# Patient Record
Sex: FEMALE | Race: WHITE | NOT HISPANIC OR LATINO | Employment: OTHER | URBAN - NONMETROPOLITAN AREA
[De-identification: names, ages, dates, MRNs, and addresses within clinical notes are randomized per-mention and may not be internally consistent; named-entity substitution may affect disease eponyms.]

---

## 2017-01-04 DIAGNOSIS — I65.23 BILATERAL CAROTID ARTERY STENOSIS: Primary | ICD-10-CM

## 2017-01-07 ENCOUNTER — TRANSCRIBE ORDERS (OUTPATIENT)
Dept: CARDIAC SURGERY | Facility: CLINIC | Age: 82
End: 2017-01-07

## 2017-01-07 DIAGNOSIS — I65.23 BILATERAL CAROTID ARTERY OCCLUSION: Primary | ICD-10-CM

## 2017-01-31 ENCOUNTER — OFFICE VISIT (OUTPATIENT)
Dept: CARDIAC SURGERY | Facility: CLINIC | Age: 82
End: 2017-01-31

## 2017-01-31 VITALS
SYSTOLIC BLOOD PRESSURE: 149 MMHG | DIASTOLIC BLOOD PRESSURE: 74 MMHG | HEART RATE: 70 BPM | HEIGHT: 62 IN | OXYGEN SATURATION: 97 % | WEIGHT: 106.3 LBS | BODY MASS INDEX: 19.56 KG/M2

## 2017-01-31 DIAGNOSIS — I65.23 BILATERAL CAROTID ARTERY STENOSIS: Primary | ICD-10-CM

## 2017-01-31 PROBLEM — I65.29 CAROTID ARTERY STENOSIS: Status: ACTIVE | Noted: 2017-01-31

## 2017-01-31 PROCEDURE — 99212 OFFICE O/P EST SF 10 MIN: CPT | Performed by: NURSE PRACTITIONER

## 2017-02-01 NOTE — PROGRESS NOTES
Subjective   Patient ID: Naomi IRVING Son is a 82 y.o. female is here today for follow-up CAROTID STENOSIS.    History of Present Illness  The following portions of the patient's history were reviewed and updated as appropriate: allergies, current medications, past family history, past medical history, past social history, past surgical history and problem list.  Carotid Artery Disease:  No amaurosis fugax, No TIA, No stroke, No dizziness, No syncope  12/19/2013 Carotid Duplex:  CASPER 16-49% antegrade.  LICA 50-79% antegrade.  12/18/14  Carotid Duplex:  CASPER 50-79% (ratio 2.4)   LICA 50-79% (ratio 2.4).  6/23/15: Carotid duplex: CASPER 50-79%(ratio 2.9) LICA 50-79%  12/29/15: Carotid duplex: CASPER 50-79% (ratio 3.6). LICA 50-79% ratio 1.8)   7/28/16: Carotid duplex: CASPER 50-79% (mPSV 239cm/s ratio 2.9) LICA 50-79% (mPSV 236cm/s ratio 1.7)  1/31/17: Carotid duplex: CASPER 50-69% (mPSV 185cm/s, ratio 1.7) LICA 50-69% (mPSV 202cm/s, ratio 2.0)    Ms Son is a 82yr woman with CAD, NSTEMI, ischemic cardiomyopathy (EF 10-15%), CHF, HTN, CKD3, dyslipidemia,  CHF, and carotid stenosis.  She returns today in scheduled follow up for carotid stenosis evaluation.  She denies any neurosensory or motor symptoms.   Denies visual or motor changes.  No CVA/TIA.     11/16/2013 Echocardiogram:  LA 38, LV 56, RV 49, IVS 9.  EF 20%.  GIULIA 2.4.  11/22/2013 Cardiac Catheterization:  LAD 70-80%, D1 70-80%, CX 95%, RCA 30% mild irreg.  moderate MR, EF 10-15%.  /24.  12/18/2013 MISSY:  EF 20%, global severe hypokinesis, no clot, LVH.  central moderate MR.  mild to moderate AI.  12/19/2013 Myocardial Viability Study:  anterior wall normal.  inferior no appreciable viable tissue in infarcted area.  lateral small amount vialble tissue on delayed images, most nonviable. (Dr Cisneros  has reviewed, noting infarcted nonviable lateral wall, anterior and inferior walls are viable.)  12/19/2013 Lower extremity vein mapping:  RIGHT 2.3-5.0mm.  LEFT  1.8-3.8mm.  3/10/2014 Echocardiogram:  LA 37, LV 58, RV 49, IVS 9.  EF 35%.  GIULIA 2.4. tr MR  4/8/14: OFF PUMP CABG X 1  4/22/14: CXR : Bilateral pleural effusions, left greater than right, and  minimal bibasilar atelectasis.  5/28/14: Echo: EF 45-50%. LV borderline reduced. LA mildly dilated. Borderline global hypokineses of LV.       Current Outpatient Prescriptions:   •  aspirin 81 MG EC tablet, Take 81 mg by mouth Daily., Disp: , Rfl:   •  atorvastatin (LIPITOR) 10 MG tablet, Take 10 mg by mouth Daily., Disp: , Rfl:   •  B Complex-C-Zn-Folic Acid (DIALYVITE/ZINC) tablet, , Disp: , Rfl:   •  carvedilol (COREG) 12.5 MG tablet, Take 6.25 mg by mouth 2 (Two) Times a Day With Meals., Disp: , Rfl:   •  clopidogrel (PLAVIX) 75 MG tablet, Take 75 mg by mouth Daily., Disp: , Rfl:   •  furosemide (LASIX) 40 MG tablet, Take 40 mg by mouth As Needed (edema)., Disp: , Rfl:   •  losartan (COZAAR) 50 MG tablet, Take 50 mg by mouth Daily., Disp: , Rfl:   •  magnesium oxide (MAGOX) 400 (241.3 MG) MG tablet tablet, Take 400 mg by mouth 2 (Two) Times a Day., Disp: , Rfl:   •  paricalcitol (ZEMPLAR) 1 MCG capsule, Take 1 mcg by mouth Take As Directed. Monday and Thursday, Disp: , Rfl:     Review of Systems   Eyes: Negative for visual disturbance.   Cardiovascular: Negative for claudication.   Respiratory: Negative for shortness of breath.    Hematologic/Lymphatic: Negative for bleeding problem.   Skin: Negative for color change.   Musculoskeletal: Negative for muscle weakness.   Gastrointestinal: Negative for dysphagia.   Genitourinary: Negative for dysuria.   Neurological: Negative for focal weakness, light-headedness, loss of balance, numbness and paresthesias.   All other systems reviewed and are negative.       Objective   Physical Exam   Constitutional: She is oriented to person, place, and time. She appears well-developed.   HENT:   Head: Normocephalic.   Eyes: Pupils are equal, round, and reactive to light.   Neck: Carotid  bruit is present.   Cardiovascular: Normal rate.    Pulmonary/Chest: Effort normal and breath sounds normal.   Abdominal: Soft. Bowel sounds are normal.   Musculoskeletal: Normal range of motion. She exhibits no edema.   Neurological: She is alert and oriented to person, place, and time. No cranial nerve deficit.   Skin: Skin is warm and dry. No erythema.   Psychiatric: She has a normal mood and affect.   Vitals reviewed.      Office Visit on 01/31/2017   Component Date Value Ref Range Status   • Prox CCA PSV 01/31/2017 141.6  cm/sec In process   • Prox CCA EDV 01/31/2017 17.8  cm/sec In process   • Dist CCA PSV 01/31/2017 69.4  cm/sec In process   • Dist CCA EDV 01/31/2017 13.7  cm/sec In process   • Prox ECA PSV 01/31/2017 222.1  cm/sec In process   • Prox ECA EDV 01/31/2017 0  cm/sec In process   • Prox ICA PSV 01/31/2017 168.9  cm/sec In process   • Prox ICA EDV 01/31/2017 32.3  cm/sec In process   • Mid ICA PSV 01/31/2017 185.7  cm/sec In process   • Mid ICA EDV 01/31/2017 29.5  cm/sec In process   • Dist ICA PSV 01/31/2017 130.3  cm/sec In process   • Dist ICA EDV 01/31/2017 22  cm/sec In process   • Vertebral A PSV 01/31/2017 121.5  cm/sec In process   • Vertebral A EDV 01/31/2017 20.9  cm/sec In process   • ICA/CCA ratio 01/31/2017 1.3   In process   • Prox CCA PSV 01/31/2017 83.3  cm/sec In process   • Prox CCA EDV 01/31/2017 0  cm/sec In process   • Dist CCA PSV 01/31/2017 110.9  cm/sec In process   • Dist CCA EDV 01/31/2017 15  cm/sec In process   • Prox ECA PSV 01/31/2017 510.9  cm/sec In process   • Prox ECA EDV 01/31/2017 0  cm/sec In process   • Prox ICA PSV 01/31/2017 202.4  cm/sec In process   • Prox ICA EDV 01/31/2017 29.5  cm/sec In process   • Mid ICA PSV 01/31/2017 149.1  cm/sec In process   • Mid ICA EDV 01/31/2017 13  cm/sec In process   • Dist ICA PSV 01/31/2017 74.5  cm/sec In process   • Dist ICA EDV 01/31/2017 13.9  cm/sec In process   • Vertebral A PSV 01/31/2017 61.7  cm/sec In process    • Vertebral A EDV 01/31/2017 15  cm/sec In process   • ICA/CCA ratio 01/31/2017 1.8   In process       Assessment/Plan   Independent Review of Radiographic Studies:    Detailed discussion regarding risks, benefits, and treatment plan.  Patient understands, agrees, and wishes to proceed with plan.  Progressing well.     1. Bilateral carotid artery stenosis  Grade 2 Moderate Carotid Stenosis BICA, Asymptomatic. Velocities stable.   - Duplex Carotid Ultrasound CAR; Future (1yr)  Medical Management: ASA,PLAVIX,STATIN   If you should experience any neurological symptoms including but not limited to visual or speech disturbances confusion, seizures, or weakness of limbs of one side of your body notify [facility] center immediately for evaluation or if after hours present to the nearest Emergency Department.              This document has been electronically signed by CHANA Redd on February 1, 2017 3:24 PM

## 2017-02-26 RX ORDER — ATORVASTATIN CALCIUM 10 MG/1
TABLET, FILM COATED ORAL
Qty: 30 TABLET | Refills: 5 | Status: CANCELLED | OUTPATIENT
Start: 2017-02-26

## 2017-02-27 RX ORDER — ATORVASTATIN CALCIUM 10 MG/1
10 TABLET, FILM COATED ORAL DAILY
Qty: 30 TABLET | Refills: 4 | Status: SHIPPED | OUTPATIENT
Start: 2017-02-27 | End: 2017-07-24 | Stop reason: SDUPTHER

## 2017-04-18 ENCOUNTER — TRANSCRIBE ORDERS (OUTPATIENT)
Dept: LAB | Facility: HOSPITAL | Age: 82
End: 2017-04-18

## 2017-04-18 ENCOUNTER — APPOINTMENT (OUTPATIENT)
Dept: LAB | Facility: HOSPITAL | Age: 82
End: 2017-04-18

## 2017-04-18 DIAGNOSIS — I10 ESSENTIAL HYPERTENSION, MALIGNANT: ICD-10-CM

## 2017-04-18 DIAGNOSIS — N18.30 CHRONIC KIDNEY DISEASE, STAGE III (MODERATE) (HCC): ICD-10-CM

## 2017-04-18 DIAGNOSIS — I10 ESSENTIAL HYPERTENSION, BENIGN: ICD-10-CM

## 2017-04-18 DIAGNOSIS — E83.30 DISORDERS OF PHOSPHORUS METABOLISM: ICD-10-CM

## 2017-04-18 DIAGNOSIS — I42.8 OTHER PRIMARY CARDIOMYOPATHIES: Primary | ICD-10-CM

## 2017-04-18 LAB
25(OH)D3 SERPL-MCNC: 40.2 NG/ML (ref 30–100)
ALBUMIN SERPL-MCNC: 4.4 G/DL (ref 3.4–4.8)
ALBUMIN/GLOB SERPL: 1.3 G/DL (ref 1.1–1.8)
ALP SERPL-CCNC: 142 U/L (ref 38–126)
ALT SERPL W P-5'-P-CCNC: 24 U/L (ref 9–52)
ANION GAP SERPL CALCULATED.3IONS-SCNC: 13 MMOL/L (ref 5–15)
AST SERPL-CCNC: 48 U/L (ref 14–36)
BASOPHILS # BLD AUTO: 0.06 10*3/MM3 (ref 0–0.2)
BASOPHILS NFR BLD AUTO: 1.2 % (ref 0–2)
BILIRUB SERPL-MCNC: 0.6 MG/DL (ref 0.2–1.3)
BUN BLD-MCNC: 20 MG/DL (ref 7–21)
BUN/CREAT SERPL: 13 (ref 7–25)
CALCIUM SPEC-SCNC: 9.5 MG/DL (ref 8.4–10.2)
CHLORIDE SERPL-SCNC: 99 MMOL/L (ref 95–110)
CO2 SERPL-SCNC: 26 MMOL/L (ref 22–31)
CREAT BLD-MCNC: 1.54 MG/DL (ref 0.5–1)
DEPRECATED RDW RBC AUTO: 43.5 FL (ref 36.4–46.3)
EOSINOPHIL # BLD AUTO: 0.27 10*3/MM3 (ref 0–0.7)
EOSINOPHIL NFR BLD AUTO: 5.2 % (ref 0–7)
ERYTHROCYTE [DISTWIDTH] IN BLOOD BY AUTOMATED COUNT: 13.7 % (ref 11.5–14.5)
GFR SERPL CREATININE-BSD FRML MDRD: 32 ML/MIN/1.73 (ref 39–90)
GLOBULIN UR ELPH-MCNC: 3.5 GM/DL (ref 2.3–3.5)
GLUCOSE BLD-MCNC: 109 MG/DL (ref 60–100)
HCT VFR BLD AUTO: 32.8 % (ref 35–45)
HGB BLD-MCNC: 10.9 G/DL (ref 12–15.5)
IMM GRANULOCYTES # BLD: 0.01 10*3/MM3 (ref 0–0.02)
IMM GRANULOCYTES NFR BLD: 0.2 % (ref 0–0.5)
LYMPHOCYTES # BLD AUTO: 1.16 10*3/MM3 (ref 0.6–4.2)
LYMPHOCYTES NFR BLD AUTO: 22.4 % (ref 10–50)
MCH RBC QN AUTO: 28.8 PG (ref 26.5–34)
MCHC RBC AUTO-ENTMCNC: 33.2 G/DL (ref 31.4–36)
MCV RBC AUTO: 86.5 FL (ref 80–98)
MONOCYTES # BLD AUTO: 0.53 10*3/MM3 (ref 0–0.9)
MONOCYTES NFR BLD AUTO: 10.2 % (ref 0–12)
NEUTROPHILS # BLD AUTO: 3.16 10*3/MM3 (ref 2–8.6)
NEUTROPHILS NFR BLD AUTO: 60.8 % (ref 37–80)
PLATELET # BLD AUTO: 281 10*3/MM3 (ref 150–450)
PMV BLD AUTO: 10.2 FL (ref 8–12)
POTASSIUM BLD-SCNC: 4.9 MMOL/L (ref 3.5–5.1)
PROT SERPL-MCNC: 7.9 G/DL (ref 6.3–8.6)
PTH-INTACT SERPL-MCNC: 55.9 PG/ML (ref 10–65)
RBC # BLD AUTO: 3.79 10*6/MM3 (ref 3.77–5.16)
SODIUM BLD-SCNC: 138 MMOL/L (ref 137–145)
WBC NRBC COR # BLD: 5.19 10*3/MM3 (ref 3.2–9.8)

## 2017-04-18 PROCEDURE — 83970 ASSAY OF PARATHORMONE: CPT | Performed by: INTERNAL MEDICINE

## 2017-04-18 PROCEDURE — 36415 COLL VENOUS BLD VENIPUNCTURE: CPT | Performed by: INTERNAL MEDICINE

## 2017-04-18 PROCEDURE — 85025 COMPLETE CBC W/AUTO DIFF WBC: CPT | Performed by: INTERNAL MEDICINE

## 2017-04-18 PROCEDURE — 82306 VITAMIN D 25 HYDROXY: CPT | Performed by: INTERNAL MEDICINE

## 2017-04-18 PROCEDURE — 80053 COMPREHEN METABOLIC PANEL: CPT | Performed by: INTERNAL MEDICINE

## 2017-06-07 ENCOUNTER — OFFICE VISIT (OUTPATIENT)
Dept: CARDIOLOGY | Facility: CLINIC | Age: 82
End: 2017-06-07

## 2017-06-07 VITALS
BODY MASS INDEX: 18.77 KG/M2 | DIASTOLIC BLOOD PRESSURE: 70 MMHG | SYSTOLIC BLOOD PRESSURE: 120 MMHG | WEIGHT: 102 LBS | HEIGHT: 62 IN | HEART RATE: 54 BPM

## 2017-06-07 DIAGNOSIS — I10 ESSENTIAL HYPERTENSION: ICD-10-CM

## 2017-06-07 DIAGNOSIS — I25.5 ISCHEMIC CARDIOMYOPATHY: Primary | ICD-10-CM

## 2017-06-07 DIAGNOSIS — E78.2 MIXED HYPERLIPIDEMIA: ICD-10-CM

## 2017-06-07 DIAGNOSIS — Z95.1 S/P CABG (CORONARY ARTERY BYPASS GRAFT): ICD-10-CM

## 2017-06-07 PROCEDURE — 99213 OFFICE O/P EST LOW 20 MIN: CPT | Performed by: INTERNAL MEDICINE

## 2017-06-07 RX ORDER — CARVEDILOL 25 MG/1
6.25 TABLET ORAL 2 TIMES DAILY WITH MEALS
COMMUNITY
End: 2020-02-21

## 2017-06-07 RX ORDER — MULTIVITAMIN WITH IRON
250 TABLET ORAL NIGHTLY
COMMUNITY
End: 2017-12-06

## 2017-06-07 RX ORDER — LOSARTAN POTASSIUM 25 MG/1
25 TABLET ORAL DAILY
COMMUNITY
End: 2019-02-14 | Stop reason: ALTCHOICE

## 2017-06-07 NOTE — PROGRESS NOTES
Naomi IRVING Son  82 y.o. female    06/07/2017  1. Ischemic cardiomyopathy    2. Essential hypertension    3. Mixed hyperlipidemia    4. S/P CABG (coronary artery bypass graft)        History of Present Illness    . Rm is here for a follow-up of her multiple cardiac issues.  She denied any chest pain, shortness of breath, palpitation, dizziness or syncope.  She takes Lasix only on a when necessary basis.  Blood pressure was in the normal range.  No signs of congestive heart failure was noted.  Her last echocardiogram showed normal LV systolic function.  She follows up with nephrology on a regular basis and her creatinine is 1.5.  I have advised her to get a lipid profiles checked with her next blood draw.        SUBJECTIVE    No Known Allergies      Past Medical History:   Diagnosis Date   • Acute bronchitis    • Arrhythmia    • Cardiomyopathy    • Carotid artery stenosis    • Chronic kidney disease     stage 3   • Congestive heart failure    • Contusion of elbow    • Cough    • Dyslipidemia    • Essential hypertension    • Fatigue    • Generalized ischemic myocardial dysfunction    • Hyperlipidemia    • Hypertension    • Iron deficiency anemia    • Mitral valve disease    • Multiple vessel coronary artery disease    • Surgical follow-up care    • UTI (urinary tract infection)          Past Surgical History:   Procedure Laterality Date   • CARDIAC CATHETERIZATION  11/22/2013    Multi-vessel CAD with critical lesions noted in the LAD, diagonal CA and lesion complex. Critical lesion noted in the obtuse marginal CA and moderate lesion noted in the proximal RCA. LV dysfunction with LV dilatation with global hypokinesis of LV.   • CORONARY ARTERY BYPASS GRAFT  04/08/2014    CABG ( off pump), placement of LIMA to LAD coronary artery (1 distal anastomosis), placement of left femoral arterial line, vascular ultrasound guidance.   • CORONARY ARTERY BYPASS GRAFT  04/08/2014    Off Pump LIMA-LAD   • TRANSESOPHAGEAL ECHOCARDIOGRAM  "(MISSY)  03/10/2014    with color flow-Depressed left ventricular systolic function with EF of 35%. Grade I diastolic dysfunction of the left ventricular myocardium. Mild AV regurgitation. Mild enlargement of the left ventricular cavity         Family History   Problem Relation Age of Onset   • Heart disease Other    • Hypertension Other          Social History     Social History   • Marital status:      Spouse name: N/A   • Number of children: N/A   • Years of education: N/A     Occupational History   • Not on file.     Social History Main Topics   • Smoking status: Former Smoker   • Smokeless tobacco: Never Used   • Alcohol use No   • Drug use: No   • Sexual activity: Defer     Other Topics Concern   • Not on file     Social History Narrative         Current Outpatient Prescriptions   Medication Sig Dispense Refill   • aspirin 81 MG EC tablet Take 81 mg by mouth Daily.     • atorvastatin (LIPITOR) 10 MG tablet Take 1 tablet by mouth Daily. 30 tablet 4   • B Complex-C-Zn-Folic Acid (DIALYVITE/ZINC) tablet      • carvedilol (COREG) 25 MG tablet Take 12.5 mg by mouth 2 (Two) Times a Day With Meals.     • clopidogrel (PLAVIX) 75 MG tablet Take 75 mg by mouth Daily.     • furosemide (LASIX) 40 MG tablet Take 40 mg by mouth As Needed (edema).     • losartan (COZAAR) 25 MG tablet Take 25 mg by mouth Daily.     • Magnesium 250 MG tablet Take 250 mg by mouth Every Night.       No current facility-administered medications for this visit.          OBJECTIVE    /70  Pulse 54  Ht 62\" (157.5 cm)  Wt 102 lb (46.3 kg)  BMI 18.66 kg/m2        Review of Systems     Constitutional:  Denies recent weight loss, weight gain, fever or chills     HENT:  Denies any hearing loss, epistaxis, hoarseness, or difficulty speaking.     Eyes: Wears eyeglasses or contact lenses     Respiratory:  Denies dyspnea with exertion,no cough, wheezing, or hemoptysis.     Cardiovascular: Negative for palpations, chest pain, orthopnea, PND, " peripheral edema, syncope, or claudication.     Gastrointestinal:  Denies change in bowel habits, dyspepsia, ulcer disease, hematochezia, or melena.     Endocrine: Negative for cold intolerance, heat intolerance, polydipsia, polyphagia and polyuria. Denies any history of weight change, heat/cold intolerance, polydipsia, polyuria     Genitourinary: Negative.        Physical Exam     Constitutional: Cooperative, alert and oriented, well-developed,  in no acute distress.     HENT:   Head: Normocephalic, normal hair patterns, no masses or tenderness.  Ears, Nose, and Throat: No gross abnormalities. No pallor or cyanosis. Dentition good.   Eyes: EOMS intact, PERRL, conjunctivae and lids unremarkable. Fundoscopic exam and visual fields not performed.   Neck: No palpable masses or adenopathy, no thyromegaly, no JVD, carotid pulses are full and equal bilaterally and without  Bruits.     Cardiovascular: Regular rhythm, S1 and S2 normal, no S3 or S4. Apical impulse not displaced. No murmurs, gallops, or rubs detected.     Pulmonary/Chest: Chest: normal symmetry, no tenderness to palpation, normal respiratory excursion, no intercostal retraction, no use of accessory muscles.            Pulmonary: Normal breath sounds. No rales or ronchi.    Abdominal: Abdomen soft, bowel sounds normoactive, no masses, no hepatosplenomegaly, non-tender, no bruits.     Musculoskeletal: No deformities, clubbing, cyanosis, erythema, or edema observed.    Procedures      Lab Results   Component Value Date    WBC 5.19 04/18/2017    HGB 10.9 (L) 04/18/2017    HCT 32.8 (L) 04/18/2017    MCV 86.5 04/18/2017     04/18/2017     Lab Results   Component Value Date    GLUCOSE 109 (H) 04/18/2017    BUN 20 04/18/2017    CREATININE 1.54 (H) 04/18/2017    EGFRIFNONA 32 (L) 04/18/2017    BCR 13.0 04/18/2017    CO2 26.0 04/18/2017    CALCIUM 9.5 04/18/2017    ALBUMIN 4.40 04/18/2017    LABIL2 1.3 04/18/2017    AST 48 (H) 04/18/2017    ALT 24 04/18/2017      No results found for: CHOL  Lab Results   Component Value Date    TRIG 89 01/19/2015     No results found for: HDL  Lab Results   Component Value Date    LDLCALC 92 01/19/2015     No results found for: LDL  No results found for: HDLLDLRATIO  No components found for: CHOLHDL  Lab Results   Component Value Date    HGBA1C 5.7 (H) 04/02/2014     Lab Results   Component Value Date    TSH 3.65 01/19/2016           ASSESSMENT AND PLAN   is stable with no evidence of progression of coronary artery disease.  No signs of congestive heart failure was noted.  Antiplatelet therapy with aspirin and Plavix, antihypertensive therapy with Coreg, losartan and when necessary Lasix has been continued.  Statin therapy with Lipitor has been continued.    Ada was seen today for follow-up.    Diagnoses and all orders for this visit:    Ischemic cardiomyopathy    Essential hypertension    Mixed hyperlipidemia    S/P CABG (coronary artery bypass graft)        Kalee Cisneros MD  6/7/2017  10:08 AM

## 2017-06-11 RX ORDER — CLOPIDOGREL BISULFATE 75 MG/1
TABLET ORAL
Qty: 90 TABLET | Refills: 2 | Status: CANCELLED | OUTPATIENT
Start: 2017-06-11

## 2017-06-12 RX ORDER — CLOPIDOGREL BISULFATE 75 MG/1
75 TABLET ORAL DAILY
Qty: 30 TABLET | Refills: 5 | Status: SHIPPED | OUTPATIENT
Start: 2017-06-12 | End: 2017-12-26 | Stop reason: SDUPTHER

## 2017-07-24 RX ORDER — ATORVASTATIN CALCIUM 10 MG/1
TABLET, FILM COATED ORAL
Qty: 30 TABLET | Refills: 6 | Status: SHIPPED | OUTPATIENT
Start: 2017-07-24 | End: 2018-02-23 | Stop reason: SDUPTHER

## 2017-12-06 ENCOUNTER — OFFICE VISIT (OUTPATIENT)
Dept: CARDIOLOGY | Facility: CLINIC | Age: 82
End: 2017-12-06

## 2017-12-06 VITALS
HEART RATE: 60 BPM | WEIGHT: 107 LBS | HEIGHT: 62 IN | BODY MASS INDEX: 19.69 KG/M2 | DIASTOLIC BLOOD PRESSURE: 70 MMHG | SYSTOLIC BLOOD PRESSURE: 152 MMHG

## 2017-12-06 DIAGNOSIS — Z95.1 S/P CABG (CORONARY ARTERY BYPASS GRAFT): ICD-10-CM

## 2017-12-06 DIAGNOSIS — I10 ESSENTIAL HYPERTENSION: Primary | ICD-10-CM

## 2017-12-06 DIAGNOSIS — Z86.79 H/O CHF: ICD-10-CM

## 2017-12-06 DIAGNOSIS — I25.5 ISCHEMIC CARDIOMYOPATHY: ICD-10-CM

## 2017-12-06 PROCEDURE — 99213 OFFICE O/P EST LOW 20 MIN: CPT | Performed by: INTERNAL MEDICINE

## 2017-12-06 RX ORDER — AMLODIPINE BESYLATE 5 MG/1
5 TABLET ORAL DAILY
Qty: 30 TABLET | Refills: 6 | Status: SHIPPED | OUTPATIENT
Start: 2017-12-06 | End: 2018-02-02 | Stop reason: SDUPTHER

## 2017-12-06 NOTE — PROGRESS NOTES
Naomi IRVING Son  82 y.o. female    12/06/2017  1. Essential hypertension    2. Ischemic cardiomyopathy    3. S/P CABG (coronary artery bypass graft)    4. H/O CHF        History of Present Illness    . Son Is here for follow-up of her above stated problems.  She denied any chest pain or shortness of breath but her blood pressure was noted to be elevated and when I checked it was 160/80 mmHg.  No signs of congestive heart failure was noted.  She has been compliant with her medications.  She follows up with Dr. Robertson on a regular basis and is due to have lab work done in January 2018.  I have added lipid profile to the labs ordered.        SUBJECTIVE    No Known Allergies      Past Medical History:   Diagnosis Date   • Acute bronchitis    • Arrhythmia    • Cardiomyopathy    • Carotid artery stenosis    • Chronic kidney disease     stage 3   • Congestive heart failure    • Contusion of elbow    • Cough    • Dyslipidemia    • Essential hypertension    • Fatigue    • Generalized ischemic myocardial dysfunction    • Hyperlipidemia    • Hypertension    • Iron deficiency anemia    • Mitral valve disease    • Multiple vessel coronary artery disease    • Surgical follow-up care    • UTI (urinary tract infection)          Past Surgical History:   Procedure Laterality Date   • CARDIAC CATHETERIZATION  11/22/2013    Multi-vessel CAD with critical lesions noted in the LAD, diagonal CA and lesion complex. Critical lesion noted in the obtuse marginal CA and moderate lesion noted in the proximal RCA. LV dysfunction with LV dilatation with global hypokinesis of LV.   • CORONARY ARTERY BYPASS GRAFT  04/08/2014    CABG ( off pump), placement of LIMA to LAD coronary artery (1 distal anastomosis), placement of left femoral arterial line, vascular ultrasound guidance.   • CORONARY ARTERY BYPASS GRAFT  04/08/2014    Off Pump LIMA-LAD   • TRANSESOPHAGEAL ECHOCARDIOGRAM (MISSY)  03/10/2014    with color flow-Depressed left ventricular systolic  "function with EF of 35%. Grade I diastolic dysfunction of the left ventricular myocardium. Mild AV regurgitation. Mild enlargement of the left ventricular cavity         Family History   Problem Relation Age of Onset   • Heart disease Other    • Hypertension Other          Social History     Social History   • Marital status:      Spouse name: N/A   • Number of children: N/A   • Years of education: N/A     Occupational History   • Not on file.     Social History Main Topics   • Smoking status: Former Smoker   • Smokeless tobacco: Never Used   • Alcohol use No   • Drug use: No   • Sexual activity: Defer     Other Topics Concern   • Not on file     Social History Narrative         Current Outpatient Prescriptions   Medication Sig Dispense Refill   • aspirin 81 MG EC tablet Take 81 mg by mouth Daily.     • atorvastatin (LIPITOR) 10 MG tablet TAKE ONE TABLET BY MOUTH DAILY 30 tablet 6   • carvedilol (COREG) 25 MG tablet Take 12.5 mg by mouth 2 (Two) Times a Day With Meals.     • clopidogrel (PLAVIX) 75 MG tablet Take 1 tablet by mouth Daily. 30 tablet 5   • furosemide (LASIX) 40 MG tablet Take 40 mg by mouth As Needed (edema).     • losartan (COZAAR) 25 MG tablet Take 25 mg by mouth Daily.     • amLODIPine (NORVASC) 5 MG tablet Take 1 tablet by mouth Daily. 30 tablet 6     No current facility-administered medications for this visit.          OBJECTIVE    /70  Pulse 60  Ht 157.5 cm (62.01\")  Wt 48.5 kg (107 lb)  BMI 19.57 kg/m2        Review of Systems     Constitutional:  Denies recent weight loss, weight gain, fever or chills, no change in exercise tolerance     HENT:  Denies any hearing loss, epistaxis, hoarseness, or difficulty speaking.     Eyes: Wears eyeglasses or contact lenses     Respiratory:  Denies dyspnea with exertion,no cough, wheezing, or hemoptysis.     Cardiovascular: Negative for palpations, chest pain, orthopnea, PND, peripheral edema, syncope, or claudication.     Gastrointestinal:  " Denies change in bowel habits, dyspepsia, ulcer disease, hematochezia, or melena.     Endocrine: Negative for cold intolerance, heat intolerance, polydipsia, polyphagia and polyuria.     Genitourinary: Negative.      Musculoskeletal: Denies any history of arthritic symptoms or back problems     Skin:  Denies any change in hair or nails, rashes, or skin lesions.     Allergic/Immunologic: Negative.  Negative for environmental allergies, food allergies and immunocompromised state.     Neurological:  Denies any history of recurrent headaches, strokes, TIA, or seizure disorder.     Hematological: Denies any food allergies, seasonal allergies, bleeding disorders, or lymphadenopathy.     Psychiatric/Behavioral: Denies any history of depression, substance abuse, or change in cognitive function.         Physical Exam     Constitutional: Cooperative, alert and oriented, in no acute distress.     HENT:   Head: Normocephalic, normal hair patterns, no masses or tenderness.  Ears, Nose, and Throat: No gross abnormalities. No pallor or cyanosis.   Eyes: EOMS intact, PERRL, conjunctivae and lids unremarkable. Fundoscopic exam and visual fields not performed.   Neck: No palpable masses or adenopathy, no thyromegaly, no JVD, carotid pulses are full and equal bilaterally and without  Bruits.     Cardiovascular: Regular rhythm, S1 and S2 normal, no S3 or S4. No murmurs, gallops, or rubs detected.     Pulmonary/Chest: Chest: normal symmetry, no tenderness to palpation, normal respiratory excursion, no use of accessory muscles.            Pulmonary: Normal breath sounds. No rales or ronchi.    Abdominal: Abdomen soft, bowel sounds normoactive, no masses, no hepatosplenomegaly, non-tender, no bruits.     Musculoskeletal: No deformities, clubbing, cyanosis, erythema, or edema observed.     Neurological: No gross motor or sensory deficits noted, affect appropriate, oriented to time, person, place.     Skin: Warm and dry to the touch, no  apparent skin lesions or masses noted.     Psychiatric: She has a normal mood and affect. Her behavior is normal. Judgment and thought content normal.         Procedures      Lab Results   Component Value Date    WBC 5.19 04/18/2017    HGB 10.9 (L) 04/18/2017    HCT 32.8 (L) 04/18/2017    MCV 86.5 04/18/2017     04/18/2017     Lab Results   Component Value Date    GLUCOSE 109 (H) 04/18/2017    BUN 20 04/18/2017    CREATININE 1.54 (H) 04/18/2017    EGFRIFNONA 32 (L) 04/18/2017    BCR 13.0 04/18/2017    CO2 26.0 04/18/2017    CALCIUM 9.5 04/18/2017    ALBUMIN 4.40 04/18/2017    LABIL2 1.3 04/18/2017    AST 48 (H) 04/18/2017    ALT 24 04/18/2017     No results found for: CHOL  Lab Results   Component Value Date    TRIG 89 01/19/2015     No results found for: HDL  Lab Results   Component Value Date    LDLCALC 92 01/19/2015     No results found for: LDL  No results found for: HDLLDLRATIO  No components found for: CHOLHDL  Lab Results   Component Value Date    HGBA1C 5.7 (H) 04/02/2014     Lab Results   Component Value Date    TSH 3.65 01/19/2016           ASSESSMENT AND PLAN  Mrs. Abdalla is stable with regards to her heart with no evidence of angina or congestive heart failure.  No arrhythmias noted.  Optimization of blood pressure I have started her on amlodipine 5 mg daily in addition to losartan 25 mg in a.m. and Coreg 6.25 mg twice a day.  Lipid-lowering therapy with atorvastatin has been continued and antiplatelet therapy with aspirin has been continued.    Ada was seen today for follow-up.    Diagnoses and all orders for this visit:    Essential hypertension    Ischemic cardiomyopathy    S/P CABG (coronary artery bypass graft)    H/O CHF    Other orders  -     amLODIPine (NORVASC) 5 MG tablet; Take 1 tablet by mouth Daily.        Kalee Cisneros MD  12/6/2017  11:16 AM

## 2017-12-26 RX ORDER — CLOPIDOGREL BISULFATE 75 MG/1
TABLET ORAL
Qty: 30 TABLET | Refills: 6 | Status: SHIPPED | OUTPATIENT
Start: 2017-12-26 | End: 2018-07-25 | Stop reason: SDUPTHER

## 2018-01-22 ENCOUNTER — TRANSCRIBE ORDERS (OUTPATIENT)
Dept: LAB | Facility: HOSPITAL | Age: 83
End: 2018-01-22

## 2018-01-22 ENCOUNTER — LAB (OUTPATIENT)
Dept: LAB | Facility: HOSPITAL | Age: 83
End: 2018-01-22

## 2018-01-22 DIAGNOSIS — N18.30 CHRONIC KIDNEY DISEASE, STAGE III (MODERATE) (HCC): ICD-10-CM

## 2018-01-22 DIAGNOSIS — I10 ESSENTIAL (PRIMARY) HYPERTENSION: ICD-10-CM

## 2018-01-22 DIAGNOSIS — I42.8 OBSCURE CARDIOMYOPATHY OF AFRICA (HCC): Primary | ICD-10-CM

## 2018-01-22 DIAGNOSIS — I10 ESSENTIAL HYPERTENSION: Primary | ICD-10-CM

## 2018-01-22 DIAGNOSIS — E83.30 DISORDER OF PHOSPHORUS METABOLISM: ICD-10-CM

## 2018-01-22 LAB
25(OH)D3 SERPL-MCNC: 33.3 NG/ML (ref 30–100)
ALBUMIN SERPL-MCNC: 4.3 G/DL (ref 3.4–4.8)
ANION GAP SERPL CALCULATED.3IONS-SCNC: 12 MMOL/L (ref 5–15)
ARTICHOKE IGE QN: 72 MG/DL (ref 1–129)
BUN BLD-MCNC: 21 MG/DL (ref 7–21)
BUN/CREAT SERPL: 12.3 (ref 7–25)
CALCIUM SPEC-SCNC: 9.7 MG/DL (ref 8.4–10.2)
CHLORIDE SERPL-SCNC: 100 MMOL/L (ref 95–110)
CHOLEST SERPL-MCNC: 163 MG/DL (ref 0–199)
CO2 SERPL-SCNC: 26 MMOL/L (ref 22–31)
CREAT BLD-MCNC: 1.71 MG/DL (ref 0.5–1)
GFR SERPL CREATININE-BSD FRML MDRD: 29 ML/MIN/1.73 (ref 39–90)
GLUCOSE BLD-MCNC: 96 MG/DL (ref 60–100)
HCT VFR BLD AUTO: 34.7 % (ref 35–45)
HDLC SERPL-MCNC: 61 MG/DL (ref 60–200)
HGB BLD-MCNC: 11.1 G/DL (ref 12–15.5)
LDLC/HDLC SERPL: 1.44 {RATIO} (ref 0–3.22)
PHOSPHATE SERPL-MCNC: 4.3 MG/DL (ref 2.4–4.4)
POTASSIUM BLD-SCNC: 4.7 MMOL/L (ref 3.5–5.1)
SODIUM BLD-SCNC: 138 MMOL/L (ref 137–145)
TRIGL SERPL-MCNC: 71 MG/DL (ref 20–199)

## 2018-01-22 PROCEDURE — 82306 VITAMIN D 25 HYDROXY: CPT | Performed by: INTERNAL MEDICINE

## 2018-01-22 PROCEDURE — 80061 LIPID PANEL: CPT

## 2018-01-22 PROCEDURE — 85018 HEMOGLOBIN: CPT | Performed by: INTERNAL MEDICINE

## 2018-01-22 PROCEDURE — 36415 COLL VENOUS BLD VENIPUNCTURE: CPT

## 2018-01-22 PROCEDURE — 83970 ASSAY OF PARATHORMONE: CPT | Performed by: INTERNAL MEDICINE

## 2018-01-22 PROCEDURE — 85014 HEMATOCRIT: CPT | Performed by: INTERNAL MEDICINE

## 2018-01-22 PROCEDURE — 80069 RENAL FUNCTION PANEL: CPT | Performed by: INTERNAL MEDICINE

## 2018-01-24 LAB — PTH-INTACT SERPL-MCNC: 78.9 PG/ML (ref 10–65)

## 2018-02-02 RX ORDER — AMLODIPINE BESYLATE 5 MG/1
5 TABLET ORAL DAILY
Qty: 30 TABLET | Refills: 3 | Status: SHIPPED | OUTPATIENT
Start: 2018-02-02 | End: 2020-11-12

## 2018-02-13 ENCOUNTER — OFFICE VISIT (OUTPATIENT)
Dept: CARDIAC SURGERY | Facility: CLINIC | Age: 83
End: 2018-02-13

## 2018-02-13 VITALS
OXYGEN SATURATION: 97 % | WEIGHT: 109 LBS | HEIGHT: 62 IN | HEART RATE: 65 BPM | BODY MASS INDEX: 20.06 KG/M2 | DIASTOLIC BLOOD PRESSURE: 70 MMHG | SYSTOLIC BLOOD PRESSURE: 138 MMHG

## 2018-02-13 DIAGNOSIS — I65.23 BILATERAL CAROTID ARTERY STENOSIS: Primary | ICD-10-CM

## 2018-02-13 PROCEDURE — 99213 OFFICE O/P EST LOW 20 MIN: CPT | Performed by: NURSE PRACTITIONER

## 2018-02-13 NOTE — PATIENT INSTRUCTIONS
Moderate Carotid Stenosis-Stable  Medical Management: ASA,PLAVIX,STATIN   If you should experience any neurological symptoms including but not limited to visual or speech disturbances confusion, seizures, or weakness of limbs of one side of your body notify [facility] V[facility] center immediately for evaluation or if after hours present to the nearest Emergency Department.    Return 1 year- Carotid Duplex

## 2018-02-13 NOTE — PROGRESS NOTES
Subjective   Patient ID: Naomi IRVING Son is a 83 y.o. female is here today for follow-up CAROTID STENOSIS.    History of Present Illness  The following portions of the patient's history were reviewed and updated as appropriate: allergies, current medications, past family history, past medical history, past social history, past surgical history and problem list.  PCP: None  Card: Neptali  Renal: Ailyn Suh Son is a 83yr woman with CAD, NSTEMI, ischemic cardiomyopathy (EF 10-15%), CHF, HTN, CKD3, dyslipidemia,  CHF, and carotid stenosis.  She returns today in scheduled follow up for carotid stenosis evaluation.  She denies any neurosensory or motor symptoms.   Denies visual or motor changes.  No CVA/TIA.     Carotid Artery Disease:  No amaurosis fugax, No TIA, No stroke, No dizziness, No syncope  12/19/2013 Carotid Duplex:  CASPER 16-49% antegrade.  LICA 50-79% antegrade.  12/18/14  Carotid Duplex:  CASPER 50-79% (ratio 2.4)   LICA 50-79% (ratio 2.4).  6/23/15: Carotid duplex: CASPER 50-79%(ratio 2.9) LICA 50-79%  12/29/15: Carotid duplex: CASPER 50-79% (ratio 3.6). LICA 50-79% ratio 1.8)   7/28/16: Carotid duplex: CASPER 50-79% (mPSV 239cm/s ratio 2.9) LICA 50-79% (mPSV 236cm/s ratio 1.7)  1/31/17: Carotid duplex: CASPER 50-69% (mPSV 185cm/s, ratio 1.7) LICA 50-69% (mPSV 202cm/s, ratio 2.0)  2/3/18: Carotid duplex: CASPER 50-69% (nIAP407lc/s, ratio 3.1) LICA 50-69% (mPSV 407cm/s, ratio 0.9)    Cardiac  11/16/2013 Echocardiogram:  LA 38, LV 56, RV 49, IVS 9.  EF 20%.  GIULIA 2.4.  11/22/2013 Cardiac Catheterization:  LAD 70-80%, D1 70-80%, CX 95%, RCA 30% mild irreg.  moderate MR, EF 10-15%.  /24.  12/18/2013 MISSY:  EF 20%, global severe hypokinesis, no clot, LVH.  central moderate MR.  mild to moderate AI.  12/19/2013 Myocardial Viability Study:  anterior wall normal.  inferior no appreciable viable tissue in infarcted area.  lateral small amount vialble tissue on delayed images, most nonviable. (Dr Cisneros  has reviewed, noting  infarcted nonviable lateral wall, anterior and inferior walls are viable.)  12/19/2013 Lower extremity vein mapping:  RIGHT 2.3-5.0mm.  LEFT 1.8-3.8mm.  3/10/2014 Echocardiogram:  LA 37, LV 58, RV 49, IVS 9.  EF 35%.  GIULIA 2.4. tr MR  4/8/14: OFF PUMP CABG X 1  4/22/14: CXR : Bilateral pleural effusions, left greater than right, and  minimal bibasilar atelectasis.  5/28/14: Echo: EF 45-50%. LV borderline reduced. LA mildly dilated. Borderline global hypokineses of LV.       Current Outpatient Prescriptions:   •  amLODIPine (NORVASC) 5 MG tablet, Take 1 tablet by mouth Daily., Disp: 30 tablet, Rfl: 3  •  aspirin 81 MG EC tablet, Take 81 mg by mouth Daily., Disp: , Rfl:   •  atorvastatin (LIPITOR) 10 MG tablet, TAKE ONE TABLET BY MOUTH DAILY, Disp: 30 tablet, Rfl: 6  •  carvedilol (COREG) 25 MG tablet, Take 6.25 mg by mouth 2 (Two) Times a Day With Meals., Disp: , Rfl:   •  clopidogrel (PLAVIX) 75 MG tablet, TAKE ONE TABLET BY MOUTH DAILY, Disp: 30 tablet, Rfl: 6  •  furosemide (LASIX) 40 MG tablet, Take 40 mg by mouth As Needed (edema)., Disp: , Rfl:   •  losartan (COZAAR) 25 MG tablet, Take 25 mg by mouth Daily., Disp: , Rfl:     Review of Systems   Eyes: Negative for visual disturbance.   Cardiovascular: Negative for claudication.   Respiratory: Negative for shortness of breath.    Hematologic/Lymphatic: Negative for bleeding problem.   Skin: Negative for color change.   Musculoskeletal: Negative for muscle weakness.   Gastrointestinal: Negative for dysphagia.   Genitourinary: Negative for dysuria.   Neurological: Negative for focal weakness, light-headedness, loss of balance, numbness and paresthesias.   All other systems reviewed and are negative.       Objective    Vitals:    02/13/18 1034   BP: 138/70   Pulse: 65   SpO2: 97%    Body mass index is 19.93 kg/(m^2).    Physical Exam   Constitutional: She is oriented to person, place, and time. She appears well-developed.   HENT:   Head: Normocephalic.   Eyes: Pupils  are equal, round, and reactive to light.   Neck: Carotid bruit is present.   Cardiovascular: Normal rate.    Pulmonary/Chest: Effort normal and breath sounds normal.   Abdominal: Soft. Bowel sounds are normal.   Musculoskeletal: Normal range of motion. She exhibits no edema.   Neurological: She is alert and oriented to person, place, and time. No cranial nerve deficit.   Skin: Skin is warm and dry. No erythema.   Psychiatric: She has a normal mood and affect.   Vitals reviewed.      Hospital Outpatient Visit on 02/13/2018   Component Date Value Ref Range Status   • Prox CCA PSV 02/13/2018 71  cm/sec In process   • Prox CCA EDV 02/13/2018 11  cm/sec In process   • Dist CCA PSV 02/13/2018 64  cm/sec In process   • Dist CCA EDV 02/13/2018 13  cm/sec In process   • Prox ECA PSV 02/13/2018 449  cm/sec In process   • Prox ECA EDV 02/13/2018 0  cm/sec In process   • Prox ICA PSV 02/13/2018 222  cm/sec In process   • Prox ICA EDV 02/13/2018 38  cm/sec In process   • Mid ICA PSV 02/13/2018 188  cm/sec In process   • Mid ICA EDV 02/13/2018 35  cm/sec In process   • Dist ICA PSV 02/13/2018 96  cm/sec In process   • Dist ICA EDV 02/13/2018 15  cm/sec In process   • Vertebral A PSV 02/13/2018 99  cm/sec In process   • Vertebral A EDV 02/13/2018 15  cm/sec In process   • ICA/CCA ratio 02/13/2018 3.1   In process   • Prox CCA PSV 02/13/2018 179  cm/sec In process   • Prox CCA EDV 02/13/2018 27  cm/sec In process   • Dist CCA PSV 02/13/2018 121  cm/sec In process   • Dist CCA EDV 02/13/2018 25  cm/sec In process   • Prox ECA PSV 02/13/2018 90  cm/sec In process   • Prox ECA EDV 02/13/2018 6  cm/sec In process   • Prox ICA PSV 02/13/2018 100  cm/sec In process   • Prox ICA EDV 02/13/2018 23  cm/sec In process   • Mid ICA PSV 02/13/2018 98  cm/sec In process   • Mid ICA EDV 02/13/2018 21  cm/sec In process   • Dist ICA PSV 02/13/2018 107  cm/sec In process   • Dist ICA EDV 02/13/2018 22  cm/sec In process   • Vertebral A PSV  02/13/2018 41  cm/sec In process   • Vertebral A EDV 02/13/2018 13  cm/sec In process   • ICA/CCA ratio 02/13/2018 0.9   In process   • Diastolic ICA/CCA Ratio 02/13/2018 3.5   In process   • ICA/CCA diastolic ratio 02/13/2018 0.9   In process       Assessment/Plan   Independent Review of Radiographic Studies:    Detailed discussion regarding risks, benefits, and treatment plan.  Patient understands, agrees, and wishes to proceed with plan.  Progressing well.     1. Bilateral carotid artery stenosis  Moderate Carotid Stenosis-Stable  Medical Management: ASA,PLAVIX,STATIN   If you should experience any neurological symptoms including but not limited to visual or speech disturbances confusion, seizures, or weakness of limbs of one side of your body notify [facility] center immediately for evaluation or if after hours present to the nearest Emergency Department.    Return 1 year- Carotid Duplex  - Duplex Carotid Ultrasound CAR; Future              This document has been electronically signed by CHANA Redd on February 13, 2018 2:26 PM

## 2018-02-26 RX ORDER — ATORVASTATIN CALCIUM 10 MG/1
TABLET, FILM COATED ORAL
Qty: 30 TABLET | Refills: 5 | Status: SHIPPED | OUTPATIENT
Start: 2018-02-26 | End: 2018-08-25 | Stop reason: SDUPTHER

## 2018-06-13 ENCOUNTER — OFFICE VISIT (OUTPATIENT)
Dept: CARDIOLOGY | Facility: CLINIC | Age: 83
End: 2018-06-13

## 2018-06-13 VITALS
HEIGHT: 62 IN | SYSTOLIC BLOOD PRESSURE: 118 MMHG | BODY MASS INDEX: 19.88 KG/M2 | HEART RATE: 62 BPM | DIASTOLIC BLOOD PRESSURE: 62 MMHG | WEIGHT: 108 LBS

## 2018-06-13 DIAGNOSIS — I10 ESSENTIAL HYPERTENSION: ICD-10-CM

## 2018-06-13 DIAGNOSIS — Z95.1 S/P CABG (CORONARY ARTERY BYPASS GRAFT): ICD-10-CM

## 2018-06-13 DIAGNOSIS — E78.2 MIXED HYPERLIPIDEMIA: ICD-10-CM

## 2018-06-13 DIAGNOSIS — I25.5 ISCHEMIC CARDIOMYOPATHY: Primary | ICD-10-CM

## 2018-06-13 PROCEDURE — 99214 OFFICE O/P EST MOD 30 MIN: CPT | Performed by: INTERNAL MEDICINE

## 2018-06-13 RX ORDER — PARICALCITOL 1 UG/1
1 CAPSULE, LIQUID FILLED ORAL DAILY
COMMUNITY
End: 2019-02-14 | Stop reason: ALTCHOICE

## 2018-06-13 NOTE — PROGRESS NOTES
Naomi IRVING Son  83 y.o. female    06/13/2018  1. Ischemic cardiomyopathy    2. Mixed hyperlipidemia    3. S/P CABG (coronary artery bypass graft)    4. Essential hypertension        History of Present Illness    . Son is here for follow-up of her above stated problems.  She denied any chest pain or shortness of breath at this time and is able to perform her activities of daily living without any restrictions.  Her LV systolic function had improved post CABG.  Blood pressure was in the normal range.  No signs of congestive heart failure was noted.  She hasn't compliant with her medications.        SUBJECTIVE    No Known Allergies      Past Medical History:   Diagnosis Date   • Acute bronchitis    • Arrhythmia    • Cardiomyopathy    • Carotid artery stenosis    • Chronic kidney disease     stage 3   • Congestive heart failure    • Contusion of elbow    • Cough    • Dyslipidemia    • Essential hypertension    • Fatigue    • Generalized ischemic myocardial dysfunction    • Hyperlipidemia    • Hypertension    • Iron deficiency anemia    • Mitral valve disease    • Multiple vessel coronary artery disease    • Surgical follow-up care    • UTI (urinary tract infection)          Past Surgical History:   Procedure Laterality Date   • CARDIAC CATHETERIZATION  11/22/2013    Multi-vessel CAD with critical lesions noted in the LAD, diagonal CA and lesion complex. Critical lesion noted in the obtuse marginal CA and moderate lesion noted in the proximal RCA. LV dysfunction with LV dilatation with global hypokinesis of LV.   • CORONARY ARTERY BYPASS GRAFT  04/08/2014    CABG ( off pump), placement of LIMA to LAD coronary artery (1 distal anastomosis), placement of left femoral arterial line, vascular ultrasound guidance.   • CORONARY ARTERY BYPASS GRAFT  04/08/2014    Off Pump LIMA-LAD   • TRANSESOPHAGEAL ECHOCARDIOGRAM (MISSY)  03/10/2014    with color flow-Depressed left ventricular systolic function with EF of 35%. Grade I diastolic  "dysfunction of the left ventricular myocardium. Mild AV regurgitation. Mild enlargement of the left ventricular cavity         Family History   Problem Relation Age of Onset   • Heart disease Other    • Hypertension Other    • No Known Problems Mother    • No Known Problems Father          Social History     Social History   • Marital status:      Spouse name: N/A   • Number of children: N/A   • Years of education: N/A     Occupational History   • Not on file.     Social History Main Topics   • Smoking status: Former Smoker   • Smokeless tobacco: Never Used   • Alcohol use No   • Drug use: No   • Sexual activity: Defer     Other Topics Concern   • Not on file     Social History Narrative   • No narrative on file         Current Outpatient Prescriptions   Medication Sig Dispense Refill   • amLODIPine (NORVASC) 5 MG tablet Take 1 tablet by mouth Daily. 30 tablet 3   • aspirin 81 MG EC tablet Take 81 mg by mouth Daily.     • atorvastatin (LIPITOR) 10 MG tablet TAKE ONE TABLET BY MOUTH DAILY 30 tablet 5   • carvedilol (COREG) 25 MG tablet Take 6.25 mg by mouth 2 (Two) Times a Day With Meals.     • clopidogrel (PLAVIX) 75 MG tablet TAKE ONE TABLET BY MOUTH DAILY 30 tablet 6   • furosemide (LASIX) 40 MG tablet Take 40 mg by mouth As Needed (edema).     • losartan (COZAAR) 25 MG tablet Take 25 mg by mouth Daily.     • paricalcitol (ZEMPLAR) 1 MCG capsule Take 1 mcg by mouth Daily.       No current facility-administered medications for this visit.          OBJECTIVE    /62 (BP Location: Left arm, Patient Position: Sitting)   Pulse 62   Ht 157.5 cm (62.01\")   Wt 49 kg (108 lb)   BMI 19.75 kg/m²         Review of Systems     Constitutional:  Denies recent weight loss, weight gain, fever or chills, no change in exercise tolerance     HENT:  Denies any hearing loss, epistaxis, hoarseness, or difficulty speaking.     Eyes: Wears eyeglasses or contact lenses     Respiratory:  Dyspnea with moderate to severe " exertion,no cough, wheezing, or hemoptysis.     Cardiovascular: Negative for palpations, chest pain, orthopnea, PND, peripheral edema, syncope, or claudication.     Gastrointestinal:  Denies change in bowel habits, dyspepsia, ulcer disease, hematochezia, or melena.     Endocrine: Negative for cold intolerance, heat intolerance, polydipsia, polyphagia and polyuria.    Genitourinary: Negative.      Musculoskeletal: Denies any history of arthritic symptoms or back problems     Skin:  Denies any change in hair or nails, rashes, or skin lesions.     Allergic/Immunologic: Negative.  Negative for environmental allergies, food allergies and immunocompromised state.     Neurological:  Denies any history of recurrent headaches, strokes, TIA, or seizure disorder.     Hematological: Denies any food allergies, seasonal allergies, bleeding disorders, or lymphadenopathy.     Psychiatric/Behavioral: Denies any history of depression, substance abuse, or change in cognitive function.         Physical Exam     Constitutional: Cooperative, alert and oriented,  in no acute distress.     HENT:   Head: Normocephalic, normal hair patterns, no masses or tenderness.  Ears, Nose, and Throat: No gross abnormalities. No pallor or cyanosis.   Eyes: EOMS intact, PERRL, conjunctivae and lids unremarkable. Fundoscopic exam and visual fields not performed.   Neck: No palpable masses or adenopathy, no thyromegaly, no JVD, carotid pulses are full and equal bilaterally and without  Bruits.     Cardiovascular: Regular rhythm, S1 and S2 normal, no S3 or S4.  No murmurs, gallops, or rubs detected.     Pulmonary/Chest: Chest: normal symmetry,  normal respiratory excursion, no intercostal retraction, no use of accessory muscles.            Pulmonary: Normal breath sounds. No rales or ronchi.    Abdominal: Abdomen soft, bowel sounds normoactive, no masses, no hepatosplenomegaly, non-tender, no bruits.     Musculoskeletal: No deformities, clubbing, cyanosis,  erythema, or edema observed.     Neurological: No gross motor or sensory deficits noted, affect appropriate, oriented to time, person, place.     Skin: Warm and dry to the touch, no apparent skin lesions or masses noted.     Psychiatric: She has a normal mood and affect. Her behavior is normal. Judgment and thought content normal.         Procedures      Lab Results   Component Value Date    WBC 5.19 04/18/2017    HGB 11.1 (L) 01/22/2018    HCT 34.7 (L) 01/22/2018    MCV 86.5 04/18/2017     04/18/2017     Lab Results   Component Value Date    GLUCOSE 96 01/22/2018    BUN 21 01/22/2018    CREATININE 1.71 (H) 01/22/2018    EGFRIFNONA 29 (L) 01/22/2018    BCR 12.3 01/22/2018    CO2 26.0 01/22/2018    CALCIUM 9.7 01/22/2018    ALBUMIN 4.30 01/22/2018    LABIL2 1.3 04/18/2017    AST 48 (H) 04/18/2017    ALT 24 04/18/2017     Lab Results   Component Value Date    CHOL 163 01/22/2018     Lab Results   Component Value Date    TRIG 71 01/22/2018    TRIG 89 01/19/2015     Lab Results   Component Value Date    HDL 61 01/22/2018     No components found for: LDLCALC  Lab Results   Component Value Date    LDL 72 01/22/2018    LDL 92 01/19/2015     No results found for: HDLLDLRATIO  No components found for: CHOLHDL  Lab Results   Component Value Date    HGBA1C 5.7 (H) 04/02/2014     Lab Results   Component Value Date    TSH 3.65 01/19/2016           ASSESSMENT AND PLAN  Mrs. Abdalla is compensated with no evidence of angina or congestive heart failure at this time.  Antiplatelet therapy with aspirin and Plavix, antihypertensive therapy with amlodipine, Coreg, losartan, lipid-lowering therapy with atorvastatin has been continued.  She follows up with nephrology on a regular basis.  She uses Lasix only on a when necessary basis.  Her GFR was 29 and creatinine 1.7 in April 2018.  I have encouraged her to drink plenty of fluids.    Ada was seen today for follow-up.    Diagnoses and all orders for this visit:    Ischemic  cardiomyopathy    Mixed hyperlipidemia    S/P CABG (coronary artery bypass graft)    Essential hypertension        Kalee Cisneros MD  6/13/2018  3:29 PM

## 2018-07-25 RX ORDER — CLOPIDOGREL BISULFATE 75 MG/1
TABLET ORAL
Qty: 30 TABLET | Refills: 5 | Status: SHIPPED | OUTPATIENT
Start: 2018-07-25 | End: 2019-01-24 | Stop reason: SDUPTHER

## 2018-08-27 RX ORDER — ATORVASTATIN CALCIUM 10 MG/1
TABLET, FILM COATED ORAL
Qty: 30 TABLET | Refills: 4 | Status: SHIPPED | OUTPATIENT
Start: 2018-08-27 | End: 2019-01-25 | Stop reason: SDUPTHER

## 2018-10-30 ENCOUNTER — TRANSCRIBE ORDERS (OUTPATIENT)
Dept: LAB | Facility: HOSPITAL | Age: 83
End: 2018-10-30

## 2018-10-30 ENCOUNTER — LAB (OUTPATIENT)
Dept: LAB | Facility: HOSPITAL | Age: 83
End: 2018-10-30

## 2018-10-30 DIAGNOSIS — I10 HYPERTENSION, UNSPECIFIED TYPE: ICD-10-CM

## 2018-10-30 DIAGNOSIS — E83.30 DISORDER OF PHOSPHORUS METABOLISM: ICD-10-CM

## 2018-10-30 DIAGNOSIS — N18.30 CHRONIC RENAL DISEASE, STAGE III (HCC): ICD-10-CM

## 2018-10-30 DIAGNOSIS — I10 HYPERTENSION, UNSPECIFIED TYPE: Primary | ICD-10-CM

## 2018-10-30 LAB
ALBUMIN SERPL-MCNC: 3.9 G/DL (ref 3.4–4.8)
ALBUMIN/GLOB SERPL: 1 G/DL (ref 1.1–1.8)
ALP SERPL-CCNC: 105 U/L (ref 38–126)
ALT SERPL W P-5'-P-CCNC: 16 U/L (ref 9–52)
ANION GAP SERPL CALCULATED.3IONS-SCNC: 8 MMOL/L (ref 5–15)
AST SERPL-CCNC: 99 U/L (ref 14–36)
BILIRUB SERPL-MCNC: 0.6 MG/DL (ref 0.2–1.3)
BUN BLD-MCNC: 17 MG/DL (ref 7–21)
BUN/CREAT SERPL: 11.4 (ref 7–25)
CALCIUM SPEC-SCNC: 9 MG/DL (ref 8.4–10.2)
CHLORIDE SERPL-SCNC: 102 MMOL/L (ref 95–110)
CO2 SERPL-SCNC: 25 MMOL/L (ref 22–31)
CREAT BLD-MCNC: 1.49 MG/DL (ref 0.5–1)
GFR SERPL CREATININE-BSD FRML MDRD: 34 ML/MIN/1.73 (ref 39–90)
GLOBULIN UR ELPH-MCNC: 3.8 GM/DL (ref 2.3–3.5)
GLUCOSE BLD-MCNC: 95 MG/DL (ref 60–100)
HCT VFR BLD AUTO: 33.7 % (ref 35–45)
HGB BLD-MCNC: 11.1 G/DL (ref 12–15.5)
POTASSIUM BLD-SCNC: 4.3 MMOL/L (ref 3.5–5.1)
PROT SERPL-MCNC: 7.7 G/DL (ref 6.3–8.6)
SODIUM BLD-SCNC: 135 MMOL/L (ref 137–145)

## 2018-10-30 PROCEDURE — 85018 HEMOGLOBIN: CPT

## 2018-10-30 PROCEDURE — 36415 COLL VENOUS BLD VENIPUNCTURE: CPT

## 2018-10-30 PROCEDURE — 85014 HEMATOCRIT: CPT

## 2018-10-30 PROCEDURE — 80053 COMPREHEN METABOLIC PANEL: CPT

## 2019-01-16 ENCOUNTER — OFFICE VISIT (OUTPATIENT)
Dept: CARDIOLOGY | Facility: CLINIC | Age: 84
End: 2019-01-16

## 2019-01-16 VITALS
OXYGEN SATURATION: 96 % | WEIGHT: 112 LBS | HEIGHT: 62 IN | BODY MASS INDEX: 20.61 KG/M2 | DIASTOLIC BLOOD PRESSURE: 62 MMHG | HEART RATE: 64 BPM | SYSTOLIC BLOOD PRESSURE: 138 MMHG

## 2019-01-16 DIAGNOSIS — E78.2 MIXED HYPERLIPIDEMIA: ICD-10-CM

## 2019-01-16 DIAGNOSIS — I25.5 ISCHEMIC CARDIOMYOPATHY: Primary | ICD-10-CM

## 2019-01-16 DIAGNOSIS — Z95.1 S/P CABG (CORONARY ARTERY BYPASS GRAFT): ICD-10-CM

## 2019-01-16 DIAGNOSIS — I10 ESSENTIAL HYPERTENSION: ICD-10-CM

## 2019-01-16 PROCEDURE — 99214 OFFICE O/P EST MOD 30 MIN: CPT | Performed by: INTERNAL MEDICINE

## 2019-01-16 NOTE — PROGRESS NOTES
Naomi IRVING Son  84 y.o. female    01/16/2019  1. Ischemic cardiomyopathy    2. Mixed hyperlipidemia    3. Essential hypertension    4. S/P CABG (coronary artery bypass graft)        History of Present Illness    . Rm is here for follow-up of her above stated problems.  She denied any chest pain or shortness of breath and has been compliant with her medications.  Her blood pressure was in the normal range.  No signs of congestive heart failure was noted.  She is known to have CK D and her renal function is being monitored closely by Dr. Robertson.        SUBJECTIVE    No Known Allergies      Past Medical History:   Diagnosis Date   • Acute bronchitis    • Arrhythmia    • Cardiomyopathy (CMS/HCC)    • Carotid artery stenosis    • Chronic kidney disease     stage 3   • Congestive heart failure (CMS/HCC)    • Contusion of elbow    • Cough    • Dyslipidemia    • Essential hypertension    • Fatigue    • Generalized ischemic myocardial dysfunction    • Hyperlipidemia    • Hypertension    • Iron deficiency anemia    • Mitral valve disease    • Multiple vessel coronary artery disease    • Surgical follow-up care    • UTI (urinary tract infection)          Past Surgical History:   Procedure Laterality Date   • CARDIAC CATHETERIZATION  11/22/2013    Multi-vessel CAD with critical lesions noted in the LAD, diagonal CA and lesion complex. Critical lesion noted in the obtuse marginal CA and moderate lesion noted in the proximal RCA. LV dysfunction with LV dilatation with global hypokinesis of LV.   • CORONARY ARTERY BYPASS GRAFT  04/08/2014    CABG ( off pump), placement of LIMA to LAD coronary artery (1 distal anastomosis), placement of left femoral arterial line, vascular ultrasound guidance.   • CORONARY ARTERY BYPASS GRAFT  04/08/2014    Off Pump LIMA-LAD   • TRANSESOPHAGEAL ECHOCARDIOGRAM (MISSY)  03/10/2014    with color flow-Depressed left ventricular systolic function with EF of 35%. Grade I diastolic dysfunction of the left  "ventricular myocardium. Mild AV regurgitation. Mild enlargement of the left ventricular cavity         Family History   Problem Relation Age of Onset   • Heart disease Other    • Hypertension Other    • No Known Problems Mother    • No Known Problems Father          Social History     Socioeconomic History   • Marital status:      Spouse name: Not on file   • Number of children: Not on file   • Years of education: Not on file   • Highest education level: Not on file   Social Needs   • Financial resource strain: Not on file   • Food insecurity - worry: Not on file   • Food insecurity - inability: Not on file   • Transportation needs - medical: Not on file   • Transportation needs - non-medical: Not on file   Occupational History   • Not on file   Tobacco Use   • Smoking status: Former Smoker   • Smokeless tobacco: Never Used   Substance and Sexual Activity   • Alcohol use: No   • Drug use: No   • Sexual activity: Defer   Other Topics Concern   • Not on file   Social History Narrative   • Not on file         Current Outpatient Medications   Medication Sig Dispense Refill   • amLODIPine (NORVASC) 5 MG tablet Take 1 tablet by mouth Daily. 30 tablet 3   • aspirin 81 MG EC tablet Take 81 mg by mouth Daily.     • atorvastatin (LIPITOR) 10 MG tablet TAKE ONE TABLET BY MOUTH DAILY 30 tablet 4   • carvedilol (COREG) 25 MG tablet Take 6.25 mg by mouth 2 (Two) Times a Day With Meals.     • clopidogrel (PLAVIX) 75 MG tablet TAKE ONE TABLET BY MOUTH DAILY 30 tablet 5   • furosemide (LASIX) 40 MG tablet Take 40 mg by mouth As Needed (edema).     • losartan (COZAAR) 25 MG tablet Take 25 mg by mouth Daily.     • paricalcitol (ZEMPLAR) 1 MCG capsule Take 1 mcg by mouth Daily.       No current facility-administered medications for this visit.          OBJECTIVE    /62   Pulse 64   Ht 157.5 cm (62.01\")   Wt 50.8 kg (112 lb)   SpO2 96%   BMI 20.48 kg/m²         Review of Systems     Constitutional:  Denies recent " weight loss, weight gain, fever or chills, no change in exercise tolerance     HENT:  Denies any hearing loss, epistaxis, hoarseness, or difficulty speaking.     Eyes: Wears eyeglasses or contact lenses     Respiratory:  Denies dyspnea with exertion,no cough, wheezing, or hemoptysis.     Cardiovascular: Negative for palpations, chest pain, orthopnea, PND    Gastrointestinal:  Denies change in bowel habits, dyspepsia, ulcer disease, hematochezia, or melena.     Endocrine: Negative for cold intolerance, heat intolerance, polydipsia, polyphagia and polyuria.    Genitourinary: Negative.      Musculoskeletal: Denies any history of arthritic symptoms or back problems     Skin:  Denies any change in hair or nails, rashes, or skin lesions.     Allergic/Immunologic: Negative.  Negative for environmental allergies, food allergies and immunocompromised state.     Neurological:  Denies any history of recurrent headaches, strokes, TIA, or seizure disorder.     Hematological: Denies any food allergies, seasonal allergies, bleeding disorders, or lymphadenopathy.     Psychiatric/Behavioral: Denies any history of depression, substance abuse, or change in cognitive function.         Physical Exam     Constitutional: Cooperative, alert and oriented,  in no acute distress.     HENT:   Head: Normocephalic, normal hair patterns, no masses or tenderness.  Ears, Nose, and Throat: No gross abnormalities. No pallor or cyanosis.   Eyes: EOMS intact, PERRL, conjunctivae and lids unremarkable. Fundoscopic exam and visual fields not performed.   Neck: No palpable masses or adenopathy, no thyromegaly, no JVD, carotid pulses are full and equal bilaterally and without  Bruits.     Cardiovascular: Regular rhythm, S1 and S2 normal, no S3 or S4. No murmurs, gallops, or rubs detected.     Pulmonary/Chest: Chest: normal symmetry, normal respiratory excursion, no intercostal retraction, no use of accessory muscles.            Pulmonary: Normal breath  sounds. No rales or ronchi.    Abdominal: Abdomen soft, bowel sounds normoactive, no masses, no hepatosplenomegaly, non-tender, no bruits.     Musculoskeletal: No deformities, clubbing, cyanosis, erythema, or edema observed.     Neurological: No gross motor or sensory deficits noted, affect appropriate, oriented to time, person, place.     Skin: Warm and dry to the touch, no apparent skin lesions or masses noted.     Psychiatric: She has a normal mood and affect. Her behavior is normal. Judgment and thought content normal.         Procedures      Lab Results   Component Value Date    WBC 5.19 04/18/2017    HGB 11.1 (L) 10/30/2018    HCT 33.7 (L) 10/30/2018    MCV 86.5 04/18/2017     04/18/2017     Lab Results   Component Value Date    GLUCOSE 95 10/30/2018    BUN 17 10/30/2018    CREATININE 1.49 (H) 10/30/2018    EGFRIFNONA 34 (L) 10/30/2018    BCR 11.4 10/30/2018    CO2 25.0 10/30/2018    CALCIUM 9.0 10/30/2018    ALBUMIN 3.90 10/30/2018    AST 99 (H) 10/30/2018    ALT 16 10/30/2018     Lab Results   Component Value Date    CHOL 163 01/22/2018     Lab Results   Component Value Date    TRIG 71 01/22/2018    TRIG 89 01/19/2015     Lab Results   Component Value Date    HDL 61 01/22/2018     No components found for: LDLCALC  Lab Results   Component Value Date    LDL 72 01/22/2018    LDL 92 01/19/2015     No results found for: HDLLDLRATIO  No components found for: CHOLHDL  Lab Results   Component Value Date    HGBA1C 5.7 (H) 04/02/2014     Lab Results   Component Value Date    TSH 3.65 01/19/2016           ASSESSMENT AND PLAN  Mrs. Abdalla is able to perform her activities of daily living without any restrictions.  To reassess her left ventricular and valvular function and echocardiogram is being arranged.  A fasting lipid profiles will be checked and she has a lab work done in February for Dr. Robertson.  I have continued antiplatelet therapy with aspirin and Plavix, antihypertensive therapy with amlodipine, Coreg,  losartan.  She takes Lasix only on an as-needed basis.    Ada was seen today for follow-up.    Diagnoses and all orders for this visit:    Ischemic cardiomyopathy  -     Lipid Panel; Future  -     Adult Transthoracic Echo Complete W/ Cont if Necessary Per Protocol; Future    Mixed hyperlipidemia  -     Lipid Panel; Future    Essential hypertension  -     Adult Transthoracic Echo Complete W/ Cont if Necessary Per Protocol; Future    S/P CABG (coronary artery bypass graft)  -     Lipid Panel; Future  -     Adult Transthoracic Echo Complete W/ Cont if Necessary Per Protocol; Future        Patient's Body mass index is 20.48 kg/m².. BMI is within normal parameters. No follow-up required..       Kalee Cisneros MD  1/16/2019  11:14 AM

## 2019-01-24 RX ORDER — CLOPIDOGREL BISULFATE 75 MG/1
TABLET ORAL
Qty: 30 TABLET | Refills: 6 | Status: SHIPPED | OUTPATIENT
Start: 2019-01-24 | End: 2019-08-25 | Stop reason: SDUPTHER

## 2019-01-25 RX ORDER — ATORVASTATIN CALCIUM 10 MG/1
10 TABLET, FILM COATED ORAL DAILY
Qty: 30 TABLET | Refills: 4 | Status: SHIPPED | OUTPATIENT
Start: 2019-01-25 | End: 2019-06-23 | Stop reason: SDUPTHER

## 2019-02-14 ENCOUNTER — OFFICE VISIT (OUTPATIENT)
Dept: CARDIAC SURGERY | Facility: CLINIC | Age: 84
End: 2019-02-14

## 2019-02-14 VITALS
SYSTOLIC BLOOD PRESSURE: 136 MMHG | TEMPERATURE: 98.1 F | HEIGHT: 63 IN | OXYGEN SATURATION: 94 % | HEART RATE: 60 BPM | WEIGHT: 113.2 LBS | BODY MASS INDEX: 20.06 KG/M2 | DIASTOLIC BLOOD PRESSURE: 62 MMHG

## 2019-02-14 DIAGNOSIS — I65.23 BILATERAL CAROTID ARTERY STENOSIS: Primary | ICD-10-CM

## 2019-02-14 DIAGNOSIS — E78.2 MIXED HYPERLIPIDEMIA: ICD-10-CM

## 2019-02-14 DIAGNOSIS — I10 ESSENTIAL HYPERTENSION: ICD-10-CM

## 2019-02-14 PROCEDURE — 99214 OFFICE O/P EST MOD 30 MIN: CPT | Performed by: NURSE PRACTITIONER

## 2019-02-14 RX ORDER — IRBESARTAN 75 MG/1
75 TABLET ORAL DAILY
COMMUNITY
End: 2021-01-01 | Stop reason: SDUPTHER

## 2019-02-14 NOTE — PATIENT INSTRUCTIONS
moderate Carotid Artery Stenosis bilateral remained stable Asymptomatic  Medical Management: ASA,PLAVIX,STATIN   If you should experience any neurological symptoms including but not limited to visual or speech disturbances confusion, seizures, or weakness of limbs of one side of your body notify Heart and Vascular center immediately for evaluation or if after hours present to the nearest Emergency Department.    Return 6 mos- Carotid duplex

## 2019-02-21 NOTE — PROGRESS NOTES
Subjective   Patient ID: Naomi IRVING Son is a 84 y.o. female is here today for follow-up     Chief Complaint   Patient presents with   • Carotid Artery Disease     1 yr f/u         The following portions of the patient's history were reviewed and updated as appropriate: allergies, current medications, past family history, past medical history, past social history, past surgical history and problem list.  PCP: None  Card: Neptali  Renal: Bradclaire    Ms Son is a 84yr woman with CAD, NSTEMI, ischemic cardiomyopathy (EF 10-15%), CHF, HTN, CKD3, dyslipidemia,  CHF, and carotid stenosis.  She returns today in scheduled follow up for carotid stenosis evaluation.  She denies any neurosensory or motor symptoms.   Denies visual or motor changes.  No CVA/TIA.     12/19/2013 Carotid Duplex:  CASPER 16-49% antegrade.  LICA 50-79% antegrade.  12/18/14  Carotid Duplex:  CASPER 50-79% (ratio 2.4)   LICA 50-79% (ratio 2.4).  6/23/15: Carotid duplex: CASPER 50-79%(ratio 2.9) LICA 50-79%  12/29/15: Carotid duplex: CASPER 50-79% (ratio 3.6). LICA 50-79% ratio 1.8)   7/28/16: Carotid duplex: CASPER 50-79% (mPSV 239cm/s ratio 2.9) LICA 50-79% (mPSV 236cm/s ratio 1.7)  1/31/17: Carotid duplex: CASPER 50-69% (mPSV 185cm/s, ratio 1.7) LICA 50-69% (mPSV 202cm/s, ratio 2.0)  2/3/18: Carotid duplex: CASPER 50-69% ([id number], ratio 3.1) LICA 50-69% (mPSV 407cm/s, ratio 0.9)  2/14/19: Carotid duplex: CASPER >70% (204/3.0) LICA 50-69% (143/1.1) Antegrade    Cardiac  11/16/2013 Echocardiogram:  LA 38, LV 56, RV 49, IVS 9.  EF 20%.  GIULIA 2.4.  11/22/2013 Cardiac Catheterization:  LAD 70-80%, D1 70-80%, CX 95%, RCA 30% mild irreg.  moderate MR, EF 10-15%.  /24.  12/18/2013 MISSY:  EF 20%, global severe hypokinesis, no clot, LVH.  central moderate MR.  mild to moderate AI.  12/19/2013 Myocardial Viability Study:  anterior wall normal.  inferior no appreciable viable tissue in infarcted area.  lateral small amount vialble tissue on delayed images, most nonviable. (  Blayne  has reviewed, noting infarcted nonviable lateral wall, anterior and inferior walls are viable.)  12/19/2013 Lower extremity vein mapping:  RIGHT 2.3-5.0mm.  LEFT 1.8-3.8mm.  3/10/2014 Echocardiogram:  LA 37, LV 58, RV 49, IVS 9.  EF 35%.  GIULIA 2.4. tr MR  4/8/14: OFF PUMP CABG X 1  4/22/14: CXR : Bilateral pleural effusions, left greater than right, and  minimal bibasilar atelectasis.  5/28/14: Echo: EF 45-50%. LV borderline reduced. LA mildly dilated. Borderline global hypokineses of LV.   Past Medical History:   Diagnosis Date   • Acute bronchitis    • Arrhythmia    • Cardiomyopathy (CMS/HCC)    • Carotid artery stenosis    • Chronic kidney disease     stage 3   • Congestive heart failure (CMS/HCC)    • Contusion of elbow    • Cough    • Dyslipidemia    • Essential hypertension    • Fatigue    • Generalized ischemic myocardial dysfunction    • Hyperlipidemia    • Hypertension    • Iron deficiency anemia    • Mitral valve disease    • Multiple vessel coronary artery disease    • Surgical follow-up care    • UTI (urinary tract infection)       Past Surgical History:   Procedure Laterality Date   • CARDIAC CATHETERIZATION  11/22/2013    Multi-vessel CAD with critical lesions noted in the LAD, diagonal CA and lesion complex. Critical lesion noted in the obtuse marginal CA and moderate lesion noted in the proximal RCA. LV dysfunction with LV dilatation with global hypokinesis of LV.   • CORONARY ARTERY BYPASS GRAFT  04/08/2014    CABG ( off pump), placement of LIMA to LAD coronary artery (1 distal anastomosis), placement of left femoral arterial line, vascular ultrasound guidance.   • CORONARY ARTERY BYPASS GRAFT  04/08/2014    Off Pump LIMA-LAD   • TRANSESOPHAGEAL ECHOCARDIOGRAM (MISSY)  03/10/2014    with color flow-Depressed left ventricular systolic function with EF of 35%. Grade I diastolic dysfunction of the left ventricular myocardium. Mild AV regurgitation. Mild enlargement of the left ventricular  cavity      Social History     Tobacco Use   • Smoking status: Former Smoker   • Smokeless tobacco: Never Used   Substance Use Topics   • Alcohol use: No   • Drug use: No      Family History   Problem Relation Age of Onset   • Heart disease Other    • Hypertension Other    • No Known Problems Mother    • No Known Problems Father         Current Outpatient Medications:   •  amLODIPine (NORVASC) 5 MG tablet, Take 1 tablet by mouth Daily., Disp: 30 tablet, Rfl: 3  •  aspirin 81 MG EC tablet, Take 81 mg by mouth Daily., Disp: , Rfl:   •  atorvastatin (LIPITOR) 10 MG tablet, Take 1 tablet by mouth Daily., Disp: 30 tablet, Rfl: 4  •  carvedilol (COREG) 25 MG tablet, Take 6.25 mg by mouth 2 (Two) Times a Day With Meals., Disp: , Rfl:   •  clopidogrel (PLAVIX) 75 MG tablet, TAKE ONE TABLET BY MOUTH DAILY, Disp: 30 tablet, Rfl: 6  •  furosemide (LASIX) 40 MG tablet, Take 40 mg by mouth As Needed (edema)., Disp: , Rfl:   •  irbesartan (AVAPRO) 75 MG tablet, Take 75 mg by mouth Every Night., Disp: , Rfl:     Review of Systems   Eyes: Negative for visual disturbance.   Cardiovascular: Negative for claudication.   Respiratory: Negative for shortness of breath.    Hematologic/Lymphatic: Negative for bleeding problem.   Skin: Negative for color change.   Musculoskeletal: Negative for muscle weakness.   Gastrointestinal: Negative for dysphagia.   Genitourinary: Negative for dysuria.   Neurological: Negative for focal weakness, light-headedness, loss of balance, numbness and paresthesias.   All other systems reviewed and are negative.       Objective    Vitals:    02/14/19 1402   BP: 136/62   Pulse: 60   Temp: 98.1 °F (36.7 °C)   SpO2: 94%    Body mass index is 20.37 kg/m².    Physical Exam   Constitutional: She is oriented to person, place, and time. She appears well-developed.   HENT:   Head: Normocephalic.   Eyes: Pupils are equal, round, and reactive to light.   Neck: Carotid bruit is present.   Cardiovascular: Normal rate.    Pulmonary/Chest: Effort normal and breath sounds normal.   Abdominal: Soft. Bowel sounds are normal.   Musculoskeletal: Normal range of motion. She exhibits no edema.   Neurological: She is alert and oriented to person, place, and time. No cranial nerve deficit.   Skin: Skin is warm and dry. No erythema.   Psychiatric: She has a normal mood and affect.   Vitals reviewed.      Hospital Outpatient Visit on 02/14/2019   Component Date Value Ref Range Status   • Prox CCA PSV 02/14/2019 200  cm/sec Final   • Prox CCA EDV 02/14/2019 13.1  cm/sec Final   • Dist CCA PSV 02/14/2019 61.2  cm/sec Final   • Dist CCA EDV 02/14/2019 9.5  cm/sec Final   • Prox ECA PSV 02/14/2019 555.6  cm/sec Final   • Prox ECA EDV 02/14/2019 0  cm/sec Final   • Prox ICA PSV 02/14/2019 204.1  cm/sec Final   • Prox ICA EDV 02/14/2019 34.6  cm/sec Final   • Mid ICA PSV 02/14/2019 135.3  cm/sec Final   • Mid ICA EDV 02/14/2019 17  cm/sec Final   • Dist ICA PSV 02/14/2019 91.5  cm/sec Final   • Dist ICA EDV 02/14/2019 15  cm/sec Final   • Vertebral A PSV 02/14/2019 121.5  cm/sec Final   • Vertebral A EDV 02/14/2019 17  cm/sec Final   • ICA/CCA ratio 02/14/2019 3.3   Final   • Prox CCA PSV 02/14/2019 141.5  cm/sec Final   • Prox CCA EDV 02/14/2019 19.9  cm/sec Final   • Dist CCA PSV 02/14/2019 143.1  cm/sec Final   • Dist CCA EDV 02/14/2019 17.5  cm/sec Final   • Prox ECA PSV 02/14/2019 193  cm/sec Final   • Prox ECA EDV 02/14/2019 0  cm/sec Final   • Prox ICA PSV 02/14/2019 143.9  cm/sec Final   • Prox ICA EDV 02/14/2019 20.1  cm/sec Final   • Mid ICA PSV 02/14/2019 92.8  cm/sec Final   • Mid ICA EDV 02/14/2019 15  cm/sec Final   • Dist ICA PSV 02/14/2019 75.9  cm/sec Final   • Dist ICA EDV 02/14/2019 16.3  cm/sec Final   • Vertebral A PSV 02/14/2019 57.3  cm/sec Final   • Vertebral A EDV 02/14/2019 11.9  cm/sec Final   • ICA/CCA ratio 02/14/2019 1   Final   • Diastolic ICA/CCA Ratio 02/14/2019 3.6   Final   • ICA/CCA diastolic ratio 02/14/2019 1.1    Final       Assessment/Plan   Independent Review of Radiographic Studies:    Detailed discussion regarding risks, benefits, and treatment plan.  Patient understands, agrees, and wishes to proceed with plan.  Progressing well.     1. Bilateral carotid artery stenosis  moderate Carotid Artery Stenosis bilateral remained stable Asymptomatic  Medical Management: ASA,PLAVIX,STATIN   If you should experience any neurological symptoms including but not limited to visual or speech disturbances confusion, seizures, or weakness of limbs of one side of your body notify [facility] center immediately for evaluation or if after hours present to the nearest Emergency Department.    Return 6 mos- Carotid duplex  - Duplex Carotid Ultrasound CAR; Future    2. Mixed hyperlipidemia  Lipid-lowering therapy has been proven beneficial in patients with vascular disease. Current guidelines recommend statin treatment for all patients with PAD and carotid stenosis. Statins are beneficial in preventing cardiovascular events, increasing functional capacity and lower the risk of adverse limb loss in PAD. Statins decrease the progression of plaque formation and may improve peripheral vessel lining, and aid in reversing atherosclerosis.    3. Essential hypertension  Controlled             This document has been electronically signed by CHANA Redd on February 21, 2019 3:50 PM

## 2019-06-24 RX ORDER — ATORVASTATIN CALCIUM 10 MG/1
TABLET, FILM COATED ORAL
Qty: 30 TABLET | Refills: 3 | Status: SHIPPED | OUTPATIENT
Start: 2019-06-24 | End: 2019-10-24 | Stop reason: SDUPTHER

## 2019-07-24 ENCOUNTER — APPOINTMENT (OUTPATIENT)
Dept: LAB | Facility: HOSPITAL | Age: 84
End: 2019-07-24

## 2019-07-24 ENCOUNTER — TRANSCRIBE ORDERS (OUTPATIENT)
Dept: LAB | Facility: HOSPITAL | Age: 84
End: 2019-07-24

## 2019-07-24 DIAGNOSIS — I10 BENIGN ESSENTIAL HYPERTENSION: ICD-10-CM

## 2019-07-24 DIAGNOSIS — E83.30 DISORDER OF PHOSPHORUS METABOLISM: ICD-10-CM

## 2019-07-24 DIAGNOSIS — I42.8 OTHER CARDIOMYOPATHIES (HCC): Primary | ICD-10-CM

## 2019-07-24 DIAGNOSIS — N18.30 CHRONIC KIDNEY DISEASE, STAGE III (MODERATE) (HCC): ICD-10-CM

## 2019-07-24 LAB
25(OH)D3 SERPL-MCNC: 62.9 NG/ML (ref 30–100)
ALBUMIN SERPL-MCNC: 4.2 G/DL (ref 3.5–5.2)
ANION GAP SERPL CALCULATED.3IONS-SCNC: 11.6 MMOL/L (ref 5–15)
BUN BLD-MCNC: 15 MG/DL (ref 8–23)
BUN/CREAT SERPL: 10.4 (ref 7–25)
CALCIUM SPEC-SCNC: 9.5 MG/DL (ref 8.6–10.5)
CHLORIDE SERPL-SCNC: 99 MMOL/L (ref 98–107)
CO2 SERPL-SCNC: 26.4 MMOL/L (ref 22–29)
CREAT BLD-MCNC: 1.44 MG/DL (ref 0.57–1)
CREAT UR-MCNC: 154.3 MG/DL
GFR SERPL CREATININE-BSD FRML MDRD: 35 ML/MIN/1.73
GLUCOSE BLD-MCNC: 101 MG/DL (ref 65–99)
PHOSPHATE SERPL-MCNC: 3.4 MG/DL (ref 2.5–4.5)
POTASSIUM BLD-SCNC: 4.2 MMOL/L (ref 3.5–5.2)
PROT UR-MCNC: 49 MG/DL
PROT/CREAT UR: 317.6 MG/G CREA (ref 0–200)
PTH-INTACT SERPL-MCNC: 41.8 PG/ML (ref 15–65)
SODIUM BLD-SCNC: 137 MMOL/L (ref 136–145)

## 2019-07-24 PROCEDURE — 80061 LIPID PANEL: CPT | Performed by: INTERNAL MEDICINE

## 2019-07-24 PROCEDURE — 83700 LIPOPRO BLD ELECTROPHORETIC: CPT | Performed by: INTERNAL MEDICINE

## 2019-07-24 PROCEDURE — 83970 ASSAY OF PARATHORMONE: CPT | Performed by: INTERNAL MEDICINE

## 2019-07-24 PROCEDURE — 82306 VITAMIN D 25 HYDROXY: CPT | Performed by: INTERNAL MEDICINE

## 2019-07-24 PROCEDURE — 82570 ASSAY OF URINE CREATININE: CPT | Performed by: INTERNAL MEDICINE

## 2019-07-24 PROCEDURE — 36415 COLL VENOUS BLD VENIPUNCTURE: CPT | Performed by: INTERNAL MEDICINE

## 2019-07-24 PROCEDURE — 80069 RENAL FUNCTION PANEL: CPT | Performed by: INTERNAL MEDICINE

## 2019-07-24 PROCEDURE — 84156 ASSAY OF PROTEIN URINE: CPT | Performed by: INTERNAL MEDICINE

## 2019-07-31 ENCOUNTER — OFFICE VISIT (OUTPATIENT)
Dept: CARDIOLOGY | Facility: CLINIC | Age: 84
End: 2019-07-31

## 2019-07-31 VITALS
DIASTOLIC BLOOD PRESSURE: 78 MMHG | OXYGEN SATURATION: 98 % | HEIGHT: 63 IN | HEART RATE: 56 BPM | SYSTOLIC BLOOD PRESSURE: 120 MMHG | BODY MASS INDEX: 20.38 KG/M2 | WEIGHT: 115 LBS

## 2019-07-31 DIAGNOSIS — Z95.1 S/P CABG (CORONARY ARTERY BYPASS GRAFT): ICD-10-CM

## 2019-07-31 DIAGNOSIS — I10 ESSENTIAL HYPERTENSION: ICD-10-CM

## 2019-07-31 DIAGNOSIS — E78.2 MIXED HYPERLIPIDEMIA: ICD-10-CM

## 2019-07-31 DIAGNOSIS — I25.5 ISCHEMIC CARDIOMYOPATHY: Primary | ICD-10-CM

## 2019-07-31 LAB
CHOLEST SERPL-MCNC: 171 MG/DL (ref 100–199)
HDLC SERPL-MCNC: 58 MG/DL
LDLC SERPL CALC-MCNC: 95 MG/DL (ref 0–99)
LP SERPL ELPH-IMP: NORMAL
TRIGL SERPL-MCNC: 89 MG/DL (ref 0–149)
VLDLC SERPL-MCNC: 18 MG/DL (ref 5–40)

## 2019-07-31 PROCEDURE — 99214 OFFICE O/P EST MOD 30 MIN: CPT | Performed by: INTERNAL MEDICINE

## 2019-07-31 NOTE — PROGRESS NOTES
Naomi IRVING Son  84 y.o. female    07/31/2019  1. Ischemic cardiomyopathy    2. Essential hypertension    3. Mixed hyperlipidemia    4. S/P CABG (coronary artery bypass graft)        History of Present Illness  Naomi Son is here for follow-up of her above-stated problems.  She has done well from a symptom standpoint and denied any chest pain, shortness of breath or palpitation.  Echocardiogram performed in January of this year showed the following findings:    · The left ventricular cavity is mildly dilated.  · Estimated EF = 48%.  · Left ventricular systolic function is low normal.  · Left ventricular diastolic dysfunction (grade I) consistent with impaired relaxation.  · Left atrial cavity size is borderline dilated.  · Mild aortic valve regurgitation is present.  · Mild mitral valve regurgitation is present  · Mild tricuspid valve regurgitation is present.     Blood pressure was in the normal range.  She does have bilateral carotid stenosis which is being followed closely by vascular surgery. Lipid profiles were in the normal range when checked last week.      SUBJECTIVE    No Known Allergies      Past Medical History:   Diagnosis Date   • Acute bronchitis    • Arrhythmia    • Cardiomyopathy (CMS/HCC)    • Carotid artery stenosis    • Chronic kidney disease     stage 3   • Congestive heart failure (CMS/HCC)    • Contusion of elbow    • Cough    • Dyslipidemia    • Essential hypertension    • Fatigue    • Generalized ischemic myocardial dysfunction    • Hyperlipidemia    • Hypertension    • Iron deficiency anemia    • Mitral valve disease    • Multiple vessel coronary artery disease    • Surgical follow-up care    • UTI (urinary tract infection)          Past Surgical History:   Procedure Laterality Date   • CARDIAC CATHETERIZATION  11/22/2013    Multi-vessel CAD with critical lesions noted in the LAD, diagonal CA and lesion complex. Critical lesion noted in the obtuse marginal CA and moderate lesion noted in the proximal  "RCA. LV dysfunction with LV dilatation with global hypokinesis of LV.   • CORONARY ARTERY BYPASS GRAFT  04/08/2014    CABG ( off pump), placement of LIMA to LAD coronary artery (1 distal anastomosis), placement of left femoral arterial line, vascular ultrasound guidance.   • CORONARY ARTERY BYPASS GRAFT  04/08/2014    Off Pump LIMA-LAD   • TRANSESOPHAGEAL ECHOCARDIOGRAM (MISSY)  03/10/2014    with color flow-Depressed left ventricular systolic function with EF of 35%. Grade I diastolic dysfunction of the left ventricular myocardium. Mild AV regurgitation. Mild enlargement of the left ventricular cavity         Family History   Problem Relation Age of Onset   • Heart disease Other    • Hypertension Other    • No Known Problems Mother    • No Known Problems Father          Social History     Socioeconomic History   • Marital status:      Spouse name: Not on file   • Number of children: Not on file   • Years of education: Not on file   • Highest education level: Not on file   Tobacco Use   • Smoking status: Former Smoker   • Smokeless tobacco: Never Used   Substance and Sexual Activity   • Alcohol use: No   • Drug use: No   • Sexual activity: Defer         Current Outpatient Medications   Medication Sig Dispense Refill   • amLODIPine (NORVASC) 5 MG tablet Take 1 tablet by mouth Daily. 30 tablet 3   • aspirin 81 MG EC tablet Take 81 mg by mouth Daily.     • atorvastatin (LIPITOR) 10 MG tablet TAKE ONE TABLET BY MOUTH DAILY 30 tablet 3   • carvedilol (COREG) 25 MG tablet Take 6.25 mg by mouth 2 (Two) Times a Day With Meals.     • clopidogrel (PLAVIX) 75 MG tablet TAKE ONE TABLET BY MOUTH DAILY 30 tablet 6   • furosemide (LASIX) 40 MG tablet Take 40 mg by mouth As Needed (edema).     • irbesartan (AVAPRO) 75 MG tablet Take 75 mg by mouth Every Night.       No current facility-administered medications for this visit.          OBJECTIVE    /78   Pulse 56   Ht 158.8 cm (62.52\")   Wt 52.2 kg (115 lb)   SpO2 98% "   BMI 20.69 kg/m²         Review of Systems     Constitutional:  Denies recent weight loss, weight gain, fever or chills     HENT:  Denies any hearing loss, epistaxis, hoarseness, or difficulty speaking.     Eyes: Wears eyeglasses or contact lenses     Respiratory:  Denies dyspnea with exertion,no cough, wheezing, or hemoptysis.     Cardiovascular: Negative for palpations, chest pain, orthopnea, PND    Gastrointestinal:  Denies change in bowel habits, dyspepsia, ulcer disease, hematochezia, or melena.     Endocrine: Negative for cold intolerance, heat intolerance, polydipsia, polyphagia and polyuria.     Genitourinary: CKD      Musculoskeletal: Denies any history of arthritic symptoms or back problems     Skin:  Denies any change in hair or nails, rashes, or skin lesions.     Allergic/Immunologic: Negative.  Negative for environmental allergies, food allergies and immunocompromised state.     Neurological:  Denies any history of recurrent headaches, strokes, TIA, or seizure disorder.     Hematological: Denies any food allergies, seasonal allergies, bleeding disorders, or lymphadenopathy.     Psychiatric/Behavioral: Denies any history of depression, substance abuse, or change in cognitive function.         Physical Exam     Constitutional: Cooperative, alert and oriented,  in no acute distress.     HENT:   Head: Normocephalic, normal hair patterns, no masses or tenderness.  Ears, Nose, and Throat: No gross abnormalities. No pallor or cyanosis.   Eyes: EOMS intact, PERRL, conjunctivae and lids unremarkable. Fundoscopic exam and visual fields not performed.   Neck: No palpable masses or adenopathy, no thyromegaly, no JVD, carotid pulses are full and equal bilaterally and without  Bruits.     Cardiovascular: Regular rhythm, S1 and S2 normal, no S3 or S4.  No murmurs, gallops, or rubs detected.     Pulmonary/Chest: Chest: normal symmetry,  normal respiratory excursion, no intercostal retraction, no use of accessory  muscles.            Pulmonary: Normal breath sounds. No rales or ronchi.    Abdominal: Abdomen soft, bowel sounds normoactive, no masses, no hepatosplenomegaly, non-tender, no bruits.     Musculoskeletal: No deformities, clubbing, cyanosis, erythema, or edema observed.     Neurological: No gross motor or sensory deficits noted, affect appropriate, oriented to time, person, place.     Skin: Warm and dry to the touch, no apparent skin lesions or masses noted.     Psychiatric: She has a normal mood and affect. Her behavior is normal. Judgment and thought content normal.         Procedures      Lab Results   Component Value Date    WBC 5.19 04/18/2017    HGB 11.1 (L) 10/30/2018    HCT 33.7 (L) 10/30/2018    MCV 86.5 04/18/2017     04/18/2017     Lab Results   Component Value Date    GLUCOSE 101 (H) 07/24/2019    BUN 15 07/24/2019    CREATININE 1.44 (H) 07/24/2019    EGFRIFNONA 35 (L) 07/24/2019    BCR 10.4 07/24/2019    CO2 26.4 07/24/2019    CALCIUM 9.5 07/24/2019    ALBUMIN 4.20 07/24/2019    AST 99 (H) 10/30/2018    ALT 16 10/30/2018     Lab Results   Component Value Date    CHOL 163 01/22/2018     Lab Results   Component Value Date    TRIG 89 07/24/2019    TRIG 71 01/22/2018    TRIG 89 01/19/2015     Lab Results   Component Value Date    HDL 58 07/24/2019    HDL 61 01/22/2018     No components found for: LDLCALC  Lab Results   Component Value Date    LDL 95 07/24/2019    LDL 72 01/22/2018    LDL 92 01/19/2015     No results found for: HDLLDLRATIO  No components found for: CHOLHDL  Lab Results   Component Value Date    HGBA1C 5.7 (H) 04/02/2014     Lab Results   Component Value Date    TSH 3.65 01/19/2016           ASSESSMENT AND PLAN  Ada Son is stable at this time with no clinical evidence of angina, arrhythmia or congestive heart failure.  She has CKD which is stable.  I have encouraged her to maintain a high fluid intake.  I have continued antiplatelet therapy with aspirin and Plavix, antihypertensive  therapy with amlodipine, irbesartan, carvedilol, lipid-lowering therapy with Coreg and furosemide on a as needed basis has been continued.    Ada was seen today for follow-up.    Diagnoses and all orders for this visit:    Ischemic cardiomyopathy    Essential hypertension    Mixed hyperlipidemia    S/P CABG (coronary artery bypass graft)        Patient's Body mass index is 20.69 kg/m². BMI is within normal parameters. No follow-up required..  Patient is a non-smoker          Kalee Cisneros MD  7/31/2019  11:45 AM

## 2019-08-26 RX ORDER — CLOPIDOGREL BISULFATE 75 MG/1
TABLET ORAL
Qty: 30 TABLET | Refills: 5 | Status: SHIPPED | OUTPATIENT
Start: 2019-08-26 | End: 2020-02-21

## 2019-09-05 NOTE — PROGRESS NOTES
CVTS Office Progress Note       Subjective   Patient ID: Naomi IRVING Son is a 84 y.o. female is here today for follow-up.    Chief Complaint:    Chief Complaint   Patient presents with   • Carotid Artery Disease     6 month follow up        The following portions of the patient's history were reviewed and updated as appropriate: allergies, current medications, past family history, past medical history, past social history, past surgical history and problem list.  Recent images independently reviewed.  Available laboratory values reviewed.    PCP:  Provider, No Known  Cardiology:  Neptali    84 y.o. female with CAD, NSTEMI, ischemic cardiomyopathy (EF 10-15%), CHF, HTN, CKD3, dyslipidemia,  CHF, and carotid stenosis.  She returns today in scheduled follow up for carotid stenosis evaluation.  She denies any neurosensory or motor symptoms.   Denies visual or motor changes.  No CVA/TIA.     12/19/2013 Carotid Duplex:  CASPER 16-49% antegrade.  LICA 50-79% antegrade.  12/18/14  Carotid Duplex:  CASPER 50-79% (ratio 2.4)   LICA 50-79% (ratio 2.4).  6/23/15: Carotid duplex: CASPER 50-79%(ratio 2.9) LICA 50-79%  12/29/15: Carotid duplex: CASPER 50-79% (ratio 3.6). LICA 50-79% ratio 1.8)   7/28/16: Carotid duplex: CASPER 50-79% (mPSV 239cm/s ratio 2.9) LICA 50-79% (mPSV 236cm/s ratio 1.7)  1/31/17: Carotid duplex: CASPER 50-69% (mPSV 185cm/s, ratio 1.7) LICA 50-69% (mPSV 202cm/s, ratio 2.0)  2/3/18: Carotid duplex: CASPER 50-69% (yCMN098ab/s, ratio 3.1) LICA 50-69% (mPSV 407cm/s, ratio 0.9)  2/14/19: Carotid duplex: CASPER >70% (204/3.0) LICA 50-69% (143/1.1) Antegrade  9/10/19: Carotid duplex: CASPER 50-69% (218/2.7) LICA 50-69% (268/4.5) Antegrade flow    11/16/2013 Echocardiogram:  LA 38, LV 56, RV 49, IVS 9.  EF 20%.  GIULIA 2.4.  11/22/2013 Cardiac Catheterization:  LAD 70-80%, D1 70-80%, CX 95%, RCA 30% mild irreg.  moderate MR, EF 10-15%.  /24.  12/18/2013 MISSY:  EF 20%, global severe hypokinesis, no clot, LVH.  central moderate MR.  mild to  moderate AI.  12/19/2013 Myocardial Viability Study:  anterior wall normal.  inferior no appreciable viable tissue in infarcted area.  lateral small amount vialble tissue on delayed images, most nonviable.  12/19/2013 Lower extremity vein mapping:  RIGHT 2.3-5.0mm.  LEFT 1.8-3.8mm.  3/10/2014 Echocardiogram:  LA 37, LV 58, RV 49, IVS 9.  EF 35%.  GIULIA 2.4. tr MR  4/8/14: OFF PUMP CABG X 1  4/22/14: CXR : Bilateral pleural effusions, left greater than right, and  minimal bibasilar atelectasis.  5/28/14: Echo: EF 45-50%. LV borderline reduced. LA mildly dilated. Borderline global hypokineses of LV.       Past Medical History:   Diagnosis Date   • Acute bronchitis    • Arrhythmia    • Cardiomyopathy (CMS/HCC)    • Carotid artery stenosis    • Chronic kidney disease     stage 3   • Congestive heart failure (CMS/HCC)    • Contusion of elbow    • Cough    • Dyslipidemia    • Essential hypertension    • Fatigue    • Generalized ischemic myocardial dysfunction    • Hyperlipidemia    • Hypertension    • Iron deficiency anemia    • Mitral valve disease    • Multiple vessel coronary artery disease    • Surgical follow-up care    • UTI (urinary tract infection)      Past Surgical History:   Procedure Laterality Date   • CARDIAC CATHETERIZATION  11/22/2013    Multi-vessel CAD with critical lesions noted in the LAD, diagonal CA and lesion complex. Critical lesion noted in the obtuse marginal CA and moderate lesion noted in the proximal RCA. LV dysfunction with LV dilatation with global hypokinesis of LV.   • CORONARY ARTERY BYPASS GRAFT  04/08/2014    CABG ( off pump), placement of LIMA to LAD coronary artery (1 distal anastomosis), placement of left femoral arterial line, vascular ultrasound guidance.   • CORONARY ARTERY BYPASS GRAFT  04/08/2014    Off Pump LIMA-LAD   • TRANSESOPHAGEAL ECHOCARDIOGRAM (MISSY)  03/10/2014    with color flow-Depressed left ventricular systolic function with EF of 35%. Grade I diastolic dysfunction of  the left ventricular myocardium. Mild AV regurgitation. Mild enlargement of the left ventricular cavity     Family History   Problem Relation Age of Onset   • Heart disease Other    • Hypertension Other    • No Known Problems Mother    • No Known Problems Father      Social History     Tobacco Use   • Smoking status: Former Smoker   • Smokeless tobacco: Never Used   Substance Use Topics   • Alcohol use: No   • Drug use: No       ALLERGIES:   Patient has no known allergies.    MEDICATIONS:      Current Outpatient Medications:   •  amLODIPine (NORVASC) 5 MG tablet, Take 1 tablet by mouth Daily., Disp: 30 tablet, Rfl: 3  •  aspirin 81 MG EC tablet, Take 81 mg by mouth Daily., Disp: , Rfl:   •  atorvastatin (LIPITOR) 10 MG tablet, TAKE ONE TABLET BY MOUTH DAILY, Disp: 30 tablet, Rfl: 3  •  carvedilol (COREG) 25 MG tablet, Take 6.25 mg by mouth 2 (Two) Times a Day With Meals., Disp: , Rfl:   •  clopidogrel (PLAVIX) 75 MG tablet, TAKE ONE TABLET BY MOUTH DAILY, Disp: 30 tablet, Rfl: 5  •  furosemide (LASIX) 40 MG tablet, Take 40 mg by mouth As Needed (edema)., Disp: , Rfl:   •  irbesartan (AVAPRO) 75 MG tablet, Take 75 mg by mouth Every Night., Disp: , Rfl:   •  Multiple Vitamins-Minerals (MULTIVITAMIN ADULT PO), Take  by mouth., Disp: , Rfl:     Review of Systems   Constitution: Negative for weakness.   HENT: Positive for hearing loss.    Eyes: Negative for visual disturbance.   Cardiovascular: Negative for claudication and cyanosis.   Respiratory: Negative for shortness of breath.    Skin: Negative for color change and nail changes.   Musculoskeletal: Negative for muscle weakness.   Gastrointestinal: Negative for dysphagia.   Neurological: Negative for focal weakness, light-headedness, loss of balance, numbness and paresthesias.   Psychiatric/Behavioral: Positive for memory loss. Negative for altered mental status. The patient is nervous/anxious.    All other systems reviewed and are negative.       Objective        Vitals:    09/10/19 1140   BP: 125/70   Pulse: 56   Temp: 97.9 °F (36.6 °C)   SpO2: 98%      Body mass index is 19.94 kg/m².  Physical Exam   Constitutional: She is oriented to person, place, and time. She appears well-nourished.   HENT:   Head: Atraumatic.   Eyes: EOM are normal.   Neck: Neck supple. Carotid bruit is present.   Cardiovascular: Normal heart sounds and intact distal pulses. Bradycardia present.   Pulmonary/Chest: Effort normal and breath sounds normal.   Abdominal: Soft. Bowel sounds are normal.   Musculoskeletal: Normal range of motion. She exhibits no edema.   Gait normal   Neurological: She is alert and oriented to person, place, and time.   Skin: Skin is warm and dry. Capillary refill takes less than 2 seconds.   Psychiatric: She has a normal mood and affect. Thought content normal.   Vitals reviewed.          Assessment & Plan     Independent Review of Radiographic Studies:  Detailed discussion regarding risks, benefits, and treatment plan. Images independently reviewed. Patient understands, agrees, and wishes to proceed with plan.     1. Bilateral carotid artery stenosis  Moderate-severe Carotid Artery Stenosis bilateral slightly worsened Asymptomatic  Medical Management: ASA,PLAVIX,STATIN   Wishes to wait for CTA Carotids. Re-eval with Duplex  If you should experience any neurological symptoms including but not limited to visual or speech disturbances confusion, seizures, or weakness of limbs of one side of your body notify [facility] and Vascular center immediately for evaluation or if after hours present to the nearest Emergency Department.    Return 6 mos- Duplex  - Duplex Carotid Ultrasound CAR; Future    2. Mixed hyperlipidemia  Lipid-lowering therapy has been proven beneficial in patients with vascular disease. Current guidelines recommend statin treatment for all patients with PAD and carotid stenosis. Statins are beneficial in preventing cardiovascular events, increasing functional  capacity and lower the risk of adverse limb loss in PAD. Statins decrease the progression of plaque formation and may improve peripheral vessel lining, and aid in reversing atherosclerosis.    3. Essential hypertension  Controlled. Beta, ARB, Norvasc    4. Ischemic cardiomyopathy  Results for orders placed during the hospital encounter of 01/25/19   Adult Transthoracic Echo Complete W/ Cont if Necessary Per Protocol    Narrative · The left ventricular cavity is mildly dilated.  · Estimated EF = 48%.  · Left ventricular systolic function is low normal.  · Left ventricular diastolic dysfunction (grade I) consistent with   impaired relaxation.  · Left atrial cavity size is borderline dilated.  · Mild aortic valve regurgitation is present.  · Mild mitral valve regurgitation is present  · Mild tricuspid valve regurgitation is present.      Stable currently. Symptoms controlled            This document has been electronically signed by CHANA Redd on September 11, 2019 3:25 PM

## 2019-09-10 ENCOUNTER — OFFICE VISIT (OUTPATIENT)
Dept: CARDIAC SURGERY | Facility: CLINIC | Age: 84
End: 2019-09-10

## 2019-09-10 VITALS
HEART RATE: 56 BPM | WEIGHT: 109 LBS | DIASTOLIC BLOOD PRESSURE: 70 MMHG | TEMPERATURE: 97.9 F | OXYGEN SATURATION: 98 % | HEIGHT: 62 IN | BODY MASS INDEX: 20.06 KG/M2 | SYSTOLIC BLOOD PRESSURE: 125 MMHG

## 2019-09-10 DIAGNOSIS — I65.23 BILATERAL CAROTID ARTERY STENOSIS: Primary | ICD-10-CM

## 2019-09-10 DIAGNOSIS — I25.5 ISCHEMIC CARDIOMYOPATHY: ICD-10-CM

## 2019-09-10 DIAGNOSIS — I10 ESSENTIAL HYPERTENSION: ICD-10-CM

## 2019-09-10 DIAGNOSIS — E78.2 MIXED HYPERLIPIDEMIA: ICD-10-CM

## 2019-09-10 PROCEDURE — 99214 OFFICE O/P EST MOD 30 MIN: CPT | Performed by: NURSE PRACTITIONER

## 2019-09-10 NOTE — PATIENT INSTRUCTIONS
Moderate-severe Carotid Artery Stenosis bilateral slightly worsened Asymptomatic  Medical Management: ASA,PLAVIX,STATIN   Wishes to wait for CTA Carotids. Re-eval with Duplex  If you should experience any neurological symptoms including but not limited to visual or speech disturbances confusion, seizures, or weakness of limbs of one side of your body notify Heart and Vascular center immediately for evaluation or if after hours present to the nearest Emergency Department.    Return 6 mos- Duplex

## 2019-10-24 RX ORDER — ATORVASTATIN CALCIUM 10 MG/1
TABLET, FILM COATED ORAL
Qty: 30 TABLET | Refills: 2 | Status: SHIPPED | OUTPATIENT
Start: 2019-10-24 | End: 2020-01-23

## 2020-01-23 RX ORDER — ATORVASTATIN CALCIUM 10 MG/1
TABLET, FILM COATED ORAL
Qty: 30 TABLET | Refills: 1 | Status: SHIPPED | OUTPATIENT
Start: 2020-01-23 | End: 2020-03-23

## 2020-02-20 DIAGNOSIS — Z95.1 S/P CABG (CORONARY ARTERY BYPASS GRAFT): Primary | ICD-10-CM

## 2020-02-21 ENCOUNTER — OFFICE VISIT (OUTPATIENT)
Dept: CARDIOLOGY | Facility: CLINIC | Age: 85
End: 2020-02-21

## 2020-02-21 VITALS
WEIGHT: 115 LBS | DIASTOLIC BLOOD PRESSURE: 70 MMHG | SYSTOLIC BLOOD PRESSURE: 132 MMHG | HEART RATE: 68 BPM | BODY MASS INDEX: 20.38 KG/M2 | OXYGEN SATURATION: 96 % | HEIGHT: 63 IN

## 2020-02-21 DIAGNOSIS — I10 ESSENTIAL HYPERTENSION: ICD-10-CM

## 2020-02-21 DIAGNOSIS — E78.2 MIXED HYPERLIPIDEMIA: ICD-10-CM

## 2020-02-21 DIAGNOSIS — Z95.1 S/P CABG (CORONARY ARTERY BYPASS GRAFT): ICD-10-CM

## 2020-02-21 DIAGNOSIS — I25.5 ISCHEMIC CARDIOMYOPATHY: Primary | ICD-10-CM

## 2020-02-21 PROCEDURE — 93000 ELECTROCARDIOGRAM COMPLETE: CPT | Performed by: INTERNAL MEDICINE

## 2020-02-21 PROCEDURE — 99214 OFFICE O/P EST MOD 30 MIN: CPT | Performed by: INTERNAL MEDICINE

## 2020-02-21 RX ORDER — CLOPIDOGREL BISULFATE 75 MG/1
TABLET ORAL
Qty: 30 TABLET | Refills: 6 | Status: SHIPPED | OUTPATIENT
Start: 2020-02-21 | End: 2020-09-16

## 2020-02-21 RX ORDER — CARVEDILOL 6.25 MG/1
6.25 TABLET ORAL 2 TIMES DAILY
Qty: 180 TABLET | Refills: 3
Start: 2020-02-21 | End: 2020-11-12

## 2020-02-21 NOTE — TELEPHONE ENCOUNTER
----- Message from Maria R Adamson sent at 2/21/2020  2:06 PM CST -----  Contact: [phone]  In today.....needs to talk to you about a rx that was sent in today. She thinks she made a mistake on the mg.    She left a msg on v/m today@ 1:34

## 2020-02-21 NOTE — TELEPHONE ENCOUNTER
----- Message from Maria R Adamson sent at 2/21/2020  2:06 PM CST -----  Contact: [phone]  In today.....needs to talk to you about a rx that was sent in today. She thinks she made a mistake on the mg.    She left a msg on v/m today@ 1:34

## 2020-02-21 NOTE — PROGRESS NOTES
Naomi IRVING Son  85 y.o. female    1. Ischemic cardiomyopathy    2. S/P CABG (coronary artery bypass graft)    3. Essential hypertension    4. Mixed hyperlipidemia        History of Present Illness  Ada Son is here for follow-up of her above-stated problems. She has done well from a symptom standpoint and denied any chest pain, shortness of breath or palpitation.    Echocardiogram performed in January 2019 showed the following findings:  · The left ventricular cavity is mildly dilated.  · Estimated EF = 48%.  · Left ventricular systolic function is low normal.  · Left ventricular diastolic dysfunction (grade I) consistent with impaired relaxation.  · Left atrial cavity size is borderline dilated.  · Mild aortic valve regurgitation is present.  · Mild mitral valve regurgitation is present  · Mild tricuspid valve regurgitation is present.    Carotid Doppler studies in September 2019 showed:   · Right internal carotid artery stenosis of 50-69%.  · Left internal carotid artery stenosis of 50-69%.  · Increased LT ICA/CCA ratio    Blood pressure was in the normal range.  She does have bilateral carotid stenosis which is being followed closely by vascular surgery. Lipid profiles have been in the normal range.    EKG today showed sinus rhythm with heart rate of 56 bpm.  Nonspecific ST-T changes.  Poor R wave progression anteroseptal   leads.    SUBJECTIVE    No Known Allergies      Past Medical History:   Diagnosis Date   • Acute bronchitis    • Arrhythmia    • Cardiomyopathy (CMS/HCC)    • Carotid artery stenosis    • Chronic kidney disease     stage 3   • Congestive heart failure (CMS/HCC)    • Contusion of elbow    • Cough    • Dyslipidemia    • Essential hypertension    • Fatigue    • Generalized ischemic myocardial dysfunction    • Hyperlipidemia    • Hypertension    • Iron deficiency anemia    • Mitral valve disease    • Multiple vessel coronary artery disease    • Surgical follow-up care    • UTI (urinary tract infection)           Past Surgical History:   Procedure Laterality Date   • CARDIAC CATHETERIZATION  11/22/2013    Multi-vessel CAD with critical lesions noted in the LAD, diagonal CA and lesion complex. Critical lesion noted in the obtuse marginal CA and moderate lesion noted in the proximal RCA. LV dysfunction with LV dilatation with global hypokinesis of LV.   • CORONARY ARTERY BYPASS GRAFT  04/08/2014    CABG ( off pump), placement of LIMA to LAD coronary artery (1 distal anastomosis), placement of left femoral arterial line, vascular ultrasound guidance.   • CORONARY ARTERY BYPASS GRAFT  04/08/2014    Off Pump LIMA-LAD   • TRANSESOPHAGEAL ECHOCARDIOGRAM (MISSY)  03/10/2014    with color flow-Depressed left ventricular systolic function with EF of 35%. Grade I diastolic dysfunction of the left ventricular myocardium. Mild AV regurgitation. Mild enlargement of the left ventricular cavity         Family History   Problem Relation Age of Onset   • Heart disease Other    • Hypertension Other    • No Known Problems Mother    • No Known Problems Father          Social History     Socioeconomic History   • Marital status:      Spouse name: Not on file   • Number of children: Not on file   • Years of education: Not on file   • Highest education level: Not on file   Tobacco Use   • Smoking status: Former Smoker   • Smokeless tobacco: Never Used   Substance and Sexual Activity   • Alcohol use: No   • Drug use: No   • Sexual activity: Defer         Current Outpatient Medications   Medication Sig Dispense Refill   • amLODIPine (NORVASC) 5 MG tablet Take 1 tablet by mouth Daily. 30 tablet 3   • aspirin 81 MG EC tablet Take 81 mg by mouth Daily.     • atorvastatin (LIPITOR) 10 MG tablet TAKE ONE TABLET BY MOUTH DAILY 30 tablet 1   • carvedilol (COREG) 25 MG tablet Take 6.25 mg by mouth 2 (Two) Times a Day With Meals.     • clopidogrel (PLAVIX) 75 MG tablet TAKE ONE TABLET BY MOUTH DAILY 30 tablet 5   • furosemide (LASIX) 40 MG tablet  "Take 40 mg by mouth As Needed (edema).     • irbesartan (AVAPRO) 75 MG tablet Take 75 mg by mouth Every Night.     • Multiple Vitamins-Minerals (MULTIVITAMIN ADULT PO) Take  by mouth.       No current facility-administered medications for this visit.          OBJECTIVE    /70 (BP Location: Left arm, Patient Position: Sitting, Cuff Size: Adult)   Pulse 68   Ht 158.8 cm (62.5\")   Wt 52.2 kg (115 lb)   SpO2 96%   BMI 20.70 kg/m²         Review of Systems     Constitutional:  Denies recent weight loss, weight gain, fever or chills     HENT:  Denies any hearing loss, epistaxis, hoarseness, or difficulty speaking.     Eyes: Wears eyeglasses or contact lenses     Respiratory:  Denies dyspnea with exertion,no cough, wheezing, or hemoptysis.     Cardiovascular: Negative for palpations, chest pain, orthopnea, PND    Gastrointestinal:  Denies change in bowel habits, dyspepsia, ulcer disease, hematochezia, or melena.     Endocrine: Negative for cold intolerance, heat intolerance, polydipsia, polyphagia and polyuria.     Genitourinary: CKD      Musculoskeletal: Denies any history of arthritic symptoms or back problems     Skin:  Denies any change in hair or nails, rashes, or skin lesions.     Allergic/Immunologic: Negative.  Negative for environmental allergies, food allergies and immunocompromised state.     Neurological:  Denies any history of recurrent headaches, strokes, TIA, or seizure disorder.     Hematological: Denies any food allergies, seasonal allergies, bleeding disorders, or lymphadenopathy.     Psychiatric/Behavioral: Denies any history of depression, substance abuse, or change in cognitive function.         Physical Exam     Constitutional: Cooperative, alert and oriented,  in no acute distress.     HENT:   Head: Normocephalic, normal hair patterns, no masses or tenderness.  Ears, Nose, and Throat: No gross abnormalities. No pallor or cyanosis.   Eyes: EOMS intact, PERRL, conjunctivae and lids " unremarkable. Fundoscopic exam and visual fields not performed.   Neck: No palpable masses or adenopathy, no thyromegaly, no JVD, carotid pulses are full and equal bilaterally and without  Bruits.     Cardiovascular: Regular rhythm, S1 and S2 normal, no S3 or S4.  No murmurs, gallops, or rubs detected.     Pulmonary/Chest: Chest: normal symmetry,  normal respiratory excursion, no intercostal retraction, no use of accessory muscles.            Pulmonary: Normal breath sounds. No rales or ronchi.    Abdominal: Abdomen soft, bowel sounds normoactive, no masses, no hepatosplenomegaly, non-tender, no bruits.     Musculoskeletal: No deformities, clubbing, cyanosis, erythema, or edema observed.     Neurological: No gross motor or sensory deficits noted, affect appropriate, oriented to time, person, place.     Skin: Warm and dry to the touch, no apparent skin lesions or masses noted.     Psychiatric: She has a normal mood and affect. Her behavior is normal. Judgment and thought content normal.         Procedures      Lab Results   Component Value Date    WBC 5.19 04/18/2017    HGB 11.1 (L) 10/30/2018    HCT 33.7 (L) 10/30/2018    MCV 86.5 04/18/2017     04/18/2017     Lab Results   Component Value Date    GLUCOSE 101 (H) 07/24/2019    BUN 15 07/24/2019    CREATININE 1.44 (H) 07/24/2019    EGFRIFNONA 35 (L) 07/24/2019    BCR 10.4 07/24/2019    CO2 26.4 07/24/2019    CALCIUM 9.5 07/24/2019    ALBUMIN 4.20 07/24/2019    AST 99 (H) 10/30/2018    ALT 16 10/30/2018     Lab Results   Component Value Date    CHOL 163 01/22/2018     Lab Results   Component Value Date    TRIG 89 07/24/2019    TRIG 71 01/22/2018    TRIG 89 01/19/2015     Lab Results   Component Value Date    HDL 58 07/24/2019    HDL 61 01/22/2018     No components found for: LDLCALC  Lab Results   Component Value Date    LDL 95 07/24/2019    LDL 72 01/22/2018    LDL 92 01/19/2015     No results found for: HDLLDLRATIO  No components found for: CHOLHDL  Lab  Results   Component Value Date    HGBA1C 5.7 (H) 04/02/2014     Lab Results   Component Value Date    TSH 3.65 01/19/2016           ASSESSMENT AND PLAN  Ada Son is stable at this time with no clinical evidence of angina, arrhythmia or congestive heart failure.  She has CKD which is stable.  I have encouraged her to maintain a high fluid intake.  I have continued antiplatelet therapy with aspirin and Plavix, antihypertensive therapy with amlodipine, irbesartan, carvedilol, lipid-lowering therapy with Coreg and furosemide on a as needed basis has been continued.    Ada was seen today for follow-up.    Diagnoses and all orders for this visit:    Ischemic cardiomyopathy    S/P CABG (coronary artery bypass graft)    Essential hypertension    Mixed hyperlipidemia        Patient's Body mass index is 20.7 kg/m². BMI is within normal parameters. No follow-up required..  Patient is a non-smoker          Kalee Cisneros MD  2/21/2020  11:23 AM

## 2020-03-11 ENCOUNTER — OFFICE VISIT (OUTPATIENT)
Dept: CARDIAC SURGERY | Facility: CLINIC | Age: 85
End: 2020-03-11

## 2020-03-11 ENCOUNTER — APPOINTMENT (OUTPATIENT)
Dept: LAB | Facility: HOSPITAL | Age: 85
End: 2020-03-11

## 2020-03-11 VITALS
SYSTOLIC BLOOD PRESSURE: 120 MMHG | WEIGHT: 113 LBS | OXYGEN SATURATION: 97 % | BODY MASS INDEX: 20.02 KG/M2 | HEIGHT: 63 IN | DIASTOLIC BLOOD PRESSURE: 60 MMHG | HEART RATE: 61 BPM

## 2020-03-11 DIAGNOSIS — E78.2 MIXED HYPERLIPIDEMIA: ICD-10-CM

## 2020-03-11 DIAGNOSIS — I65.23 BILATERAL CAROTID ARTERY STENOSIS: Primary | ICD-10-CM

## 2020-03-11 DIAGNOSIS — N18.30 CKD (CHRONIC KIDNEY DISEASE) STAGE 3, GFR 30-59 ML/MIN (HCC): ICD-10-CM

## 2020-03-11 DIAGNOSIS — I10 ESSENTIAL HYPERTENSION: ICD-10-CM

## 2020-03-11 LAB
ALBUMIN SERPL-MCNC: 4.1 G/DL (ref 3.5–5.2)
ALBUMIN/GLOB SERPL: 1.1 G/DL
ALP SERPL-CCNC: 97 U/L (ref 39–117)
ALT SERPL W P-5'-P-CCNC: 8 U/L (ref 1–33)
ANION GAP SERPL CALCULATED.3IONS-SCNC: 14 MMOL/L (ref 5–15)
AST SERPL-CCNC: 18 U/L (ref 1–32)
BILIRUB SERPL-MCNC: 0.3 MG/DL (ref 0.2–1.2)
BUN BLD-MCNC: 19 MG/DL (ref 8–23)
BUN/CREAT SERPL: 13.2 (ref 7–25)
CALCIUM SPEC-SCNC: 9.7 MG/DL (ref 8.6–10.5)
CHLORIDE SERPL-SCNC: 97 MMOL/L (ref 98–107)
CO2 SERPL-SCNC: 24 MMOL/L (ref 22–29)
CREAT BLD-MCNC: 1.44 MG/DL (ref 0.57–1)
GFR SERPL CREATININE-BSD FRML MDRD: 35 ML/MIN/1.73
GLOBULIN UR ELPH-MCNC: 3.9 GM/DL
GLUCOSE BLD-MCNC: 114 MG/DL (ref 65–99)
POTASSIUM BLD-SCNC: 4.8 MMOL/L (ref 3.5–5.2)
PROT SERPL-MCNC: 8 G/DL (ref 6–8.5)
SODIUM BLD-SCNC: 135 MMOL/L (ref 136–145)

## 2020-03-11 PROCEDURE — 99215 OFFICE O/P EST HI 40 MIN: CPT | Performed by: NURSE PRACTITIONER

## 2020-03-11 PROCEDURE — 80053 COMPREHEN METABOLIC PANEL: CPT | Performed by: NURSE PRACTITIONER

## 2020-03-11 PROCEDURE — 36415 COLL VENOUS BLD VENIPUNCTURE: CPT | Performed by: NURSE PRACTITIONER

## 2020-03-11 RX ORDER — SODIUM CHLORIDE 9 MG/ML
150 INJECTION, SOLUTION INTRAVENOUS ONCE
Status: CANCELLED | OUTPATIENT
Start: 2020-03-11 | End: 2020-03-11

## 2020-03-11 RX ORDER — SODIUM CHLORIDE 9 MG/ML
50 INJECTION, SOLUTION INTRAVENOUS ONCE
Status: CANCELLED | OUTPATIENT
Start: 2020-03-11

## 2020-03-11 NOTE — PROGRESS NOTES
CVTS Office Progress Note       Subjective   Patient ID: Naomi IRVING Son is a 85 y.o. female is here today for follow-up.    Chief Complaint:    Chief Complaint   Patient presents with   • Follow-up     6 mo carotid stenosis       The following portions of the patient's history were reviewed and updated as appropriate: allergies, current medications, past family history, past medical history, past social history, past surgical history and problem list.  Recent images independently reviewed.  Available laboratory values reviewed.    PCP:  System, Provider Not In  Cardiology:  Neptali    85 y.o. female with CAD, NSTEMI, ischemic cardiomyopathy (EF 10-15%), CHF, HTN, CKD3, dyslipidemia,  CHF, and carotid stenosis.  She returns today in scheduled follow up for carotid stenosis evaluation.  She denies any neurosensory or motor symptoms.   Denies visual or motor changes.  No CVA/TIA.     12/19/2013 Carotid Duplex:  CASPER 16-49% antegrade.  LICA 50-79% antegrade.  12/18/14  Carotid Duplex:  CASPER 50-79% (ratio 2.4)   LICA 50-79% (ratio 2.4).  6/23/15: Carotid duplex: CASPER 50-79%(ratio 2.9) LICA 50-79%  12/29/15: Carotid duplex: CASPER 50-79% (ratio 3.6). LICA 50-79% ratio 1.8)   7/28/16: Carotid duplex: CASPER 50-79% (mPSV 239cm/s ratio 2.9) LICA 50-79% (mPSV 236cm/s ratio 1.7)  1/31/17: Carotid duplex: CASPER 50-69% (mPSV 185cm/s, ratio 1.7) LICA 50-69% (mPSV 202cm/s, ratio 2.0)  2/3/18: Carotid duplex: CASPER 50-69% ([id number]/s, ratio 3.1) LICA 50-69% (mPSV 407cm/s, ratio 0.9)  2/14/19: Carotid duplex: CASPER >70% (204/3.0) LICA 50-69% (143/1.1) Antegrade  9/10/19: Carotid duplex: CASPER 50-69% (218/2.7) LICA 50-69% (268/4.5) Antegrade flow  3/11/2020: Carotid Duplex: RIGHT >70% (142/4.1) LEFT 50-69% (166/0.7) Antegrade      11/16/2013 Echocardiogram:  LA 38, LV 56, RV 49, IVS 9.  EF 20%.  GIULIA 2.4.  11/22/2013 Cardiac Catheterization:  LAD 70-80%, D1 70-80%, CX 95%, RCA 30% mild irreg.  moderate MR, EF 10-15%.  /24.  12/18/2013 MISSY:  EF  20%, global severe hypokinesis, no clot, LVH.  central moderate MR.  mild to moderate AI.  12/19/2013 Myocardial Viability Study:  anterior wall normal.  inferior no appreciable viable tissue in infarcted area.  lateral small amount vialble tissue on delayed images, most nonviable.  12/19/2013 Lower extremity vein mapping:  RIGHT 2.3-5.0mm.  LEFT 1.8-3.8mm.  3/10/2014 Echocardiogram:  LA 37, LV 58, RV 49, IVS 9.  EF 35%.  GIULIA 2.4. tr MR  4/8/14: OFF PUMP CABG X 1  4/22/14: CXR : Bilateral pleural effusions, left greater than right, and  minimal bibasilar atelectasis.  5/28/14: Echo: EF 45-50%. LV borderline reduced. LA mildly dilated. Borderline global hypokineses of LV.       Past Medical History:   Diagnosis Date   • Acute bronchitis    • Arrhythmia    • Cardiomyopathy (CMS/HCC)    • Carotid artery stenosis    • Chronic kidney disease     stage 3   • Congestive heart failure (CMS/HCC)    • Contusion of elbow    • Cough    • Dyslipidemia    • Essential hypertension    • Fatigue    • Generalized ischemic myocardial dysfunction    • Hyperlipidemia    • Hypertension    • Iron deficiency anemia    • Mitral valve disease    • Multiple vessel coronary artery disease    • Surgical follow-up care    • UTI (urinary tract infection)      Past Surgical History:   Procedure Laterality Date   • CARDIAC CATHETERIZATION  11/22/2013    Multi-vessel CAD with critical lesions noted in the LAD, diagonal CA and lesion complex. Critical lesion noted in the obtuse marginal CA and moderate lesion noted in the proximal RCA. LV dysfunction with LV dilatation with global hypokinesis of LV.   • CORONARY ARTERY BYPASS GRAFT  04/08/2014    CABG ( off pump), placement of LIMA to LAD coronary artery (1 distal anastomosis), placement of left femoral arterial line, vascular ultrasound guidance.   • CORONARY ARTERY BYPASS GRAFT  04/08/2014    Off Pump LIMA-LAD   • TRANSESOPHAGEAL ECHOCARDIOGRAM (MISSY)  03/10/2014    with color flow-Depressed left  ventricular systolic function with EF of 35%. Grade I diastolic dysfunction of the left ventricular myocardium. Mild AV regurgitation. Mild enlargement of the left ventricular cavity     Family History   Problem Relation Age of Onset   • Heart disease Other    • Hypertension Other    • No Known Problems Mother    • No Known Problems Father      Social History     Tobacco Use   • Smoking status: Former Smoker   • Smokeless tobacco: Never Used   Substance Use Topics   • Alcohol use: No   • Drug use: No       ALLERGIES:   Patient has no known allergies.    MEDICATIONS:      Current Outpatient Medications:   •  amLODIPine (NORVASC) 5 MG tablet, Take 1 tablet by mouth Daily., Disp: 30 tablet, Rfl: 3  •  aspirin 81 MG EC tablet, Take 81 mg by mouth Daily., Disp: , Rfl:   •  atorvastatin (LIPITOR) 10 MG tablet, TAKE ONE TABLET BY MOUTH DAILY, Disp: 30 tablet, Rfl: 1  •  carvedilol (COREG) 6.25 MG tablet, Take 1 tablet by mouth 2 (Two) Times a Day. (Patient taking differently: Take 12.5 mg by mouth 2 (Two) Times a Day.), Disp: 180 tablet, Rfl: 3  •  clopidogrel (PLAVIX) 75 MG tablet, TAKE ONE TABLET BY MOUTH DAILY, Disp: 30 tablet, Rfl: 6  •  furosemide (LASIX) 40 MG tablet, Take 40 mg by mouth As Needed (edema)., Disp: , Rfl:   •  irbesartan (AVAPRO) 75 MG tablet, Take 75 mg by mouth Every Night., Disp: , Rfl:   •  Multiple Vitamins-Minerals (MULTIVITAMIN ADULT PO), Take  by mouth., Disp: , Rfl:     Review of Systems   HENT: Positive for hearing loss.    Eyes: Negative for visual disturbance.   Cardiovascular: Negative for claudication and cyanosis.   Respiratory: Negative for shortness of breath.    Skin: Negative for color change and nail changes.   Musculoskeletal: Negative for muscle weakness.   Gastrointestinal: Negative for dysphagia.   Neurological: Negative for focal weakness, light-headedness, loss of balance, numbness, paresthesias and weakness.   Psychiatric/Behavioral: Positive for memory loss. Negative for  altered mental status. The patient is nervous/anxious.    All other systems reviewed and are negative.       Objective   Heart Rate:  [61] 61  BP: (120)/(60) 120/60   Vitals:    03/11/20 1119   BP: 120/60   Pulse: 61   SpO2: 97%      Body mass index is 20.34 kg/m².  Physical Exam   Constitutional: She is oriented to person, place, and time. She appears well-nourished.   HENT:   Head: Atraumatic.   Eyes: EOM are normal.   Neck: Neck supple. Carotid bruit is present.   Cardiovascular: Normal heart sounds and intact distal pulses. Bradycardia present.   Pulmonary/Chest: Effort normal and breath sounds normal.   Abdominal: Soft. Bowel sounds are normal.   Musculoskeletal: Normal range of motion. She exhibits no edema.   Gait normal   Neurological: She is alert and oriented to person, place, and time.   Skin: Skin is warm and dry. Capillary refill takes less than 2 seconds.   Psychiatric: She has a normal mood and affect. Thought content normal.   Vitals reviewed.    Lab Results   Component Value Date    GLUCOSE 114 (H) 03/11/2020    BUN 19 03/11/2020    CREATININE 1.44 (H) 03/11/2020    EGFRIFNONA 35 (L) 03/11/2020    BCR 13.2 03/11/2020    K 4.8 03/11/2020    CO2 24.0 03/11/2020    CALCIUM 9.7 03/11/2020    ALBUMIN 4.10 03/11/2020    AST 18 03/11/2020    ALT 8 03/11/2020           Assessment & Plan     Independent Review of Radiographic Studies:  Detailed discussion regarding risks, benefits, and treatment plan. Images independently reviewed. Patient understands, agrees, and wishes to proceed with plan.     1. Bilateral carotid artery stenosis  severe Carotid Artery Stenosis right worsened Asymptomatic   Proceed with CTA Carotids as scheduled  Discussion regarding possible surgical interventions  TCAR vs Endarterectomy  Medical Management: ASA,PLAVIX,STATIN   If you should experience any neurological symptoms including but not limited to visual or speech disturbances confusion, seizures, or weakness of limbs of one  side of your body notify Heart and Vascular center immediately for evaluation or if after hours present to the nearest Emergency Department.    Return as scheduled  - Comprehensive Metabolic Panel  - CT Angiogram Carotids; Future    2. Mixed hyperlipidemia  Lipid-lowering therapy has been proven beneficial in patients with cardio-vascular disease. Current guidelines recommend statin treatment for all patients with PAD,CAD and carotid stenosis. Statins are beneficial in preventing cardiovascular events, increasing functional capacity and lower the risk of adverse limb loss in PAD. Statins decrease the progression of plaque formation and may improve peripheral vessel lining, and aid in reversing atherosclerosis.       3. Essential hypertension  controlled    4. CKD (chronic kidney disease) stage 3, GFR 30-59 ml/min (CMS/[facility])  Estimated Creatinine Clearance: 24 mL/min (A) (by C-G formula based on SCr of 1.44 mg/dL (H)).  Indication for Pre/Post IVF  - Notify Provider if Patient Taking Diuretics or Nephrotoxic Drugs; Standing  - sodium chloride 0.9 % infusion 150 mL  - sodium chloride 0.9 % infusion    Time spent with patient 40 out of 40 min face to face evaluating, treating, and discussing findings regarding vascular exam. Coordination of CTA Carotids/orders/lab evalaution and education to patient and family regarding treatment options, plan of care, and hoped for outcomes.     -Weight WNL  -No advanced directive on file          This document has been electronically signed by CHANA Redd on March 11, 2020 14:09

## 2020-03-11 NOTE — PATIENT INSTRUCTIONS
severe Carotid Artery Stenosis right worsened Asymptomatic   Proceed with CTA Carotids as scheduled  Medical Management: ASA,PLAVIX,STATIN   If you should experience any neurological symptoms including but not limited to visual or speech disturbances confusion, seizures, or weakness of limbs of one side of your body notify Heart and Vascular center immediately for evaluation or if after hours present to the nearest Emergency Department.    Return as scheduled

## 2020-03-19 ENCOUNTER — HOSPITAL ENCOUNTER (OUTPATIENT)
Dept: ULTRASOUND IMAGING | Facility: HOSPITAL | Age: 85
Discharge: HOME | End: 2020-03-19

## 2020-03-19 ENCOUNTER — HOSPITAL ENCOUNTER (OUTPATIENT)
Dept: INTERVENTIONAL RADIOLOGY/VASCULAR | Facility: HOSPITAL | Age: 85
Discharge: HOME | End: 2020-03-19

## 2020-03-19 ENCOUNTER — HOSPITAL ENCOUNTER (OUTPATIENT)
Dept: CT IMAGING | Facility: HOSPITAL | Age: 85
Discharge: HOME | End: 2020-03-19
Admitting: NURSE PRACTITIONER

## 2020-03-19 VITALS
DIASTOLIC BLOOD PRESSURE: 58 MMHG | HEART RATE: 75 BPM | WEIGHT: 112.88 LBS | BODY MASS INDEX: 20 KG/M2 | RESPIRATION RATE: 18 BRPM | OXYGEN SATURATION: 94 % | SYSTOLIC BLOOD PRESSURE: 126 MMHG | TEMPERATURE: 98.4 F | HEIGHT: 63 IN

## 2020-03-19 DIAGNOSIS — I65.23 BILATERAL CAROTID ARTERY STENOSIS: ICD-10-CM

## 2020-03-19 DIAGNOSIS — N18.30 CKD (CHRONIC KIDNEY DISEASE) STAGE 3, GFR 30-59 ML/MIN (HCC): ICD-10-CM

## 2020-03-19 PROCEDURE — 70498 CT ANGIOGRAPHY NECK: CPT

## 2020-03-19 PROCEDURE — 0 IOPAMIDOL PER 1 ML: Performed by: NURSE PRACTITIONER

## 2020-03-19 RX ORDER — SODIUM CHLORIDE 9 MG/ML
50 INJECTION, SOLUTION INTRAVENOUS ONCE
Status: DISCONTINUED | OUTPATIENT
Start: 2020-03-19 | End: 2020-03-20 | Stop reason: HOSPADM

## 2020-03-19 RX ORDER — SODIUM CHLORIDE 9 MG/ML
150 INJECTION, SOLUTION INTRAVENOUS ONCE
Status: COMPLETED | OUTPATIENT
Start: 2020-03-19 | End: 2020-03-19

## 2020-03-19 RX ADMIN — SODIUM CHLORIDE 150 ML: 9 INJECTION, SOLUTION INTRAVENOUS at 09:28

## 2020-03-19 RX ADMIN — IOPAMIDOL 80 ML: 755 INJECTION, SOLUTION INTRAVENOUS at 11:22

## 2020-03-23 RX ORDER — ATORVASTATIN CALCIUM 10 MG/1
TABLET, FILM COATED ORAL
Qty: 30 TABLET | Refills: 6 | Status: SHIPPED | OUTPATIENT
Start: 2020-03-23 | End: 2020-09-16

## 2020-03-25 ENCOUNTER — TELEPHONE (OUTPATIENT)
Dept: CARDIAC SURGERY | Facility: CLINIC | Age: 85
End: 2020-03-25

## 2020-03-25 DIAGNOSIS — I65.23 BILATERAL CAROTID ARTERY STENOSIS: Primary | ICD-10-CM

## 2020-03-25 NOTE — TELEPHONE ENCOUNTER
Patient notified of CTA Carotid results LICA >70% CASPER >70% currently asymptomatic. Films reviewed by Dr. Latif. Will evaluate further regarding TCAR candidacy and call patient with interventional plan. She understands and agrees. All questions answered.           This document has been electronically signed by CHANA Redd on March 25, 2020 13:40

## 2020-07-21 ENCOUNTER — LAB (OUTPATIENT)
Dept: LAB | Facility: HOSPITAL | Age: 85
End: 2020-07-21

## 2020-07-21 ENCOUNTER — TRANSCRIBE ORDERS (OUTPATIENT)
Dept: LAB | Facility: HOSPITAL | Age: 85
End: 2020-07-21

## 2020-07-21 DIAGNOSIS — I10 ESSENTIAL (PRIMARY) HYPERTENSION: ICD-10-CM

## 2020-07-21 DIAGNOSIS — E83.30 DISORDER OF PHOSPHORUS METABOLISM: ICD-10-CM

## 2020-07-21 DIAGNOSIS — N18.30 CHRONIC KIDNEY DISEASE, STAGE III (MODERATE) (HCC): ICD-10-CM

## 2020-07-21 DIAGNOSIS — I42.9 PRIMARY CARDIOMYOPATHY (HCC): Primary | ICD-10-CM

## 2020-07-21 LAB
ALBUMIN SERPL-MCNC: 4.2 G/DL (ref 3.5–5.2)
ANION GAP SERPL CALCULATED.3IONS-SCNC: 10.5 MMOL/L (ref 5–15)
BUN SERPL-MCNC: 15 MG/DL (ref 8–23)
BUN/CREAT SERPL: 9.3 (ref 7–25)
CALCIUM SPEC-SCNC: 9.7 MG/DL (ref 8.6–10.5)
CHLORIDE SERPL-SCNC: 100 MMOL/L (ref 98–107)
CO2 SERPL-SCNC: 26.5 MMOL/L (ref 22–29)
CREAT SERPL-MCNC: 1.62 MG/DL (ref 0.57–1)
CREAT UR-MCNC: 292 MG/DL
GFR SERPL CREATININE-BSD FRML MDRD: 30 ML/MIN/1.73
GLUCOSE SERPL-MCNC: 103 MG/DL (ref 65–99)
HCT VFR BLD AUTO: 33.6 % (ref 34–46.6)
HGB BLD-MCNC: 11.4 G/DL (ref 12–15.9)
PHOSPHATE SERPL-MCNC: 4.2 MG/DL (ref 2.5–4.5)
POTASSIUM SERPL-SCNC: 4.6 MMOL/L (ref 3.5–5.2)
PROT UR-MCNC: 78 MG/DL
PROT/CREAT UR: 267.1 MG/G CREA (ref 0–200)
SODIUM SERPL-SCNC: 137 MMOL/L (ref 136–145)

## 2020-07-21 PROCEDURE — 85018 HEMOGLOBIN: CPT | Performed by: INTERNAL MEDICINE

## 2020-07-21 PROCEDURE — 36415 COLL VENOUS BLD VENIPUNCTURE: CPT | Performed by: INTERNAL MEDICINE

## 2020-07-21 PROCEDURE — 84156 ASSAY OF PROTEIN URINE: CPT | Performed by: INTERNAL MEDICINE

## 2020-07-21 PROCEDURE — 82570 ASSAY OF URINE CREATININE: CPT | Performed by: INTERNAL MEDICINE

## 2020-07-21 PROCEDURE — 80069 RENAL FUNCTION PANEL: CPT | Performed by: INTERNAL MEDICINE

## 2020-07-21 PROCEDURE — 85014 HEMATOCRIT: CPT | Performed by: INTERNAL MEDICINE

## 2020-09-10 ENCOUNTER — OFFICE VISIT (OUTPATIENT)
Dept: CARDIOLOGY | Facility: CLINIC | Age: 85
End: 2020-09-10

## 2020-09-10 VITALS
BODY MASS INDEX: 20.41 KG/M2 | HEIGHT: 63 IN | WEIGHT: 115.2 LBS | SYSTOLIC BLOOD PRESSURE: 124 MMHG | DIASTOLIC BLOOD PRESSURE: 70 MMHG | HEART RATE: 74 BPM | OXYGEN SATURATION: 98 %

## 2020-09-10 DIAGNOSIS — I25.5 ISCHEMIC CARDIOMYOPATHY: Primary | ICD-10-CM

## 2020-09-10 DIAGNOSIS — E78.2 MIXED HYPERLIPIDEMIA: ICD-10-CM

## 2020-09-10 DIAGNOSIS — Z95.1 S/P CABG (CORONARY ARTERY BYPASS GRAFT): ICD-10-CM

## 2020-09-10 DIAGNOSIS — I65.23 BILATERAL CAROTID ARTERY STENOSIS: ICD-10-CM

## 2020-09-10 DIAGNOSIS — I10 ESSENTIAL HYPERTENSION: ICD-10-CM

## 2020-09-10 PROCEDURE — 99214 OFFICE O/P EST MOD 30 MIN: CPT | Performed by: INTERNAL MEDICINE

## 2020-09-10 NOTE — PROGRESS NOTES
Naomi IRVING Son  85 y.o. female    1. Ischemic cardiomyopathy    2. Essential hypertension    3. Mixed hyperlipidemia    4. S/P CABG (coronary artery bypass graft)    5. Bilateral carotid artery stenosis        History of Present Illness  Naomi Son is here for follow-up of her above-stated problems. She has done well from a symptom standpoint and denied any chest pain, shortness of breath or palpitation.   Her blood pressure today was in the normal range and no signs of congestive heart failure was noted.    She is known to have carotid stenosis and underwent CT angiography of the carotid arteries and the findings were as follows:  IMPRESSION:  Right Internal Carotid Artery:  Moderate/large calcified plaque at the carotid bulb/proximal ICA  contributing to focal stenosis greater than 70%.    Left Internal Carotid Artery:  Moderate/large calcified plaque at the carotid bulb/proximal ICA  which demonstrates a medial retropharyngeal course. There is  estimated greater than 70% stenosis focally.     Left Extraforaminal Carotid Artery:  High-grade stenosis focally.     Bilateral vertebral arteries are widely patent right greater than  left with mild vascular calcification    Because of COVID she has not yet had an opportunity to follow-up with vascular surgery and an appointment will be arranged.       Echocardiogram performed in January 2019 showed an ejection fraction of 48% with mild decrease in LV global systolic function.  There is mild mitral and tricuspid regurgitation and mild aortic valve insufficiency.      SUBJECTIVE    No Known Allergies      Past Medical History:   Diagnosis Date   • Acute bronchitis    • Arrhythmia    • Cardiomyopathy (CMS/HCC)    • Carotid artery stenosis    • Chronic kidney disease     stage 3   • Congestive heart failure (CMS/HCC)    • Contusion of elbow    • Cough    • Dyslipidemia    • Essential hypertension    • Fatigue    • Generalized ischemic myocardial dysfunction    • Hyperlipidemia    •  Hypertension    • Iron deficiency anemia    • Mitral valve disease    • Multiple vessel coronary artery disease    • Surgical follow-up care    • UTI (urinary tract infection)          Past Surgical History:   Procedure Laterality Date   • CARDIAC CATHETERIZATION  11/22/2013    Multi-vessel CAD with critical lesions noted in the LAD, diagonal CA and lesion complex. Critical lesion noted in the obtuse marginal CA and moderate lesion noted in the proximal RCA. LV dysfunction with LV dilatation with global hypokinesis of LV.   • CORONARY ARTERY BYPASS GRAFT  04/08/2014    CABG ( off pump), placement of LIMA to LAD coronary artery (1 distal anastomosis), placement of left femoral arterial line, vascular ultrasound guidance.   • CORONARY ARTERY BYPASS GRAFT  04/08/2014    Off Pump LIMA-LAD   • TRANSESOPHAGEAL ECHOCARDIOGRAM (MISSY)  03/10/2014    with color flow-Depressed left ventricular systolic function with EF of 35%. Grade I diastolic dysfunction of the left ventricular myocardium. Mild AV regurgitation. Mild enlargement of the left ventricular cavity         Family History   Problem Relation Age of Onset   • Heart disease Other    • Hypertension Other    • No Known Problems Mother    • No Known Problems Father          Social History     Socioeconomic History   • Marital status:      Spouse name: Not on file   • Number of children: Not on file   • Years of education: Not on file   • Highest education level: Not on file   Tobacco Use   • Smoking status: Former Smoker   • Smokeless tobacco: Never Used   Substance and Sexual Activity   • Alcohol use: No   • Drug use: No   • Sexual activity: Defer         Current Outpatient Medications   Medication Sig Dispense Refill   • amLODIPine (NORVASC) 5 MG tablet Take 1 tablet by mouth Daily. 30 tablet 3   • aspirin 81 MG EC tablet Take 81 mg by mouth Daily.     • atorvastatin (LIPITOR) 10 MG tablet TAKE ONE TABLET BY MOUTH DAILY 30 tablet 6   • carvedilol (COREG) 6.25 MG  "tablet Take 1 tablet by mouth 2 (Two) Times a Day. (Patient taking differently: Take 12.5 mg by mouth 2 (Two) Times a Day.) 180 tablet 3   • clopidogrel (PLAVIX) 75 MG tablet TAKE ONE TABLET BY MOUTH DAILY 30 tablet 6   • furosemide (LASIX) 40 MG tablet Take 40 mg by mouth As Needed (edema).     • irbesartan (AVAPRO) 75 MG tablet Take 75 mg by mouth Every Night.     • Multiple Vitamins-Minerals (MULTIVITAMIN ADULT PO) Take  by mouth.       No current facility-administered medications for this visit.          OBJECTIVE    /70 (BP Location: Left arm, Patient Position: Sitting, Cuff Size: Adult)   Pulse 74   Ht 158.8 cm (62.5\")   Wt 52.3 kg (115 lb 3.2 oz)   SpO2 98%   BMI 20.73 kg/m²         Review of Systems     Constitutional:  Denies recent weight loss, weight gain, fever or chills     HENT:  Denies any hearing loss, epistaxis, hoarseness, or difficulty speaking.     Eyes: Wears eyeglasses or contact lenses     Respiratory:  Denies dyspnea with exertion,no cough, wheezing, or hemoptysis.     Cardiovascular: Negative for palpations, chest pain, orthopnea, PND    Gastrointestinal:  Denies change in bowel habits, dyspepsia, ulcer disease, hematochezia, or melena.     Endocrine: Negative for cold intolerance, heat intolerance, polydipsia, polyphagia and polyuria.     Genitourinary: CKD      Musculoskeletal: Denies any history of arthritic symptoms or back problems     Neurological:  Denies any history of recurrent headaches, strokes, TIA, or seizure disorder.     Hematological: Denies any food allergies, seasonal allergies, bleeding disorders, or lymphadenopathy.     Psychiatric/Behavioral: Denies any history of depression, substance abuse, or change in cognitive function.         Physical Exam     Constitutional: Cooperative, alert and oriented,  in no acute distress.     HENT:   Head: Normocephalic, normal hair patterns, no masses or tenderness.  Ears, Nose, and Throat: No gross abnormalities. No pallor or " cyanosis.   Eyes: EOMS intact, PERRL, conjunctivae and lids unremarkable. Fundoscopic exam and visual fields not performed.   Neck: No palpable masses or adenopathy, no thyromegaly, no JVD, carotid pulses are full and equal bilaterally and without  Bruits.     Cardiovascular: Regular rhythm, S1 and S2 normal, no S3 or S4.  No murmurs, gallops, or rubs detected.     Pulmonary/Chest: Chest: normal symmetry,  normal respiratory excursion, no intercostal retraction, no use of accessory muscles.            Pulmonary: Normal breath sounds. No rales or ronchi.    Abdominal: Abdomen soft, bowel sounds normoactive, no masses, no hepatosplenomegaly, non-tender, no bruits.     Musculoskeletal: No deformities, clubbing, cyanosis, erythema, or edema observed.     Neurological: No gross motor or sensory deficits noted, affect appropriate, oriented to time, person, place.     Skin: Warm and dry to the touch, no apparent skin lesions or masses noted.     Psychiatric: She has a normal mood and affect. Her behavior is normal. Judgment and thought content normal.         Procedures      Lab Results   Component Value Date    WBC 5.19 04/18/2017    HGB 11.4 (L) 07/21/2020    HCT 33.6 (L) 07/21/2020    MCV 86.5 04/18/2017     04/18/2017     Lab Results   Component Value Date    GLUCOSE 103 (H) 07/21/2020    BUN 15 07/21/2020    CREATININE 1.62 (H) 07/21/2020    EGFRIFNONA 30 (L) 07/21/2020    BCR 9.3 07/21/2020    CO2 26.5 07/21/2020    CALCIUM 9.7 07/21/2020    ALBUMIN 4.20 07/21/2020    AST 18 03/11/2020    ALT 8 03/11/2020     Lab Results   Component Value Date    CHOL 163 01/22/2018     Lab Results   Component Value Date    TRIG 89 07/24/2019    TRIG 71 01/22/2018    TRIG 89 01/19/2015     Lab Results   Component Value Date    HDL 58 07/24/2019    HDL 61 01/22/2018     No components found for: LDLCALC  Lab Results   Component Value Date    LDL 95 07/24/2019    LDL 72 01/22/2018    LDL 92 01/19/2015     No results found for:  HDLLDLRATIO  No components found for: CHOLHDL  Lab Results   Component Value Date    HGBA1C 5.7 (H) 04/02/2014     Lab Results   Component Value Date    TSH 3.65 01/19/2016           ASSESSMENT AND PLAN  Ada Son is stable at this time with no clinical evidence of angina, arrhythmia or congestive heart failure.  She has CKD which is stable.  She follows up with Dr. Robertson on a regular basis.  I have encouraged her to maintain a high fluid intake.  I have continued antiplatelet therapy with aspirin and Plavix, antihypertensive therapy with amlodipine, irbesartan, carvedilol, lipid-lowering therapy with Coreg and furosemide on a as needed basis has been continued.    Addendum: The patient is being considered for transcarotid artery revascularization procedure (TCAR).  Based on the information I have she will be low to moderate risk for perioperative cardiac events.    Ada was seen today for follow-up.    Diagnoses and all orders for this visit:    Ischemic cardiomyopathy    Essential hypertension    Mixed hyperlipidemia    S/P CABG (coronary artery bypass graft)    Bilateral carotid artery stenosis        Patient's Body mass index is 20.73 kg/m². BMI is within normal parameters. No follow-up required..  Patient is a non-smoker      Kalee Cisneros MD  9/11/2020  07:33

## 2020-09-16 RX ORDER — CLOPIDOGREL BISULFATE 75 MG/1
TABLET ORAL
Qty: 30 TABLET | Refills: 6 | Status: SHIPPED | OUTPATIENT
Start: 2020-09-16 | End: 2020-11-12

## 2020-09-16 RX ORDER — ATORVASTATIN CALCIUM 10 MG/1
TABLET, FILM COATED ORAL
Qty: 30 TABLET | Refills: 6 | Status: SHIPPED | OUTPATIENT
Start: 2020-09-16 | End: 2020-11-12

## 2020-10-20 DIAGNOSIS — I65.23 BILATERAL CAROTID ARTERY STENOSIS: Primary | ICD-10-CM

## 2020-10-23 ENCOUNTER — OFFICE VISIT (OUTPATIENT)
Dept: CARDIAC SURGERY | Facility: CLINIC | Age: 85
End: 2020-10-23

## 2020-10-23 VITALS
BODY MASS INDEX: 20.2 KG/M2 | HEIGHT: 63 IN | HEART RATE: 67 BPM | DIASTOLIC BLOOD PRESSURE: 70 MMHG | WEIGHT: 114 LBS | OXYGEN SATURATION: 97 % | SYSTOLIC BLOOD PRESSURE: 120 MMHG

## 2020-10-23 DIAGNOSIS — I65.21 STENOSIS OF RIGHT CAROTID ARTERY: Primary | ICD-10-CM

## 2020-10-23 DIAGNOSIS — I10 ESSENTIAL HYPERTENSION: ICD-10-CM

## 2020-10-23 DIAGNOSIS — E78.2 MIXED HYPERLIPIDEMIA: ICD-10-CM

## 2020-10-23 PROCEDURE — 99214 OFFICE O/P EST MOD 30 MIN: CPT | Performed by: NURSE PRACTITIONER

## 2020-10-23 NOTE — PROGRESS NOTES
CVTS Office Progress Note       Subjective   Patient ID: Naomi IRVING Son is a 85 y.o. female is here today for follow-up.    Chief Complaint:    Chief Complaint   Patient presents with   • Carotid Artery Disease       The following portions of the patient's history were reviewed and updated as appropriate: allergies, current medications, past family history, past medical history, past social history, past surgical history and problem list.  Recent images independently reviewed.  Available laboratory values reviewed.    PCP:  System, Provider Not In  Cardiology:  Neptali    85 y.o. female with CAD, NSTEMI, ischemic cardiomyopathy (EF 10-15%), CHF, HTN, CKD3, dyslipidemia,  CHF, and carotid stenosis.  She returns today in scheduled follow up for carotid stenosis evaluation.  She denies any neurosensory or motor symptoms.   Denies visual or motor changes.  No CVA/TIA.     12/19/2013 Carotid Duplex:  CASPER 16-49% antegrade.  LICA 50-79% antegrade.  12/18/14  Carotid Duplex:  CASPER 50-79% (ratio 2.4)   LICA 50-79% (ratio 2.4).  6/23/15: Carotid duplex: CASPER 50-79%(ratio 2.9) LICA 50-79%  12/29/15: Carotid duplex: CASPER 50-79% (ratio 3.6). LICA 50-79% ratio 1.8)   7/28/16: Carotid duplex: CASPER 50-79% (mPSV 239cm/s ratio 2.9) LICA 50-79% (mPSV 236cm/s ratio 1.7)  1/31/17: Carotid duplex: CASPER 50-69% (mPSV 185cm/s, ratio 1.7) LICA 50-69% (mPSV 202cm/s, ratio 2.0)  2/3/18: Carotid duplex: CASPER 50-69% (rTKF737bo/s, ratio 3.1) LICA 50-69% (mPSV 407cm/s, ratio 0.9)  2/14/19: Carotid duplex: CASPER >70% (204/3.0) LICA 50-69% (143/1.1) Antegrade  9/10/19: Carotid duplex: CASPER 50-69% (218/2.7) LICA 50-69% (268/4.5) Antegrade flow  3/11/2020: Carotid Duplex: RIGHT >70% (142/4.1) LEFT 50-69% (166/0.7) Antegrade   3/19/2020: CTA Carotids: CASPER >70% LICA 70%  10/23/2020: Carotid Duplex: RIGHT >70% (235/2.9) LEFT 50-69% (168/0.9) Antegrade    11/16/2013 Echocardiogram:  LA 38, LV 56, RV 49, IVS 9.  EF 20%.  GIULIA 2.4.  11/22/2013 Cardiac  Catheterization:  LAD 70-80%, D1 70-80%, CX 95%, RCA 30% mild irreg.  moderate MR, EF 10-15%.  /24.  12/18/2013 MISSY:  EF 20%, global severe hypokinesis, no clot, LVH.  central moderate MR.  mild to moderate AI.  12/19/2013 Myocardial Viability Study:  anterior wall normal.  inferior no appreciable viable tissue in infarcted area.  lateral small amount vialble tissue on delayed images, most nonviable.  12/19/2013 Lower extremity vein mapping:  RIGHT 2.3-5.0mm.  LEFT 1.8-3.8mm.  3/10/2014 Echocardiogram:  LA 37, LV 58, RV 49, IVS 9.  EF 35%.  GIULIA 2.4. tr MR  4/8/14: OFF PUMP CABG X 1  4/22/14: CXR : Bilateral pleural effusions, left greater than right, and  minimal bibasilar atelectasis.  5/28/14: Echo: EF 45-50%. LV borderline reduced. LA mildly dilated. Borderline global hypokineses of LV.       Past Medical History:   Diagnosis Date   • Acute bronchitis    • Arrhythmia    • Cardiomyopathy (CMS/HCC)    • Carotid artery stenosis    • Chronic kidney disease     stage 3   • Congestive heart failure (CMS/HCC)    • Contusion of elbow    • Cough    • Dyslipidemia    • Essential hypertension    • Fatigue    • Generalized ischemic myocardial dysfunction    • Hyperlipidemia    • Hypertension    • Iron deficiency anemia    • Mitral valve disease    • Multiple vessel coronary artery disease    • Surgical follow-up care    • UTI (urinary tract infection)      Past Surgical History:   Procedure Laterality Date   • CARDIAC CATHETERIZATION  11/22/2013    Multi-vessel CAD with critical lesions noted in the LAD, diagonal CA and lesion complex. Critical lesion noted in the obtuse marginal CA and moderate lesion noted in the proximal RCA. LV dysfunction with LV dilatation with global hypokinesis of LV.   • CORONARY ARTERY BYPASS GRAFT  04/08/2014    CABG ( off pump), placement of LIMA to LAD coronary artery (1 distal anastomosis), placement of left femoral arterial line, vascular ultrasound guidance.   • CORONARY ARTERY BYPASS  GRAFT  04/08/2014    Off Pump LIMA-LAD   • TRANSESOPHAGEAL ECHOCARDIOGRAM (MISSY)  03/10/2014    with color flow-Depressed left ventricular systolic function with EF of 35%. Grade I diastolic dysfunction of the left ventricular myocardium. Mild AV regurgitation. Mild enlargement of the left ventricular cavity     Family History   Problem Relation Age of Onset   • Heart disease Other    • Hypertension Other    • No Known Problems Mother    • No Known Problems Father      Social History     Tobacco Use   • Smoking status: Former Smoker   • Smokeless tobacco: Never Used   Substance Use Topics   • Alcohol use: No   • Drug use: No       ALLERGIES:   Patient has no known allergies.    MEDICATIONS:      Current Outpatient Medications:   •  amLODIPine (NORVASC) 5 MG tablet, Take 1 tablet by mouth Daily., Disp: 30 tablet, Rfl: 3  •  aspirin 81 MG EC tablet, Take 81 mg by mouth Daily., Disp: , Rfl:   •  atorvastatin (LIPITOR) 10 MG tablet, TAKE ONE TABLET BY MOUTH DAILY, Disp: 30 tablet, Rfl: 6  •  carvedilol (COREG) 6.25 MG tablet, Take 1 tablet by mouth 2 (Two) Times a Day. (Patient taking differently: Take 12.5 mg by mouth 2 (Two) Times a Day.), Disp: 180 tablet, Rfl: 3  •  clopidogrel (PLAVIX) 75 MG tablet, TAKE ONE TABLET BY MOUTH DAILY, Disp: 30 tablet, Rfl: 6  •  furosemide (LASIX) 40 MG tablet, Take 40 mg by mouth As Needed (edema)., Disp: , Rfl:   •  irbesartan (AVAPRO) 75 MG tablet, Take 75 mg by mouth Every Night., Disp: , Rfl:   •  Multiple Vitamins-Minerals (MULTIVITAMIN ADULT PO), Take  by mouth., Disp: , Rfl:     Review of Systems   HENT: Positive for hearing loss.    Eyes: Negative for visual disturbance.   Cardiovascular: Negative for claudication and cyanosis.   Respiratory: Negative for shortness of breath.    Skin: Negative for color change and nail changes.   Musculoskeletal: Negative for muscle weakness.   Gastrointestinal: Negative for dysphagia.   Neurological: Negative for focal weakness,  light-headedness, loss of balance, numbness, paresthesias and weakness.   Psychiatric/Behavioral: Positive for memory loss. Negative for altered mental status. The patient is nervous/anxious.    All other systems reviewed and are negative.       Objective       Vitals:    10/23/20 1333   BP: 120/70   Pulse: 67   SpO2: 97%      Body mass index is 20.52 kg/m².  Physical Exam   Constitutional: She is oriented to person, place, and time.   HENT:   Head: Atraumatic.   Neck: Neck supple. Carotid bruit is present.   Cardiovascular: Normal rate and normal heart sounds.   Pulmonary/Chest: Effort normal and breath sounds normal.   Abdominal: Soft. Bowel sounds are normal.   Musculoskeletal: Normal range of motion.      Comments: Gait normal   Neurological: She is alert and oriented to person, place, and time.   Skin: Skin is warm and dry. Capillary refill takes less than 2 seconds.   Psychiatric: Thought content normal.   Vitals reviewed.    Lab Results   Component Value Date    GLUCOSE 103 (H) 07/21/2020    BUN 15 07/21/2020    CREATININE 1.62 (H) 07/21/2020    EGFRIFNONA 30 (L) 07/21/2020    BCR 9.3 07/21/2020    K 4.6 07/21/2020    CO2 26.5 07/21/2020    CALCIUM 9.7 07/21/2020    ALBUMIN 4.20 07/21/2020    AST 18 03/11/2020    ALT 8 03/11/2020           Assessment & Plan     Independent Review of Radiographic Studies:  Detailed discussion regarding risks, benefits, and treatment plan. Images independently reviewed. Patient understands, agrees, and wishes to proceed with plan.     1. Stenosis of right carotid artery  Severe Carotid Artery Stenosis-RIGHT   Plan TCAR Stent Procedure  Cardiac Clearance-Dr. Gunderson  Office will call and schedule  At length discussion regarding options for carotid intervention including TCAR procedure,stent placement and surgical endarterectomy including stroke risk, hospital stay, and recovery. Questions answered.    2. Mixed hyperlipidemia  Lipid-lowering therapy has been proven beneficial in  patients with cardio-vascular disease. Current guidelines recommend statin treatment for all patients with PAD,CAD and carotid stenosis. Statins are beneficial in preventing cardiovascular events, increasing functional capacity and lower the risk of adverse limb loss in PAD. Statins decrease the progression of plaque formation and may improve peripheral vessel lining, and aid in reversing atherosclerosis.       3. Essential hypertension  CONTROLLED.             This document has been electronically signed by CHANA Redd on October 28, 2020 14:22 CDT

## 2020-10-23 NOTE — PATIENT INSTRUCTIONS
Severe Carotid Artery Stenosis-RIGHT   Plan TCAR Stent Procedure  Cardiac Clearance-Dr. Gunderson  Office will call and schedule

## 2020-10-29 ENCOUNTER — PREP FOR SURGERY (OUTPATIENT)
Dept: OTHER | Facility: HOSPITAL | Age: 85
End: 2020-10-29

## 2020-10-29 DIAGNOSIS — I65.23 CAROTID STENOSIS, ASYMPTOMATIC, BILATERAL: Primary | ICD-10-CM

## 2020-10-29 PROBLEM — I77.9 BILATERAL CAROTID ARTERY DISEASE (HCC): Status: ACTIVE | Noted: 2020-10-29

## 2020-10-29 RX ORDER — SODIUM CHLORIDE 0.9 % (FLUSH) 0.9 %
10 SYRINGE (ML) INJECTION AS NEEDED
Status: CANCELLED | OUTPATIENT
Start: 2020-11-17

## 2020-10-29 RX ORDER — SODIUM CHLORIDE 9 MG/ML
100 INJECTION, SOLUTION INTRAVENOUS CONTINUOUS
Status: CANCELLED | OUTPATIENT
Start: 2020-11-17

## 2020-10-29 RX ORDER — BUPIVACAINE HCL/0.9 % NACL/PF 0.1 %
2 PLASTIC BAG, INJECTION (ML) EPIDURAL ONCE
Status: CANCELLED | OUTPATIENT
Start: 2020-11-17 | End: 2020-10-29

## 2020-10-29 RX ORDER — SODIUM CHLORIDE 0.9 % (FLUSH) 0.9 %
3 SYRINGE (ML) INJECTION EVERY 12 HOURS SCHEDULED
Status: CANCELLED | OUTPATIENT
Start: 2020-11-17

## 2020-10-30 ENCOUNTER — TELEPHONE (OUTPATIENT)
Dept: CARDIAC SURGERY | Facility: CLINIC | Age: 85
End: 2020-10-30

## 2020-10-30 PROBLEM — I65.23 CAROTID STENOSIS, ASYMPTOMATIC, BILATERAL: Status: ACTIVE | Noted: 2020-10-30

## 2020-10-30 NOTE — TELEPHONE ENCOUNTER
Spoke to pt and gave her date and time for her covid and preop testing   explained to pt to continue plavix, ASA, and statin  Pt verbalized understanding

## 2020-11-12 ENCOUNTER — APPOINTMENT (OUTPATIENT)
Dept: PREADMISSION TESTING | Facility: HOSPITAL | Age: 85
End: 2020-11-12

## 2020-11-12 VITALS
RESPIRATION RATE: 18 BRPM | BODY MASS INDEX: 21.35 KG/M2 | HEART RATE: 67 BPM | DIASTOLIC BLOOD PRESSURE: 70 MMHG | OXYGEN SATURATION: 94 % | HEIGHT: 62 IN | WEIGHT: 116 LBS | SYSTOLIC BLOOD PRESSURE: 138 MMHG

## 2020-11-12 LAB
ABO GROUP BLD: NORMAL
ANION GAP SERPL CALCULATED.3IONS-SCNC: 10 MMOL/L (ref 5–15)
BLD GP AB SCN SERPL QL: NEGATIVE
BUN SERPL-MCNC: 17 MG/DL (ref 8–23)
BUN/CREAT SERPL: 10.6 (ref 7–25)
CALCIUM SPEC-SCNC: 9.8 MG/DL (ref 8.6–10.5)
CHLORIDE SERPL-SCNC: 101 MMOL/L (ref 98–107)
CO2 SERPL-SCNC: 26 MMOL/L (ref 22–29)
CREAT SERPL-MCNC: 1.6 MG/DL (ref 0.57–1)
DEPRECATED RDW RBC AUTO: 38.8 FL (ref 37–54)
ERYTHROCYTE [DISTWIDTH] IN BLOOD BY AUTOMATED COUNT: 12.7 % (ref 12.3–15.4)
GFR SERPL CREATININE-BSD FRML MDRD: 31 ML/MIN/1.73
GLUCOSE SERPL-MCNC: 105 MG/DL (ref 65–99)
HCT VFR BLD AUTO: 32.3 % (ref 34–46.6)
HGB BLD-MCNC: 11.1 G/DL (ref 12–15.9)
Lab: NORMAL
MCH RBC QN AUTO: 29.3 PG (ref 26.6–33)
MCHC RBC AUTO-ENTMCNC: 34.4 G/DL (ref 31.5–35.7)
MCV RBC AUTO: 85.2 FL (ref 79–97)
PLATELET # BLD AUTO: 283 10*3/MM3 (ref 140–450)
PMV BLD AUTO: 9.8 FL (ref 6–12)
POTASSIUM SERPL-SCNC: 5.3 MMOL/L (ref 3.5–5.2)
RBC # BLD AUTO: 3.79 10*6/MM3 (ref 3.77–5.28)
RH BLD: NEGATIVE
SODIUM SERPL-SCNC: 137 MMOL/L (ref 136–145)
T&S EXPIRATION DATE: NORMAL
WBC # BLD AUTO: 4.79 10*3/MM3 (ref 3.4–10.8)

## 2020-11-12 PROCEDURE — 85027 COMPLETE CBC AUTOMATED: CPT

## 2020-11-12 PROCEDURE — 86850 RBC ANTIBODY SCREEN: CPT

## 2020-11-12 PROCEDURE — 93005 ELECTROCARDIOGRAM TRACING: CPT

## 2020-11-12 PROCEDURE — 93010 ELECTROCARDIOGRAM REPORT: CPT | Performed by: INTERNAL MEDICINE

## 2020-11-12 PROCEDURE — 86900 BLOOD TYPING SEROLOGIC ABO: CPT

## 2020-11-12 PROCEDURE — 80048 BASIC METABOLIC PNL TOTAL CA: CPT

## 2020-11-12 PROCEDURE — 36415 COLL VENOUS BLD VENIPUNCTURE: CPT

## 2020-11-12 PROCEDURE — 86901 BLOOD TYPING SEROLOGIC RH(D): CPT

## 2020-11-12 RX ORDER — AMLODIPINE BESYLATE 5 MG/1
5 TABLET ORAL NIGHTLY
COMMUNITY
End: 2021-02-11 | Stop reason: HOSPADM

## 2020-11-12 RX ORDER — CLOPIDOGREL BISULFATE 75 MG/1
75 TABLET ORAL DAILY
COMMUNITY
End: 2021-02-15

## 2020-11-12 RX ORDER — CARVEDILOL 25 MG/1
12.5 TABLET ORAL 2 TIMES DAILY WITH MEALS
COMMUNITY
End: 2021-02-25

## 2020-11-12 RX ORDER — ASPIRIN 81 MG/1
81 TABLET, CHEWABLE ORAL DAILY
COMMUNITY
End: 2022-01-01

## 2020-11-12 RX ORDER — ATORVASTATIN CALCIUM 10 MG/1
10 TABLET, FILM COATED ORAL NIGHTLY
COMMUNITY
End: 2021-01-01

## 2020-11-12 NOTE — DISCHARGE INSTRUCTIONS
[facility]  Pre-op Information and Guidelines    You will be called after 2 p.m. the day before your surgery (Friday for Monday surgery) and notified of your time for arrival and approximate surgery time.  If you have not received a call by 4P.M., please contact Same Day Surgery at [phone] of if outside [facility] call [phone].    Please Follow these Important Safety Guidelines:    • The morning of your procedure, take only the medications listed below with   A sip of water:_____________________________________________       ______________________________________________    • DO NOT eat or drink anything after 12:00 midnight the night before surgery  Specific instructions concerning drinking clear liquids will be discussed during  the pre-surgery instruction call the day before your surgery.    • If you take a blood thinner (ex. Plavix, Coumadin, aspirin), ask your doctor when to stop it before surgery  STOP DATE: _________________    • Only 2 visitors are allowed in patient rooms at a time  Your visitors will be asked to wait in the lobby until the admission process is complete with the exception of a parent with a child and patients in need of special assistance.    • YOU CANNOT DRIVE YOURSELF HOME  You must be accompanied by someone who will be responsible for driving you home after surgery and for your care at home.    • DO NOT chew gum, use breath mints, hard candy, or smoke the day of surgery  • DO NOT drink alcohol for at least 24 hours before your surgery  • DO NOT wear any jewelry and remove all body piercing before coming to the hospital  • DO NOT wear make-up to the hospital  • If you are having surgery on an extremity (arm/leg/foot) remove nail polish/artificial nails on the surgical side  • Clothing, glasses, contacts, dentures, and hairpieces must be removed before surgery  • Bathe the night before or the morning of your surgery and do not use powders/lotions on  skin.

## 2020-11-12 NOTE — PAT
Chlorhexidine scrub given with instruction sheet. Instructions reviewed in PAT, understanding verbalized.    Dr. Orr here to speak with patient and review EKG. No orders received.    Patient reminded about continuing to take Atorvastatin, Aspirin, and Plavix for procedure.    Quan ZAYAS informed that an order is needed for informed consent.

## 2020-11-13 LAB
QT INTERVAL: 430 MS
QTC INTERVAL: 411 MS

## 2020-11-14 ENCOUNTER — LAB (OUTPATIENT)
Dept: LAB | Facility: HOSPITAL | Age: 85
End: 2020-11-14

## 2020-11-14 DIAGNOSIS — Z01.818 PREOP TESTING: Primary | ICD-10-CM

## 2020-11-14 PROCEDURE — C9803 HOPD COVID-19 SPEC COLLECT: HCPCS

## 2020-11-14 PROCEDURE — U0003 INFECTIOUS AGENT DETECTION BY NUCLEIC ACID (DNA OR RNA); SEVERE ACUTE RESPIRATORY SYNDROME CORONAVIRUS 2 (SARS-COV-2) (CORONAVIRUS DISEASE [COVID-19]), AMPLIFIED PROBE TECHNIQUE, MAKING USE OF HIGH THROUGHPUT TECHNOLOGIES AS DESCRIBED BY CMS-2020-01-R: HCPCS

## 2020-11-15 LAB
COVID LABCORP PRIORITY: NORMAL
SARS-COV-2 RNA RESP QL NAA+PROBE: NOT DETECTED

## 2020-11-17 ENCOUNTER — HOSPITAL ENCOUNTER (INPATIENT)
Facility: HOSPITAL | Age: 85
LOS: 1 days | Discharge: HOME | End: 2020-11-18
Attending: THORACIC SURGERY (CARDIOTHORACIC VASCULAR SURGERY) | Admitting: THORACIC SURGERY (CARDIOTHORACIC VASCULAR SURGERY)

## 2020-11-17 ENCOUNTER — ANESTHESIA EVENT (OUTPATIENT)
Dept: PERIOP | Facility: HOSPITAL | Age: 85
End: 2020-11-17

## 2020-11-17 ENCOUNTER — APPOINTMENT (OUTPATIENT)
Dept: GENERAL RADIOLOGY | Facility: HOSPITAL | Age: 85
End: 2020-11-17

## 2020-11-17 ENCOUNTER — ANESTHESIA (OUTPATIENT)
Dept: PERIOP | Facility: HOSPITAL | Age: 85
End: 2020-11-17

## 2020-11-17 DIAGNOSIS — I65.23 CAROTID STENOSIS, ASYMPTOMATIC, BILATERAL: ICD-10-CM

## 2020-11-17 LAB
ABO GROUP BLD: NORMAL
BLD GP AB SCN SERPL QL: NEGATIVE
HOLD SPECIMEN: NORMAL
Lab: NORMAL
POTASSIUM SERPL-SCNC: 4.5 MMOL/L (ref 3.5–5.2)
RH BLD: NEGATIVE
T&S EXPIRATION DATE: NORMAL
WHOLE BLOOD HOLD SPECIMEN: NORMAL
WHOLE BLOOD HOLD SPECIMEN: NORMAL

## 2020-11-17 PROCEDURE — C1769 GUIDE WIRE: HCPCS | Performed by: THORACIC SURGERY (CARDIOTHORACIC VASCULAR SURGERY)

## 2020-11-17 PROCEDURE — 25010000002 IOPAMIDOL 61 % SOLUTION: Performed by: THORACIC SURGERY (CARDIOTHORACIC VASCULAR SURGERY)

## 2020-11-17 PROCEDURE — 25010000002 ALBUMIN HUMAN 5% PER 50 ML: Performed by: THORACIC SURGERY (CARDIOTHORACIC VASCULAR SURGERY)

## 2020-11-17 PROCEDURE — 25010000002 ONDANSETRON PER 1 MG: Performed by: NURSE ANESTHETIST, CERTIFIED REGISTERED

## 2020-11-17 PROCEDURE — 94640 AIRWAY INHALATION TREATMENT: CPT

## 2020-11-17 PROCEDURE — 37215 TRANSCATH STENT CCA W/EPS: CPT | Performed by: THORACIC SURGERY (CARDIOTHORACIC VASCULAR SURGERY)

## 2020-11-17 PROCEDURE — 037H3ZZ DILATION OF RIGHT COMMON CAROTID ARTERY, PERCUTANEOUS APPROACH: ICD-10-PCS | Performed by: THORACIC SURGERY (CARDIOTHORACIC VASCULAR SURGERY)

## 2020-11-17 PROCEDURE — 25010000002 HYDROMORPHONE PER 4 MG: Performed by: NURSE ANESTHETIST, CERTIFIED REGISTERED

## 2020-11-17 PROCEDURE — 76000 FLUOROSCOPY <1 HR PHYS/QHP: CPT

## 2020-11-17 PROCEDURE — C1876 STENT, NON-COA/NON-COV W/DEL: HCPCS | Performed by: THORACIC SURGERY (CARDIOTHORACIC VASCULAR SURGERY)

## 2020-11-17 PROCEDURE — B3161ZZ FLUOROSCOPY OF RIGHT INTERNAL CAROTID ARTERY USING LOW OSMOLAR CONTRAST: ICD-10-PCS | Performed by: THORACIC SURGERY (CARDIOTHORACIC VASCULAR SURGERY)

## 2020-11-17 PROCEDURE — 25010000002 PROTAMINE SULFATE PER 10 MG: Performed by: NURSE ANESTHETIST, CERTIFIED REGISTERED

## 2020-11-17 PROCEDURE — 86850 RBC ANTIBODY SCREEN: CPT | Performed by: ANESTHESIOLOGY

## 2020-11-17 PROCEDURE — 25010000002 CEFAZOLIN PER 500 MG: Performed by: THORACIC SURGERY (CARDIOTHORACIC VASCULAR SURGERY)

## 2020-11-17 PROCEDURE — C1894 INTRO/SHEATH, NON-LASER: HCPCS | Performed by: THORACIC SURGERY (CARDIOTHORACIC VASCULAR SURGERY)

## 2020-11-17 PROCEDURE — 86901 BLOOD TYPING SEROLOGIC RH(D): CPT | Performed by: ANESTHESIOLOGY

## 2020-11-17 PROCEDURE — C1725 CATH, TRANSLUMIN NON-LASER: HCPCS | Performed by: THORACIC SURGERY (CARDIOTHORACIC VASCULAR SURGERY)

## 2020-11-17 PROCEDURE — 25010000002 HEPARIN (PORCINE) PER 1000 UNITS: Performed by: THORACIC SURGERY (CARDIOTHORACIC VASCULAR SURGERY)

## 2020-11-17 PROCEDURE — 25010000002 MIDAZOLAM PER 1 MG: Performed by: NURSE ANESTHETIST, CERTIFIED REGISTERED

## 2020-11-17 PROCEDURE — 037K3ZZ DILATION OF RIGHT INTERNAL CAROTID ARTERY, PERCUTANEOUS APPROACH: ICD-10-PCS | Performed by: THORACIC SURGERY (CARDIOTHORACIC VASCULAR SURGERY)

## 2020-11-17 PROCEDURE — 25010000002 PHENYLEPHRINE PER 1 ML: Performed by: NURSE ANESTHETIST, CERTIFIED REGISTERED

## 2020-11-17 PROCEDURE — 94799 UNLISTED PULMONARY SVC/PX: CPT

## 2020-11-17 PROCEDURE — 84132 ASSAY OF SERUM POTASSIUM: CPT | Performed by: ANESTHESIOLOGY

## 2020-11-17 PROCEDURE — P9041 ALBUMIN (HUMAN),5%, 50ML: HCPCS | Performed by: THORACIC SURGERY (CARDIOTHORACIC VASCULAR SURGERY)

## 2020-11-17 PROCEDURE — 25010000002 DEXAMETHASONE PER 1 MG: Performed by: NURSE ANESTHETIST, CERTIFIED REGISTERED

## 2020-11-17 PROCEDURE — 25010000002 HEPARIN (PORCINE) PER 1000 UNITS: Performed by: NURSE ANESTHETIST, CERTIFIED REGISTERED

## 2020-11-17 PROCEDURE — 86900 BLOOD TYPING SEROLOGIC ABO: CPT | Performed by: ANESTHESIOLOGY

## 2020-11-17 PROCEDURE — 25010000002 PROPOFOL 10 MG/ML EMULSION: Performed by: NURSE ANESTHETIST, CERTIFIED REGISTERED

## 2020-11-17 PROCEDURE — 36223 PLACE CATH CAROTID/INOM ART: CPT | Performed by: THORACIC SURGERY (CARDIOTHORACIC VASCULAR SURGERY)

## 2020-11-17 PROCEDURE — 25010000002 NEOSTIGMINE 4 MG/4ML SOLUTION PREFILLED SYRINGE: Performed by: NURSE ANESTHETIST, CERTIFIED REGISTERED

## 2020-11-17 PROCEDURE — 037M3ZZ DILATION OF RIGHT EXTERNAL CAROTID ARTERY, PERCUTANEOUS APPROACH: ICD-10-PCS | Performed by: THORACIC SURGERY (CARDIOTHORACIC VASCULAR SURGERY)

## 2020-11-17 PROCEDURE — 25010000003 CEFAZOLIN PER 500 MG: Performed by: THORACIC SURGERY (CARDIOTHORACIC VASCULAR SURGERY)

## 2020-11-17 DEVICE — 10 MM X 40 MM
Type: IMPLANTABLE DEVICE | Site: CAROTID | Status: FUNCTIONAL
Brand: ENROUTE TRANSCAROTID STENT

## 2020-11-17 DEVICE — HEMOST ABS SURGIFOAM 8X6.25CM 10MM: Type: IMPLANTABLE DEVICE | Status: FUNCTIONAL

## 2020-11-17 RX ORDER — ATORVASTATIN CALCIUM 10 MG/1
10 TABLET, FILM COATED ORAL NIGHTLY
Status: DISCONTINUED | OUTPATIENT
Start: 2020-11-17 | End: 2020-11-18 | Stop reason: HOSPADM

## 2020-11-17 RX ORDER — PROMETHAZINE HYDROCHLORIDE 25 MG/1
25 SUPPOSITORY RECTAL ONCE AS NEEDED
Status: DISCONTINUED | OUTPATIENT
Start: 2020-11-17 | End: 2020-11-17 | Stop reason: HOSPADM

## 2020-11-17 RX ORDER — SODIUM CHLORIDE 9 MG/ML
75 INJECTION, SOLUTION INTRAVENOUS CONTINUOUS
Status: DISCONTINUED | OUTPATIENT
Start: 2020-11-17 | End: 2020-11-18 | Stop reason: HOSPADM

## 2020-11-17 RX ORDER — MIDAZOLAM HYDROCHLORIDE 1 MG/ML
INJECTION INTRAMUSCULAR; INTRAVENOUS AS NEEDED
Status: DISCONTINUED | OUTPATIENT
Start: 2020-11-17 | End: 2020-11-17 | Stop reason: SURG

## 2020-11-17 RX ORDER — SODIUM CHLORIDE 9 MG/ML
100 INJECTION, SOLUTION INTRAVENOUS CONTINUOUS
Status: DISCONTINUED | OUTPATIENT
Start: 2020-11-17 | End: 2020-11-17 | Stop reason: HOSPADM

## 2020-11-17 RX ORDER — ACETAMINOPHEN 650 MG/1
650 SUPPOSITORY RECTAL ONCE AS NEEDED
Status: DISCONTINUED | OUTPATIENT
Start: 2020-11-17 | End: 2020-11-17 | Stop reason: HOSPADM

## 2020-11-17 RX ORDER — KETAMINE HYDROCHLORIDE 100 MG/ML
INJECTION INTRAMUSCULAR; INTRAVENOUS AS NEEDED
Status: DISCONTINUED | OUTPATIENT
Start: 2020-11-17 | End: 2020-11-17 | Stop reason: SURG

## 2020-11-17 RX ORDER — NALOXONE HCL 0.4 MG/ML
0.4 VIAL (ML) INJECTION AS NEEDED
Status: DISCONTINUED | OUTPATIENT
Start: 2020-11-17 | End: 2020-11-17 | Stop reason: HOSPADM

## 2020-11-17 RX ORDER — ROCURONIUM BROMIDE 10 MG/ML
INJECTION, SOLUTION INTRAVENOUS AS NEEDED
Status: DISCONTINUED | OUTPATIENT
Start: 2020-11-17 | End: 2020-11-17 | Stop reason: SURG

## 2020-11-17 RX ORDER — AMOXICILLIN 250 MG
2 CAPSULE ORAL 2 TIMES DAILY PRN
Status: DISCONTINUED | OUTPATIENT
Start: 2020-11-17 | End: 2020-11-18 | Stop reason: HOSPADM

## 2020-11-17 RX ORDER — ONDANSETRON 2 MG/ML
4 INJECTION INTRAMUSCULAR; INTRAVENOUS ONCE AS NEEDED
Status: DISCONTINUED | OUTPATIENT
Start: 2020-11-17 | End: 2020-11-17 | Stop reason: HOSPADM

## 2020-11-17 RX ORDER — CLOPIDOGREL BISULFATE 75 MG/1
75 TABLET ORAL DAILY
Status: DISCONTINUED | OUTPATIENT
Start: 2020-11-18 | End: 2020-11-18 | Stop reason: HOSPADM

## 2020-11-17 RX ORDER — HEPARIN SODIUM 5000 [USP'U]/ML
INJECTION, SOLUTION INTRAVENOUS; SUBCUTANEOUS AS NEEDED
Status: DISCONTINUED | OUTPATIENT
Start: 2020-11-17 | End: 2020-11-17 | Stop reason: HOSPADM

## 2020-11-17 RX ORDER — FLUMAZENIL 0.1 MG/ML
0.2 INJECTION INTRAVENOUS AS NEEDED
Status: DISCONTINUED | OUTPATIENT
Start: 2020-11-17 | End: 2020-11-17 | Stop reason: HOSPADM

## 2020-11-17 RX ORDER — ACETAMINOPHEN 325 MG/1
650 TABLET ORAL EVERY 4 HOURS PRN
Status: DISCONTINUED | OUTPATIENT
Start: 2020-11-17 | End: 2020-11-18 | Stop reason: HOSPADM

## 2020-11-17 RX ORDER — CEFAZOLIN SODIUM 1 G/3ML
INJECTION, POWDER, FOR SOLUTION INTRAMUSCULAR; INTRAVENOUS AS NEEDED
Status: DISCONTINUED | OUTPATIENT
Start: 2020-11-17 | End: 2020-11-17 | Stop reason: HOSPADM

## 2020-11-17 RX ORDER — MEPERIDINE HYDROCHLORIDE 25 MG/ML
12.5 INJECTION INTRAMUSCULAR; INTRAVENOUS; SUBCUTANEOUS
Status: DISCONTINUED | OUTPATIENT
Start: 2020-11-17 | End: 2020-11-17 | Stop reason: HOSPADM

## 2020-11-17 RX ORDER — LOSARTAN POTASSIUM 25 MG/1
25 TABLET ORAL
Status: DISCONTINUED | OUTPATIENT
Start: 2020-11-17 | End: 2020-11-18 | Stop reason: HOSPADM

## 2020-11-17 RX ORDER — LABETALOL HYDROCHLORIDE 5 MG/ML
5 INJECTION, SOLUTION INTRAVENOUS
Status: DISCONTINUED | OUTPATIENT
Start: 2020-11-17 | End: 2020-11-17 | Stop reason: HOSPADM

## 2020-11-17 RX ORDER — DEXAMETHASONE SODIUM PHOSPHATE 4 MG/ML
INJECTION, SOLUTION INTRA-ARTICULAR; INTRALESIONAL; INTRAMUSCULAR; INTRAVENOUS; SOFT TISSUE AS NEEDED
Status: DISCONTINUED | OUTPATIENT
Start: 2020-11-17 | End: 2020-11-17 | Stop reason: SURG

## 2020-11-17 RX ORDER — PROTAMINE SULFATE 10 MG/ML
INJECTION, SOLUTION INTRAVENOUS AS NEEDED
Status: DISCONTINUED | OUTPATIENT
Start: 2020-11-17 | End: 2020-11-17 | Stop reason: SURG

## 2020-11-17 RX ORDER — ONDANSETRON 2 MG/ML
INJECTION INTRAMUSCULAR; INTRAVENOUS AS NEEDED
Status: DISCONTINUED | OUTPATIENT
Start: 2020-11-17 | End: 2020-11-17 | Stop reason: SURG

## 2020-11-17 RX ORDER — LIDOCAINE HYDROCHLORIDE 20 MG/ML
INJECTION, SOLUTION INFILTRATION; PERINEURAL AS NEEDED
Status: DISCONTINUED | OUTPATIENT
Start: 2020-11-17 | End: 2020-11-17 | Stop reason: SURG

## 2020-11-17 RX ORDER — PROPOFOL 10 MG/ML
VIAL (ML) INTRAVENOUS AS NEEDED
Status: DISCONTINUED | OUTPATIENT
Start: 2020-11-17 | End: 2020-11-17 | Stop reason: SURG

## 2020-11-17 RX ORDER — NITROGLYCERIN 20 MG/100ML
5-200 INJECTION INTRAVENOUS
Status: DISCONTINUED | OUTPATIENT
Start: 2020-11-17 | End: 2020-11-18 | Stop reason: HOSPADM

## 2020-11-17 RX ORDER — HYDROCODONE BITARTRATE AND ACETAMINOPHEN 5; 325 MG/1; MG/1
1 TABLET ORAL EVERY 4 HOURS PRN
Status: DISCONTINUED | OUTPATIENT
Start: 2020-11-17 | End: 2020-11-18 | Stop reason: HOSPADM

## 2020-11-17 RX ORDER — PROMETHAZINE HYDROCHLORIDE 25 MG/1
25 TABLET ORAL ONCE AS NEEDED
Status: DISCONTINUED | OUTPATIENT
Start: 2020-11-17 | End: 2020-11-17 | Stop reason: HOSPADM

## 2020-11-17 RX ORDER — SODIUM CHLORIDE 0.9 % (FLUSH) 0.9 %
3 SYRINGE (ML) INJECTION EVERY 12 HOURS SCHEDULED
Status: DISCONTINUED | OUTPATIENT
Start: 2020-11-17 | End: 2020-11-17 | Stop reason: HOSPADM

## 2020-11-17 RX ORDER — SODIUM CHLORIDE 0.9 % (FLUSH) 0.9 %
10 SYRINGE (ML) INJECTION AS NEEDED
Status: DISCONTINUED | OUTPATIENT
Start: 2020-11-17 | End: 2020-11-17 | Stop reason: HOSPADM

## 2020-11-17 RX ORDER — FUROSEMIDE 40 MG/1
40 TABLET ORAL DAILY PRN
Status: DISCONTINUED | OUTPATIENT
Start: 2020-11-17 | End: 2020-11-18 | Stop reason: HOSPADM

## 2020-11-17 RX ORDER — BUPIVACAINE HCL/0.9 % NACL/PF 0.1 %
2 PLASTIC BAG, INJECTION (ML) EPIDURAL EVERY 8 HOURS
Status: COMPLETED | OUTPATIENT
Start: 2020-11-17 | End: 2020-11-18

## 2020-11-17 RX ORDER — HEPARIN SODIUM 1000 [USP'U]/ML
INJECTION, SOLUTION INTRAVENOUS; SUBCUTANEOUS AS NEEDED
Status: DISCONTINUED | OUTPATIENT
Start: 2020-11-17 | End: 2020-11-17 | Stop reason: SURG

## 2020-11-17 RX ORDER — CARVEDILOL 12.5 MG/1
12.5 TABLET ORAL 2 TIMES DAILY WITH MEALS
Status: DISCONTINUED | OUTPATIENT
Start: 2020-11-17 | End: 2020-11-18 | Stop reason: HOSPADM

## 2020-11-17 RX ORDER — ALBUTEROL SULFATE 2.5 MG/3ML
2.5 SOLUTION RESPIRATORY (INHALATION)
Status: DISCONTINUED | OUTPATIENT
Start: 2020-11-17 | End: 2020-11-18 | Stop reason: HOSPADM

## 2020-11-17 RX ORDER — ONDANSETRON 2 MG/ML
4 INJECTION INTRAMUSCULAR; INTRAVENOUS EVERY 6 HOURS PRN
Status: DISCONTINUED | OUTPATIENT
Start: 2020-11-17 | End: 2020-11-18 | Stop reason: HOSPADM

## 2020-11-17 RX ORDER — BUPIVACAINE HCL/0.9 % NACL/PF 0.1 %
2 PLASTIC BAG, INJECTION (ML) EPIDURAL ONCE
Status: COMPLETED | OUTPATIENT
Start: 2020-11-17 | End: 2020-11-17

## 2020-11-17 RX ORDER — HYDROMORPHONE HCL 110MG/55ML
PATIENT CONTROLLED ANALGESIA SYRINGE INTRAVENOUS AS NEEDED
Status: DISCONTINUED | OUTPATIENT
Start: 2020-11-17 | End: 2020-11-17 | Stop reason: SURG

## 2020-11-17 RX ORDER — ACETAMINOPHEN 325 MG/1
650 TABLET ORAL ONCE AS NEEDED
Status: DISCONTINUED | OUTPATIENT
Start: 2020-11-17 | End: 2020-11-17 | Stop reason: HOSPADM

## 2020-11-17 RX ORDER — DIPHENHYDRAMINE HYDROCHLORIDE 50 MG/ML
12.5 INJECTION INTRAMUSCULAR; INTRAVENOUS
Status: DISCONTINUED | OUTPATIENT
Start: 2020-11-17 | End: 2020-11-17 | Stop reason: HOSPADM

## 2020-11-17 RX ORDER — ASPIRIN 81 MG/1
81 TABLET, CHEWABLE ORAL DAILY
Status: DISCONTINUED | OUTPATIENT
Start: 2020-11-18 | End: 2020-11-18 | Stop reason: HOSPADM

## 2020-11-17 RX ORDER — ALBUMIN, HUMAN INJ 5% 5 %
500 SOLUTION INTRAVENOUS ONCE
Status: COMPLETED | OUTPATIENT
Start: 2020-11-17 | End: 2020-11-17

## 2020-11-17 RX ORDER — FAMOTIDINE 20 MG/1
20 TABLET, FILM COATED ORAL EVERY 12 HOURS SCHEDULED
Status: DISCONTINUED | OUTPATIENT
Start: 2020-11-17 | End: 2020-11-18 | Stop reason: HOSPADM

## 2020-11-17 RX ORDER — EPHEDRINE SULFATE 50 MG/ML
5 INJECTION, SOLUTION INTRAVENOUS ONCE AS NEEDED
Status: DISCONTINUED | OUTPATIENT
Start: 2020-11-17 | End: 2020-11-17 | Stop reason: HOSPADM

## 2020-11-17 RX ORDER — ONDANSETRON 4 MG/1
4 TABLET, FILM COATED ORAL EVERY 6 HOURS PRN
Status: DISCONTINUED | OUTPATIENT
Start: 2020-11-17 | End: 2020-11-18 | Stop reason: HOSPADM

## 2020-11-17 RX ORDER — BISACODYL 10 MG
10 SUPPOSITORY, RECTAL RECTAL DAILY PRN
Status: DISCONTINUED | OUTPATIENT
Start: 2020-11-17 | End: 2020-11-18 | Stop reason: HOSPADM

## 2020-11-17 RX ORDER — NEOSTIGMINE METHYLSULFATE 4 MG/4 ML
SYRINGE (ML) INTRAVENOUS AS NEEDED
Status: DISCONTINUED | OUTPATIENT
Start: 2020-11-17 | End: 2020-11-17 | Stop reason: SURG

## 2020-11-17 RX ORDER — PSEUDOEPHEDRINE HCL 30 MG
60 TABLET ORAL EVERY 6 HOURS
Status: DISCONTINUED | OUTPATIENT
Start: 2020-11-17 | End: 2020-11-18 | Stop reason: HOSPADM

## 2020-11-17 RX ADMIN — HYDROMORPHONE HYDROCHLORIDE 0.5 MG: 2 INJECTION, SOLUTION INTRAMUSCULAR; INTRAVENOUS; SUBCUTANEOUS at 10:14

## 2020-11-17 RX ADMIN — PHENYLEPHRINE HYDROCHLORIDE 50 MCG: 10 INJECTION INTRAVENOUS at 10:51

## 2020-11-17 RX ADMIN — SODIUM CHLORIDE 75 ML/HR: 9 INJECTION, SOLUTION INTRAVENOUS at 14:18

## 2020-11-17 RX ADMIN — SODIUM CHLORIDE 100 ML/HR: 9 INJECTION, SOLUTION INTRAVENOUS at 08:00

## 2020-11-17 RX ADMIN — Medication 2 MG: at 11:58

## 2020-11-17 RX ADMIN — KETAMINE HYDROCHLORIDE 30 MG: 100 INJECTION INTRAMUSCULAR; INTRAVENOUS at 10:16

## 2020-11-17 RX ADMIN — PHENYLEPHRINE HYDROCHLORIDE 50 MCG: 10 INJECTION INTRAVENOUS at 11:25

## 2020-11-17 RX ADMIN — PSEUDOEPHEDRINE HCL 60 MG: 30 TABLET, FILM COATED ORAL at 13:42

## 2020-11-17 RX ADMIN — PHENYLEPHRINE HYDROCHLORIDE 50 MCG: 10 INJECTION INTRAVENOUS at 11:12

## 2020-11-17 RX ADMIN — GLYCOPYRROLATE 0.4 MG: 0.2 INJECTION, SOLUTION INTRAMUSCULAR; INTRAVITREAL at 11:55

## 2020-11-17 RX ADMIN — FAMOTIDINE 20 MG: 20 TABLET, FILM COATED ORAL at 20:31

## 2020-11-17 RX ADMIN — HYDROMORPHONE HYDROCHLORIDE 0.5 MG: 2 INJECTION, SOLUTION INTRAMUSCULAR; INTRAVENOUS; SUBCUTANEOUS at 10:24

## 2020-11-17 RX ADMIN — ONDANSETRON 4 MG: 2 INJECTION INTRAMUSCULAR; INTRAVENOUS at 10:02

## 2020-11-17 RX ADMIN — PROPOFOL 50 MG: 10 INJECTION, EMULSION INTRAVENOUS at 10:16

## 2020-11-17 RX ADMIN — PHENYLEPHRINE HYDROCHLORIDE 50 MCG: 10 INJECTION INTRAVENOUS at 11:08

## 2020-11-17 RX ADMIN — MIDAZOLAM HYDROCHLORIDE 1 MG: 2 INJECTION, SOLUTION INTRAMUSCULAR; INTRAVENOUS at 09:58

## 2020-11-17 RX ADMIN — PHENYLEPHRINE HYDROCHLORIDE 50 MCG: 10 INJECTION INTRAVENOUS at 11:04

## 2020-11-17 RX ADMIN — ATORVASTATIN CALCIUM 10 MG: 10 TABLET, FILM COATED ORAL at 20:31

## 2020-11-17 RX ADMIN — LIDOCAINE HYDROCHLORIDE 100 MG: 20 INJECTION, SOLUTION INFILTRATION; PERINEURAL at 10:16

## 2020-11-17 RX ADMIN — ALBUMIN HUMAN 250 ML: 0.05 INJECTION, SOLUTION INTRAVENOUS at 13:15

## 2020-11-17 RX ADMIN — PROTAMINE SULFATE 20 MG: 10 INJECTION, SOLUTION INTRAVENOUS at 11:38

## 2020-11-17 RX ADMIN — PROTAMINE SULFATE 20 MG: 10 INJECTION, SOLUTION INTRAVENOUS at 11:23

## 2020-11-17 RX ADMIN — PHENYLEPHRINE HYDROCHLORIDE 80 MCG: 10 INJECTION INTRAVENOUS at 10:39

## 2020-11-17 RX ADMIN — PHENYLEPHRINE HYDROCHLORIDE 50 MCG: 10 INJECTION INTRAVENOUS at 10:45

## 2020-11-17 RX ADMIN — PSEUDOEPHEDRINE HCL 60 MG: 30 TABLET, FILM COATED ORAL at 18:09

## 2020-11-17 RX ADMIN — ALBUMIN HUMAN 250 ML: 0.05 INJECTION, SOLUTION INTRAVENOUS at 13:29

## 2020-11-17 RX ADMIN — ROCURONIUM BROMIDE 30 MG: 10 INJECTION INTRAVENOUS at 10:16

## 2020-11-17 RX ADMIN — PHENYLEPHRINE HYDROCHLORIDE 50 MCG: 10 INJECTION INTRAVENOUS at 11:20

## 2020-11-17 RX ADMIN — PHENYLEPHRINE HYDROCHLORIDE 50 MCG: 10 INJECTION INTRAVENOUS at 11:16

## 2020-11-17 RX ADMIN — Medication 2 G: at 10:09

## 2020-11-17 RX ADMIN — Medication 2 G: at 18:10

## 2020-11-17 RX ADMIN — DEXAMETHASONE SODIUM PHOSPHATE 8 MG: 4 INJECTION, SOLUTION INTRAMUSCULAR; INTRAVENOUS at 10:22

## 2020-11-17 RX ADMIN — HEPARIN SODIUM 6000 UNITS: 1000 INJECTION INTRAVENOUS; SUBCUTANEOUS at 10:53

## 2020-11-17 RX ADMIN — MIDAZOLAM HYDROCHLORIDE 1 MG: 2 INJECTION, SOLUTION INTRAMUSCULAR; INTRAVENOUS at 09:51

## 2020-11-17 RX ADMIN — PHENYLEPHRINE HYDROCHLORIDE 50 MCG: 10 INJECTION INTRAVENOUS at 10:57

## 2020-11-17 RX ADMIN — GLYCOPYRROLATE 0.2 MG: 0.2 INJECTION, SOLUTION INTRAMUSCULAR; INTRAVITREAL at 10:06

## 2020-11-17 RX ADMIN — PROTAMINE SULFATE 20 MG: 10 INJECTION, SOLUTION INTRAVENOUS at 12:04

## 2020-11-17 RX ADMIN — PHENYLEPHRINE HYDROCHLORIDE 50 MCG: 10 INJECTION INTRAVENOUS at 10:59

## 2020-11-17 RX ADMIN — ALBUTEROL SULFATE 2.5 MG: 2.5 SOLUTION RESPIRATORY (INHALATION) at 16:39

## 2020-11-17 NOTE — ANESTHESIA PREPROCEDURE EVALUATION
Anesthesia Evaluation     no history of anesthetic complications:  NPO Solid Status: > 8 hours  NPO Liquid Status: > 2 hours           Airway   Mallampati: I  TM distance: >3 FB  Neck ROM: full  No difficulty expected  Dental    (+) upper dentures and lower dentures    Pulmonary - negative pulmonary ROS and normal exam    breath sounds clear to auscultation  (-) COPD, asthma, sleep apnea, not a smoker  Cardiovascular   Exercise tolerance: poor (<4 METS)    ECG reviewed  Patient on routine beta blocker and Beta blocker given within 24 hours of surgery  Rhythm: regular  Rate: normal    (+) hypertension well controlled 2 medications or greater, valvular problems/murmurs AI and MR, past MI  >12 months, CAD, CABG (6 yrs ago) >6 Months, CHF Systolic <55%, murmur, hyperlipidemia,  carotid artery disease carotid bilateral  (-) dysrhythmias, angina, cardiac stents, DVT    ROS comment: Sinus bradycardia  T wave abnormality, consider lateral ischemia  Abnormal ECG  When compared with ECG of 21-FEB-2020 11:26,  NO SIG CHANGE  Confirmed by MINOR    · The left ventricular cavity is mildly dilated.  · Estimated EF = 48%.  · Left ventricular systolic function is low normal.  · Left ventricular diastolic dysfunction (grade I) consistent with impaired relaxation.  · Left atrial cavity size is borderline dilated.  · Mild aortic valve regurgitation is present.  · Mild mitral valve regurgitation is present  · Mild tricuspid valve regurgitation is present.       Neuro/Psych  (-) seizures, TIA, CVA, headaches, numbness, psychiatric history  GI/Hepatic/Renal/Endo    (+)  GERD well controlled,  renal disease CRI,   (-) hepatitis, diabetes, no thyroid disorder    Musculoskeletal     (+) arthralgias,   Abdominal    Substance History   (-) alcohol use, drug use     OB/GYN          Other   arthritis (knees),      (-) history of cancer  ROS/Med Hx Other: plavix taken this am  K 5.3 on 11/12/20                Anesthesia Plan    ASA 3     general    (Arterial line, 2nd IV discussed)  intravenous induction     Anesthetic plan, all risks, benefits, and alternatives have been provided, discussed and informed consent has been obtained with: patient.  Use of blood products discussed with patient  Consented to blood products.

## 2020-11-17 NOTE — OP NOTE
OPERATIVE NOTE  Naomi IRVING Son  1935 11/17/2020    PREOP DIAGNOSES:    RIGHT carotid artery stenosis, asymptomatic  High risk criteria:  Age >= 75 and bilateral carotid artery stenosis requiring treatment  SVS VQI VQI-TSP:  [id number]    POSTOP DIAGNOSES:   RIGHT carotid artery stenosis    PROCEDURE:   RIGHT Transcatheter placement cervical carotid stent open with distal embolic protection  (TCAR, [id number] EnRoute stent, 6.5x30mm Dre balloon predilation,  EnRoute NPS flow reversal system)  Select RIGHT carotid cerebral arteriogram  Vascular ultrasound guidance (femoral venous cannulation)    SURGEON: TEODORO Latif MD FACS RPVI    ASSISTANT: Mouna Kenney CFA  provided critical assistance during the case including suctioning, exposure, retraction, and reduction of blood loss.    ANESTHESIA: GEN    CONTRAST:  15ml isovue    MEDICATIONS: 6000 units heparin, 0.4 mg glycopyrrolate    FLUORO TIME:  3.5min    FLOW REVERSAL TIME:  10min    ESTIMATED BLOOD LOSS: Minimal    SPECIMEN:  None    COMPLICATIONS: none    DESCRIPTION OF OPERATION:   Location: bifurcation, ICA and CCA  Lesion type:  atherosclerosis  Lesion circumference calcification:  75%  Lesion length:  33mm  Degree of stenosis:  95%  ICA tortuosity:  mild  Aortic Arch: 2  Aortic Arch calcification:  Moderate to severe    Antiplatelet, statin confirmed.    Patient taken to the operating room, placed in supine position, neck extended.  Vascular ultrasound used to identify the LEFT common femoral vein  9mm in diameter no plaque, cannulated via modified Seldinger technique with mini stick under ultrasound guidance. wire and catheter exchanged for 8Fr Enroute NPS venous sheath. Secured to skin.    Vascular ultrasound used to identify carotid artery, CCA >=6mm, no significant plaque at puncture site, bifurcation marked on skin.  transverse incision made base of RIGHT neck. 6000 units IV heparin given to maintain ACT around 300.  Dissection down to common  carotid artery, 3cm exposed, controlled with vessel loop.  5-0 Prolene pursestring suture placed anterior CCA.  Traction on CCA loop. CCA cannulated via modified Seldinger technique with micropuncture needle, exchanged 4Fr catheter.  Carotid cerebral arteriogram performed.    RIGHT common carotid artery:  patent with moderate diffuse disease  RIGHT internal carotid artery:  with focal stenosis 95%  RIGHT external carotid artery:  with focal stenosis  99%  No significant intracranial stenoses visualized.    0.035 J-tip extra support guidewire placed, stopped short of bifurcation. Exchanged 8Fr Enroute sheath.  Distal sheath location in central lumen confirmed with AP and oblique contrast injections. Flow reversal established with Enroute NPS system.  Proximal CCA occluded with profunda clamp. Exchanged 0.014 SR guidewire, lesion crossed with mild difficulty, tip at siphon turn.     Exchanged 4x30mm Dre balloon placed across carotid artery lesion for predilation, inflated to full effacement 6atm with inflator. Reverse flow x2min.  Exchanged 9x40mm Enroute stent deployed across lesion without difficulty.  Reverse flow x2min. Completion arteriogram shows no significant residual stenosis. No evidence obstruction, dissection or extravasation.    Wire, catheter removed. CCA clamp removed, antegrade flow re-established.  Enroute NPS system disconnected, blood returned thru venous sheath.  Venous sheath removed, manual pressure held with good hemostasis.  Arterial sheath removed, repaired with 5-0 Prolene pursestring suture.     40 mg protamine given with return of ACT to baseline.  Hemostasis obtained.  SNoW used.  Incision closed in layers interrupted 3-0 Vicryl platysma, 4-0 Monocryl skin, Dermabond tape.  Awoke from anesthesia moving all extremities, no focal neurologic deficits.  Patient tolerated procedure well, transferred to PACU in stable condition.          This document has been electronically signed by  Bravo Latif MD on November 17, 2020 11:51 CST

## 2020-11-17 NOTE — PLAN OF CARE
Goal Outcome Evaluation:  Plan of Care Reviewed With: patient     Outcome Summary: pt temp 93.2, mariaa singh started, Neuro exam intact. no complaints of pain.  Art line not correlating with cuff on pressure readings.  will clarify with MD.

## 2020-11-17 NOTE — ANESTHESIA POSTPROCEDURE EVALUATION
Patient: Naomi Abdalla    Procedure Summary     Date: 11/17/20 Room / Location: [facility] OR  / [facility] OR    Anesthesia Start: 0959 Anesthesia Stop: 1220    Procedure: RIGHT TRANSCAROTID ARTERY REVASCULARIZATION possible carotid endarterectomy carotid cerebral arteriogram (Right Neck) Diagnosis:       Carotid stenosis, asymptomatic, bilateral      (Carotid stenosis, asymptomatic, bilateral [I65.23])    Surgeon: Bravo Latif MD Provider: Ab Tovar MD    Anesthesia Type: general ASA Status: 3          Anesthesia Type: general    Vitals  No vitals data found for the desired time range.          Post Anesthesia Care and Evaluation    Patient location during evaluation: bedside  Patient participation: complete - patient participated  Level of consciousness: sleepy but conscious  Pain score: 0  Pain management: adequate  Airway patency: patent  Anesthetic complications: No anesthetic complications  PONV Status: none  Cardiovascular status: acceptable and stable  Respiratory status: acceptable  Hydration status: stable

## 2020-11-18 VITALS
HEART RATE: 77 BPM | OXYGEN SATURATION: 93 % | BODY MASS INDEX: 21.5 KG/M2 | TEMPERATURE: 98.6 F | RESPIRATION RATE: 18 BRPM | HEIGHT: 62 IN | WEIGHT: 116.84 LBS | SYSTOLIC BLOOD PRESSURE: 130 MMHG | DIASTOLIC BLOOD PRESSURE: 60 MMHG

## 2020-11-18 PROCEDURE — 94799 UNLISTED PULMONARY SVC/PX: CPT

## 2020-11-18 PROCEDURE — 99024 POSTOP FOLLOW-UP VISIT: CPT | Performed by: NURSE PRACTITIONER

## 2020-11-18 RX ORDER — ACETAMINOPHEN 325 MG/1
650 TABLET ORAL EVERY 4 HOURS PRN
Start: 2020-11-18 | End: 2022-01-01

## 2020-11-18 RX ORDER — AMOXICILLIN 250 MG
2 CAPSULE ORAL 2 TIMES DAILY PRN
Start: 2020-11-18 | End: 2022-01-01

## 2020-11-18 RX ADMIN — FAMOTIDINE 20 MG: 20 TABLET, FILM COATED ORAL at 08:07

## 2020-11-18 RX ADMIN — Medication 2 G: at 02:30

## 2020-11-18 RX ADMIN — PSEUDOEPHEDRINE HCL 60 MG: 30 TABLET, FILM COATED ORAL at 01:30

## 2020-11-18 RX ADMIN — ASPIRIN 81 MG: 81 TABLET, CHEWABLE ORAL at 08:07

## 2020-11-18 RX ADMIN — SODIUM CHLORIDE 75 ML/HR: 9 INJECTION, SOLUTION INTRAVENOUS at 06:06

## 2020-11-18 RX ADMIN — ALBUTEROL SULFATE 2.5 MG: 2.5 SOLUTION RESPIRATORY (INHALATION) at 07:53

## 2020-11-18 RX ADMIN — PSEUDOEPHEDRINE HCL 60 MG: 30 TABLET, FILM COATED ORAL at 08:07

## 2020-11-18 RX ADMIN — CLOPIDOGREL BISULFATE 75 MG: 75 TABLET ORAL at 08:07

## 2020-11-18 RX ADMIN — LOSARTAN POTASSIUM 25 MG: 25 TABLET, FILM COATED ORAL at 08:07

## 2020-11-18 NOTE — PROGRESS NOTES
"  Cardiothoracic - Vascular Surgery Daily Note        LOS: 1 day   Patient Care Team:  System, Provider Not In as PCP - General     POD1  RIGHT Transcatheter placement cervical carotid stent open with distal embolic protection  (TCAR, [id number] EnRoute stent, 6.5x30mm Dre balloon predilation,  EnRoute NPS flow reversal system)  Select RIGHT carotid cerebral arteriogram  Vascular ultrasound guidance (femoral venous cannulation)    Chief Complaint: Carotid Stenosis      Subjective     The following portions of the patient's history were reviewed and updated as appropriate: allergies, current medications, past family history, past medical history, past social history, past surgical history and problem list.     Subjective:  Symptoms:  Stable.  No shortness of breath or chest pain.    Diet:  Adequate intake.    Activity level: Returning to normal.    Pain:  She reports no pain.          Objective     Vital Signs  Temp:  [93.2 °F (34 °C)-98.7 °F (37.1 °C)] 98.6 °F (37 °C)  Heart Rate:  [45-75] 66  Resp:  [15-22] 18  BP: (101-146)/(36-94) 146/63  Arterial Line BP: ()/(29-47) 124/36  Body mass index is 21.37 kg/m².    Intake/Output Summary (Last 24 hours) at 11/18/2020 0906  Last data filed at 11/18/2020 0600  Gross per 24 hour   Intake 1911 ml   Output 200 ml   Net 1711 ml     No intake/output data recorded.    Wt Readings from Last 3 Encounters:   11/18/20 53 kg (116 lb 13.5 oz)   11/12/20 52.6 kg (116 lb)   10/23/20 51.7 kg (114 lb)         Physical Exam   Objective:  General Appearance:  Comfortable, well-appearing, in no acute distress and not in pain.    Vital signs: (most recent): Blood pressure 146/63, pulse 66, temperature 98.6 °F (37 °C), temperature source Oral, resp. rate 18, height 157.5 cm (62\"), weight 53 kg (116 lb 13.5 oz), SpO2 100 %.  Vital signs are normal.  No fever.    Output: Producing urine and no stool output.    HEENT: Normal HEENT exam.    Lungs:  Normal effort and normal respiratory " rate.  Breath sounds clear to auscultation.    Heart: Normal rate.  Regular rhythm.    Chest: Symmetric chest wall expansion.   Abdomen: Abdomen is soft.  Bowel sounds are normal.   There is no abdominal tenderness.     Extremities: Normal range of motion.    Pulses: Distal pulses are intact.  There are no decreased pulses.    Neurological: Patient is alert and oriented to person, place and time.  GCS score is 15.    Pupils:  Pupils are equal, round, and reactive to light.  Pupils are equal.   Skin:  Warm and dry.  (R subclavian incision CDI  L femoral vein site CDI)              Results Review:    Lab Results   Component Value Date    WBC 4.79 11/12/2020    HGB 11.1 (L) 11/12/2020    HCT 32.3 (L) 11/12/2020    MCV 85.2 11/12/2020     11/12/2020     Lab Results   Component Value Date    GLUCOSE 105 (H) 11/12/2020    BUN 17 11/12/2020    CREATININE 1.60 (H) 11/12/2020    EGFRIFNONA 31 (L) 11/12/2020    BCR 10.6 11/12/2020    K 4.5 11/17/2020    CO2 26.0 11/12/2020    CALCIUM 9.8 11/12/2020    ALBUMIN 4.20 07/21/2020    AST 18 03/11/2020    ALT 8 03/11/2020       Calcium No results found for: CALCIUM   Magnesium No results found for: MG     Imaging Results (Last 24 Hours)     Procedure Component Value Units Date/Time    FL C Arm During Surgery [[id number]] Resulted: 11/17/20 1714     Updated: 11/17/20 1714                              albuterol, 2.5 mg, Nebulization, Q8H - RT  aspirin, 81 mg, Oral, Daily  atorvastatin, 10 mg, Oral, Nightly  carvedilol, 12.5 mg, Oral, BID With Meals  clopidogrel, 75 mg, Oral, Daily  famotidine, 20 mg, Oral, Q12H  losartan, 25 mg, Oral, Q24H  pseudoephedrine, 60 mg, Oral, Q6H      nitroglycerin, 5-200 mcg/min, Last Rate: Stopped (11/17/20 1610)  sodium chloride, 75 mL/hr, Last Rate: 75 mL/hr (11/18/20 0606)              ASSESSMENT/PLAN     Satisfactory Post op  Progressing well, up in chair, well appearing, tolerating PO, voiding.    Carotid Stenosis  DAPT, Statin, BB.  S/P R  TCAR.  No new deficits.  Plan R ICA ultrasound for stent survey in 4 weeks    ASCVD  DAPT,Statin, BB, ARB     Post operative pain  Controlled [place]     Respiratory  NEBS PRN, room air, IS/OPEP     Disposition  Stable for D/C home with one week follow up [facility] APRN        This document has been electronically signed by SURJIT Gallagher-BC @  On November 18, 2020 09:06 CST

## 2020-11-18 NOTE — PLAN OF CARE
Goal Outcome Evaluation:  Plan of Care Reviewed With: patient     Outcome Summary: pt temp 98.6 this AM. marcia out. pt up in chair. pt vital signs stable. will continue to monitor.

## 2020-11-18 NOTE — DISCHARGE SUMMARY
CVTS DISCHARGE SUMMARY  Date of Admission: 11/17/2020  7:15 AM  Date of Discharge:  11/18/2020    Admission Diagnosis:   Carotid stenosis, asymptomatic, bilateral [I65.23]    Discharge Diagnosis:   Post-Op Diagnosis Codes:     * Carotid stenosis, asymptomatic, bilateral [I65.23]    Consults:   Consults     No orders found for last 30 day(s).          Procedures Performed  Procedure(s):  RIGHT TRANSCAROTID ARTERY REVASCULARIZATION possible carotid endarterectomy carotid cerebral arteriogram       Discharge Medications     Discharge Medications      New Medications      Instructions Start Date   acetaminophen 325 MG tablet  Commonly known as: TYLENOL   650 mg, Oral, Every 4 Hours PRN      sennosides-docusate 8.6-50 MG per tablet  Commonly known as: PERICOLACE   2 tablets, Oral, 2 Times Daily PRN         Continue These Medications      Instructions Start Date   amLODIPine 5 MG tablet  Commonly known as: NORVASC   5 mg, Oral, Nightly      aspirin 81 MG chewable tablet   81 mg, Oral, Daily      atorvastatin 10 MG tablet  Commonly known as: LIPITOR   10 mg, Oral, Nightly      carvedilol 25 MG tablet  Commonly known as: COREG   12.5 mg, Oral, 2 Times Daily With Meals      clopidogrel 75 MG tablet  Commonly known as: PLAVIX   75 mg, Oral, Daily      furosemide 40 MG tablet  Commonly known as: LASIX   40 mg, Oral, As Needed      irbesartan 75 MG tablet  Commonly known as: AVAPRO   75 mg, Oral, Daily      multivitamin with minerals tablet tablet   1 tablet, Oral, Daily             Discharge Diet:   Diet Instructions     Diet: Regular, Consistent Carbohydrate, Cardiac      Discharge Diet:  Regular  Consistent Carbohydrate  Cardiac             Discharge Disposition: Home in stable condition with follow up appointments with CVTS Nurse Practitioner 1 week, Cardiology/PCP as scheduled.     History of Present Illness/Hospital Course:   Cardiology:  Neptali     85 y.o. female with CAD, NSTEMI, ischemic cardiomyopathy (EF 10-15%), CHF,  HTN, CKD3, dyslipidemia,  CHF, and carotid stenosis.  She returns today in scheduled follow up for carotid stenosis evaluation.  She denies any neurosensory or motor symptoms.   Denies visual or motor changes.  No CVA/TIA.      12/19/2013 Carotid Duplex:  CASPER 16-49% antegrade.  LICA 50-79% antegrade.  12/18/14  Carotid Duplex:  CASPER 50-79% (ratio 2.4)   LICA 50-79% (ratio 2.4).  6/23/15: Carotid duplex: CASPER 50-79%(ratio 2.9) LICA 50-79%  12/29/15: Carotid duplex: CASPER 50-79% (ratio 3.6). LICA 50-79% ratio 1.8)   7/28/16: Carotid duplex: CASPER 50-79% (mPSV 239cm/s ratio 2.9) LICA 50-79% (mPSV 236cm/s ratio 1.7)  1/31/17: Carotid duplex: CASPER 50-69% (mPSV 185cm/s, ratio 1.7) LICA 50-69% (mPSV 202cm/s, ratio 2.0)  2/3/18: Carotid duplex: CASPER 50-69% (sICH546rp/s, ratio 3.1) LICA 50-69% (mPSV 407cm/s, ratio 0.9)  2/14/19: Carotid duplex: CASPER >70% (204/3.0) LICA 50-69% (143/1.1) Antegrade  9/10/19: Carotid duplex: CASPER 50-69% (218/2.7) LICA 50-69% (268/4.5) Antegrade flow  3/11/2020: Carotid Duplex: RIGHT >70% (142/4.1) LEFT 50-69% (166/0.7) Antegrade   3/19/2020: CTA Carotids: CASPER >70% LICA 70%  10/23/2020: Carotid Duplex: RIGHT >70% (235/2.9) LEFT 50-69% (168/0.9) Antegrade     11/16/2013 Echocardiogram:  LA 38, LV 56, RV 49, IVS 9.  EF 20%.  GIULIA 2.4.  11/22/2013 Cardiac Catheterization:  LAD 70-80%, D1 70-80%, CX 95%, RCA 30% mild irreg.  moderate MR, EF 10-15%.  /24.  12/18/2013 MISSY:  EF 20%, global severe hypokinesis, no clot, LVH.  central moderate MR.  mild to moderate AI.  12/19/2013 Myocardial Viability Study:  anterior wall normal.  inferior no appreciable viable tissue in infarcted area.  lateral small amount vialble tissue on delayed images, most nonviable.  12/19/2013 Lower extremity vein mapping:  RIGHT 2.3-5.0mm.  LEFT 1.8-3.8mm.  3/10/2014 Echocardiogram:  LA 37, LV 58, RV 49, IVS 9.  EF 35%.  GIULIA 2.4. tr MR  4/8/14: OFF PUMP CABG X 1  4/22/14: CXR : Bilateral pleural effusions, left greater than  right, and  minimal bibasilar atelectasis.  5/28/14: Echo: EF 45-50%. LV borderline reduced. LA mildly dilated. Borderline global hypokineses of LV.       Adequate preop studies were completed and the patient was scheduled electively. Operative day Naomi IRVING Son admitted through [facility], taken to operating suite and placed under general anesthesia.  Underwent said procedure without complications or difficulty. They were weaned easily from mechanical ventilation and extubated in the recovery room placed on oxygen per nasal cannula and further transferred to CCU for further care and monitoring. Naomi IRVING Son remained hemodynamically and neurovascularly stable immediately postop.  POD1 they were out of the bed to the chair tolerating breakfast without any difficulty swallowing.  Incisions and vital signs are stable for discharge home today in satisfactory condition.     Vital Signs  Temp:  [93.2 °F (34 °C)-98.7 °F (37.1 °C)] 98.6 °F (37 °C)  Heart Rate:  [45-75] 66  Resp:  [15-22] 18  BP: (101-146)/(36-94) 146/63  Arterial Line BP: ()/(29-47) 124/36      11/17/20  0732 11/17/20  1551 11/18/20  0500   Weight: 51.8 kg (114 lb 3.2 oz) 52.2 kg (115 lb) 53 kg (116 lb 13.5 oz)       Pertinent Test Results:  Lab Results   Component Value Date    WBC 4.79 11/12/2020    HGB 11.1 (L) 11/12/2020    HCT 32.3 (L) 11/12/2020    MCV 85.2 11/12/2020     11/12/2020     Lab Results   Component Value Date    GLUCOSE 105 (H) 11/12/2020    BUN 17 11/12/2020    CREATININE 1.60 (H) 11/12/2020    EGFRIFNONA 31 (L) 11/12/2020    BCR 10.6 11/12/2020    K 4.5 11/17/2020    CO2 26.0 11/12/2020    CALCIUM 9.8 11/12/2020    ALBUMIN 4.20 07/21/2020    AST 18 03/11/2020    ALT 8 03/11/2020       Physical Exam   Objective:  General Appearance:  Comfortable, well-appearing, in no acute distress and not in pain.    Vital signs: (most recent): Blood pressure 146/63, pulse 66, temperature 98.6 °F (37 °C), temperature source Oral, resp. rate 18, height  "157.5 cm (62\"), weight 53 kg (116 lb 13.5 oz), SpO2 100 %.  Vital signs are normal.  No fever.    Output: Producing urine and no stool output.    HEENT: Normal HEENT exam.    Lungs:  Normal effort and normal respiratory rate.  Breath sounds clear to auscultation.    Heart: Normal rate.  Regular rhythm.    Chest: Symmetric chest wall expansion.   Abdomen: Abdomen is soft.  Bowel sounds are normal.   There is no abdominal tenderness.     Extremities: Normal range of motion.    Pulses: Distal pulses are intact.  There are no decreased pulses.    Neurological: Patient is alert and oriented to person, place and time.  GCS score is 15.    Pupils:  Pupils are equal, round, and reactive to light.  Pupils are equal.   Skin:  Warm and dry.  (R subclavian incision CDI  L femoral vein site CDI)    Additional Instructions for the Follow-ups that You Need to Schedule     Call MD With Problems / Concerns   As directed      Instructions: Contact provider for uncontrolled pain, shortness of breath, fever or chills, drainage or swelling from surgical incision site.     If you should experience any neurological symptoms including but not limited to visual or speech disturbances confusion, seizures, or weakness of limbs of one side of your body notify [facility] V[facility] center immediately for evaluation or if after hours present to the nearest Emergency Department.    Order Comments: Instructions: Contact provider for uncontrolled pain, shortness of breath, fever or chills, drainage or swelling from surgical incision site.  If you should experience any neurological symptoms including but not limited to visual or speech disturbances confusion, seizures, or weakness of limbs of one side of your body notify [facility] V[facility] center immediately for evaluation or if after hours present to the nearest Emergency Department.          Discharge Follow-up with PCP   As directed       Currently Documented PCP:    System, Provider Not In    PCP " Phone Number:    None     Follow Up Details: as scheduled         Discharge Follow-up with Specified Provider: Madhuri ZAYAS; 1 Week   As directed      To: Madhuri ZAYAS    Follow Up: 1 Week               Discharge Instructions: Discharge instructions include no heavy lifting anything greater than 10lbs for approximately 1 week.  No sex or driving for 1-2 weeks. Printed information given to the patient with advancement of activities weekly.  Risks and benefits of narcotic medications and weaning postoperatively have been discussed. Clean operative site with antibacterial soap/water, pat dry. Keep open to air unless draining, then may apply dry dressing.  No ointments or creams unless prescribed by provider. Signs and symptoms of infection including drainage from operative site, redness, swelling, with associated fever and/or chills notify [facility] V[facility] center immediately for wound check. If you should experience any neurological symptoms including but not limited to visual or speech disturbances confusion, seizures, or weakness of limbs of one side of your body notify [facility] V[facility] center immediately for evaluation or if after hours present to the nearest Emergency Department.  Patient verbalizes understanding of discharge instructions, all questions are answered, follow up appointments have been made, they are discharged home in stable condition.       Follow-up Appointments  No future appointments.    Test Results Pending at Discharge           This document has been electronically signed by JORDY GallagherCNP-BC @  On November 18, 2020 09:14 CST      Time: Discharge 45 min

## 2020-11-19 ENCOUNTER — READMISSION MANAGEMENT (OUTPATIENT)
Dept: CALL CENTER | Facility: HOSPITAL | Age: 85
End: 2020-11-19

## 2020-11-19 NOTE — OUTREACH NOTE
Prep Survey      Responses   [facility] facility patient discharged from?  [facility]   Is LACE score < 7 ?  No   Eligibility  Readm Mgmt   Discharge diagnosis  Carotid stenosis, asymptomatic, bilateral RIGHT TRANSCAROTID ARTERY REVASCULARIZATION    Does the patient have one of the following disease processes/diagnoses(primary or secondary)?  Other   Does the patient have Home health ordered?  No   Is there a DME ordered?  No   Prep survey completed?  Yes          Mary Barrett RN

## 2020-11-24 ENCOUNTER — READMISSION MANAGEMENT (OUTPATIENT)
Dept: CALL CENTER | Facility: HOSPITAL | Age: 85
End: 2020-11-24

## 2020-11-24 NOTE — OUTREACH NOTE
"Medical Week 1 Survey      Responses   [facility] patient discharged from?  [place]   Does the patient have one of the following disease processes/diagnoses(primary or secondary)?  Other   Week 1 attempt successful?  Yes   Call start time  1718   Call end time  1725   Discharge diagnosis  Carotid stenosis, asymptomatic, bilateral RIGHT TRANSCAROTID ARTERY REVASCULARIZATION    Meds reviewed with patient/caregiver?  Yes   Is the patient having any side effects they believe may be caused by any medication additions or changes?  No   Does the patient have all medications ordered at discharge?  Yes   Is the patient taking all medications as directed (includes completed medication regime)?  Yes   Comments regarding appointments  Patient will see Madhuri Ayala \"heart care\"   Does the patient have a primary care provider?   No   Does the patient have an appointment with their PCP within 7 days of discharge?  Yes   Comments regarding PCP  Patient see Dr. Salinas, Dr. Christy, no PCP   Has the patient kept scheduled appointments due by today?  Yes   Did the patient receive a copy of their discharge instructions?  Yes   Nursing interventions  Reviewed instructions with patient   What is the patient's perception of their health status since discharge?  Same   Is the patient/caregiver able to teach back the hierarchy of who to call/visit for symptoms/problems? PCP, Specialist, Home health nurse, Urgent Care, ED, 911  Yes   Additional teach back comments  Patient reports she doesnt have an appetite.   Week 1 call completed?  Yes   Wrap up additional comments  Patient reports she is tired.  She states \"this threw me for a loop\".  She has not removed bandages, she wants provider to do this tomorrow.  She states her children are \"in and out and can call them if i need anything\".  She has AVS and no questions regarding meds.            Althea Voss RN  "

## 2020-11-25 ENCOUNTER — OFFICE VISIT (OUTPATIENT)
Dept: CARDIAC SURGERY | Facility: CLINIC | Age: 85
End: 2020-11-25

## 2020-11-25 ENCOUNTER — LAB (OUTPATIENT)
Dept: LAB | Facility: HOSPITAL | Age: 85
End: 2020-11-25

## 2020-11-25 VITALS
TEMPERATURE: 97.5 F | WEIGHT: 111.4 LBS | SYSTOLIC BLOOD PRESSURE: 132 MMHG | OXYGEN SATURATION: 96 % | DIASTOLIC BLOOD PRESSURE: 78 MMHG | BODY MASS INDEX: 19.74 KG/M2 | HEART RATE: 87 BPM | HEIGHT: 63 IN

## 2020-11-25 DIAGNOSIS — R79.1 ABNORMAL COAGULATION PROFILE: ICD-10-CM

## 2020-11-25 DIAGNOSIS — I65.23 BILATERAL CAROTID ARTERY STENOSIS: ICD-10-CM

## 2020-11-25 DIAGNOSIS — Z09 FOLLOW-UP SURGERY CARE: Primary | ICD-10-CM

## 2020-11-25 LAB
ALBUMIN SERPL-MCNC: 4 G/DL (ref 3.5–5.2)
ALBUMIN/GLOB SERPL: 1.2 G/DL
ALP SERPL-CCNC: 84 U/L (ref 39–117)
ALT SERPL W P-5'-P-CCNC: 7 U/L (ref 1–33)
ANION GAP SERPL CALCULATED.3IONS-SCNC: 11 MMOL/L (ref 5–15)
AST SERPL-CCNC: 23 U/L (ref 1–32)
BASOPHILS # BLD AUTO: 0.04 10*3/MM3 (ref 0–0.2)
BASOPHILS NFR BLD AUTO: 0.5 % (ref 0–1.5)
BILIRUB SERPL-MCNC: 0.6 MG/DL (ref 0–1.2)
BUN SERPL-MCNC: 20 MG/DL (ref 8–23)
BUN/CREAT SERPL: 13.8 (ref 7–25)
CALCIUM SPEC-SCNC: 9.5 MG/DL (ref 8.6–10.5)
CHLORIDE SERPL-SCNC: 98 MMOL/L (ref 98–107)
CO2 SERPL-SCNC: 25 MMOL/L (ref 22–29)
CREAT SERPL-MCNC: 1.45 MG/DL (ref 0.57–1)
DEPRECATED RDW RBC AUTO: 41.8 FL (ref 37–54)
EOSINOPHIL # BLD AUTO: 0.02 10*3/MM3 (ref 0–0.4)
EOSINOPHIL NFR BLD AUTO: 0.2 % (ref 0.3–6.2)
ERYTHROCYTE [DISTWIDTH] IN BLOOD BY AUTOMATED COUNT: 13.2 % (ref 12.3–15.4)
GFR SERPL CREATININE-BSD FRML MDRD: 34 ML/MIN/1.73
GLOBULIN UR ELPH-MCNC: 3.3 GM/DL
GLUCOSE SERPL-MCNC: 124 MG/DL (ref 65–99)
HCT VFR BLD AUTO: 28 % (ref 34–46.6)
HGB BLD-MCNC: 9.3 G/DL (ref 12–15.9)
IMM GRANULOCYTES # BLD AUTO: 0.03 10*3/MM3 (ref 0–0.05)
IMM GRANULOCYTES NFR BLD AUTO: 0.3 % (ref 0–0.5)
LYMPHOCYTES # BLD AUTO: 0.71 10*3/MM3 (ref 0.7–3.1)
LYMPHOCYTES NFR BLD AUTO: 8.1 % (ref 19.6–45.3)
MCH RBC QN AUTO: 29.2 PG (ref 26.6–33)
MCHC RBC AUTO-ENTMCNC: 33.2 G/DL (ref 31.5–35.7)
MCV RBC AUTO: 87.8 FL (ref 79–97)
MONOCYTES # BLD AUTO: 0.98 10*3/MM3 (ref 0.1–0.9)
MONOCYTES NFR BLD AUTO: 11.2 % (ref 5–12)
NEUTROPHILS NFR BLD AUTO: 6.98 10*3/MM3 (ref 1.7–7)
NEUTROPHILS NFR BLD AUTO: 79.7 % (ref 42.7–76)
NRBC BLD AUTO-RTO: 0 /100 WBC (ref 0–0.2)
PLATELET # BLD AUTO: 296 10*3/MM3 (ref 140–450)
PMV BLD AUTO: 9.8 FL (ref 6–12)
POTASSIUM SERPL-SCNC: 4 MMOL/L (ref 3.5–5.2)
PROT SERPL-MCNC: 7.3 G/DL (ref 6–8.5)
RBC # BLD AUTO: 3.19 10*6/MM3 (ref 3.77–5.28)
SODIUM SERPL-SCNC: 134 MMOL/L (ref 136–145)
WBC # BLD AUTO: 8.76 10*3/MM3 (ref 3.4–10.8)

## 2020-11-25 PROCEDURE — 85025 COMPLETE CBC W/AUTO DIFF WBC: CPT | Performed by: NURSE PRACTITIONER

## 2020-11-25 PROCEDURE — 99024 POSTOP FOLLOW-UP VISIT: CPT | Performed by: NURSE PRACTITIONER

## 2020-11-25 PROCEDURE — 36415 COLL VENOUS BLD VENIPUNCTURE: CPT | Performed by: NURSE PRACTITIONER

## 2020-11-25 PROCEDURE — 80053 COMPREHEN METABOLIC PANEL: CPT | Performed by: NURSE PRACTITIONER

## 2020-11-25 NOTE — PATIENT INSTRUCTIONS
Stable Post Op RIGHT CAROTID STENT:  Progressing slowly  -Check Labs Today-Office will call results  -Increase activity, ambulate around  -Increase caloric intake, milkshakes, [place] instant breakfast ready shakes    Medical Management: Aspirin, Plavix, statin   Signs and symptoms of infection including drainage from operative site, redness, swelling, with associated fever and/or chills notify [facility] Vascular center immediately for wound check.    Return 3 weeks- Duplex

## 2020-12-03 ENCOUNTER — READMISSION MANAGEMENT (OUTPATIENT)
Dept: CALL CENTER | Facility: HOSPITAL | Age: 85
End: 2020-12-03

## 2020-12-03 NOTE — OUTREACH NOTE
Medical Week 2 Survey      Responses   [facility] patient discharged from?  [place]   Does the patient have one of the following disease processes/diagnoses(primary or secondary)?  Other   Week 2 attempt successful?  Yes   Call start time  1538   Discharge diagnosis  Carotid stenosis, asymptomatic, bilateral RIGHT TRANSCAROTID ARTERY REVASCULARIZATION    Call end time  1543   Meds reviewed with patient/caregiver?  Yes   Is the patient having any side effects they believe may be caused by any medication additions or changes?  No   Does the patient have all medications ordered at discharge?  Yes   Is the patient taking all medications as directed (includes completed medication regime)?  Yes   Does the patient have a primary care provider?   Yes   Does the patient have an appointment with their PCP within 7 days of discharge?  Yes   Has the patient kept scheduled appointments due by today?  Yes   Psychosocial issues?  No   Did the patient receive a copy of their discharge instructions?  Yes   Nursing interventions  Reviewed instructions with patient   What is the patient's perception of their health status since discharge?  Improving   Is the patient/caregiver able to teach back signs and symptoms related to disease process for when to call PCP?  Yes   Is the patient/caregiver able to teach back signs and symptoms related to disease process for when to call 911?  Yes   Is the patient/caregiver able to teach back the hierarchy of who to call/visit for symptoms/problems? PCP, Specialist, Home health nurse, Urgent Care, ED, 911  Yes   If the patient is a current smoker, are they able to teach back resources for cessation?  Not a smoker   Week 2 Call Completed?  Yes   Wrap up additional comments  Patient reports she is tired.  on iron now          Mary Barrett RN

## 2020-12-15 ENCOUNTER — READMISSION MANAGEMENT (OUTPATIENT)
Dept: CALL CENTER | Facility: HOSPITAL | Age: 85
End: 2020-12-15

## 2020-12-15 NOTE — OUTREACH NOTE
Medical Week 3 Survey      Responses   [facility] patient discharged from?  [place]   Does the patient have one of the following disease processes/diagnoses(primary or secondary)?  Other   Week 3 attempt successful?  Yes   Call start time  1600   Call end time  1601   Discharge diagnosis  Carotid stenosis, asymptomatic, bilateral RIGHT TRANSCAROTID ARTERY REVASCULARIZATION    Meds reviewed with patient/caregiver?  Yes   Is the patient having any side effects they believe may be caused by any medication additions or changes?  No   Does the patient have all medications ordered at discharge?  Yes   Is the patient taking all medications as directed (includes completed medication regime)?  Yes   Does the patient have a primary care provider?   Yes   Does the patient have an appointment with their PCP within 7 days of discharge?  Yes   Has the patient kept scheduled appointments due by today?  Yes   Has home health visited the patient within 72 hours of discharge?  N/A   Has all DME been delivered?  No   Psychosocial issues?  No   Did the patient receive a copy of their discharge instructions?  Yes   Nursing interventions  Reviewed instructions with patient   What is the patient's perception of their health status since discharge?  Improving   Is the patient/caregiver able to teach back signs and symptoms related to disease process for when to call PCP?  Yes   Is the patient/caregiver able to teach back signs and symptoms related to disease process for when to call 911?  Yes   Is the patient/caregiver able to teach back the hierarchy of who to call/visit for symptoms/problems? PCP, Specialist, Home health nurse, Urgent Care, ED, 911  Yes   If the patient is a current smoker, are they able to teach back resources for cessation?  Not a smoker   Additional teach back comments  Patient reports she doesnt have an appetite.   Week 3 Call Completed?  Yes   Graduated  Yes   Is the patient interested in additional calls  from an ambulatory ?  NOTE:  applies to high risk patients requiring additional follow-up.  No   Did the patient feel the follow up calls were helpful during their recovery period?  Yes   Was the number of calls appropriate?  Yes   Graduated/Revoked comments  Patient is doing well. All goals met.           Sarthak Bhat RN

## 2020-12-18 ENCOUNTER — OFFICE VISIT (OUTPATIENT)
Dept: CARDIAC SURGERY | Facility: CLINIC | Age: 85
End: 2020-12-18

## 2020-12-18 VITALS
TEMPERATURE: 97.5 F | WEIGHT: 111.6 LBS | SYSTOLIC BLOOD PRESSURE: 132 MMHG | DIASTOLIC BLOOD PRESSURE: 79 MMHG | OXYGEN SATURATION: 98 % | HEART RATE: 62 BPM | HEIGHT: 62 IN | BODY MASS INDEX: 20.54 KG/M2

## 2020-12-18 DIAGNOSIS — I65.23 BILATERAL CAROTID ARTERY STENOSIS: ICD-10-CM

## 2020-12-18 DIAGNOSIS — E78.2 MIXED HYPERLIPIDEMIA: Primary | ICD-10-CM

## 2020-12-18 DIAGNOSIS — Z48.812 SURGICAL AFTERCARE, CIRCULATORY SYSTEM: ICD-10-CM

## 2020-12-18 PROCEDURE — 99024 POSTOP FOLLOW-UP VISIT: CPT | Performed by: NURSE PRACTITIONER

## 2020-12-18 NOTE — PATIENT INSTRUCTIONS
The results of your carotid ultrasound shows mild disease of the right carotid and is stable from previous exam.    Follow up in 3 months repeat bilateral carotid ultrasound.     If you should experience any neurological symptoms including but not limited to visual or speech disturbances confusion, seizures, or weakness of limbs of one side of your body notify [facility] V[facility] center immediately for evaluation or if after hours present to the nearest Emergency Department.     Clean operative site with antibacterial soap/water, pat dry. Keep open to air unless draining, then may apply dry dressing.  No ointments or creams unless prescribed by provider.

## 2020-12-18 NOTE — PROGRESS NOTES
CVTS Office Progress Note       Subjective   Patient ID: Naomi IRVING Son is a 85 y.o. female is here today for follow-up.    Chief Complaint:    Chief Complaint   Patient presents with   • Carotid Artery Disease       The following portions of the patient's history were reviewed and updated as appropriate: allergies, current medications, past family history, past medical history, past social history, past surgical history and problem list.  Recent images independently reviewed.  Available laboratory values reviewed.    PCP:  System, Provider Not In  Cardiology:  Neptali    85 y.o. female with CAD, NSTEMI, ischemic  cardiomyopathy (EF 10-15%), CHF, HTN, CKD3,  dyslipidemia,  CHF, and carotid stenosis.  She returns  today in scheduled follow up for carotid stenosis   evaluation.  She denies any neurosensory or motor  symptoms.   Denies visual or motor changes.  No  CVA/TIA. Progressing slowly post op.     12/19/2013 Carotid Duplex:  CASPER 16-49% antegrade.   LICA 50-79% antegrade.  12/18/14  Carotid Duplex:  CASPER 50-79% (ratio 2.4)   LICA  50-79% (ratio 2.4).  6/23/15: Carotid duplex: CASPER 50-79%(ratio 2.9) LICA  50-79%  12/29/15: Carotid duplex: CASPER 50-79% (ratio 3.6). LICA  50-79% ratio 1.8)   7/28/16: Carotid duplex: CASPER 50-79% (mPSV 239cm/s  ratio 2.9) LICA 50-79% (mPSV 236cm/s ratio 1.7)  1/31/17: Carotid duplex: CASPER 50-69% (mPSV 185cm/s,  ratio 1.7) LICA 50-69% (mPSV 202cm/s, ratio 2.0)  2/3/18: Carotid duplex: CASPER 50-69% ([id number]/s, ratio  3.1) LICA 50-69% (mPSV 407cm/s, ratio 0.9)  2/14/19: Carotid duplex: CASPER >70% (204/3.0) LICA  50-69% (143/1.1) Antegrade  9/10/19: Carotid duplex: CASPER 50-69% (218/2.7) LICA  50-69% (268/4.5) Antegrade flow  3/11/2020: Carotid Duplex: RIGHT >70% (142/4.1) TQG25-94% (166/0.7) Antegrade   3/19/2020: CTA Carotids: CASPER >70% LICA 70%  10/23/2020: Carotid Duplex: RIGHT >70% (235/2.9) LEFT 50-69% (168/0.9) Antegrade   11/17/2020: RIGHT TCAR  12/2020 Carotid Duplex: CASPER 0-49% mPSV  96c/s Ratio 1.2, Antegrade vertebrals    11/16/2013 Echocardiogram:  LA 38, LV 56, RV 49, IVS 9.  EF 20%.  GIULIA 2.4.  11/22/2013 Cardiac Catheterization:  LAD 70-80%, D1 70-80%, CX 95%, RCA 30% mild irreg.  moderate MR, EF 10-15%.  /24.  12/18/2013 MISSY:  EF 20%, global severe hypokinesis, no clot, LVH.  central moderate MR.  mild to moderate AI.  12/19/2013 Myocardial Viability Study:  anterior wall normal.  inferior no appreciable viable tissue in infarcted area.  lateral small amount vialble tissue on delayed images, most nonviable.  12/19/2013 Lower extremity vein mapping:  RIGHT 2.3-5.0mm.  LEFT 1.8-3.8mm.  3/10/2014 Echocardiogram:  LA 37, LV 58, RV 49, IVS 9.  EF 35%.  GIULIA 2.4. tr MR  4/8/14: OFF PUMP CABG X 1  4/22/14: CXR : Bilateral pleural effusions, left greater than right, and  minimal bibasilar atelectasis.  5/28/14: Echo: EF 45-50%. LV borderline reduced. LA mildly dilated. Borderline global hypokineses of LV.       Past Medical History:   Diagnosis Date   • Acute bronchitis    • Arrhythmia    • Arthritis    • Cardiomyopathy (CMS/HCC)    • Carotid artery stenosis    • Chronic kidney disease     stage 4   • Congestive heart failure (CMS/HCC)    • Contusion of elbow    • Cough    • Dyslipidemia    • Essential hypertension    • Fatigue    • Generalized ischemic myocardial dysfunction    • GERD (gastroesophageal reflux disease)    • Hyperlipidemia    • Hypertension    • Iron deficiency anemia    • MI (myocardial infarction) (CMS/HCC)    • Mitral valve disease    • Multiple vessel coronary artery disease    • Surgical follow-up care    • UTI (urinary tract infection)      Past Surgical History:   Procedure Laterality Date   • CARDIAC CATHETERIZATION  11/22/2013    Multi-vessel CAD with critical lesions noted in the LAD, diagonal CA and lesion complex. Critical lesion noted in the obtuse marginal CA and moderate lesion noted in the proximal RCA. LV dysfunction with LV dilatation with global hypokinesis  of LV.   • CORONARY ARTERY BYPASS GRAFT  2014    CABG ( off pump), placement of LIMA to LAD coronary artery (1 distal anastomosis), placement of left femoral arterial line, vascular ultrasound guidance.   • CORONARY ARTERY BYPASS GRAFT  2014    Off Pump LIMA-LAD   • TRANSESOPHAGEAL ECHOCARDIOGRAM (MISSY)  03/10/2014    with color flow-Depressed left ventricular systolic function with EF of 35%. Grade I diastolic dysfunction of the left ventricular myocardium. Mild AV regurgitation. Mild enlargement of the left ventricular cavity     Family History   Problem Relation Age of Onset   • Heart disease Other    • Hypertension Other    • No Known Problems Mother    • No Known Problems Father      Social History     Tobacco Use   • Smoking status: Former Smoker     Quit date:      Years since quittin.9   • Smokeless tobacco: Never Used   Substance Use Topics   • Alcohol use: No   • Drug use: No       ALLERGIES:   Patient has no known allergies.    MEDICATIONS:      Current Outpatient Medications:   •  acetaminophen (TYLENOL) 325 MG tablet, Take 2 tablets by mouth Every 4 (Four) Hours As Needed for Mild Pain ., Disp:  , Rfl:   •  amLODIPine (NORVASC) 5 MG tablet, Take 5 mg by mouth Every Night., Disp: , Rfl:   •  aspirin 81 MG chewable tablet, Chew 81 mg Daily., Disp: , Rfl:   •  atorvastatin (LIPITOR) 10 MG tablet, Take 10 mg by mouth Every Night., Disp: , Rfl:   •  carvedilol (COREG) 25 MG tablet, Take 12.5 mg by mouth 2 (Two) Times a Day With Meals., Disp: , Rfl:   •  clopidogrel (PLAVIX) 75 MG tablet, Take 75 mg by mouth Daily., Disp: , Rfl:   •  furosemide (LASIX) 40 MG tablet, Take 40 mg by mouth As Needed (edema)., Disp: , Rfl:   •  irbesartan (AVAPRO) 75 MG tablet, Take 75 mg by mouth Daily., Disp: , Rfl:   •  Multiple Vitamins-Minerals (MULTIVITAMIN ADULT PO), Take 1 tablet by mouth Daily., Disp: , Rfl:   •  sennosides-docusate (senna-docusate sodium) 8.6-50 MG per tablet, Take 2 tablets by mouth  2 (Two) Times a Day As Needed for Constipation., Disp:  , Rfl:     Review of Systems   Constitution: Positive for decreased appetite and malaise/fatigue. Negative for fever.   HENT: Positive for hearing loss.    Eyes: Negative for visual disturbance.   Cardiovascular: Negative for claudication and cyanosis.   Respiratory: Negative for shortness of breath.    Skin: Negative for color change and nail changes.   Musculoskeletal: Negative for muscle weakness.   Gastrointestinal: Negative for dysphagia.   Neurological: Negative for focal weakness, light-headedness, loss of balance, numbness, paresthesias and weakness.   Psychiatric/Behavioral: Positive for memory loss. Negative for altered mental status. The patient is nervous/anxious.    All other systems reviewed and are negative.       Objective   Temp:  [97.5 °F (36.4 °C)] 97.5 °F (36.4 °C)  Heart Rate:  [62] 62  BP: (132)/(79) 132/79   Vitals:    12/18/20 1336   BP: 132/79   Pulse: 62   Temp: 97.5 °F (36.4 °C)   SpO2: 98%      Body mass index is 20.41 kg/m².  Physical Exam   Constitutional: She is oriented to person, place, and time.   HENT:   Head: Atraumatic.   Neck: Neck supple. Carotid bruit is not present.   (R) Neck Inc: CDI   Cardiovascular: Normal rate and normal heart sounds.   Pulmonary/Chest: Effort normal and breath sounds normal.   Abdominal: Soft. Bowel sounds are normal.   Musculoskeletal: Normal range of motion.      Comments: Gait normal   Neurological: She is alert and oriented to person, place, and time.   Skin: Skin is warm and dry. Capillary refill takes less than 2 seconds.   Psychiatric: Thought content normal.   Vitals reviewed.    Lab Results   Component Value Date    GLUCOSE 124 (H) 11/25/2020    BUN 20 11/25/2020    CREATININE 1.45 (H) 11/25/2020    EGFRIFNONA 34 (L) 11/25/2020    BCR 13.8 11/25/2020    K 4.0 11/25/2020    CO2 25.0 11/25/2020    CALCIUM 9.5 11/25/2020    ALBUMIN 4.00 11/25/2020    AST 23 11/25/2020    ALT 7 11/25/2020      Lab Results   Component Value Date    WBC 8.76 11/25/2020    HGB 9.3 (L) 11/25/2020    HCT 28.0 (L) 11/25/2020    MCV 87.8 11/25/2020     11/25/2020           Assessment & Plan     Independent Review of Radiographic Studies:   12/2020 Carotid Duplex: CASPER 0-49% mPSV 96c/s Ratio 1.2, Antegrade vertebrals    1. Follow-up surgery care  Stable Post Op RIGHT CAROTID STENT:  Progressing well    Clean operative site with antibacterial soap/water, pat dry. Keep open to air unless draining, then may apply dry dressing.  No ointments or creams unless prescribed by provider.    2. Bilateral carotid artery stenosis  Medical Management: Aspirin, Plavix, statin   No deficits  Velocities stable R ICA post procedure.     Plan repeat bilateral carotid duplex in 3 months                This document has been electronically signed by CHANA Nowak on December 18, 2020 15:08 CST

## 2021-01-01 ENCOUNTER — READMISSION MANAGEMENT (OUTPATIENT)
Dept: CALL CENTER | Facility: HOSPITAL | Age: 86
End: 2021-01-01

## 2021-01-01 ENCOUNTER — HOSPITAL ENCOUNTER (INPATIENT)
Facility: HOSPITAL | Age: 86
LOS: 2 days | Discharge: HOME | End: 2021-07-03
Attending: EMERGENCY MEDICINE | Admitting: FAMILY MEDICINE

## 2021-01-01 ENCOUNTER — OFFICE VISIT (OUTPATIENT)
Dept: CARDIOLOGY | Facility: CLINIC | Age: 86
End: 2021-01-01

## 2021-01-01 ENCOUNTER — OFFICE VISIT (OUTPATIENT)
Dept: CARDIAC SURGERY | Facility: CLINIC | Age: 86
End: 2021-01-01

## 2021-01-01 ENCOUNTER — TELEPHONE (OUTPATIENT)
Dept: CARDIOLOGY | Facility: CLINIC | Age: 86
End: 2021-01-01

## 2021-01-01 ENCOUNTER — LAB (OUTPATIENT)
Dept: LAB | Facility: HOSPITAL | Age: 86
End: 2021-01-01

## 2021-01-01 ENCOUNTER — APPOINTMENT (OUTPATIENT)
Dept: GENERAL RADIOLOGY | Facility: HOSPITAL | Age: 86
End: 2021-01-01

## 2021-01-01 ENCOUNTER — APPOINTMENT (OUTPATIENT)
Dept: CARDIOLOGY | Facility: HOSPITAL | Age: 86
End: 2021-01-01

## 2021-01-01 ENCOUNTER — TRANSCRIBE ORDERS (OUTPATIENT)
Dept: LAB | Facility: HOSPITAL | Age: 86
End: 2021-01-01

## 2021-01-01 VITALS
HEART RATE: 71 BPM | SYSTOLIC BLOOD PRESSURE: 99 MMHG | BODY MASS INDEX: 18.44 KG/M2 | OXYGEN SATURATION: 94 % | DIASTOLIC BLOOD PRESSURE: 50 MMHG | WEIGHT: 100.2 LBS | RESPIRATION RATE: 18 BRPM | TEMPERATURE: 97.9 F | HEIGHT: 62 IN

## 2021-01-01 VITALS
SYSTOLIC BLOOD PRESSURE: 100 MMHG | OXYGEN SATURATION: 96 % | HEART RATE: 66 BPM | WEIGHT: 100.8 LBS | BODY MASS INDEX: 18.55 KG/M2 | HEIGHT: 62 IN | DIASTOLIC BLOOD PRESSURE: 58 MMHG

## 2021-01-01 VITALS
WEIGHT: 110.6 LBS | OXYGEN SATURATION: 99 % | DIASTOLIC BLOOD PRESSURE: 66 MMHG | SYSTOLIC BLOOD PRESSURE: 118 MMHG | HEIGHT: 62 IN | BODY MASS INDEX: 20.35 KG/M2 | HEART RATE: 76 BPM

## 2021-01-01 VITALS
DIASTOLIC BLOOD PRESSURE: 78 MMHG | OXYGEN SATURATION: 97 % | HEIGHT: 62 IN | HEART RATE: 75 BPM | BODY MASS INDEX: 18.55 KG/M2 | SYSTOLIC BLOOD PRESSURE: 120 MMHG | WEIGHT: 100.8 LBS | TEMPERATURE: 97.3 F

## 2021-01-01 VITALS
WEIGHT: 101.6 LBS | BODY MASS INDEX: 18.69 KG/M2 | HEIGHT: 62 IN | HEART RATE: 80 BPM | DIASTOLIC BLOOD PRESSURE: 60 MMHG | OXYGEN SATURATION: 94 % | SYSTOLIC BLOOD PRESSURE: 122 MMHG

## 2021-01-01 VITALS
SYSTOLIC BLOOD PRESSURE: 128 MMHG | HEIGHT: 62 IN | WEIGHT: 106 LBS | HEART RATE: 69 BPM | DIASTOLIC BLOOD PRESSURE: 78 MMHG | OXYGEN SATURATION: 98 % | BODY MASS INDEX: 19.51 KG/M2

## 2021-01-01 VITALS
SYSTOLIC BLOOD PRESSURE: 128 MMHG | WEIGHT: 105.6 LBS | BODY MASS INDEX: 19.43 KG/M2 | HEIGHT: 62 IN | OXYGEN SATURATION: 95 % | TEMPERATURE: 98 F | DIASTOLIC BLOOD PRESSURE: 54 MMHG | HEART RATE: 69 BPM

## 2021-01-01 VITALS
TEMPERATURE: 96.4 F | DIASTOLIC BLOOD PRESSURE: 66 MMHG | OXYGEN SATURATION: 95 % | BODY MASS INDEX: 18.58 KG/M2 | HEIGHT: 62 IN | HEART RATE: 76 BPM | WEIGHT: 101 LBS | SYSTOLIC BLOOD PRESSURE: 148 MMHG

## 2021-01-01 DIAGNOSIS — I10 ESSENTIAL HYPERTENSION: ICD-10-CM

## 2021-01-01 DIAGNOSIS — I25.5 ISCHEMIC CARDIOMYOPATHY: Primary | ICD-10-CM

## 2021-01-01 DIAGNOSIS — I50.9 ACUTE CONGESTIVE HEART FAILURE, UNSPECIFIED HEART FAILURE TYPE (HCC): Primary | ICD-10-CM

## 2021-01-01 DIAGNOSIS — I50.23 ACUTE ON CHRONIC SYSTOLIC CONGESTIVE HEART FAILURE (HCC): ICD-10-CM

## 2021-01-01 DIAGNOSIS — I42.8 OTHER CARDIOMYOPATHIES (HCC): ICD-10-CM

## 2021-01-01 DIAGNOSIS — E83.30 DISORDER OF PHOSPHORUS METABOLISM: ICD-10-CM

## 2021-01-01 DIAGNOSIS — I25.5 ISCHEMIC CARDIOMYOPATHY: ICD-10-CM

## 2021-01-01 DIAGNOSIS — E78.2 MIXED HYPERLIPIDEMIA: ICD-10-CM

## 2021-01-01 DIAGNOSIS — N18.30 STAGE 3 CHRONIC KIDNEY DISEASE, UNSPECIFIED WHETHER STAGE 3A OR 3B CKD (HCC): ICD-10-CM

## 2021-01-01 DIAGNOSIS — I10 BENIGN HYPERTENSION: ICD-10-CM

## 2021-01-01 DIAGNOSIS — I65.23 CAROTID STENOSIS, ASYMPTOMATIC, BILATERAL: Primary | ICD-10-CM

## 2021-01-01 DIAGNOSIS — J96.01 ACUTE RESPIRATORY FAILURE WITH HYPOXEMIA (HCC): ICD-10-CM

## 2021-01-01 DIAGNOSIS — Z95.1 S/P CABG (CORONARY ARTERY BYPASS GRAFT): ICD-10-CM

## 2021-01-01 DIAGNOSIS — I42.8 OTHER CARDIOMYOPATHIES (HCC): Primary | ICD-10-CM

## 2021-01-01 DIAGNOSIS — Z95.1 S/P CABG (CORONARY ARTERY BYPASS GRAFT): Primary | ICD-10-CM

## 2021-01-01 LAB
25(OH)D3 SERPL-MCNC: 38.1 NG/ML
ALBUMIN SERPL-MCNC: 3.6 G/DL (ref 3.5–5.2)
ALBUMIN SERPL-MCNC: 3.8 G/DL (ref 3.5–5.2)
ALBUMIN SERPL-MCNC: 4 G/DL (ref 3.5–5.2)
ALBUMIN/GLOB SERPL: 1.1 G/DL
ALBUMIN/GLOB SERPL: 1.2 G/DL
ALBUMIN/GLOB SERPL: 1.3 G/DL
ALP SERPL-CCNC: 84 U/L (ref 39–117)
ALP SERPL-CCNC: 91 U/L (ref 39–117)
ALP SERPL-CCNC: 96 U/L (ref 39–117)
ALT SERPL W P-5'-P-CCNC: 10 U/L (ref 1–33)
ALT SERPL W P-5'-P-CCNC: 5 U/L (ref 1–33)
ALT SERPL W P-5'-P-CCNC: 6 U/L (ref 1–33)
ANION GAP SERPL CALCULATED.3IONS-SCNC: 8 MMOL/L (ref 5–15)
ANION GAP SERPL CALCULATED.3IONS-SCNC: 9 MMOL/L (ref 5–15)
ANION GAP SERPL CALCULATED.3IONS-SCNC: 9.4 MMOL/L (ref 5–15)
AST SERPL-CCNC: 15 U/L (ref 1–32)
AST SERPL-CCNC: 16 U/L (ref 1–32)
AST SERPL-CCNC: 21 U/L (ref 1–32)
BASOPHILS # BLD AUTO: 0.05 10*3/MM3 (ref 0–0.2)
BASOPHILS # BLD AUTO: 0.06 10*3/MM3 (ref 0–0.2)
BASOPHILS NFR BLD AUTO: 0.9 % (ref 0–1.5)
BASOPHILS NFR BLD AUTO: 1 % (ref 0–1.5)
BH CV ECHO MEAS - ACS: 1.7 CM
BH CV ECHO MEAS - AI DEC SLOPE: 224 CM/SEC^2
BH CV ECHO MEAS - AI MAX PG: 43.3 MMHG
BH CV ECHO MEAS - AI MAX VEL: 329 CM/SEC
BH CV ECHO MEAS - AI P1/2T: 430.2 MSEC
BH CV ECHO MEAS - AO MAX PG (FULL): 1.7 MMHG
BH CV ECHO MEAS - AO MAX PG: 4.5 MMHG
BH CV ECHO MEAS - AO MEAN PG (FULL): 1 MMHG
BH CV ECHO MEAS - AO MEAN PG: 2 MMHG
BH CV ECHO MEAS - AO ROOT AREA (BSA CORRECTED): 2.3
BH CV ECHO MEAS - AO ROOT AREA: 8.6 CM^2
BH CV ECHO MEAS - AO ROOT DIAM: 3.3 CM
BH CV ECHO MEAS - AO V2 MAX: 106 CM/SEC
BH CV ECHO MEAS - AO V2 MEAN: 63.9 CM/SEC
BH CV ECHO MEAS - AO V2 VTI: 19.5 CM
BH CV ECHO MEAS - AVA(I,A): 1.6 CM^2
BH CV ECHO MEAS - AVA(I,D): 1.6 CM^2
BH CV ECHO MEAS - AVA(V,A): 1.8 CM^2
BH CV ECHO MEAS - AVA(V,D): 1.8 CM^2
BH CV ECHO MEAS - BSA(HAYCOCK): 1.4 M^2
BH CV ECHO MEAS - BSA: 1.5 M^2
BH CV ECHO MEAS - BZI_BMI: 19.4 KILOGRAMS/M^2
BH CV ECHO MEAS - BZI_METRIC_HEIGHT: 157.5 CM
BH CV ECHO MEAS - BZI_METRIC_WEIGHT: 48.1 KG
BH CV ECHO MEAS - EDV(CUBED): 123.5 ML
BH CV ECHO MEAS - EDV(MOD-SP2): 121 ML
BH CV ECHO MEAS - EDV(MOD-SP4): 87.7 ML
BH CV ECHO MEAS - EDV(TEICH): 117.1 ML
BH CV ECHO MEAS - EF(CUBED): 33.8 %
BH CV ECHO MEAS - EF(MOD-SP2): 35.3 %
BH CV ECHO MEAS - EF(MOD-SP4): 23.3 %
BH CV ECHO MEAS - EF(TEICH): 27.5 %
BH CV ECHO MEAS - ESV(CUBED): 81.7 ML
BH CV ECHO MEAS - ESV(MOD-SP2): 78.3 ML
BH CV ECHO MEAS - ESV(MOD-SP4): 67.3 ML
BH CV ECHO MEAS - ESV(TEICH): 84.9 ML
BH CV ECHO MEAS - FS: 12.9 %
BH CV ECHO MEAS - IVS/LVPW: 1.2
BH CV ECHO MEAS - IVSD: 1.3 CM
BH CV ECHO MEAS - LA DIMENSION: 4.3 CM
BH CV ECHO MEAS - LA/AO: 1.3
BH CV ECHO MEAS - LV DIASTOLIC VOL/BSA (35-75): 60.1 ML/M^2
BH CV ECHO MEAS - LV MASS(C)D: 229.6 GRAMS
BH CV ECHO MEAS - LV MASS(C)DI: 157.3 GRAMS/M^2
BH CV ECHO MEAS - LV MAX PG: 2.8 MMHG
BH CV ECHO MEAS - LV MEAN PG: 1 MMHG
BH CV ECHO MEAS - LV SYSTOLIC VOL/BSA (12-30): 46.1 ML/M^2
BH CV ECHO MEAS - LV V1 MAX: 83.6 CM/SEC
BH CV ECHO MEAS - LV V1 MEAN: 44.8 CM/SEC
BH CV ECHO MEAS - LV V1 VTI: 13.5 CM
BH CV ECHO MEAS - LVIDD: 5 CM
BH CV ECHO MEAS - LVIDS: 4.3 CM
BH CV ECHO MEAS - LVLD AP2: 8.3 CM
BH CV ECHO MEAS - LVLD AP4: 7.6 CM
BH CV ECHO MEAS - LVLS AP2: 7.6 CM
BH CV ECHO MEAS - LVLS AP4: 7.5 CM
BH CV ECHO MEAS - LVOT AREA (M): 2.3 CM^2
BH CV ECHO MEAS - LVOT AREA: 2.3 CM^2
BH CV ECHO MEAS - LVOT DIAM: 1.7 CM
BH CV ECHO MEAS - LVPWD: 1.1 CM
BH CV ECHO MEAS - MR MAX PG: 124.5 MMHG
BH CV ECHO MEAS - MR MAX VEL: 558 CM/SEC
BH CV ECHO MEAS - MV A MAX VEL: 72.4 CM/SEC
BH CV ECHO MEAS - MV DEC SLOPE: 240 CM/SEC^2
BH CV ECHO MEAS - MV E MAX VEL: 63.4 CM/SEC
BH CV ECHO MEAS - MV E/A: 0.88
BH CV ECHO MEAS - MV MAX PG: 2.3 MMHG
BH CV ECHO MEAS - MV MEAN PG: 1 MMHG
BH CV ECHO MEAS - MV P1/2T MAX VEL: 76.2 CM/SEC
BH CV ECHO MEAS - MV P1/2T: 93 MSEC
BH CV ECHO MEAS - MV V2 MAX: 76.1 CM/SEC
BH CV ECHO MEAS - MV V2 MEAN: 47.2 CM/SEC
BH CV ECHO MEAS - MV V2 VTI: 21.6 CM
BH CV ECHO MEAS - MVA P1/2T LCG: 2.9 CM^2
BH CV ECHO MEAS - MVA(P1/2T): 2.4 CM^2
BH CV ECHO MEAS - MVA(VTI): 1.4 CM^2
BH CV ECHO MEAS - PA MAX PG: 3.3 MMHG
BH CV ECHO MEAS - PA V2 MAX: 90.9 CM/SEC
BH CV ECHO MEAS - RAP SYSTOLE: 10 MMHG
BH CV ECHO MEAS - RVDD: 2.5 CM
BH CV ECHO MEAS - RVSP: 39.8 MMHG
BH CV ECHO MEAS - SI(AO): 114.3 ML/M^2
BH CV ECHO MEAS - SI(CUBED): 28.6 ML/M^2
BH CV ECHO MEAS - SI(LVOT): 21 ML/M^2
BH CV ECHO MEAS - SI(MOD-SP2): 29.3 ML/M^2
BH CV ECHO MEAS - SI(MOD-SP4): 14 ML/M^2
BH CV ECHO MEAS - SI(TEICH): 22.1 ML/M^2
BH CV ECHO MEAS - SV(AO): 166.8 ML
BH CV ECHO MEAS - SV(CUBED): 41.8 ML
BH CV ECHO MEAS - SV(LVOT): 30.6 ML
BH CV ECHO MEAS - SV(MOD-SP2): 42.7 ML
BH CV ECHO MEAS - SV(MOD-SP4): 20.4 ML
BH CV ECHO MEAS - SV(TEICH): 32.2 ML
BH CV ECHO MEAS - TR MAX VEL: 273 CM/SEC
BILIRUB SERPL-MCNC: 0.4 MG/DL (ref 0–1.2)
BILIRUB SERPL-MCNC: 0.4 MG/DL (ref 0–1.2)
BILIRUB SERPL-MCNC: 0.5 MG/DL (ref 0–1.2)
BUN SERPL-MCNC: 14 MG/DL (ref 8–23)
BUN SERPL-MCNC: 15 MG/DL (ref 8–23)
BUN SERPL-MCNC: 20 MG/DL (ref 8–23)
BUN/CREAT SERPL: 10.6 (ref 7–25)
BUN/CREAT SERPL: 13 (ref 7–25)
BUN/CREAT SERPL: 9.7 (ref 7–25)
CALCIUM SPEC-SCNC: 9.3 MG/DL (ref 8.6–10.5)
CALCIUM SPEC-SCNC: 9.4 MG/DL (ref 8.6–10.5)
CALCIUM SPEC-SCNC: 9.5 MG/DL (ref 8.6–10.5)
CHLORIDE SERPL-SCNC: 99 MMOL/L (ref 98–107)
CHOLEST SERPL-MCNC: 184 MG/DL (ref 0–200)
CO2 SERPL-SCNC: 25 MMOL/L (ref 22–29)
CO2 SERPL-SCNC: 26.6 MMOL/L (ref 22–29)
CO2 SERPL-SCNC: 28 MMOL/L (ref 22–29)
CREAT SERPL-MCNC: 1.32 MG/DL (ref 0.57–1)
CREAT SERPL-MCNC: 1.54 MG/DL (ref 0.57–1)
CREAT SERPL-MCNC: 1.55 MG/DL (ref 0.57–1)
CREAT UR-MCNC: 136.7 MG/DL
DEPRECATED RDW RBC AUTO: 43.9 FL (ref 37–54)
DEPRECATED RDW RBC AUTO: 45 FL (ref 37–54)
EOSINOPHIL # BLD AUTO: 0 10*3/MM3 (ref 0–0.4)
EOSINOPHIL # BLD AUTO: 0 10*3/MM3 (ref 0–0.4)
EOSINOPHIL NFR BLD AUTO: 0 % (ref 0.3–6.2)
EOSINOPHIL NFR BLD AUTO: 0 % (ref 0.3–6.2)
ERYTHROCYTE [DISTWIDTH] IN BLOOD BY AUTOMATED COUNT: 14.5 % (ref 12.3–15.4)
ERYTHROCYTE [DISTWIDTH] IN BLOOD BY AUTOMATED COUNT: 14.6 % (ref 12.3–15.4)
FLUAV RNA RESP QL NAA+PROBE: NOT DETECTED
FLUBV RNA RESP QL NAA+PROBE: NOT DETECTED
GFR SERPL CREATININE-BSD FRML MDRD: 32 ML/MIN/1.73
GFR SERPL CREATININE-BSD FRML MDRD: 32 ML/MIN/1.73
GFR SERPL CREATININE-BSD FRML MDRD: 38 ML/MIN/1.73
GLOBULIN UR ELPH-MCNC: 3 GM/DL
GLOBULIN UR ELPH-MCNC: 3.2 GM/DL
GLOBULIN UR ELPH-MCNC: 3.5 GM/DL
GLUCOSE SERPL-MCNC: 123 MG/DL (ref 65–99)
GLUCOSE SERPL-MCNC: 86 MG/DL (ref 65–99)
GLUCOSE SERPL-MCNC: 93 MG/DL (ref 65–99)
HCT VFR BLD AUTO: 30.2 % (ref 34–46.6)
HCT VFR BLD AUTO: 31.5 % (ref 34–46.6)
HCT VFR BLD AUTO: 34.1 % (ref 34–46.6)
HDLC SERPL-MCNC: 59 MG/DL (ref 40–60)
HGB BLD-MCNC: 10.6 G/DL (ref 12–15.9)
HGB BLD-MCNC: 10.8 G/DL (ref 12–15.9)
HGB BLD-MCNC: 9.7 G/DL (ref 12–15.9)
HOLD SPECIMEN: NORMAL
IMM GRANULOCYTES # BLD AUTO: 0.02 10*3/MM3 (ref 0–0.05)
IMM GRANULOCYTES # BLD AUTO: 0.02 10*3/MM3 (ref 0–0.05)
IMM GRANULOCYTES NFR BLD AUTO: 0.3 % (ref 0–0.5)
IMM GRANULOCYTES NFR BLD AUTO: 0.3 % (ref 0–0.5)
LDLC SERPL CALC-MCNC: 112 MG/DL (ref 0–100)
LDLC/HDLC SERPL: 1.88 {RATIO}
LV EF 2D ECHO EST: 35 %
LYMPHOCYTES # BLD AUTO: 0.82 10*3/MM3 (ref 0.7–3.1)
LYMPHOCYTES # BLD AUTO: 0.93 10*3/MM3 (ref 0.7–3.1)
LYMPHOCYTES NFR BLD AUTO: 13.6 % (ref 19.6–45.3)
LYMPHOCYTES NFR BLD AUTO: 15.8 % (ref 19.6–45.3)
MAGNESIUM SERPL-MCNC: 2.1 MG/DL (ref 1.6–2.4)
MAGNESIUM SERPL-MCNC: 2.2 MG/DL (ref 1.6–2.4)
MAXIMAL PREDICTED HEART RATE: 136 BPM
MCH RBC QN AUTO: 26.9 PG (ref 26.6–33)
MCH RBC QN AUTO: 26.9 PG (ref 26.6–33)
MCHC RBC AUTO-ENTMCNC: 31.7 G/DL (ref 31.5–35.7)
MCHC RBC AUTO-ENTMCNC: 32.1 G/DL (ref 31.5–35.7)
MCV RBC AUTO: 83.7 FL (ref 79–97)
MCV RBC AUTO: 85 FL (ref 79–97)
MONOCYTES # BLD AUTO: 0.56 10*3/MM3 (ref 0.1–0.9)
MONOCYTES # BLD AUTO: 0.74 10*3/MM3 (ref 0.1–0.9)
MONOCYTES NFR BLD AUTO: 12.6 % (ref 5–12)
MONOCYTES NFR BLD AUTO: 9.3 % (ref 5–12)
NEUTROPHILS NFR BLD AUTO: 4.13 10*3/MM3 (ref 1.7–7)
NEUTROPHILS NFR BLD AUTO: 4.57 10*3/MM3 (ref 1.7–7)
NEUTROPHILS NFR BLD AUTO: 70.4 % (ref 42.7–76)
NEUTROPHILS NFR BLD AUTO: 75.8 % (ref 42.7–76)
NRBC BLD AUTO-RTO: 0 /100 WBC (ref 0–0.2)
NRBC BLD AUTO-RTO: 0 /100 WBC (ref 0–0.2)
NT-PROBNP SERPL-MCNC: ABNORMAL PG/ML (ref 0–1800)
PHOSPHATE SERPL-MCNC: 3.6 MG/DL (ref 2.5–4.5)
PLATELET # BLD AUTO: 205 10*3/MM3 (ref 140–450)
PLATELET # BLD AUTO: 235 10*3/MM3 (ref 140–450)
PMV BLD AUTO: 10.3 FL (ref 6–12)
PMV BLD AUTO: 10.3 FL (ref 6–12)
POTASSIUM SERPL-SCNC: 4.3 MMOL/L (ref 3.5–5.2)
POTASSIUM SERPL-SCNC: 4.3 MMOL/L (ref 3.5–5.2)
POTASSIUM SERPL-SCNC: 4.7 MMOL/L (ref 3.5–5.2)
PROT SERPL-MCNC: 6.6 G/DL (ref 6–8.5)
PROT SERPL-MCNC: 7.2 G/DL (ref 6–8.5)
PROT SERPL-MCNC: 7.3 G/DL (ref 6–8.5)
PROT UR-MCNC: 75 MG/DL
PROT/CREAT UR: 548.6 MG/G CREA (ref 0–200)
PTH-INTACT SERPL-MCNC: 53.1 PG/ML (ref 15–65)
QT INTERVAL: 380 MS
QTC INTERVAL: 412 MS
RBC # BLD AUTO: 3.61 10*6/MM3 (ref 3.77–5.28)
RBC # BLD AUTO: 4.01 10*6/MM3 (ref 3.77–5.28)
SARS-COV-2 RNA RESP QL NAA+PROBE: NOT DETECTED
SODIUM SERPL-SCNC: 132 MMOL/L (ref 136–145)
SODIUM SERPL-SCNC: 135 MMOL/L (ref 136–145)
SODIUM SERPL-SCNC: 136 MMOL/L (ref 136–145)
STRESS TARGET HR: 116 BPM
TRIGL SERPL-MCNC: 69 MG/DL (ref 0–150)
TROPONIN T SERPL-MCNC: <0.01 NG/ML (ref 0–0.03)
VLDLC SERPL-MCNC: 13 MG/DL (ref 5–40)
WBC # BLD AUTO: 5.87 10*3/MM3 (ref 3.4–10.8)
WBC # BLD AUTO: 6.03 10*3/MM3 (ref 3.4–10.8)

## 2021-01-01 PROCEDURE — 93306 TTE W/DOPPLER COMPLETE: CPT | Performed by: INTERNAL MEDICINE

## 2021-01-01 PROCEDURE — 36415 COLL VENOUS BLD VENIPUNCTURE: CPT

## 2021-01-01 PROCEDURE — 99233 SBSQ HOSP IP/OBS HIGH 50: CPT | Performed by: INTERNAL MEDICINE

## 2021-01-01 PROCEDURE — 99214 OFFICE O/P EST MOD 30 MIN: CPT | Performed by: NURSE PRACTITIONER

## 2021-01-01 PROCEDURE — 93005 ELECTROCARDIOGRAM TRACING: CPT | Performed by: EMERGENCY MEDICINE

## 2021-01-01 PROCEDURE — 83970 ASSAY OF PARATHORMONE: CPT

## 2021-01-01 PROCEDURE — 80053 COMPREHEN METABOLIC PANEL: CPT | Performed by: EMERGENCY MEDICINE

## 2021-01-01 PROCEDURE — 99215 OFFICE O/P EST HI 40 MIN: CPT | Performed by: NURSE PRACTITIONER

## 2021-01-01 PROCEDURE — 82306 VITAMIN D 25 HYDROXY: CPT

## 2021-01-01 PROCEDURE — 71045 X-RAY EXAM CHEST 1 VIEW: CPT

## 2021-01-01 PROCEDURE — 84100 ASSAY OF PHOSPHORUS: CPT

## 2021-01-01 PROCEDURE — 93005 ELECTROCARDIOGRAM TRACING: CPT

## 2021-01-01 PROCEDURE — 94799 UNLISTED PULMONARY SVC/PX: CPT

## 2021-01-01 PROCEDURE — 94760 N-INVAS EAR/PLS OXIMETRY 1: CPT

## 2021-01-01 PROCEDURE — 83880 ASSAY OF NATRIURETIC PEPTIDE: CPT | Performed by: EMERGENCY MEDICINE

## 2021-01-01 PROCEDURE — 82570 ASSAY OF URINE CREATININE: CPT

## 2021-01-01 PROCEDURE — 85025 COMPLETE CBC W/AUTO DIFF WBC: CPT | Performed by: INTERNAL MEDICINE

## 2021-01-01 PROCEDURE — 85018 HEMOGLOBIN: CPT

## 2021-01-01 PROCEDURE — 99214 OFFICE O/P EST MOD 30 MIN: CPT | Performed by: INTERNAL MEDICINE

## 2021-01-01 PROCEDURE — 85025 COMPLETE CBC W/AUTO DIFF WBC: CPT | Performed by: EMERGENCY MEDICINE

## 2021-01-01 PROCEDURE — 84484 ASSAY OF TROPONIN QUANT: CPT | Performed by: EMERGENCY MEDICINE

## 2021-01-01 PROCEDURE — 85014 HEMATOCRIT: CPT

## 2021-01-01 PROCEDURE — 99213 OFFICE O/P EST LOW 20 MIN: CPT | Performed by: INTERNAL MEDICINE

## 2021-01-01 PROCEDURE — 25010000002 FUROSEMIDE PER 20 MG: Performed by: FAMILY MEDICINE

## 2021-01-01 PROCEDURE — 25010000002 MAGNESIUM SULFATE 2 GM/50ML SOLUTION: Performed by: NURSE PRACTITIONER

## 2021-01-01 PROCEDURE — 93306 TTE W/DOPPLER COMPLETE: CPT

## 2021-01-01 PROCEDURE — 93010 ELECTROCARDIOGRAM REPORT: CPT | Performed by: INTERNAL MEDICINE

## 2021-01-01 PROCEDURE — 99213 OFFICE O/P EST LOW 20 MIN: CPT | Performed by: NURSE PRACTITIONER

## 2021-01-01 PROCEDURE — 99223 1ST HOSP IP/OBS HIGH 75: CPT | Performed by: INTERNAL MEDICINE

## 2021-01-01 PROCEDURE — 94618 PULMONARY STRESS TESTING: CPT | Performed by: NURSE PRACTITIONER

## 2021-01-01 PROCEDURE — 80053 COMPREHEN METABOLIC PANEL: CPT | Performed by: INTERNAL MEDICINE

## 2021-01-01 PROCEDURE — 99285 EMERGENCY DEPT VISIT HI MDM: CPT

## 2021-01-01 PROCEDURE — 87636 SARSCOV2 & INF A&B AMP PRB: CPT | Performed by: EMERGENCY MEDICINE

## 2021-01-01 PROCEDURE — 84156 ASSAY OF PROTEIN URINE: CPT

## 2021-01-01 PROCEDURE — 80053 COMPREHEN METABOLIC PANEL: CPT

## 2021-01-01 PROCEDURE — 80061 LIPID PANEL: CPT

## 2021-01-01 PROCEDURE — 83735 ASSAY OF MAGNESIUM: CPT | Performed by: NURSE PRACTITIONER

## 2021-01-01 PROCEDURE — 25010000002 FUROSEMIDE PER 20 MG: Performed by: EMERGENCY MEDICINE

## 2021-01-01 PROCEDURE — 83735 ASSAY OF MAGNESIUM: CPT

## 2021-01-01 RX ORDER — LOSARTAN POTASSIUM 25 MG/1
25 TABLET ORAL
Status: DISCONTINUED | OUTPATIENT
Start: 2021-01-01 | End: 2021-01-01 | Stop reason: HOSPADM

## 2021-01-01 RX ORDER — ATORVASTATIN CALCIUM 10 MG/1
TABLET, FILM COATED ORAL
Qty: 30 TABLET | Refills: 0 | Status: SHIPPED | OUTPATIENT
Start: 2021-01-01 | End: 2021-01-01 | Stop reason: SDUPTHER

## 2021-01-01 RX ORDER — CARVEDILOL 12.5 MG/1
12.5 TABLET ORAL 2 TIMES DAILY
Qty: 180 TABLET | Refills: 3 | Status: SHIPPED | OUTPATIENT
Start: 2021-01-01 | End: 2021-01-01

## 2021-01-01 RX ORDER — ISOSORBIDE MONONITRATE 30 MG/1
30 TABLET, EXTENDED RELEASE ORAL
Qty: 30 TABLET | Refills: 1 | Status: SHIPPED | OUTPATIENT
Start: 2021-01-01 | End: 2021-01-01 | Stop reason: SINTOL

## 2021-01-01 RX ORDER — SODIUM CHLORIDE 0.9 % (FLUSH) 0.9 %
10 SYRINGE (ML) INJECTION AS NEEDED
Status: DISCONTINUED | OUTPATIENT
Start: 2021-01-01 | End: 2021-01-01 | Stop reason: HOSPADM

## 2021-01-01 RX ORDER — CARVEDILOL 12.5 MG/1
12.5 TABLET ORAL 2 TIMES DAILY WITH MEALS
Qty: 60 TABLET | Refills: 0 | Status: SHIPPED | OUTPATIENT
Start: 2021-01-01 | End: 2021-01-01 | Stop reason: SDUPTHER

## 2021-01-01 RX ORDER — ATORVASTATIN CALCIUM 20 MG/1
20 TABLET, FILM COATED ORAL NIGHTLY
Qty: 10 TABLET | Refills: 0 | Status: SHIPPED | OUTPATIENT
Start: 2021-01-01 | End: 2022-01-01

## 2021-01-01 RX ORDER — ALBUTEROL SULFATE 90 UG/1
2 AEROSOL, METERED RESPIRATORY (INHALATION) EVERY 4 HOURS PRN
Qty: 19 G | Refills: 1 | Status: SHIPPED | OUTPATIENT
Start: 2021-01-01 | End: 2022-01-01

## 2021-01-01 RX ORDER — CARVEDILOL 12.5 MG/1
12.5 TABLET ORAL 2 TIMES DAILY WITH MEALS
Status: DISCONTINUED | OUTPATIENT
Start: 2021-01-01 | End: 2021-01-01 | Stop reason: HOSPADM

## 2021-01-01 RX ORDER — CARVEDILOL 12.5 MG/1
TABLET ORAL
Qty: 180 TABLET | Refills: 0 | OUTPATIENT
Start: 2021-01-01

## 2021-01-01 RX ORDER — ATORVASTATIN CALCIUM 10 MG/1
10 TABLET, FILM COATED ORAL DAILY
Qty: 90 TABLET | Refills: 3 | Status: SHIPPED | OUTPATIENT
Start: 2021-01-01 | End: 2021-01-01 | Stop reason: ALTCHOICE

## 2021-01-01 RX ORDER — CARVEDILOL 12.5 MG/1
12.5 TABLET ORAL 2 TIMES DAILY WITH MEALS
Qty: 180 TABLET | Refills: 3 | Status: SHIPPED | OUTPATIENT
Start: 2021-01-01 | End: 2021-01-01

## 2021-01-01 RX ORDER — IRBESARTAN 75 MG/1
75 TABLET ORAL DAILY
Qty: 90 TABLET | Refills: 3 | Status: SHIPPED | OUTPATIENT
Start: 2021-01-01 | End: 2021-01-01 | Stop reason: SDUPTHER

## 2021-01-01 RX ORDER — FUROSEMIDE 20 MG/1
20 TABLET ORAL DAILY
Status: DISCONTINUED | OUTPATIENT
Start: 2021-01-01 | End: 2021-01-01 | Stop reason: HOSPADM

## 2021-01-01 RX ORDER — ATORVASTATIN CALCIUM 20 MG/1
TABLET, FILM COATED ORAL
COMMUNITY
Start: 2021-01-01 | End: 2021-01-01 | Stop reason: SDUPTHER

## 2021-01-01 RX ORDER — ATORVASTATIN CALCIUM 10 MG/1
TABLET, FILM COATED ORAL
Qty: 30 TABLET | Refills: 0 | Status: SHIPPED | OUTPATIENT
Start: 2021-01-01 | End: 2021-01-01

## 2021-01-01 RX ORDER — ATORVASTATIN CALCIUM 10 MG/1
10 TABLET, FILM COATED ORAL DAILY
Status: DISCONTINUED | OUTPATIENT
Start: 2021-01-01 | End: 2021-01-01 | Stop reason: HOSPADM

## 2021-01-01 RX ORDER — ASPIRIN 81 MG/1
81 TABLET, CHEWABLE ORAL DAILY
Status: DISCONTINUED | OUTPATIENT
Start: 2021-01-01 | End: 2021-01-01 | Stop reason: HOSPADM

## 2021-01-01 RX ORDER — AMLODIPINE BESYLATE 5 MG/1
TABLET ORAL
COMMUNITY
Start: 2021-02-15 | End: 2021-01-01

## 2021-01-01 RX ORDER — ALBUTEROL SULFATE 2.5 MG/3ML
2.5 SOLUTION RESPIRATORY (INHALATION) EVERY 6 HOURS PRN
Status: DISCONTINUED | OUTPATIENT
Start: 2021-01-01 | End: 2021-01-01 | Stop reason: HOSPADM

## 2021-01-01 RX ORDER — FUROSEMIDE 10 MG/ML
20 INJECTION INTRAMUSCULAR; INTRAVENOUS EVERY 12 HOURS
Status: DISCONTINUED | OUTPATIENT
Start: 2021-01-01 | End: 2021-01-01

## 2021-01-01 RX ORDER — FUROSEMIDE 10 MG/ML
40 INJECTION INTRAMUSCULAR; INTRAVENOUS ONCE
Status: COMPLETED | OUTPATIENT
Start: 2021-01-01 | End: 2021-01-01

## 2021-01-01 RX ORDER — FUROSEMIDE 40 MG/1
40 TABLET ORAL 2 TIMES WEEKLY
COMMUNITY
Start: 2021-01-01 | End: 2022-01-01

## 2021-01-01 RX ORDER — FUROSEMIDE 40 MG/1
40 TABLET ORAL
COMMUNITY
Start: 2021-03-15 | End: 2021-01-01 | Stop reason: SDUPTHER

## 2021-01-01 RX ORDER — IRBESARTAN 75 MG/1
75 TABLET ORAL DAILY
Qty: 90 TABLET | Refills: 3 | Status: SHIPPED | OUTPATIENT
Start: 2021-01-01 | End: 2022-01-01

## 2021-01-01 RX ORDER — ISOSORBIDE MONONITRATE 30 MG/1
30 TABLET, EXTENDED RELEASE ORAL
Status: DISCONTINUED | OUTPATIENT
Start: 2021-01-01 | End: 2021-01-01 | Stop reason: HOSPADM

## 2021-01-01 RX ORDER — ACETAMINOPHEN 325 MG/1
650 TABLET ORAL EVERY 4 HOURS PRN
Status: DISCONTINUED | OUTPATIENT
Start: 2021-01-01 | End: 2021-01-01 | Stop reason: HOSPADM

## 2021-01-01 RX ORDER — CLOPIDOGREL BISULFATE 75 MG/1
75 TABLET ORAL DAILY
Qty: 90 TABLET | Refills: 3 | Status: SHIPPED | OUTPATIENT
Start: 2021-01-01 | End: 2022-01-01

## 2021-01-01 RX ORDER — FUROSEMIDE 40 MG/1
40 TABLET ORAL 2 TIMES DAILY
Qty: 60 TABLET | Refills: 1 | Status: SHIPPED | OUTPATIENT
Start: 2021-01-01 | End: 2021-01-01

## 2021-01-01 RX ORDER — CLOPIDOGREL BISULFATE 75 MG/1
75 TABLET ORAL DAILY
Status: DISCONTINUED | OUTPATIENT
Start: 2021-01-01 | End: 2021-01-01 | Stop reason: HOSPADM

## 2021-01-01 RX ORDER — CARVEDILOL 12.5 MG/1
12.5 TABLET ORAL 2 TIMES DAILY WITH MEALS
Qty: 180 TABLET | Refills: 3 | Status: SHIPPED | OUTPATIENT
Start: 2021-01-01 | End: 2022-01-01

## 2021-01-01 RX ORDER — SODIUM CHLORIDE 0.9 % (FLUSH) 0.9 %
10 SYRINGE (ML) INJECTION EVERY 12 HOURS SCHEDULED
Status: DISCONTINUED | OUTPATIENT
Start: 2021-01-01 | End: 2021-01-01 | Stop reason: HOSPADM

## 2021-01-01 RX ORDER — MAGNESIUM SULFATE HEPTAHYDRATE 40 MG/ML
2 INJECTION, SOLUTION INTRAVENOUS ONCE
Status: COMPLETED | OUTPATIENT
Start: 2021-01-01 | End: 2021-01-01

## 2021-01-01 RX ADMIN — ATORVASTATIN CALCIUM 10 MG: 10 TABLET, FILM COATED ORAL at 08:42

## 2021-01-01 RX ADMIN — LOSARTAN POTASSIUM 25 MG: 25 TABLET, FILM COATED ORAL at 12:05

## 2021-01-01 RX ADMIN — SODIUM CHLORIDE, PRESERVATIVE FREE 10 ML: 5 INJECTION INTRAVENOUS at 10:21

## 2021-01-01 RX ADMIN — CARVEDILOL 12.5 MG: 12.5 TABLET, FILM COATED ORAL at 09:00

## 2021-01-01 RX ADMIN — SODIUM CHLORIDE, PRESERVATIVE FREE 10 ML: 5 INJECTION INTRAVENOUS at 09:40

## 2021-01-01 RX ADMIN — FUROSEMIDE 20 MG: 10 INJECTION, SOLUTION INTRAMUSCULAR; INTRAVENOUS at 20:02

## 2021-01-01 RX ADMIN — ATORVASTATIN CALCIUM 10 MG: 10 TABLET, FILM COATED ORAL at 11:26

## 2021-01-01 RX ADMIN — NITROGLYCERIN 1 INCH: 20 OINTMENT TOPICAL at 11:35

## 2021-01-01 RX ADMIN — CLOPIDOGREL BISULFATE 75 MG: 75 TABLET ORAL at 11:26

## 2021-01-01 RX ADMIN — NITROGLYCERIN 1 INCH: 20 OINTMENT TOPICAL at 07:45

## 2021-01-01 RX ADMIN — CARVEDILOL 12.5 MG: 12.5 TABLET, FILM COATED ORAL at 17:17

## 2021-01-01 RX ADMIN — SODIUM CHLORIDE, PRESERVATIVE FREE 10 ML: 5 INJECTION INTRAVENOUS at 20:02

## 2021-01-01 RX ADMIN — CLOPIDOGREL BISULFATE 75 MG: 75 TABLET ORAL at 09:00

## 2021-01-01 RX ADMIN — CARVEDILOL 12.5 MG: 12.5 TABLET, FILM COATED ORAL at 11:26

## 2021-01-01 RX ADMIN — ASPIRIN 81 MG: 81 TABLET, CHEWABLE ORAL at 11:26

## 2021-01-01 RX ADMIN — ISOSORBIDE MONONITRATE 30 MG: 30 TABLET, EXTENDED RELEASE ORAL at 09:00

## 2021-01-01 RX ADMIN — ISOSORBIDE MONONITRATE 30 MG: 30 TABLET, EXTENDED RELEASE ORAL at 18:36

## 2021-01-01 RX ADMIN — ASPIRIN 81 MG: 81 TABLET, CHEWABLE ORAL at 09:00

## 2021-01-01 RX ADMIN — MAGNESIUM SULFATE HEPTAHYDRATE 2 G: 40 INJECTION, SOLUTION INTRAVENOUS at 11:36

## 2021-01-01 RX ADMIN — LOSARTAN POTASSIUM 25 MG: 25 TABLET, FILM COATED ORAL at 09:00

## 2021-01-01 RX ADMIN — ASPIRIN 81 MG: 81 TABLET, CHEWABLE ORAL at 08:42

## 2021-01-01 RX ADMIN — ATORVASTATIN CALCIUM 10 MG: 10 TABLET, FILM COATED ORAL at 09:00

## 2021-01-01 RX ADMIN — CARVEDILOL 12.5 MG: 12.5 TABLET, FILM COATED ORAL at 17:39

## 2021-01-01 RX ADMIN — FUROSEMIDE 40 MG: 10 INJECTION, SOLUTION INTRAMUSCULAR; INTRAVENOUS at 07:49

## 2021-01-01 RX ADMIN — SODIUM CHLORIDE, PRESERVATIVE FREE 10 ML: 5 INJECTION INTRAVENOUS at 20:25

## 2021-01-01 RX ADMIN — FUROSEMIDE 20 MG: 10 INJECTION, SOLUTION INTRAMUSCULAR; INTRAVENOUS at 08:42

## 2021-01-01 RX ADMIN — LOSARTAN POTASSIUM 25 MG: 25 TABLET, FILM COATED ORAL at 08:42

## 2021-01-01 RX ADMIN — CLOPIDOGREL BISULFATE 75 MG: 75 TABLET ORAL at 08:42

## 2021-01-01 RX ADMIN — CARVEDILOL 12.5 MG: 12.5 TABLET, FILM COATED ORAL at 08:42

## 2021-01-01 RX ADMIN — ISOSORBIDE MONONITRATE 30 MG: 30 TABLET, EXTENDED RELEASE ORAL at 08:42

## 2021-01-07 ENCOUNTER — TELEPHONE (OUTPATIENT)
Dept: CARDIAC SURGERY | Facility: CLINIC | Age: 86
End: 2021-01-07

## 2021-01-07 ENCOUNTER — PREP FOR SURGERY (OUTPATIENT)
Dept: OTHER | Facility: HOSPITAL | Age: 86
End: 2021-01-07

## 2021-01-07 DIAGNOSIS — I65.22 CAROTID STENOSIS, ASYMPTOMATIC, LEFT: Primary | ICD-10-CM

## 2021-01-07 RX ORDER — SODIUM CHLORIDE 0.9 % (FLUSH) 0.9 %
10 SYRINGE (ML) INJECTION AS NEEDED
Status: CANCELLED | OUTPATIENT
Start: 2021-02-10

## 2021-01-07 RX ORDER — SODIUM CHLORIDE 0.9 % (FLUSH) 0.9 %
3 SYRINGE (ML) INJECTION EVERY 12 HOURS SCHEDULED
Status: CANCELLED | OUTPATIENT
Start: 2021-02-10

## 2021-01-07 RX ORDER — BUPIVACAINE HCL/0.9 % NACL/PF 0.1 %
2 PLASTIC BAG, INJECTION (ML) EPIDURAL ONCE
Status: CANCELLED | OUTPATIENT
Start: 2021-02-10 | End: 2021-01-07

## 2021-01-07 RX ORDER — SODIUM CHLORIDE 9 MG/ML
100 INJECTION, SOLUTION INTRAVENOUS CONTINUOUS
Status: CANCELLED | OUTPATIENT
Start: 2021-02-10

## 2021-01-07 NOTE — H&P
Naomi IRVING Son is a 85 y.o. female is here today for follow-up.     Chief Complaint:        Chief Complaint   Patient presents with   • Carotid Artery Disease         The following portions of the patient's history were reviewed and updated as appropriate: allergies, current medications, past family history, past medical history, past social history, past surgical history and problem list.  Recent images independently reviewed.  Available laboratory values reviewed.     PCP:  System, Provider Not In  Cardiology:  Neptali     85 y.o. female with CAD, NSTEMI, ischemic  cardiomyopathy (EF 10-15%), CHF, HTN, CKD3,  dyslipidemia,  CHF, and carotid stenosis.  She returns  today in scheduled follow up for carotid stenosis   evaluation.  She denies any neurosensory or motor  symptoms.   Denies visual or motor changes.  No  CVA/TIA. Progressing slowly post op.      12/19/2013 Carotid Duplex:  CASPER 16-49% antegrade.   LICA 50-79% antegrade.  12/18/14  Carotid Duplex:  CASPER 50-79% (ratio 2.4)   LICA  50-79% (ratio 2.4).  6/23/15: Carotid duplex: CASPER 50-79%(ratio 2.9) LICA  50-79%  12/29/15: Carotid duplex: CASPER 50-79% (ratio 3.6). LICA  50-79% ratio 1.8)   7/28/16: Carotid duplex: CASPER 50-79% (mPSV 239cm/s  ratio 2.9) LICA 50-79% (mPSV 236cm/s ratio 1.7)  1/31/17: Carotid duplex: CASPER 50-69% (mPSV 185cm/s,  ratio 1.7) LICA 50-69% (mPSV 202cm/s, ratio 2.0)  2/3/18: Carotid duplex: CASPER 50-69% ([id number]/s, ratio  3.1) LICA 50-69% (mPSV 407cm/s, ratio 0.9)  2/14/19: Carotid duplex: CASPER >70% (204/3.0) LICA  50-69% (143/1.1) Antegrade  9/10/19: Carotid duplex: CASPER 50-69% (218/2.7) LICA  50-69% (268/4.5) Antegrade flow  3/11/2020: Carotid Duplex: RIGHT >70% (142/4.1) IQB64-41% (166/0.7) Antegrade   3/19/2020: CTA Carotids: CASPER >70% LICA 70%  10/23/2020: Carotid Duplex: RIGHT >70% (235/2.9) LEFT 50-69% (168/0.9) Antegrade   11/17/2020: RIGHT TCAR  12/2020 Carotid Duplex: CASPER 0-49% mPSV 96c/s Ratio 1.2, Antegrade  vertebrals     11/16/2013 Echocardiogram:  LA 38, LV 56, RV 49, IVS 9.  EF 20%.  GIULIA 2.4.  11/22/2013 Cardiac Catheterization:  LAD 70-80%, D1 70-80%, CX 95%, RCA 30% mild irreg.  moderate MR, EF 10-15%.  /24.  12/18/2013 MISSY:  EF 20%, global severe hypokinesis, no clot, LVH.  central moderate MR.  mild to moderate AI.  12/19/2013 Myocardial Viability Study:  anterior wall normal.  inferior no appreciable viable tissue in infarcted area.  lateral small amount vialble tissue on delayed images, most nonviable.  12/19/2013 Lower extremity vein mapping:  RIGHT 2.3-5.0mm.  LEFT 1.8-3.8mm.  3/10/2014 Echocardiogram:  LA 37, LV 58, RV 49, IVS 9.  EF 35%.  GIULIA 2.4. tr MR  4/8/14: OFF PUMP CABG X 1  4/22/14: CXR : Bilateral pleural effusions, left greater than right, and  minimal bibasilar atelectasis.  5/28/14: Echo: EF 45-50%. LV borderline reduced. LA mildly dilated. Borderline global hypokineses of LV.         Medical History        Past Medical History:   Diagnosis Date   • Acute bronchitis     • Arrhythmia     • Arthritis     • Cardiomyopathy (CMS/HCC)     • Carotid artery stenosis     • Chronic kidney disease       stage 4   • Congestive heart failure (CMS/HCC)     • Contusion of elbow     • Cough     • Dyslipidemia     • Essential hypertension     • Fatigue     • Generalized ischemic myocardial dysfunction     • GERD (gastroesophageal reflux disease)     • Hyperlipidemia     • Hypertension     • Iron deficiency anemia     • MI (myocardial infarction) (CMS/HCC)     • Mitral valve disease     • Multiple vessel coronary artery disease     • Surgical follow-up care     • UTI (urinary tract infection)          Surgical History         Past Surgical History:   Procedure Laterality Date   • CARDIAC CATHETERIZATION   11/22/2013     Multi-vessel CAD with critical lesions noted in the LAD, diagonal CA and lesion complex. Critical lesion noted in the obtuse marginal CA and moderate lesion noted in the proximal RCA. LV  dysfunction with LV dilatation with global hypokinesis of LV.   • CORONARY ARTERY BYPASS GRAFT   2014     CABG ( off pump), placement of LIMA to LAD coronary artery (1 distal anastomosis), placement of left femoral arterial line, vascular ultrasound guidance.   • CORONARY ARTERY BYPASS GRAFT   2014     Off Pump LIMA-LAD   • TRANSESOPHAGEAL ECHOCARDIOGRAM (MISSY)   03/10/2014     with color flow-Depressed left ventricular systolic function with EF of 35%. Grade I diastolic dysfunction of the left ventricular myocardium. Mild AV regurgitation. Mild enlargement of the left ventricular cavity              Family History   Problem Relation Age of Onset   • Heart disease Other     • Hypertension Other     • No Known Problems Mother     • No Known Problems Father        Social History      Tobacco Use   • Smoking status: Former Smoker       Quit date:        Years since quittin.9   • Smokeless tobacco: Never Used   Substance Use Topics   • Alcohol use: No   • Drug use: No         ALLERGIES:   Patient has no known allergies.     MEDICATIONS:        Current Outpatient Medications:   •  acetaminophen (TYLENOL) 325 MG tablet, Take 2 tablets by mouth Every 4 (Four) Hours As Needed for Mild Pain ., Disp:  , Rfl:   •  amLODIPine (NORVASC) 5 MG tablet, Take 5 mg by mouth Every Night., Disp: , Rfl:   •  aspirin 81 MG chewable tablet, Chew 81 mg Daily., Disp: , Rfl:   •  atorvastatin (LIPITOR) 10 MG tablet, Take 10 mg by mouth Every Night., Disp: , Rfl:   •  carvedilol (COREG) 25 MG tablet, Take 12.5 mg by mouth 2 (Two) Times a Day With Meals., Disp: , Rfl:   •  clopidogrel (PLAVIX) 75 MG tablet, Take 75 mg by mouth Daily., Disp: , Rfl:   •  furosemide (LASIX) 40 MG tablet, Take 40 mg by mouth As Needed (edema)., Disp: , Rfl:   •  irbesartan (AVAPRO) 75 MG tablet, Take 75 mg by mouth Daily., Disp: , Rfl:   •  Multiple Vitamins-Minerals (MULTIVITAMIN ADULT PO), Take 1 tablet by mouth Daily., Disp: , Rfl:   •   sennosides-docusate (senna-docusate sodium) 8.6-50 MG per tablet, Take 2 tablets by mouth 2 (Two) Times a Day As Needed for Constipation., Disp:  , Rfl:      Review of Systems   Constitution: Positive for decreased appetite and malaise/fatigue. Negative for fever.   HENT: Positive for hearing loss.    Eyes: Negative for visual disturbance.   Cardiovascular: Negative for claudication and cyanosis.   Respiratory: Negative for shortness of breath.    Skin: Negative for color change and nail changes.   Musculoskeletal: Negative for muscle weakness.   Gastrointestinal: Negative for dysphagia.   Neurological: Negative for focal weakness, light-headedness, loss of balance, numbness, paresthesias and weakness.   Psychiatric/Behavioral: Positive for memory loss. Negative for altered mental status. The patient is nervous/anxious.    All other systems reviewed and are negative.           Objective      Temp:  [97.5 °F (36.4 °C)] 97.5 °F (36.4 °C)  Heart Rate:  [62] 62  BP: (132)/(79) 132/79       Vitals:     12/18/20 1336   BP: 132/79   Pulse: 62   Temp: 97.5 °F (36.4 °C)   SpO2: 98%      Body mass index is 20.41 kg/m².  Physical Exam   Constitutional: She is oriented to person, place, and time.   HENT:   Head: Atraumatic.   Neck: Neck supple. Carotid bruit is not present.   (R) Neck Inc: CDI   Cardiovascular: Normal rate and normal heart sounds.   Pulmonary/Chest: Effort normal and breath sounds normal.   Abdominal: Soft. Bowel sounds are normal.   Musculoskeletal: Normal range of motion.      Comments: Gait normal   Neurological: She is alert and oriented to person, place, and time.   Skin: Skin is warm and dry. Capillary refill takes less than 2 seconds.   Psychiatric: Thought content normal.   Vitals reviewed.           Lab Results   Component Value Date     GLUCOSE 124 (H) 11/25/2020     BUN 20 11/25/2020     CREATININE 1.45 (H) 11/25/2020     EGFRIFNONA 34 (L) 11/25/2020     BCR 13.8 11/25/2020     K 4.0 11/25/2020      CO2 25.0 11/25/2020     CALCIUM 9.5 11/25/2020     ALBUMIN 4.00 11/25/2020     AST 23 11/25/2020     ALT 7 11/25/2020            Lab Results   Component Value Date     WBC 8.76 11/25/2020     HGB 9.3 (L) 11/25/2020     HCT 28.0 (L) 11/25/2020     MCV 87.8 11/25/2020      11/25/2020               Assessment & Plan      Independent Review of Radiographic Studies:   12/2020 Carotid Duplex: CASPER 0-49% mPSV 96c/s Ratio 1.2, Antegrade vertebrals     Bilateral carotid artery stenosis  Stable Post Op RIGHT CAROTID STENT:  Progressing well    Detailed discussion with Ada L Son regarding situation, options and plans.  severe,  asymptomatic  carotid stenosis.  Carotid intervention is advisable.  Increased risk for Stroke and cardiovascular complications.  Carotid stenting is advisable.    Risks, including but not limited to:  Mortality, major morbidity 1%.  Stroke risk 2-3%.  bleeding, transfusion, infection, pulmonary, renal dysfunction, blood vessel and nerve injury.  Benefits:  reduction of stroke risk  Options: carotid endarterectomy, stenting and medical therapy discussed.   usually overnight stay in hospital if all well. Understands and wishes to proceed.    LEFT Transcarotid artery revascularization, carotid cerebral arteriogram, carotid stent, possible carotid endarterectomy, radial arterial line, GEN  SDS  2/10/2021             This document has been electronically signed by Bravo Latif MD on January 7, 2021 12:18 CST

## 2021-01-07 NOTE — TELEPHONE ENCOUNTER
Talked to Ms. Son about scheduling surgery. She chose the date 2/10/2021. Let patient know I would call her back with more dates and times for her covid and PAT.

## 2021-01-19 ENCOUNTER — IMMUNIZATION (OUTPATIENT)
Dept: VACCINE CLINIC | Facility: HOSPITAL | Age: 86
End: 2021-01-19

## 2021-01-19 PROCEDURE — 91300 HC SARSCOV02 VAC 30MCG/0.3ML IM: CPT | Performed by: THORACIC SURGERY (CARDIOTHORACIC VASCULAR SURGERY)

## 2021-01-19 PROCEDURE — 0001A: CPT | Performed by: THORACIC SURGERY (CARDIOTHORACIC VASCULAR SURGERY)

## 2021-02-04 ENCOUNTER — APPOINTMENT (OUTPATIENT)
Dept: PREADMISSION TESTING | Facility: HOSPITAL | Age: 86
End: 2021-02-04

## 2021-02-04 VITALS
HEART RATE: 60 BPM | SYSTOLIC BLOOD PRESSURE: 132 MMHG | OXYGEN SATURATION: 98 % | RESPIRATION RATE: 18 BRPM | HEIGHT: 63 IN | BODY MASS INDEX: 19.49 KG/M2 | WEIGHT: 110 LBS | DIASTOLIC BLOOD PRESSURE: 76 MMHG

## 2021-02-04 DIAGNOSIS — I65.22 CAROTID STENOSIS, ASYMPTOMATIC, LEFT: ICD-10-CM

## 2021-02-04 LAB
ABO GROUP BLD: NORMAL
ANION GAP SERPL CALCULATED.3IONS-SCNC: 8 MMOL/L (ref 5–15)
BASOPHILS # BLD AUTO: 0.07 10*3/MM3 (ref 0–0.2)
BASOPHILS NFR BLD AUTO: 1.4 % (ref 0–1.5)
BLD GP AB SCN SERPL QL: NEGATIVE
BUN SERPL-MCNC: 14 MG/DL (ref 8–23)
BUN/CREAT SERPL: 8.6 (ref 7–25)
CALCIUM SPEC-SCNC: 9.4 MG/DL (ref 8.6–10.5)
CHLORIDE SERPL-SCNC: 100 MMOL/L (ref 98–107)
CO2 SERPL-SCNC: 28 MMOL/L (ref 22–29)
CREAT SERPL-MCNC: 1.63 MG/DL (ref 0.57–1)
DEPRECATED RDW RBC AUTO: 41.8 FL (ref 37–54)
EOSINOPHIL # BLD AUTO: 0 10*3/MM3 (ref 0–0.4)
EOSINOPHIL NFR BLD AUTO: 0 % (ref 0.3–6.2)
ERYTHROCYTE [DISTWIDTH] IN BLOOD BY AUTOMATED COUNT: 13.1 % (ref 12.3–15.4)
GFR SERPL CREATININE-BSD FRML MDRD: 30 ML/MIN/1.73
GLUCOSE SERPL-MCNC: 99 MG/DL (ref 65–99)
HCT VFR BLD AUTO: 31.7 % (ref 34–46.6)
HGB BLD-MCNC: 10.5 G/DL (ref 12–15.9)
IMM GRANULOCYTES # BLD AUTO: 0 10*3/MM3 (ref 0–0.05)
IMM GRANULOCYTES NFR BLD AUTO: 0 % (ref 0–0.5)
LYMPHOCYTES # BLD AUTO: 1.04 10*3/MM3 (ref 0.7–3.1)
LYMPHOCYTES NFR BLD AUTO: 21.5 % (ref 19.6–45.3)
Lab: NORMAL
MCH RBC QN AUTO: 29 PG (ref 26.6–33)
MCHC RBC AUTO-ENTMCNC: 33.1 G/DL (ref 31.5–35.7)
MCV RBC AUTO: 87.6 FL (ref 79–97)
MONOCYTES # BLD AUTO: 0.62 10*3/MM3 (ref 0.1–0.9)
MONOCYTES NFR BLD AUTO: 12.8 % (ref 5–12)
NEUTROPHILS NFR BLD AUTO: 3.1 10*3/MM3 (ref 1.7–7)
NEUTROPHILS NFR BLD AUTO: 64.3 % (ref 42.7–76)
NRBC BLD AUTO-RTO: 0 /100 WBC (ref 0–0.2)
PLATELET # BLD AUTO: 277 10*3/MM3 (ref 140–450)
PMV BLD AUTO: 9.8 FL (ref 6–12)
POTASSIUM SERPL-SCNC: 4.6 MMOL/L (ref 3.5–5.2)
RBC # BLD AUTO: 3.62 10*6/MM3 (ref 3.77–5.28)
RH BLD: NEGATIVE
SODIUM SERPL-SCNC: 136 MMOL/L (ref 136–145)
T&S EXPIRATION DATE: NORMAL
WBC # BLD AUTO: 4.83 10*3/MM3 (ref 3.4–10.8)

## 2021-02-04 PROCEDURE — 86900 BLOOD TYPING SEROLOGIC ABO: CPT

## 2021-02-04 PROCEDURE — 86901 BLOOD TYPING SEROLOGIC RH(D): CPT

## 2021-02-04 PROCEDURE — 36415 COLL VENOUS BLD VENIPUNCTURE: CPT

## 2021-02-04 PROCEDURE — 86850 RBC ANTIBODY SCREEN: CPT

## 2021-02-04 PROCEDURE — 85025 COMPLETE CBC W/AUTO DIFF WBC: CPT

## 2021-02-04 PROCEDURE — 80048 BASIC METABOLIC PNL TOTAL CA: CPT

## 2021-02-04 NOTE — DISCHARGE INSTRUCTIONS
[facility]  Pre-op Information and Guidelines    You will be called after 2 p.m. the day before your surgery (Friday for Monday surgery) and notified of your time for arrival and approximate surgery time.  If you have not received a call by 4P.M., please contact Same Day Surgery at [phone] of if outside [facility] call [phone].    Please Follow these Important Safety Guidelines:    • The morning of your procedure, take only the medications listed below with   A sip of water:_____________________________________________       ______________________________________________    • DO NOT eat or drink anything after 12:00 midnight the night before surgery  Specific instructions concerning drinking clear liquids will be discussed during  the pre-surgery instruction call the day before your surgery.    • If you take a blood thinner (ex. Plavix, Coumadin, aspirin), ask your doctor when to stop it before surgery  STOP DATE: _________________    • Only 2 visitors are allowed in patient rooms at a time  Your visitors will be asked to wait in the lobby until the admission process is complete with the exception of a parent with a child and patients in need of special assistance.    • YOU CANNOT DRIVE YOURSELF HOME  You must be accompanied by someone who will be responsible for driving you home after surgery and for your care at home.    • DO NOT chew gum, use breath mints, hard candy, or smoke the day of surgery  • DO NOT drink alcohol for at least 24 hours before your surgery  • DO NOT wear any jewelry and remove all body piercing before coming to the hospital  • DO NOT wear make-up to the hospital  • If you are having surgery on an extremity (arm/leg/foot) remove nail polish/artificial nails on the surgical side  • Clothing, glasses, contacts, dentures, and hairpieces must be removed before surgery  • Bathe the night before or the morning of your surgery and do not use powders/lotions on  skin.

## 2021-02-07 ENCOUNTER — LAB (OUTPATIENT)
Dept: LAB | Facility: HOSPITAL | Age: 86
End: 2021-02-07

## 2021-02-07 DIAGNOSIS — Z01.818 PREOP TESTING: Primary | ICD-10-CM

## 2021-02-07 PROCEDURE — U0004 COV-19 TEST NON-CDC HGH THRU: HCPCS

## 2021-02-07 PROCEDURE — C9803 HOPD COVID-19 SPEC COLLECT: HCPCS

## 2021-02-08 LAB — SARS-COV-2 ORF1AB RESP QL NAA+PROBE: NOT DETECTED

## 2021-02-09 ENCOUNTER — IMMUNIZATION (OUTPATIENT)
Dept: VACCINE CLINIC | Facility: HOSPITAL | Age: 86
End: 2021-02-09

## 2021-02-09 PROCEDURE — 0002A: CPT | Performed by: THORACIC SURGERY (CARDIOTHORACIC VASCULAR SURGERY)

## 2021-02-09 PROCEDURE — 91300 HC SARSCOV02 VAC 30MCG/0.3ML IM: CPT | Performed by: THORACIC SURGERY (CARDIOTHORACIC VASCULAR SURGERY)

## 2021-02-10 ENCOUNTER — HOSPITAL ENCOUNTER (INPATIENT)
Facility: HOSPITAL | Age: 86
LOS: 1 days | Discharge: HOME | End: 2021-02-11
Attending: THORACIC SURGERY (CARDIOTHORACIC VASCULAR SURGERY) | Admitting: THORACIC SURGERY (CARDIOTHORACIC VASCULAR SURGERY)

## 2021-02-10 ENCOUNTER — ANESTHESIA EVENT (OUTPATIENT)
Dept: PERIOP | Facility: HOSPITAL | Age: 86
End: 2021-02-10

## 2021-02-10 ENCOUNTER — APPOINTMENT (OUTPATIENT)
Dept: GENERAL RADIOLOGY | Facility: HOSPITAL | Age: 86
End: 2021-02-10

## 2021-02-10 ENCOUNTER — ANESTHESIA (OUTPATIENT)
Dept: PERIOP | Facility: HOSPITAL | Age: 86
End: 2021-02-10

## 2021-02-10 DIAGNOSIS — I65.22 CAROTID STENOSIS, ASYMPTOMATIC, LEFT: ICD-10-CM

## 2021-02-10 LAB
ABO GROUP BLD: NORMAL
BLD GP AB SCN SERPL QL: NEGATIVE
Lab: NORMAL
RH BLD: NEGATIVE
T&S EXPIRATION DATE: NORMAL

## 2021-02-10 PROCEDURE — C1769 GUIDE WIRE: HCPCS | Performed by: THORACIC SURGERY (CARDIOTHORACIC VASCULAR SURGERY)

## 2021-02-10 PROCEDURE — 25010000002 ONDANSETRON PER 1 MG: Performed by: NURSE ANESTHETIST, CERTIFIED REGISTERED

## 2021-02-10 PROCEDURE — 86901 BLOOD TYPING SEROLOGIC RH(D): CPT | Performed by: ANESTHESIOLOGY

## 2021-02-10 PROCEDURE — P9041 ALBUMIN (HUMAN),5%, 50ML: HCPCS | Performed by: THORACIC SURGERY (CARDIOTHORACIC VASCULAR SURGERY)

## 2021-02-10 PROCEDURE — 86850 RBC ANTIBODY SCREEN: CPT | Performed by: ANESTHESIOLOGY

## 2021-02-10 PROCEDURE — 25010000002 FENTANYL CITRATE (PF) 100 MCG/2ML SOLUTION: Performed by: NURSE ANESTHETIST, CERTIFIED REGISTERED

## 2021-02-10 PROCEDURE — 25010000002 PROTAMINE SULFATE PER 10 MG: Performed by: NURSE ANESTHETIST, CERTIFIED REGISTERED

## 2021-02-10 PROCEDURE — 25010000002 IOPAMIDOL 61 % SOLUTION: Performed by: THORACIC SURGERY (CARDIOTHORACIC VASCULAR SURGERY)

## 2021-02-10 PROCEDURE — C1876 STENT, NON-COA/NON-COV W/DEL: HCPCS | Performed by: THORACIC SURGERY (CARDIOTHORACIC VASCULAR SURGERY)

## 2021-02-10 PROCEDURE — B3171ZZ FLUOROSCOPY OF LEFT INTERNAL CAROTID ARTERY USING LOW OSMOLAR CONTRAST: ICD-10-PCS | Performed by: THORACIC SURGERY (CARDIOTHORACIC VASCULAR SURGERY)

## 2021-02-10 PROCEDURE — C1894 INTRO/SHEATH, NON-LASER: HCPCS | Performed by: THORACIC SURGERY (CARDIOTHORACIC VASCULAR SURGERY)

## 2021-02-10 PROCEDURE — 86900 BLOOD TYPING SEROLOGIC ABO: CPT | Performed by: ANESTHESIOLOGY

## 2021-02-10 PROCEDURE — 25010000002 SUCCINYLCHOLINE PER 20 MG: Performed by: NURSE ANESTHETIST, CERTIFIED REGISTERED

## 2021-02-10 PROCEDURE — 25010000002 PHENYLEPHRINE 10 MG/ML SOLUTION: Performed by: THORACIC SURGERY (CARDIOTHORACIC VASCULAR SURGERY)

## 2021-02-10 PROCEDURE — 37215 TRANSCATH STENT CCA W/EPS: CPT | Performed by: THORACIC SURGERY (CARDIOTHORACIC VASCULAR SURGERY)

## 2021-02-10 PROCEDURE — 76000 FLUOROSCOPY <1 HR PHYS/QHP: CPT

## 2021-02-10 PROCEDURE — 25010000002 PHENYLEPHRINE PER 1 ML: Performed by: NURSE ANESTHETIST, CERTIFIED REGISTERED

## 2021-02-10 PROCEDURE — 25010000002 ATROPINE PER 0.01 MG: Performed by: NURSE ANESTHETIST, CERTIFIED REGISTERED

## 2021-02-10 PROCEDURE — X2AJ336 CEREBRAL EMBOLIC FILTRATION, EXTRACORPOREAL FLOW REVERSAL CIRCUIT FROM LEFT COMMON CAROTID ARTERY, PERCUTANEOUS APPROACH, NEW TECHNOLOGY GROUP 6: ICD-10-PCS | Performed by: THORACIC SURGERY (CARDIOTHORACIC VASCULAR SURGERY)

## 2021-02-10 PROCEDURE — 25010000002 CEFAZOLIN PER 500 MG: Performed by: THORACIC SURGERY (CARDIOTHORACIC VASCULAR SURGERY)

## 2021-02-10 PROCEDURE — 037L3DZ DILATION OF LEFT INTERNAL CAROTID ARTERY WITH INTRALUMINAL DEVICE, PERCUTANEOUS APPROACH: ICD-10-PCS | Performed by: THORACIC SURGERY (CARDIOTHORACIC VASCULAR SURGERY)

## 2021-02-10 PROCEDURE — S0260 H&P FOR SURGERY: HCPCS | Performed by: THORACIC SURGERY (CARDIOTHORACIC VASCULAR SURGERY)

## 2021-02-10 PROCEDURE — 25010000002 HEPARIN (PORCINE) PER 1000 UNITS: Performed by: THORACIC SURGERY (CARDIOTHORACIC VASCULAR SURGERY)

## 2021-02-10 PROCEDURE — 25010000002 ALBUMIN HUMAN 5% PER 50 ML: Performed by: THORACIC SURGERY (CARDIOTHORACIC VASCULAR SURGERY)

## 2021-02-10 PROCEDURE — C1725 CATH, TRANSLUMIN NON-LASER: HCPCS | Performed by: THORACIC SURGERY (CARDIOTHORACIC VASCULAR SURGERY)

## 2021-02-10 PROCEDURE — 25010000002 HEPARIN (PORCINE) PER 1000 UNITS: Performed by: NURSE ANESTHETIST, CERTIFIED REGISTERED

## 2021-02-10 PROCEDURE — 25010000002 PROPOFOL 10 MG/ML EMULSION: Performed by: NURSE ANESTHETIST, CERTIFIED REGISTERED

## 2021-02-10 DEVICE — HEMOST ABS SURGIFOAM 8X6.25CM 10MM: Type: IMPLANTABLE DEVICE | Site: CAROTID | Status: FUNCTIONAL

## 2021-02-10 DEVICE — 10 MM X 40 MM
Type: IMPLANTABLE DEVICE | Site: CAROTID | Status: FUNCTIONAL
Brand: ENROUTE TRANSCAROTID STENT

## 2021-02-10 RX ORDER — PSEUDOEPHEDRINE HCL 30 MG
60 TABLET ORAL EVERY 6 HOURS SCHEDULED
Status: DISCONTINUED | OUTPATIENT
Start: 2021-02-10 | End: 2021-02-11 | Stop reason: HOSPADM

## 2021-02-10 RX ORDER — LIDOCAINE HYDROCHLORIDE 40 MG/ML
INJECTION, SOLUTION RETROBULBAR; TOPICAL AS NEEDED
Status: DISCONTINUED | OUTPATIENT
Start: 2021-02-10 | End: 2021-02-10 | Stop reason: SURG

## 2021-02-10 RX ORDER — SODIUM CHLORIDE, SODIUM GLUCONATE, SODIUM ACETATE, POTASSIUM CHLORIDE, AND MAGNESIUM CHLORIDE 526; 502; 368; 37; 30 MG/100ML; MG/100ML; MG/100ML; MG/100ML; MG/100ML
INJECTION, SOLUTION INTRAVENOUS CONTINUOUS PRN
Status: DISCONTINUED | OUTPATIENT
Start: 2021-02-10 | End: 2021-02-10 | Stop reason: SURG

## 2021-02-10 RX ORDER — NOREPINEPHRINE BIT/0.9 % NACL 8 MG/250ML
.02-.3 INFUSION BOTTLE (ML) INTRAVENOUS
Status: DISCONTINUED | OUTPATIENT
Start: 2021-02-10 | End: 2021-02-10

## 2021-02-10 RX ORDER — ALBUMIN, HUMAN INJ 5% 5 %
500 SOLUTION INTRAVENOUS ONCE
Status: COMPLETED | OUTPATIENT
Start: 2021-02-10 | End: 2021-02-10

## 2021-02-10 RX ORDER — ROCURONIUM BROMIDE 10 MG/ML
INJECTION, SOLUTION INTRAVENOUS AS NEEDED
Status: DISCONTINUED | OUTPATIENT
Start: 2021-02-10 | End: 2021-02-10 | Stop reason: SURG

## 2021-02-10 RX ORDER — NITROGLYCERIN 40 MG/100ML
5-200 INJECTION INTRAVENOUS
Status: DISCONTINUED | OUTPATIENT
Start: 2021-02-10 | End: 2021-02-11 | Stop reason: HOSPADM

## 2021-02-10 RX ORDER — ASPIRIN 81 MG/1
81 TABLET, CHEWABLE ORAL DAILY
Status: DISCONTINUED | OUTPATIENT
Start: 2021-02-11 | End: 2021-02-11 | Stop reason: HOSPADM

## 2021-02-10 RX ORDER — PROPOFOL 10 MG/ML
VIAL (ML) INTRAVENOUS AS NEEDED
Status: DISCONTINUED | OUTPATIENT
Start: 2021-02-10 | End: 2021-02-10 | Stop reason: SURG

## 2021-02-10 RX ORDER — ALBUTEROL SULFATE 2.5 MG/3ML
2.5 SOLUTION RESPIRATORY (INHALATION)
Status: DISCONTINUED | OUTPATIENT
Start: 2021-02-10 | End: 2021-02-11 | Stop reason: HOSPADM

## 2021-02-10 RX ORDER — BISACODYL 10 MG
10 SUPPOSITORY, RECTAL RECTAL DAILY PRN
Status: DISCONTINUED | OUTPATIENT
Start: 2021-02-10 | End: 2021-02-11 | Stop reason: HOSPADM

## 2021-02-10 RX ORDER — LIDOCAINE HYDROCHLORIDE 10 MG/ML
INJECTION, SOLUTION INFILTRATION; PERINEURAL AS NEEDED
Status: DISCONTINUED | OUTPATIENT
Start: 2021-02-10 | End: 2021-02-10 | Stop reason: HOSPADM

## 2021-02-10 RX ORDER — SODIUM CHLORIDE 9 MG/ML
100 INJECTION, SOLUTION INTRAVENOUS CONTINUOUS
Status: DISCONTINUED | OUTPATIENT
Start: 2021-02-10 | End: 2021-02-10 | Stop reason: SDUPTHER

## 2021-02-10 RX ORDER — LIDOCAINE HYDROCHLORIDE 20 MG/ML
INJECTION, SOLUTION EPIDURAL; INFILTRATION; INTRACAUDAL; PERINEURAL AS NEEDED
Status: DISCONTINUED | OUTPATIENT
Start: 2021-02-10 | End: 2021-02-10 | Stop reason: SURG

## 2021-02-10 RX ORDER — BUPIVACAINE HCL/0.9 % NACL/PF 0.1 %
2 PLASTIC BAG, INJECTION (ML) EPIDURAL ONCE
Status: COMPLETED | OUTPATIENT
Start: 2021-02-10 | End: 2021-02-10

## 2021-02-10 RX ORDER — ONDANSETRON 2 MG/ML
INJECTION INTRAMUSCULAR; INTRAVENOUS AS NEEDED
Status: DISCONTINUED | OUTPATIENT
Start: 2021-02-10 | End: 2021-02-10 | Stop reason: SURG

## 2021-02-10 RX ORDER — EPHEDRINE SULFATE 50 MG/ML
INJECTION, SOLUTION INTRAVENOUS AS NEEDED
Status: DISCONTINUED | OUTPATIENT
Start: 2021-02-10 | End: 2021-02-10 | Stop reason: SURG

## 2021-02-10 RX ORDER — SUCCINYLCHOLINE CHLORIDE 20 MG/ML
INJECTION INTRAMUSCULAR; INTRAVENOUS AS NEEDED
Status: DISCONTINUED | OUTPATIENT
Start: 2021-02-10 | End: 2021-02-10 | Stop reason: SURG

## 2021-02-10 RX ORDER — FENTANYL CITRATE 50 UG/ML
INJECTION, SOLUTION INTRAMUSCULAR; INTRAVENOUS AS NEEDED
Status: DISCONTINUED | OUTPATIENT
Start: 2021-02-10 | End: 2021-02-10 | Stop reason: SURG

## 2021-02-10 RX ORDER — PHENYLEPHRINE HCL IN 0.9% NACL 0.5 MG/5ML
.5-3 SYRINGE (ML) INTRAVENOUS
Status: DISCONTINUED | OUTPATIENT
Start: 2021-02-10 | End: 2021-02-11 | Stop reason: HOSPADM

## 2021-02-10 RX ORDER — ATORVASTATIN CALCIUM 10 MG/1
10 TABLET, FILM COATED ORAL NIGHTLY
Status: DISCONTINUED | OUTPATIENT
Start: 2021-02-10 | End: 2021-02-11 | Stop reason: HOSPADM

## 2021-02-10 RX ORDER — HEPARIN SODIUM 5000 [USP'U]/ML
INJECTION, SOLUTION INTRAVENOUS; SUBCUTANEOUS AS NEEDED
Status: DISCONTINUED | OUTPATIENT
Start: 2021-02-10 | End: 2021-02-10 | Stop reason: HOSPADM

## 2021-02-10 RX ORDER — AMOXICILLIN 250 MG
2 CAPSULE ORAL 2 TIMES DAILY PRN
Status: DISCONTINUED | OUTPATIENT
Start: 2021-02-10 | End: 2021-02-10 | Stop reason: SDUPTHER

## 2021-02-10 RX ORDER — SODIUM CHLORIDE 9 MG/ML
75 INJECTION, SOLUTION INTRAVENOUS CONTINUOUS
Status: DISCONTINUED | OUTPATIENT
Start: 2021-02-10 | End: 2021-02-11 | Stop reason: HOSPADM

## 2021-02-10 RX ORDER — CARVEDILOL 12.5 MG/1
12.5 TABLET ORAL 2 TIMES DAILY WITH MEALS
Status: DISCONTINUED | OUTPATIENT
Start: 2021-02-10 | End: 2021-02-11 | Stop reason: HOSPADM

## 2021-02-10 RX ORDER — HEPARIN SODIUM 1000 [USP'U]/ML
INJECTION, SOLUTION INTRAVENOUS; SUBCUTANEOUS AS NEEDED
Status: DISCONTINUED | OUTPATIENT
Start: 2021-02-10 | End: 2021-02-10 | Stop reason: SURG

## 2021-02-10 RX ORDER — FUROSEMIDE 40 MG/1
40 TABLET ORAL AS NEEDED
Status: DISCONTINUED | OUTPATIENT
Start: 2021-02-10 | End: 2021-02-11 | Stop reason: HOSPADM

## 2021-02-10 RX ORDER — ACETAMINOPHEN 325 MG/1
650 TABLET ORAL EVERY 4 HOURS PRN
Status: DISCONTINUED | OUTPATIENT
Start: 2021-02-10 | End: 2021-02-11 | Stop reason: HOSPADM

## 2021-02-10 RX ORDER — AMLODIPINE BESYLATE 5 MG/1
5 TABLET ORAL NIGHTLY
Status: DISCONTINUED | OUTPATIENT
Start: 2021-02-10 | End: 2021-02-11 | Stop reason: HOSPADM

## 2021-02-10 RX ORDER — PROTAMINE SULFATE 10 MG/ML
INJECTION, SOLUTION INTRAVENOUS AS NEEDED
Status: DISCONTINUED | OUTPATIENT
Start: 2021-02-10 | End: 2021-02-10 | Stop reason: SURG

## 2021-02-10 RX ORDER — ONDANSETRON 2 MG/ML
4 INJECTION INTRAMUSCULAR; INTRAVENOUS EVERY 6 HOURS PRN
Status: DISCONTINUED | OUTPATIENT
Start: 2021-02-10 | End: 2021-02-11 | Stop reason: HOSPADM

## 2021-02-10 RX ORDER — BUPIVACAINE HCL/0.9 % NACL/PF 0.1 %
2 PLASTIC BAG, INJECTION (ML) EPIDURAL EVERY 8 HOURS
Status: COMPLETED | OUTPATIENT
Start: 2021-02-10 | End: 2021-02-11

## 2021-02-10 RX ORDER — CLOPIDOGREL BISULFATE 75 MG/1
75 TABLET ORAL DAILY
Status: DISCONTINUED | OUTPATIENT
Start: 2021-02-11 | End: 2021-02-11 | Stop reason: HOSPADM

## 2021-02-10 RX ORDER — SODIUM CHLORIDE 0.9 % (FLUSH) 0.9 %
3 SYRINGE (ML) INJECTION EVERY 12 HOURS SCHEDULED
Status: DISCONTINUED | OUTPATIENT
Start: 2021-02-10 | End: 2021-02-10 | Stop reason: HOSPADM

## 2021-02-10 RX ORDER — ONDANSETRON 4 MG/1
4 TABLET, FILM COATED ORAL EVERY 6 HOURS PRN
Status: DISCONTINUED | OUTPATIENT
Start: 2021-02-10 | End: 2021-02-11 | Stop reason: HOSPADM

## 2021-02-10 RX ORDER — SODIUM CHLORIDE 0.9 % (FLUSH) 0.9 %
10 SYRINGE (ML) INJECTION AS NEEDED
Status: DISCONTINUED | OUTPATIENT
Start: 2021-02-10 | End: 2021-02-10 | Stop reason: HOSPADM

## 2021-02-10 RX ORDER — ATROPINE SULFATE 1 MG/ML
INJECTION, SOLUTION INTRAMUSCULAR; INTRAVENOUS; SUBCUTANEOUS AS NEEDED
Status: DISCONTINUED | OUTPATIENT
Start: 2021-02-10 | End: 2021-02-10 | Stop reason: SURG

## 2021-02-10 RX ORDER — AMOXICILLIN 250 MG
2 CAPSULE ORAL 2 TIMES DAILY PRN
Status: DISCONTINUED | OUTPATIENT
Start: 2021-02-10 | End: 2021-02-11 | Stop reason: HOSPADM

## 2021-02-10 RX ADMIN — HEPARIN SODIUM 7000 UNITS: 1000 INJECTION INTRAVENOUS; SUBCUTANEOUS at 13:06

## 2021-02-10 RX ADMIN — PROTAMINE SULFATE 20 MG: 10 INJECTION, SOLUTION INTRAVENOUS at 14:00

## 2021-02-10 RX ADMIN — EPHEDRINE SULFATE 10 MG: 50 INJECTION INTRAVENOUS at 12:36

## 2021-02-10 RX ADMIN — PHENYLEPHRINE HYDROCHLORIDE 0.5 MCG/KG/MIN: 10 INJECTION INTRAVENOUS at 17:05

## 2021-02-10 RX ADMIN — SODIUM CHLORIDE 100 ML/HR: 9 INJECTION, SOLUTION INTRAVENOUS at 09:45

## 2021-02-10 RX ADMIN — SODIUM CHLORIDE, SODIUM GLUCONATE, SODIUM ACETATE, POTASSIUM CHLORIDE, AND MAGNESIUM CHLORIDE: 526; 502; 368; 37; 30 INJECTION, SOLUTION INTRAVENOUS at 13:36

## 2021-02-10 RX ADMIN — ATORVASTATIN CALCIUM 10 MG: 10 TABLET, FILM COATED ORAL at 21:03

## 2021-02-10 RX ADMIN — GLYCOPYRROLATE 0.2 MG: 0.2 INJECTION, SOLUTION INTRAMUSCULAR; INTRAVITREAL at 12:45

## 2021-02-10 RX ADMIN — ONDANSETRON 4 MG: 2 INJECTION INTRAMUSCULAR; INTRAVENOUS at 13:55

## 2021-02-10 RX ADMIN — SUCCINYLCHOLINE CHLORIDE 100 MG: 20 INJECTION, SOLUTION INTRAMUSCULAR; INTRAVENOUS at 12:26

## 2021-02-10 RX ADMIN — PSEUDOEPHEDRINE HCL 60 MG: 30 TABLET, FILM COATED ORAL at 15:57

## 2021-02-10 RX ADMIN — ALBUMIN HUMAN 250 ML: 0.05 INJECTION, SOLUTION INTRAVENOUS at 15:38

## 2021-02-10 RX ADMIN — LIDOCAINE HYDROCHLORIDE 60 MG: 20 INJECTION, SOLUTION EPIDURAL; INFILTRATION; INTRACAUDAL; PERINEURAL at 12:26

## 2021-02-10 RX ADMIN — PHENYLEPHRINE HYDROCHLORIDE 100 MCG: 10 INJECTION INTRAVENOUS at 14:24

## 2021-02-10 RX ADMIN — EPHEDRINE SULFATE 25 MG: 50 INJECTION INTRAVENOUS at 14:24

## 2021-02-10 RX ADMIN — ATROPINE SULFATE 0.25 MG: 1 INJECTION, SOLUTION INTRAMUSCULAR; INTRAVENOUS; SUBCUTANEOUS at 13:18

## 2021-02-10 RX ADMIN — PHENYLEPHRINE HYDROCHLORIDE 100 MCG: 10 INJECTION INTRAVENOUS at 12:54

## 2021-02-10 RX ADMIN — FENTANYL CITRATE 50 MCG: 50 INJECTION, SOLUTION INTRAMUSCULAR; INTRAVENOUS at 12:24

## 2021-02-10 RX ADMIN — CEFAZOLIN SODIUM 2 G: 10 INJECTION, POWDER, FOR SOLUTION INTRAVENOUS at 19:44

## 2021-02-10 RX ADMIN — ROCURONIUM BROMIDE 50 MG: 10 INJECTION INTRAVENOUS at 12:46

## 2021-02-10 RX ADMIN — SODIUM CHLORIDE, SODIUM GLUCONATE, SODIUM ACETATE, POTASSIUM CHLORIDE, AND MAGNESIUM CHLORIDE: 526; 502; 368; 37; 30 INJECTION, SOLUTION INTRAVENOUS at 12:40

## 2021-02-10 RX ADMIN — Medication 2 G: at 12:44

## 2021-02-10 RX ADMIN — PROPOFOL 100 MG: 10 INJECTION, EMULSION INTRAVENOUS at 12:26

## 2021-02-10 RX ADMIN — LIDOCAINE HYDROCHLORIDE 4 ML: 40 INJECTION, SOLUTION RETROBULBAR; TOPICAL at 12:28

## 2021-02-10 RX ADMIN — FENTANYL CITRATE 50 MCG: 50 INJECTION, SOLUTION INTRAMUSCULAR; INTRAVENOUS at 12:45

## 2021-02-10 RX ADMIN — PHENYLEPHRINE HYDROCHLORIDE 100 MCG: 10 INJECTION INTRAVENOUS at 12:42

## 2021-02-10 RX ADMIN — ATROPINE SULFATE 0.25 MG: 1 INJECTION, SOLUTION INTRAMUSCULAR; INTRAVENOUS; SUBCUTANEOUS at 12:58

## 2021-02-10 RX ADMIN — PHENYLEPHRINE HYDROCHLORIDE 100 MCG: 10 INJECTION INTRAVENOUS at 13:03

## 2021-02-10 NOTE — ANESTHESIA POSTPROCEDURE EVALUATION
Patient: Naomi Abdalla    Procedure Summary     Date: 02/10/21 Room / Location: [facility] OR  / [facility] OR    Anesthesia Start: 1218 Anesthesia Stop: 1503    Procedure: LEFT TRANSCAROTID ARTERY REVASCULARIZATION carotid cerebral arteriogram (Left Neck) Diagnosis:       Carotid stenosis, asymptomatic, left      (Carotid stenosis, asymptomatic, left [I65.22])    Surgeon: Bravo Latif MD Provider: Kole Gresham MD    Anesthesia Type: general ASA Status: 4          Anesthesia Type: general    Vitals  No vitals data found for the desired time range.          Post Anesthesia Care and Evaluation    Patient location during evaluation: bedside  Patient participation: complete - patient cannot participate  Level of consciousness: awake  Pain score: 0  Pain management: adequate  Airway patency: patent  Anesthetic complications: No anesthetic complications  PONV Status: none  Cardiovascular status: acceptable  Respiratory status: acceptable  Hydration status: acceptable

## 2021-02-10 NOTE — ANESTHESIA PROCEDURE NOTES
Peripheral IV    Patient location during procedure: OR  Line placed for Fluids/Medication Admin.  Performed By   CRNA: Jeison Shirley CRNA  Preanesthetic Checklist  Completed: patient identified, site marked, surgical consent, pre-op evaluation, timeout performed, IV checked, risks and benefits discussed and monitors and equipment checked  Peripheral IV Prep   Patient position: supine   Prep: ChloraPrep  Peripheral IV Procedure   Laterality:left  Location:  Wrist  Catheter size: 20 G         Post Assessment   Dressing Type: gauze, tape and transparent.    IV Dressing/Site: clean, dry and intact

## 2021-02-10 NOTE — PLAN OF CARE
Problem: Adult Inpatient Plan of Care  Goal: Plan of Care Review  Outcome: Ongoing, Progressing  Flowsheets (Taken 2/10/2021 1724)  Progress: no change  Plan of Care Reviewed With:   patient   son  Outcome Summary: New admission at this time.   Goal Outcome Evaluation:  Plan of Care Reviewed With: patient, son  Progress: no change  Outcome Summary: New admission at this time.

## 2021-02-10 NOTE — ANESTHESIA PROCEDURE NOTES
Airway  Date/Time: 2/10/2021 12:28 PM  Airway not difficult    General Information and Staff    Patient location during procedure: OR  CRNA: Lino Oviedo CRNA    Indications and Patient Condition  Indications for airway management: airway protection    Preoxygenated: yes  Mask difficulty assessment: 0 - not attempted    Final Airway Details  Final airway type: endotracheal airway      Successful airway: ETT    Successful intubation technique: direct laryngoscopy  Endotracheal tube insertion site: oral  Blade: Viviana  Blade size: 3  ETT size (mm): 7.0  Cormack-Lehane Classification: grade IIa - partial view of glottis  Placement verified by: chest auscultation and capnometry   Measured from: lips  ETT/EBT  to lips (cm): 20  Number of attempts at approach: 1  Assessment: lips, teeth, and gum same as pre-op and atraumatic intubation    Additional Comments  Teeth checked after intubation.  No damage noted.

## 2021-02-10 NOTE — ANESTHESIA PROCEDURE NOTES
Arterial Line      Patient reassessed immediately prior to procedure    Patient location during procedure: OR   Line placed for hemodynamic monitoring and ABGs/Labs/ISTAT.  Performed By   CRNA: Jeison Shirley CRNA  Preanesthetic Checklist  Completed: patient identified, site marked, surgical consent, pre-op evaluation, timeout performed, IV checked, risks and benefits discussed and monitors and equipment checked  Arterial Line Prep   Sterile Tech: cap, gloves and sterile barriers  Prep: ChloraPrep  Patient monitoring: EKG, continuous pulse oximetry and blood pressure monitoring  Arterial Line Procedure   Laterality:left  Location:  radial artery  Catheter size: 20 G   Guidance: palpation technique  Number of attempts: 1  Successful placement: yes  Post Assessment   Dressing Type: wrist guard applied, secured with tape and occlusive dressing applied.   Complications no  Circ/Move/Sens Assessment: normal and unchanged.   Patient Tolerance: patient tolerated the procedure well with no apparent complications

## 2021-02-10 NOTE — ANESTHESIA PREPROCEDURE EVALUATION
Anesthesia Evaluation     Patient summary reviewed and Nursing notes reviewed   no history of anesthetic complications:  NPO Solid Status: > 8 hours  NPO Liquid Status: > 2 hours           Airway   Mallampati: I  TM distance: >3 FB  Neck ROM: full  No difficulty expected  Dental    (+) upper dentures and lower dentures    Pulmonary - normal exam    breath sounds clear to auscultation  (+) a smoker Former,   (-) COPD, asthma, sleep apnea  Cardiovascular   Exercise tolerance: poor (<4 METS)    ECG reviewed  PT is on anticoagulation therapy  Patient on routine beta blocker and Beta blocker given within 24 hours of surgery  Rhythm: regular  Rate: normal    (+) hypertension well controlled 2 medications or greater, valvular problems/murmurs AI and MR, past MI  >12 months, CAD, CABG >6 Months, CHF Systolic <55%, murmur, hyperlipidemia,  carotid artery disease carotid bilateral  (-) dysrhythmias, angina, cardiac stents, DVT    ROS comment: Sinus bradycardia  T wave abnormality, consider lateral ischemia  Abnormal ECG  When compared with ECG of 21-FEB-2020 11:26,  NO SIG CHANGE  Confirmed by MINOR    · The left ventricular cavity is mildly dilated.  · Estimated EF = 48%.  · Left ventricular systolic function is low normal.  · Left ventricular diastolic dysfunction (grade I) consistent with impaired relaxation.  · Left atrial cavity size is borderline dilated.  · Mild aortic valve regurgitation is present.  · Mild mitral valve regurgitation is present  · Mild tricuspid valve regurgitation is present.   Sinus bradycardia  T wave abnormality, consider lateral ischemia  Abnormal ECG  When compared with ECG of 21-FEB-2020 11:26,  NO SIG CHANGE  Confirmed by MONICA GAXIOLA ([id number]) on 11/13/2020 9:58:42 PM    Neuro/Psych- negative ROS  (-) seizures, TIA, CVA, headaches, numbness, psychiatric history  GI/Hepatic/Renal/Endo    (+)   renal disease (Creatinine 1.63),   (-) hepatitis, diabetes, no thyroid disorder    Musculoskeletal      Abdominal    Substance History - negative use  (-) alcohol use, drug use     OB/GYN negative ob/gyn ROS         Other   arthritis (knees),      (-) history of cancer  ROS/Med Hx Other: HGB 10.5 HCT 31.7                    Anesthesia Plan    ASA 4     general   (Arterial line, 2nd IV discussed)  intravenous induction     Anesthetic plan, all risks, benefits, and alternatives have been provided, discussed and informed consent has been obtained with: patient.  Use of blood products discussed with patient  Consented to blood products.

## 2021-02-10 NOTE — PLAN OF CARE
Goal Outcome Evaluation:  Plan of Care Reviewed With: patient, son  Progress: no change  Outcome Summary: New admission at this time.

## 2021-02-10 NOTE — OP NOTE
OPERATIVE NOTE  Naomi Duong Son  1935  2/10/2021    PREOP DIAGNOSES:    LEFT carotid artery stenosis, asymptomatic  High risk criteria:  Age >= 75, surgically inaccessible lesion and bilateral carotid artery stenosis requiring treatment  SVS VQI VQI-TSP:  [id number]    POSTOP DIAGNOSES:   LEFT carotid artery stenosis    PROCEDURE:   LEFT Transcatheter placement cervical carotid stent open with distal embolic protection  (TCAR, [id number] EnRoute stent, 6x30mm Dre balloon predilation, EnRoute NPS flow reversal system)  Select LEFT carotid cerebral arteriogram  Vascular ultrasound guidance (femoral venous cannulation)    SURGEON: TEODORO Latif MD FACS RPVI    ASSISTANT: Mouna Kenney CFA  provided critical assistance during the case including suctioning, exposure, retraction, and reduction of blood loss.    ANESTHESIA: GEN    CONTRAST:  15ml isovue    MEDICATIONS: 6000 units heparin, 0.4 mg glycopyrrolate    FLUORO TIME:  5min    FLOW REVERSAL TIME:  14min    ESTIMATED BLOOD LOSS: Minimal    SPECIMEN:  None    COMPLICATIONS: none    DESCRIPTION OF OPERATION:   Location: bifurcation, ICA and CCA  Lesion type:  atherosclerosis  Lesion circumference calcification:  60%  Lesion length:  38mm  Degree of stenosis:  90%  ICA tortuosity:  severe  Aortic Arch: type 3   Aortic Arch calcification:  moderate    Antiplatelet, statin confirmed.    Patient taken to the operating room, placed in supine position, neck extended.  Vascular ultrasound used to identify the RIGHT common femoral vein  9mm in diameter no plaque, cannulated via modified Seldinger technique with mini stick under ultrasound guidance. wire and catheter exchanged for 8Fr Enroute NPS venous sheath. Secured to skin.    Vascular ultrasound used to identify carotid artery, CCA >=6mm, no significant plaque at puncture site, bifurcation marked on skin.  transverse incision made base of LEFT neck. 8000 units IV heparin given to maintain ACT around 300.   Dissection down to common carotid artery, 3cm exposed, controlled with vessel loop.  5-0 Prolene pursestring suture placed anterior CCA.  Traction on CCA loop. CCA cannulated via modified Seldinger technique with micropuncture needle, exchanged 4Fr catheter.  Carotid cerebral arteriogram performed.    LEFT common carotid artery:  patent with mild irregularities  LEFT internal carotid artery:  with focal stenosis 90%  LEFT external carotid artery:  patent with moderate diffuse disease  No significant intracranial stenoses visualized.    0.035 J-tip extra support guidewire placed, stopped short of bifurcation. Exchanged 8Fr Enroute sheath.  Distal sheath location in central lumen confirmed with AP and oblique contrast injections. Flow reversal established with Enroute NPS system.  Proximal CCA occluded with profunda clamp. Exchanged 0.014 SR guidewire, lesion crossed with mild difficulty, tip at siphon turn.     Exchanged 6x30mm Dre balloon placed across carotid artery lesion for predilation, inflated to full effacement 6atm with inflator. Reverse flow x2min.  [id number] Enroute stent deployed across lesion without difficulty.  Reverse flow x2min. Completion arteriogram shows no significant residual stenosis. No evidence obstruction, dissection or extravasation.    Wire, catheter removed. CCA clamp removed, antegrade flow re-established.  Enroute NPS system disconnected, blood returned thru venous sheath.  Venous sheath removed, manual pressure held with good hemostasis.  Arterial sheath removed, repaired with 5-0 Prolene pursestring suture.     20 mg protamine given with return of ACT to baseline.  Hemostasis obtained.  SNoW used.  Incision closed in layers interrupted 3-0 Vicryl platysma, 4-0 Monocryl skin, Dermabond tape.  Awoke from anesthesia moving all extremities, no focal neurologic deficits.  Patient tolerated procedure well, transferred to PACU in stable condition.          This document has been  electronically signed by Bravo Latif MD on February 10, 2021 14:25 CST

## 2021-02-10 NOTE — H&P
Naomi IRVING Son is a 85 y.o. female is here today for follow-up.     Chief Complaint:          Chief Complaint   Patient presents with   • Carotid Artery Disease         The following portions of the patient's history were reviewed and updated as appropriate: allergies, current medications, past family history, past medical history, past social history, past surgical history and problem list.  Recent images independently reviewed.  Available laboratory values reviewed.     PCP:  System, Provider Not In  Cardiology:  Neptali     85 y.o. female with CAD, NSTEMI, ischemic  cardiomyopathy (EF 10-15%), CHF, HTN, CKD3,  dyslipidemia,  CHF, and carotid stenosis.  She returns  today in scheduled follow up for carotid stenosis   evaluation.  She denies any neurosensory or motor  symptoms.   Denies visual or motor changes.  No  CVA/TIA. Progressing slowly post op.      12/19/2013 Carotid Duplex:  CASPER 16-49% antegrade.   LICA 50-79% antegrade.  12/18/14  Carotid Duplex:  CASPER 50-79% (ratio 2.4)   LICA  50-79% (ratio 2.4).  6/23/15: Carotid duplex: CASPER 50-79%(ratio 2.9) LICA  50-79%  12/29/15: Carotid duplex: CASPER 50-79% (ratio 3.6). LICA  50-79% ratio 1.8)   7/28/16: Carotid duplex: CASPER 50-79% (mPSV 239cm/s  ratio 2.9) LICA 50-79% (mPSV 236cm/s ratio 1.7)  1/31/17: Carotid duplex: CASPER 50-69% (mPSV 185cm/s,  ratio 1.7) LICA 50-69% (mPSV 202cm/s, ratio 2.0)  2/3/18: Carotid duplex: CASPER 50-69% (gUBC759wk/s, ratio  3.1) LICA 50-69% (mPSV 407cm/s, ratio 0.9)  2/14/19: Carotid duplex: CASPER >70% (204/3.0) LICA  50-69% (143/1.1) Antegrade  9/10/19: Carotid duplex: CASPER 50-69% (218/2.7) LICA  50-69% (268/4.5) Antegrade flow  3/11/2020: Carotid Duplex: RIGHT >70% (142/4.1) ZYV44-75% (166/0.7) Antegrade   3/19/2020: CTA Carotids: CASPER >70% LICA 70%  10/23/2020: Carotid Duplex: RIGHT >70% (235/2.9) LEFT 50-69% (168/0.9) Antegrade   11/17/2020: RIGHT TCAR  12/2020 Carotid Duplex: CASPER 0-49% mPSV 96c/s  Ratio 1.2, Antegrade vertebrals     11/16/2013 Echocardiogram:  LA 38, LV 56, RV 49, IVS 9.  EF 20%.  GIULIA 2.4.  11/22/2013 Cardiac Catheterization:  LAD 70-80%, D1 70-80%, CX 95%, RCA 30% mild irreg.  moderate MR, EF 10-15%.  /24.  12/18/2013 MISSY:  EF 20%, global severe hypokinesis, no clot, LVH.  central moderate MR.  mild to moderate AI.  12/19/2013 Myocardial Viability Study:  anterior wall normal.  inferior no appreciable viable tissue in infarcted area.  lateral small amount vialble tissue on delayed images, most nonviable.  12/19/2013 Lower extremity vein mapping:  RIGHT 2.3-5.0mm.  LEFT 1.8-3.8mm.  3/10/2014 Echocardiogram:  LA 37, LV 58, RV 49, IVS 9.  EF 35%.  GIULIA 2.4. tr MR  4/8/14: OFF PUMP CABG X 1  4/22/14: CXR : Bilateral pleural effusions, left greater than right, and  minimal bibasilar atelectasis.  5/28/14: Echo: EF 45-50%. LV borderline reduced. LA mildly dilated. Borderline global hypokineses of LV.         Medical History           Past Medical History:   Diagnosis Date   • Acute bronchitis     • Arrhythmia     • Arthritis     • Cardiomyopathy (CMS/HCC)     • Carotid artery stenosis     • Chronic kidney disease       stage 4   • Congestive heart failure (CMS/HCC)     • Contusion of elbow     • Cough     • Dyslipidemia     • Essential hypertension     • Fatigue     • Generalized ischemic myocardial dysfunction     • GERD (gastroesophageal reflux disease)     • Hyperlipidemia     • Hypertension     • Iron deficiency anemia     • MI (myocardial infarction) (CMS/HCC)     • Mitral valve disease     • Multiple vessel coronary artery disease     • Surgical follow-up care     • UTI (urinary tract infection)           Surgical History             Past Surgical History:   Procedure Laterality Date   • CARDIAC CATHETERIZATION   11/22/2013     Multi-vessel CAD with critical lesions noted in the LAD, diagonal CA and lesion complex. Critical lesion noted in the obtuse marginal CA and moderate lesion  noted in the proximal RCA. LV dysfunction with LV dilatation with global hypokinesis of LV.   • CORONARY ARTERY BYPASS GRAFT   2014     CABG ( off pump), placement of LIMA to LAD coronary artery (1 distal anastomosis), placement of left femoral arterial line, vascular ultrasound guidance.   • CORONARY ARTERY BYPASS GRAFT   2014     Off Pump LIMA-LAD   • TRANSESOPHAGEAL ECHOCARDIOGRAM (MISSY)   03/10/2014     with color flow-Depressed left ventricular systolic function with EF of 35%. Grade I diastolic dysfunction of the left ventricular myocardium. Mild AV regurgitation. Mild enlargement of the left ventricular cavity                   Family History   Problem Relation Age of Onset   • Heart disease Other     • Hypertension Other     • No Known Problems Mother     • No Known Problems Father        Social History            Tobacco Use   • Smoking status: Former Smoker       Quit date:        Years since quittin.9   • Smokeless tobacco: Never Used   Substance Use Topics   • Alcohol use: No   • Drug use: No         ALLERGIES:   Patient has no known allergies.     MEDICATIONS:        Current Outpatient Medications:   •  acetaminophen (TYLENOL) 325 MG tablet, Take 2 tablets by mouth Every 4 (Four) Hours As Needed for Mild Pain ., Disp:  , Rfl:   •  amLODIPine (NORVASC) 5 MG tablet, Take 5 mg by mouth Every Night., Disp: , Rfl:   •  aspirin 81 MG chewable tablet, Chew 81 mg Daily., Disp: , Rfl:   •  atorvastatin (LIPITOR) 10 MG tablet, Take 10 mg by mouth Every Night., Disp: , Rfl:   •  carvedilol (COREG) 25 MG tablet, Take 12.5 mg by mouth 2 (Two) Times a Day With Meals., Disp: , Rfl:   •  clopidogrel (PLAVIX) 75 MG tablet, Take 75 mg by mouth Daily., Disp: , Rfl:   •  furosemide (LASIX) 40 MG tablet, Take 40 mg by mouth As Needed (edema)., Disp: , Rfl:   •  irbesartan (AVAPRO) 75 MG tablet, Take 75 mg by mouth Daily., Disp: , Rfl:   •  Multiple Vitamins-Minerals (MULTIVITAMIN ADULT PO), Take 1 tablet  by mouth Daily., Disp: , Rfl:   •  sennosides-docusate (senna-docusate sodium) 8.6-50 MG per tablet, Take 2 tablets by mouth 2 (Two) Times a Day As Needed for Constipation., Disp:  , Rfl:      Review of Systems   Constitution: Positive for decreased appetite and malaise/fatigue. Negative for fever.   HENT: Positive for hearing loss.    Eyes: Negative for visual disturbance.   Cardiovascular: Negative for claudication and cyanosis.   Respiratory: Negative for shortness of breath.    Skin: Negative for color change and nail changes.   Musculoskeletal: Negative for muscle weakness.   Gastrointestinal: Negative for dysphagia.   Neurological: Negative for focal weakness, light-headedness, loss of balance, numbness, paresthesias and weakness.   Psychiatric/Behavioral: Positive for memory loss. Negative for altered mental status. The patient is nervous/anxious.    All other systems reviewed and are negative.           Objective      Temp:  [97.5 °F (36.4 °C)] 97.5 °F (36.4 °C)  Heart Rate:  [62] 62  BP: (132)/(79) 132/79         Vitals:     12/18/20 1336   BP: 132/79   Pulse: 62   Temp: 97.5 °F (36.4 °C)   SpO2: 98%      Body mass index is 20.41 kg/m².  Physical Exam   Constitutional: She is oriented to person, place, and time.   HENT:   Head: Atraumatic.   Neck: Neck supple. Carotid bruit is not present.   (R) Neck Inc: CDI   Cardiovascular: Normal rate and normal heart sounds.   Pulmonary/Chest: Effort normal and breath sounds normal.   Abdominal: Soft. Bowel sounds are normal.   Musculoskeletal: Normal range of motion.      Comments: Gait normal   Neurological: She is alert and oriented to person, place, and time.   Skin: Skin is warm and dry. Capillary refill takes less than 2 seconds.   Psychiatric: Thought content normal.   Vitals reviewed.               Lab Results   Component Value Date     GLUCOSE 124 (H) 11/25/2020     BUN 20 11/25/2020     CREATININE 1.45 (H) 11/25/2020     EGFRIFNONA 34 (L) 11/25/2020     BCR  13.8 11/25/2020     K 4.0 11/25/2020     CO2 25.0 11/25/2020     CALCIUM 9.5 11/25/2020     ALBUMIN 4.00 11/25/2020     AST 23 11/25/2020     ALT 7 11/25/2020                Lab Results   Component Value Date     WBC 8.76 11/25/2020     HGB 9.3 (L) 11/25/2020     HCT 28.0 (L) 11/25/2020     MCV 87.8 11/25/2020      11/25/2020               Assessment & Plan      Independent Review of Radiographic Studies:   12/2020 Carotid Duplex: CASPER 0-49% mPSV 96c/s Ratio 1.2, Antegrade vertebrals     Bilateral carotid artery stenosis  Stable Post Op RIGHT CAROTID STENT:  Progressing well     Detailed discussion with Naomi L Son regarding situation, options and plans.  severe,  asymptomatic  carotid stenosis.  Carotid intervention is advisable.  Increased risk for Stroke and cardiovascular complications.  Carotid stenting is advisable.     Risks, including but not limited to:  Mortality, major morbidity 1%.  Stroke risk 2-3%.  bleeding, transfusion, infection, pulmonary, renal dysfunction, blood vessel and nerve injury.  Benefits:  reduction of stroke risk  Options: carotid endarterectomy, stenting and medical therapy discussed.   usually overnight stay in hospital if all well. Understands and wishes to proceed.     LEFT Transcarotid artery revascularization, carotid cerebral arteriogram, carotid stent, possible carotid endarterectomy, radial arterial line, GEN  SDS  2/10/2021

## 2021-02-11 VITALS
OXYGEN SATURATION: 94 % | BODY MASS INDEX: 20.77 KG/M2 | SYSTOLIC BLOOD PRESSURE: 124 MMHG | DIASTOLIC BLOOD PRESSURE: 59 MMHG | WEIGHT: 112.88 LBS | RESPIRATION RATE: 16 BRPM | HEIGHT: 62 IN | TEMPERATURE: 97.7 F | HEART RATE: 71 BPM

## 2021-02-11 PROCEDURE — 99024 POSTOP FOLLOW-UP VISIT: CPT | Performed by: NURSE PRACTITIONER

## 2021-02-11 PROCEDURE — 94799 UNLISTED PULMONARY SVC/PX: CPT

## 2021-02-11 PROCEDURE — 25010000002 CEFAZOLIN PER 500 MG: Performed by: THORACIC SURGERY (CARDIOTHORACIC VASCULAR SURGERY)

## 2021-02-11 RX ADMIN — CLOPIDOGREL BISULFATE 75 MG: 75 TABLET ORAL at 08:03

## 2021-02-11 RX ADMIN — PSEUDOEPHEDRINE HCL 60 MG: 30 TABLET, FILM COATED ORAL at 00:03

## 2021-02-11 RX ADMIN — PSEUDOEPHEDRINE HCL 60 MG: 30 TABLET, FILM COATED ORAL at 06:20

## 2021-02-11 RX ADMIN — CARVEDILOL 12.5 MG: 12.5 TABLET, FILM COATED ORAL at 09:05

## 2021-02-11 RX ADMIN — CEFAZOLIN SODIUM 2 G: 10 INJECTION, POWDER, FOR SOLUTION INTRAVENOUS at 04:33

## 2021-02-11 RX ADMIN — SODIUM CHLORIDE 75 ML/HR: 9 INJECTION, SOLUTION INTRAVENOUS at 00:03

## 2021-02-11 RX ADMIN — ASPIRIN 81 MG: 81 TABLET, CHEWABLE ORAL at 08:03

## 2021-02-11 NOTE — PLAN OF CARE
Goal Outcome Evaluation:     Progress: improving  Outcome Summary: pt up in chair. michaela d/c. no c/o pain. no distress. vital signs stable. pt off hong.

## 2021-02-11 NOTE — PROGRESS NOTES
"  Cardiothoracic - Vascular Surgery Daily Note        LOS: 1 day   Patient Care Team:  Oz, No Known as PCP - General    POD1   L TCAR    Chief Complaint: Carotid Stenosis      Subjective     The following portions of the patient's history were reviewed and updated as appropriate: allergies, current medications, past family history, past medical history, past social history, past surgical history and problem list.     Subjective:  Symptoms:  Stable.  No shortness of breath.    Diet:  Adequate intake.    Activity level: Returning to normal.    Pain:  She reports no pain.          Objective     Vital Signs  Temp:  [96.3 °F (35.7 °C)-98.1 °F (36.7 °C)] 97.7 °F (36.5 °C)  Heart Rate:  [52-99] 84  Resp:  [16-18] 16  BP: ()/(41-80) 118/56  Arterial Line BP: ()/(27-45) 108/39  Body mass index is 20.65 kg/m².    Intake/Output Summary (Last 24 hours) at 2/11/2021 0952  Last data filed at 2/11/2021 0800  Gross per 24 hour   Intake 4222.06 ml   Output 1825 ml   Net 2397.06 ml     I/O this shift:  In: 100 [P.O.:100]  Out: -     Wt Readings from Last 3 Encounters:   02/11/21 51.2 kg (112 lb 14 oz)   02/04/21 49.9 kg (110 lb)   12/18/20 50.6 kg (111 lb 9.6 oz)         Physical Exam   Objective:  General Appearance:  Comfortable, well-appearing, in no acute distress and not in pain.    Vital signs: (most recent): Blood pressure 118/56, pulse 84, temperature 97.7 °F (36.5 °C), temperature source Oral, resp. rate 16, height 157.5 cm (62\"), weight 51.2 kg (112 lb 14 oz), SpO2 93 %.  Vital signs are normal.    Output: Producing urine and no stool output.    HEENT: Normal HEENT exam.    Lungs:  Normal effort and normal respiratory rate.  Breath sounds clear to auscultation.    Heart: Normal rate.  Regular rhythm.    Chest: No chest wall tenderness.    Abdomen: Abdomen is soft.  Bowel sounds are normal.   There is no abdominal tenderness.     Extremities: Normal range of motion.    Pulses: Distal pulses are intact.  "   Neurological: Patient is alert and oriented to person, place and time.  GCS score is 15.  (No deficits).    Pupils:  Pupils are equal, round, and reactive to light.  Pupils are equal.   Skin:  Warm and dry.  (L Subclavian incision CDI, femoral site CDI without hematoma)              Results Review:    Lab Results   Component Value Date    WBC 4.83 02/04/2021    HGB 10.5 (L) 02/04/2021    HCT 31.7 (L) 02/04/2021    MCV 87.6 02/04/2021     02/04/2021     Lab Results   Component Value Date    GLUCOSE 99 02/04/2021    BUN 14 02/04/2021    CREATININE 1.63 (H) 02/04/2021    EGFRIFNONA 30 (L) 02/04/2021    BCR 8.6 02/04/2021    K 4.6 02/04/2021    CO2 28.0 02/04/2021    CALCIUM 9.4 02/04/2021    ALBUMIN 4.00 11/25/2020    AST 23 11/25/2020    ALT 7 11/25/2020       Calcium No results found for: CALCIUM   Magnesium No results found for: MG     Imaging Results (Last 24 Hours)     Procedure Component Value Units Date/Time    FL C Arm During Surgery [[id number]] Resulted: 02/10/21 1546     Updated: 02/10/21 1546                              albuterol, 2.5 mg, Nebulization, Q8H - RT  amLODIPine, 5 mg, Oral, Nightly  aspirin, 81 mg, Oral, Daily  atorvastatin, 10 mg, Oral, Nightly  carvedilol, 12.5 mg, Oral, BID With Meals  clopidogrel, 75 mg, Oral, Daily  losartan, 75 mg, Oral, Q24H  pseudoephedrine, 60 mg, Oral, Q6H      nitroglycerin, 5-200 mcg/min, Last Rate: Stopped (02/10/21 1640)  phenylephrine, 0.5-3 mcg/kg/min, Last Rate: Stopped (02/10/21 2300)  sodium chloride, 75 mL/hr, Last Rate: 75 mL/hr (02/11/21 0003)              ASSESSMENT/PLAN     Satisfactory Post op  Progressing well, up in chair, well appearing, tolerating PO, voiding post ye removal.       Carotid Stenosis  S/P L TCAR.  No new deficits  DAPT, Statin, ARB, BB  Repeat carotid duplex in 4 weeks    Essential HTN  Controlled, hold amlodipine     Post operative pain  Controlled acetominophen     Rehab  Up ad jean marie     Respiratory  NEBS PRN, room air,  IS/OPEP     Disposition  Stable for D/C home, one week follow up with [facility] APRN        This document has been electronically signed by SABRINA Gallagher @  On February 11, 2021 09:52 CST

## 2021-02-11 NOTE — DISCHARGE SUMMARY
CVTS DISCHARGE SUMMARY  Date of Admission: 2/10/2021  9:04 AM  Date of Discharge:  2/11/2021    Admission Diagnosis:   Carotid stenosis, asymptomatic, left [I65.22]    Discharge Diagnosis:   Post-Op Diagnosis Codes:     * Carotid stenosis, asymptomatic, left [I65.22]    Consults:   Consults     No orders found for last 30 day(s).          Procedures Performed  Procedure(s):  LEFT TRANSCAROTID ARTERY REVASCULARIZATION carotid cerebral arteriogram       Discharge Medications     Discharge Medications      Continue These Medications      Instructions Start Date   acetaminophen 325 MG tablet  Commonly known as: TYLENOL   650 mg, Oral, Every 4 Hours PRN      aspirin 81 MG chewable tablet   81 mg, Oral, Daily      atorvastatin 10 MG tablet  Commonly known as: LIPITOR   10 mg, Oral, Nightly      carvedilol 25 MG tablet  Commonly known as: COREG   12.5 mg, Oral, 2 Times Daily With Meals      clopidogrel 75 MG tablet  Commonly known as: PLAVIX   75 mg, Oral, Daily      furosemide 40 MG tablet  Commonly known as: LASIX   40 mg, Oral, As Needed      irbesartan 75 MG tablet  Commonly known as: AVAPRO   75 mg, Oral, Daily      multivitamin with minerals tablet tablet   1 tablet, Oral, Daily      sennosides-docusate 8.6-50 MG per tablet  Commonly known as: PERICOLACE   2 tablets, Oral, 2 Times Daily PRN         Stop These Medications    amLODIPine 5 MG tablet  Commonly known as: NORVASC            Discharge Diet:   Diet Instructions     Diet: Regular, Consistent Carbohydrate, Cardiac      Discharge Diet:  Regular  Consistent Carbohydrate  Cardiac             Discharge Disposition: Home in stable condition with follow up appointments with CVTS Nurse Practitioner 1 week,  Cardiology/PCP as scheduled.     History of Present Illness/Hospital Course:   85 y.o. female with CAD, NSTEMI, ischemic  cardiomyopathy (EF 10-15%), CHF, HTN, CKD3,  dyslipidemia,  CHF, and carotid stenosis.  She returns  today in scheduled follow up for carotid  stenosis   evaluation.  She denies any neurosensory or motor  symptoms.   Denies visual or motor changes.  No  CVA/TIA. Progressing slowly post op.      12/19/2013 Carotid Duplex:  CASPER 16-49% antegrade.   LICA 50-79% antegrade.  12/18/14  Carotid Duplex:  CASPER 50-79% (ratio 2.4)   LICA  50-79% (ratio 2.4).  6/23/15: Carotid duplex: CASPER 50-79%(ratio 2.9) LICA  50-79%  12/29/15: Carotid duplex: CASPER 50-79% (ratio 3.6). LICA  50-79% ratio 1.8)   7/28/16: Carotid duplex: CASPER 50-79% (mPSV 239cm/s  ratio 2.9) LICA 50-79% (mPSV 236cm/s ratio 1.7)  1/31/17: Carotid duplex: CASPER 50-69% (mPSV 185cm/s,  ratio 1.7) LICA 50-69% (mPSV 202cm/s, ratio 2.0)  2/3/18: Carotid duplex: CASPER 50-69% (bFLS776dn/s, ratio  3.1) LICA 50-69% (mPSV 407cm/s, ratio 0.9)  2/14/19: Carotid duplex: CASPER >70% (204/3.0) LICA  50-69% (143/1.1) Antegrade  9/10/19: Carotid duplex: CASPER 50-69% (218/2.7) LICA  50-69% (268/4.5) Antegrade flow  3/11/2020: Carotid Duplex: RIGHT >70% (142/4.1) JEO60-78% (166/0.7) Antegrade   3/19/2020: CTA Carotids: CASPER >70% LICA 70%  10/23/2020: Carotid Duplex: RIGHT >70% (235/2.9) LEFT 50-69% (168/0.9) Antegrade   11/17/2020: RIGHT TCAR  12/2020 Carotid Duplex: CASPER 0-49% mPSV 96c/s Ratio 1.2, Antegrade vertebrals  2/10/2021 L TCAR     11/16/2013 Echocardiogram:  LA 38, LV 56, RV 49, IVS 9.  EF 20%.  GIULIA 2.4.  11/22/2013 Cardiac Catheterization:  LAD 70-80%, D1 70-80%, CX 95%, RCA 30% mild irreg.  moderate MR, EF 10-15%.  /24.  12/18/2013 MISSY:  EF 20%, global severe hypokinesis, no clot, LVH.  central moderate MR.  mild to moderate AI.  12/19/2013 Myocardial Viability Study:  anterior wall normal.  inferior no appreciable viable tissue in infarcted area.  lateral small amount vialble tissue on delayed images, most nonviable.  12/19/2013 Lower extremity vein mapping:  RIGHT 2.3-5.0mm.  LEFT 1.8-3.8mm.  3/10/2014 Echocardiogram:  LA 37, LV 58, RV 49, IVS 9.  EF 35%.  GIULIA 2.4. tr MR  4/8/14: OFF PUMP CABG X  1  4/22/14: CXR : Bilateral pleural effusions, left greater than right, and  minimal bibasilar atelectasis.  5/28/14: Echo: EF 45-50%. LV borderline reduced. LA mildly dilated. Borderline global hypokineses of LV.      Adequate preop studies were completed and the patient was scheduled electively. Operative day Naomi Duong Son admitted through [facility], taken to operating suite and placed under general anesthesia.  Underwent said procedure without complications or difficulty. They were weaned easily from mechanical ventilation and extubated in the recovery room placed on oxygen per nasal cannula and further transferred to CCU for further care and monitoring. Naomi Duong Son remained hemodynamically and neurovascularly stable immediately postop.  POD1 they were out of the bed to the chair tolerating breakfast without any difficulty swallowing.  Incisions and vital signs are stable for discharge home today in satisfactory condition.         Vital Signs  Temp:  [96.3 °F (35.7 °C)-98.1 °F (36.7 °C)] 97.7 °F (36.5 °C)  Heart Rate:  [52-99] 84  Resp:  [16-18] 16  BP: ()/(41-80) 118/56  Arterial Line BP: ()/(27-45) 108/39      02/10/21  0936 02/11/21  0548   Weight: 50.4 kg (111 lb 1.8 oz) 51.2 kg (112 lb 14 oz)       Pertinent Test Results:  Lab Results   Component Value Date    WBC 4.83 02/04/2021    HGB 10.5 (L) 02/04/2021    HCT 31.7 (L) 02/04/2021    MCV 87.6 02/04/2021     02/04/2021     Lab Results   Component Value Date    GLUCOSE 99 02/04/2021    BUN 14 02/04/2021    CREATININE 1.63 (H) 02/04/2021    EGFRIFNONA 30 (L) 02/04/2021    BCR 8.6 02/04/2021    K 4.6 02/04/2021    CO2 28.0 02/04/2021    CALCIUM 9.4 02/04/2021    ALBUMIN 4.00 11/25/2020    AST 23 11/25/2020    ALT 7 11/25/2020       Physical Exam   Objective:  General Appearance:  Comfortable, well-appearing, in no acute distress and not in pain.    Vital signs: (most recent): Blood pressure 118/56, pulse 84, temperature 97.7 °F (36.5 °C),  "temperature source Oral, resp. rate 16, height 157.5 cm (62\"), weight 51.2 kg (112 lb 14 oz), SpO2 93 %.  Vital signs are normal.    Output: Producing urine and no stool output.    HEENT: Normal HEENT exam.    Lungs:  Normal effort and normal respiratory rate.  Breath sounds clear to auscultation.    Heart: Normal rate.  Regular rhythm.    Chest: No chest wall tenderness.    Abdomen: Abdomen is soft.  Bowel sounds are normal.   There is no abdominal tenderness.     Extremities: Normal range of motion.    Pulses: Distal pulses are intact.    Neurological: Patient is alert and oriented to person, place and time.  GCS score is 15.  (No deficits).    Pupils:  Pupils are equal, round, and reactive to light.  Pupils are equal.   Skin:  Warm and dry.  (L Subclavian incision CDI, femoral site CDI without hematoma)    Additional Instructions for the Follow-ups that You Need to Schedule     Call MD With Problems / Concerns   As directed      Instructions: Instructions: notify provider for uncontrolled pain, shortness of breath, fever or chills, purulent drainage or swelling from the surgical incision site.    Business hours call [phone]    After business hours may call hospital and speak to surgeon on call [phone]     If you should experience any neurological symptoms including but not limited to visual or speech disturbances confusion, seizures, or weakness of limbs of one side of your body notify [facility] center immediately for evaluation or if after hours present to the nearest Emergency Department.    Order Comments: Instructions: Instructions: notify provider for uncontrolled pain, shortness of breath, fever or chills, purulent drainage or swelling from the surgical incision site.  Business hours call [phone]  After business hours may call hospital and speak to surgeon on call [phone]  If you should experience any neurological symptoms including but not limited to visual or speech " disturbances confusion, seizures, or weakness of limbs of one side of your body notify [facility] center immediately for evaluation or if after hours present to the nearest Emergency Department.          Discharge Follow-up with PCP   As directed       Currently Documented PCP:    Provider, No Known    PCP Phone Number:    None     Follow Up Details: as scheduled         Discharge Follow-up with Specified Provider: Quan ZAYAS; 1 Week   As directed      To: Quan ZAYAS    Follow Up: 1 Week    Follow Up Details: office will call with appointment               Discharge Instructions: Discharge instructions include no heavy lifting anything greater than 10lbs for approximately 1 week.  No sex or driving for 1-2 weeks. Printed information given to the patient with advancement of activities weekly.  Risks and benefits of narcotic medications and weaning postoperatively have been discussed. Clean operative site with antibacterial soap/water, pat dry. Keep open to air unless draining, then may apply dry dressing.  No ointments or creams unless prescribed by provider. Signs and symptoms of infection including drainage from operative site, redness, swelling, with associated fever and/or chills notify [facility] V[facility] center immediately for wound check. If you should experience any neurological symptoms including but not limited to visual or speech disturbances confusion, seizures, or weakness of limbs of one side of your body notify [facility] V[facility] center immediately for evaluation or if after hours present to the nearest Emergency Department.  Patient verbalizes understanding of discharge instructions, all questions are answered, follow up appointments have been made, they are discharged home in stable condition.           Follow-up Appointments  Future Appointments   Date Time Provider Department Center   3/16/2021 10:30 AM [facility]   3/16/2021 11:00 AM Ryan Dubois,  [facility] None   3/24/2021  9:30 AM Kalee Cisneros MD [facility] None       Test Results Pending at Discharge           This document has been electronically signed by SABRINA Gallagher @  On February 11, 2021 10:02 CST      Time: Discharge 45 min

## 2021-02-12 ENCOUNTER — READMISSION MANAGEMENT (OUTPATIENT)
Dept: CALL CENTER | Facility: HOSPITAL | Age: 86
End: 2021-02-12

## 2021-02-12 NOTE — OUTREACH NOTE
Prep Survey      Responses   [facility]?  [place]   Is LACE score < 7 ?  No   Emergency Room discharge w/ pulse ox?  No   Eligibility  Readm Mgmt   Discharge diagnosis  Carotid stenosis, asymptomatic, left  [s/p LEFT TRANSCAROTID ARTERY REVASCULARIZATION carotid cerebral arteriogram]   Does the patient have one of the following disease processes/diagnoses(primary or secondary)?  General Surgery   Does the patient have Home health ordered?  No   Is there a DME ordered?  No   Comments regarding appointments  Needs f/u scheduled   Medication alerts for this patient  Continue aspirin and plavix.  Meds per AVS.   Prep survey completed?  Yes          Hiral Bush RN

## 2021-02-14 ENCOUNTER — READMISSION MANAGEMENT (OUTPATIENT)
Dept: CALL CENTER | Facility: HOSPITAL | Age: 86
End: 2021-02-14

## 2021-02-14 NOTE — OUTREACH NOTE
General Surgery Week 1 Survey      Responses   [facility] patient discharged from?  [place]   Does the patient have one of the following disease processes/diagnoses(primary or secondary)?  General Surgery   Week 1 attempt successful?  Yes   Call start time  1002   Call end time  1003   Discharge diagnosis  Carotid stenosis, asymptomatic, left    Meds reviewed with patient/caregiver?  Yes   Is the patient having any side effects they believe may be caused by any medication additions or changes?  No   Does the patient have all medications related to this admission filled (includes all antibiotics, pain medications, etc.)  Yes   Is the patient taking all medications as directed (includes completed medication regime)?  Yes   Does the patient have a follow up appointment scheduled with their surgeon?  Yes   Has the patient kept scheduled appointments due by today?  N/A   Psychosocial issues?  No   Did the patient receive a copy of their discharge instructions?  Yes   Nursing interventions  Reviewed instructions with patient   What is the patient's perception of their health status since discharge?  Improving   Nursing interventions  Nurse provided patient education   Is the patient /caregiver able to teach back basic post-op care?  Continue use of incentive spirometry at least 1 week post discharge, Practice 'cough and deep breath'   Is the patient/caregiver able to teach back signs and symptoms of incisional infection?  Increased redness, swelling or pain at the incisonal site, Increased drainage or bleeding, Incisional warmth, Pus or odor from incision, Fever   Is the patient/caregiver able to teach back steps to recovery at home?  Set small, achievable goals for return to baseline health, Rest and rebuild strength, gradually increase activity   If the patient is a current smoker, are they able to teach back resources for cessation?  Not a smoker   Is the patient/caregiver able to teach back the hierarchy of  who to call/visit for symptoms/problems? PCP, Specialist, Home health nurse, Urgent Care, ED, 911  Yes   Week 1 call completed?  Yes          Daisy Murray RN

## 2021-02-15 RX ORDER — CLOPIDOGREL BISULFATE 75 MG/1
TABLET ORAL
Qty: 90 TABLET | Refills: 5 | Status: SHIPPED | OUTPATIENT
Start: 2021-02-15 | End: 2021-01-01 | Stop reason: SDUPTHER

## 2021-02-22 ENCOUNTER — READMISSION MANAGEMENT (OUTPATIENT)
Dept: CALL CENTER | Facility: HOSPITAL | Age: 86
End: 2021-02-22

## 2021-02-22 NOTE — OUTREACH NOTE
General Surgery Week 2 Survey      Responses   [facility] [place] patient discharged from?  [place]   Does the patient have one of the following disease processes/diagnoses(primary or secondary)?  General Surgery   Week 2 attempt successful?  Yes   Call start time  1037   Call end time  1048   Discharge diagnosis  Carotid stenosis, asymptomatic, left    Meds reviewed with patient/caregiver?  Yes   Is the patient having any side effects they believe may be caused by any medication additions or changes?  No   Does the patient have all medications related to this admission filled (includes all antibiotics, pain medications, etc.)  Yes   Is the patient taking all medications as directed (includes completed medication regime)?  Yes   Does the patient have a follow up appointment scheduled with their surgeon?  Yes   Has the patient kept scheduled appointments due by today?  Yes   Has home health visited the patient within 72 hours of discharge?  N/A   Psychosocial issues?  No   Did the patient receive a copy of their discharge instructions?  Yes   Nursing interventions  Reviewed instructions with patient   What is the patient's perception of their health status since discharge?  Improving   Nursing interventions  Nurse provided patient education   Is the patient /caregiver able to teach back basic post-op care?  Continue use of incentive spirometry at least 1 week post discharge, Practice 'cough and deep breath', Take showers only when approved by MD-sponge bathe until then, No tub bath, swimming, or hot tub until instructed by MD   Is the patient/caregiver able to teach back signs and symptoms of incisional infection?  Increased redness, swelling or pain at the incisonal site, Increased drainage or bleeding, Incisional warmth, Pus or odor from incision, Fever   Is the patient/caregiver able to teach back steps to recovery at home?  Set small, achievable goals for return to baseline health, Rest and rebuild strength,  gradually increase activity   If the patient is a current smoker, are they able to teach back resources for cessation?  Not a smoker   Is the patient/caregiver able to teach back the hierarchy of who to call/visit for symptoms/problems? PCP, Specialist, Home health nurse, Urgent Care, ED, 911  Yes   Week 2 call completed?  Yes          Mahesh Monahan RN

## 2021-02-25 ENCOUNTER — OFFICE VISIT (OUTPATIENT)
Dept: CARDIAC SURGERY | Facility: CLINIC | Age: 86
End: 2021-02-25

## 2021-02-25 VITALS
SYSTOLIC BLOOD PRESSURE: 125 MMHG | OXYGEN SATURATION: 98 % | DIASTOLIC BLOOD PRESSURE: 65 MMHG | TEMPERATURE: 97.8 F | HEART RATE: 67 BPM

## 2021-02-25 DIAGNOSIS — I10 ESSENTIAL HYPERTENSION: Primary | ICD-10-CM

## 2021-02-25 DIAGNOSIS — N18.30 STAGE 3 CHRONIC KIDNEY DISEASE, UNSPECIFIED WHETHER STAGE 3A OR 3B CKD (HCC): ICD-10-CM

## 2021-02-25 DIAGNOSIS — I65.23 BILATERAL CAROTID ARTERY STENOSIS: ICD-10-CM

## 2021-02-25 DIAGNOSIS — E78.2 MIXED HYPERLIPIDEMIA: ICD-10-CM

## 2021-02-25 DIAGNOSIS — Z09 FOLLOW-UP SURGERY CARE: ICD-10-CM

## 2021-02-25 PROCEDURE — 99024 POSTOP FOLLOW-UP VISIT: CPT | Performed by: NURSE PRACTITIONER

## 2021-02-25 RX ORDER — CARVEDILOL 12.5 MG/1
12.5 TABLET ORAL 2 TIMES DAILY WITH MEALS
Qty: 180 TABLET | Refills: 0 | Status: SHIPPED | OUTPATIENT
Start: 2021-02-25 | End: 2021-01-01 | Stop reason: SDUPTHER

## 2021-03-16 ENCOUNTER — OFFICE VISIT (OUTPATIENT)
Dept: CARDIAC SURGERY | Facility: CLINIC | Age: 86
End: 2021-03-16

## 2021-03-16 VITALS
WEIGHT: 107.8 LBS | BODY MASS INDEX: 19.84 KG/M2 | TEMPERATURE: 97.7 F | HEART RATE: 66 BPM | SYSTOLIC BLOOD PRESSURE: 121 MMHG | DIASTOLIC BLOOD PRESSURE: 64 MMHG | HEIGHT: 62 IN | OXYGEN SATURATION: 98 %

## 2021-03-16 DIAGNOSIS — Z48.812 SURGICAL AFTERCARE, CIRCULATORY SYSTEM: ICD-10-CM

## 2021-03-16 DIAGNOSIS — I65.23 CAROTID STENOSIS, ASYMPTOMATIC, BILATERAL: ICD-10-CM

## 2021-03-16 DIAGNOSIS — E78.2 MIXED HYPERLIPIDEMIA: Primary | ICD-10-CM

## 2021-03-16 PROCEDURE — 99024 POSTOP FOLLOW-UP VISIT: CPT | Performed by: NURSE PRACTITIONER

## 2021-03-16 NOTE — PATIENT INSTRUCTIONS
The results of your carotid ultrasound shows mild disease of the right carotid and is stable from previous exam, ultrasound of the left carotid shows mild disease and is stable from previous exam.    Follow up in 3 months    If you should experience any neurological symptoms including but not limited to visual or speech disturbances confusion, seizures, or weakness of limbs of one side of your body notify [facility] Vascular center immediately for evaluation or if after hours present to the nearest Emergency Department.

## 2021-03-17 NOTE — PROGRESS NOTES
CVTS Office Progress Note       Subjective   Patient ID: Naomi Duong Son is a 86 y.o. female is here today for follow-up.    Chief Complaint:    Chief Complaint   Patient presents with   • Carotid Artery Disease       The following portions of the patient's history were reviewed and updated as appropriate: allergies, current medications, past family history, past medical history, past social history, past surgical history and problem list.  Recent images independently reviewed.  Available laboratory values reviewed.    PCP:  [facility]  Cardiology:  Neptali    86 y.o. female with CAD, NSTEMI, ischemic  cardiomyopathy (EF 10-15%), CHF, HTN, CKD3,  dyslipidemia,  CHF, and carotid stenosis.  She returns  today in scheduled follow up for carotid stenosis   evaluation.  She denies any neurosensory or motor  symptoms.   Denies visual or motor changes.  No  CVA/TIA. Progressing well      12/19/2013 Carotid Duplex:  CASPER 16-49% antegrade.   LICA 50-79% antegrade.  12/18/14  Carotid Duplex:  CASPER 50-79% (ratio 2.4)   LICA  50-79% (ratio 2.4).  6/23/15: Carotid duplex: CASPER 50-79%(ratio 2.9) LICA  50-79%  12/29/15: Carotid duplex: CASPER 50-79% (ratio 3.6). LICA  50-79% ratio 1.8)   7/28/16: Carotid duplex: CASPER 50-79% (mPSV 239cm/s  ratio 2.9) LICA 50-79% (mPSV 236cm/s ratio 1.7)  1/31/17: Carotid duplex: CASPER 50-69% (mPSV 185cm/s,  ratio 1.7) LICA 50-69% (mPSV 202cm/s, ratio 2.0)  2/3/18: Carotid duplex: CASPER 50-69% ([id number]/s, ratio  3.1) LICA 50-69% (mPSV 407cm/s, ratio 0.9)  2/14/19: Carotid duplex: CASPER >70% (204/3.0) LICA  50-69% (143/1.1) Antegrade  9/10/19: Carotid duplex: CASPER 50-69% (218/2.7) LICA  50-69% (268/4.5) Antegrade flow  3/11/2020: Carotid Duplex: RIGHT >70% (142/4.1) SPL67-14% (166/0.7) Antegrade   3/19/2020: CTA Carotids: CASPER >70% LICA 70%  10/23/2020: Carotid Duplex: RIGHT >70% (235/2.9) LEFT 50-69% (168/0.9) Antegrade   11/17/2020: RIGHT TCAR     12/2020  Carotid Duplex: CASPER 0-49% mPSV 96c/s Ratio 1.2, Antegrade vertebrals     2/10/2021: LEFT TCAR  3/2021 Carotid Duplex: CASPER 0-49% mPSV 99c/s Ratio 2.4, LICA 0-49% mPSV 93c/s Ratio 1.5, Antegrade vertebrals      11/16/2013 Echocardiogram:  LA 38, LV 56, RV 49, IVS 9.  EF 20%.  GIULIA 2.4.  11/22/2013 Cardiac Catheterization:  LAD 70-80%, D1 70-80%, CX 95%, RCA 30% mild irreg.  moderate MR, EF 10-15%.  /24.  12/18/2013 MISSY:  EF 20%, global severe hypokinesis, no clot, LVH.  central moderate MR.  mild to moderate AI.  12/19/2013 Myocardial Viability Study:  anterior wall normal.  inferior no appreciable viable tissue in infarcted area.  lateral small amount vialble tissue on delayed images, most nonviable.  12/19/2013 Lower extremity vein mapping:  RIGHT 2.3-5.0mm.  LEFT 1.8-3.8mm.  3/10/2014 Echocardiogram:  LA 37, LV 58, RV 49, IVS 9.  EF 35%.  GIULIA 2.4. tr MR  4/8/14: OFF PUMP CABG X 1  4/22/14: CXR : Bilateral pleural effusions, left greater than right, and  minimal bibasilar atelectasis.  5/28/14: Echo: EF 45-50%. LV borderline reduced. LA mildly dilated. Borderline global hypokineses of LV.       Past Medical History:   Diagnosis Date   • Acute bronchitis    • Arrhythmia    • Arthritis    • Cardiomyopathy (CMS/HCC)    • Carotid artery stenosis    • Chronic kidney disease     stage 4   • Congestive heart failure (CMS/HCC)    • Contusion of elbow    • Cough    • Dyslipidemia    • Essential hypertension    • Fatigue    • Generalized ischemic myocardial dysfunction    • GERD (gastroesophageal reflux disease)    • Hyperlipidemia    • Hypertension    • Iron deficiency anemia    • MI (myocardial infarction) (CMS/HCC)    • Mitral valve disease    • Multiple vessel coronary artery disease    • Surgical follow-up care    • UTI (urinary tract infection)      Past Surgical History:   Procedure Laterality Date   • CARDIAC CATHETERIZATION  11/22/2013    Multi-vessel CAD with critical lesions noted in the LAD, diagonal CA and  lesion complex. Critical lesion noted in the obtuse marginal CA and moderate lesion noted in the proximal RCA. LV dysfunction with LV dilatation with global hypokinesis of LV.   • CORONARY ARTERY BYPASS GRAFT  2014    CABG ( off pump), placement of LIMA to LAD coronary artery (1 distal anastomosis), placement of left femoral arterial line, vascular ultrasound guidance.   • CORONARY ARTERY BYPASS GRAFT  2014    Off Pump LIMA-LAD   • TRANSESOPHAGEAL ECHOCARDIOGRAM (MISSY)  03/10/2014    with color flow-Depressed left ventricular systolic function with EF of 35%. Grade I diastolic dysfunction of the left ventricular myocardium. Mild AV regurgitation. Mild enlargement of the left ventricular cavity   • VASCULAR SURGERY      RIGHT CAROTID     Family History   Problem Relation Age of Onset   • Heart disease Other    • Hypertension Other    • No Known Problems Mother    • No Known Problems Father      Social History     Tobacco Use   • Smoking status: Former Smoker     Quit date:      Years since quittin.2   • Smokeless tobacco: Never Used   Vaping Use   • Vaping Use: Never used   Substance Use Topics   • Alcohol use: No   • Drug use: No       ALLERGIES:   Patient has no known allergies.    MEDICATIONS:      Current Outpatient Medications:   •  acetaminophen (TYLENOL) 325 MG tablet, Take 2 tablets by mouth Every 4 (Four) Hours As Needed for Mild Pain ., Disp:  , Rfl:   •  aspirin 81 MG chewable tablet, Chew 81 mg Daily., Disp: , Rfl:   •  atorvastatin (LIPITOR) 10 MG tablet, Take 10 mg by mouth Every Night., Disp: , Rfl:   •  carvedilol (Coreg) 12.5 MG tablet, Take 1 tablet by mouth 2 (Two) Times a Day With Meals., Disp: 180 tablet, Rfl: 0  •  clopidogrel (PLAVIX) 75 MG tablet, TAKE ONE TABLET BY MOUTH DAILY, Disp: 90 tablet, Rfl: 5  •  furosemide (LASIX) 40 MG tablet, Take 40 mg by mouth As Needed (edema). Take 1/2 tablet daily as needed, Disp: , Rfl:   •  irbesartan (AVAPRO) 75 MG tablet, Take 75 mg by  mouth Daily., Disp: , Rfl:   •  Multiple Vitamins-Minerals (MULTIVITAMIN ADULT PO), Take 1 tablet by mouth Daily., Disp: , Rfl:   •  sennosides-docusate (senna-docusate sodium) 8.6-50 MG per tablet, Take 2 tablets by mouth 2 (Two) Times a Day As Needed for Constipation., Disp:  , Rfl:     Review of Systems   Constitutional: Positive for decreased appetite and malaise/fatigue. Negative for fever.   HENT: Positive for hearing loss.    Eyes: Negative for visual disturbance.   Cardiovascular: Negative for claudication and cyanosis.   Respiratory: Negative for shortness of breath.    Skin: Negative for color change and nail changes.   Musculoskeletal: Negative for muscle weakness.   Gastrointestinal: Negative for dysphagia.   Neurological: Negative for focal weakness, light-headedness, loss of balance, numbness, paresthesias and weakness.   Psychiatric/Behavioral: Positive for memory loss. Negative for altered mental status. The patient is nervous/anxious.    All other systems reviewed and are negative.       Objective       Vitals:    03/16/21 1049   BP: 121/64   Pulse: 66   Temp: 97.7 °F (36.5 °C)   SpO2: 98%      Body mass index is 19.72 kg/m².  Physical Exam   Constitutional: She is oriented to person, place, and time.   HENT:   Head: Atraumatic.   Neck: Carotid bruit is not present.   (L) Neck Inc: CDI   Cardiovascular: Normal rate and normal heart sounds.   Pulmonary/Chest: Effort normal and breath sounds normal.   Abdominal: Soft. Bowel sounds are normal.   Musculoskeletal: Normal range of motion.   Neurological: She is alert and oriented to person, place, and time. Gait normal.   Skin: Skin is warm and dry. Capillary refill takes less than 2 seconds.   Psychiatric: Thought content normal.   Vitals reviewed.    Lab Results   Component Value Date    GLUCOSE 99 02/04/2021    BUN 14 02/04/2021    CREATININE 1.63 (H) 02/04/2021    EGFRIFNONA 30 (L) 02/04/2021    BCR 8.6 02/04/2021    K 4.6 02/04/2021    CO2 28.0  02/04/2021    CALCIUM 9.4 02/04/2021    ALBUMIN 4.00 11/25/2020    AST 23 11/25/2020    ALT 7 11/25/2020     Lab Results   Component Value Date    WBC 4.83 02/04/2021    HGB 10.5 (L) 02/04/2021    HCT 31.7 (L) 02/04/2021    MCV 87.6 02/04/2021     02/04/2021           Assessment & Plan     Independent Review of Radiographic Studies:   3/2021 Carotid Duplex: CASPER 0-49% mPSV 99c/s Ratio 2.4, LICA 0-49% mPSV 93c/s Ratio 1.5, Antegrade vertebrals    1. Follow-up surgery care  Stable Post Op LEFT TCAR:  Progressing well    Clean operative site with antibacterial soap/water, pat dry. Keep open to air unless draining, then may apply dry dressing.  No ointments or creams unless prescribed by provider.    2. Mixed hyperlipidemia  Lipid-lowering therapy has been proven beneficial in patients with cardio-vascular disease. Current guidelines recommend statin treatment for all patients with PAD,CAD and carotid stenosis. Statins are beneficial in preventing cardiovascular events, increasing functional capacity and lower the risk of adverse limb loss in PAD. Statins decrease the progression of plaque formation and may improve peripheral vessel lining, and aid in reversing atherosclerosis.    3. Carotid stenosis, asymptomatic, bilateral  Mild bilateral ICA disease post intervention.  Repeat bilateral duplex in 3 months  Headache improving  No new deficits  ASA, Statin, Plavix                This document has been electronically signed by CHANA Nowak on March 17, 2021 16:37 CDT

## 2021-03-24 NOTE — PROGRESS NOTES
Naomi Duong Son  86 y.o. female    1. Ischemic cardiomyopathy    2. S/P CABG (coronary artery bypass graft)    3. Essential hypertension    4. Mixed hyperlipidemia        History of Present Illness:  Naomi Son is here for follow-up of her above-stated problems.  The patient was noted to have critical stenosis in the left internal carotid artery >70% and in February 2021 when she underwent:  LEFT Transcatheter placement cervical carotid stent open with distal embolic protection  (TCAR, [id number] EnRoute stent, 6x30mm Dre balloon predilation, EnRoute NPS flow reversal system).  She has done well following the procedure and denied any neurological symptoms.  She denied any chest pain, shortness of breath, palpitation or dizziness.  Is been compliant with all her medications.    Clinical exam today was unremarkable with normal heart rate and blood pressure.  No signs of congestive heart failure was noted.  She has been able to perform activities of daily living.    No Known Allergies      Past Medical History:   Diagnosis Date   • Acute bronchitis    • Arrhythmia    • Arthritis    • Cardiomyopathy (CMS/HCC)    • Carotid artery stenosis    • Chronic kidney disease     stage 4   • Congestive heart failure (CMS/HCC)    • Contusion of elbow    • Cough    • Dyslipidemia    • Essential hypertension    • Fatigue    • Generalized ischemic myocardial dysfunction    • GERD (gastroesophageal reflux disease)    • Hyperlipidemia    • Hypertension    • Iron deficiency anemia    • MI (myocardial infarction) (CMS/HCC)    • Mitral valve disease    • Multiple vessel coronary artery disease    • Surgical follow-up care    • UTI (urinary tract infection)          Past Surgical History:   Procedure Laterality Date   • CARDIAC CATHETERIZATION  11/22/2013    Multi-vessel CAD with critical lesions noted in the LAD, diagonal CA and lesion complex. Critical lesion noted in the obtuse marginal CA and moderate lesion noted in the proximal RCA. LV  dysfunction with LV dilatation with global hypokinesis of LV.   • CORONARY ARTERY BYPASS GRAFT  2014    CABG ( off pump), placement of LIMA to LAD coronary artery (1 distal anastomosis), placement of left femoral arterial line, vascular ultrasound guidance.   • CORONARY ARTERY BYPASS GRAFT  2014    Off Pump LIMA-LAD   • TRANSESOPHAGEAL ECHOCARDIOGRAM (MISSY)  03/10/2014    with color flow-Depressed left ventricular systolic function with EF of 35%. Grade I diastolic dysfunction of the left ventricular myocardium. Mild AV regurgitation. Mild enlargement of the left ventricular cavity   • VASCULAR SURGERY      RIGHT CAROTID         Family History   Problem Relation Age of Onset   • Heart disease Other    • Hypertension Other    • No Known Problems Mother    • No Known Problems Father          Social History     Socioeconomic History   • Marital status:      Spouse name: Not on file   • Number of children: Not on file   • Years of education: Not on file   • Highest education level: Not on file   Tobacco Use   • Smoking status: Former Smoker     Quit date:      Years since quittin.2   • Smokeless tobacco: Never Used   Vaping Use   • Vaping Use: Never used   Substance and Sexual Activity   • Alcohol use: No   • Drug use: No   • Sexual activity: Defer         Current Outpatient Medications   Medication Sig Dispense Refill   • acetaminophen (TYLENOL) 325 MG tablet Take 2 tablets by mouth Every 4 (Four) Hours As Needed for Mild Pain .     • aspirin 81 MG chewable tablet Chew 81 mg Daily.     • atorvastatin (LIPITOR) 10 MG tablet Take 10 mg by mouth Every Night.     • carvedilol (Coreg) 12.5 MG tablet Take 1 tablet by mouth 2 (Two) Times a Day With Meals. 180 tablet 0   • clopidogrel (PLAVIX) 75 MG tablet TAKE ONE TABLET BY MOUTH DAILY 90 tablet 5   • furosemide (LASIX) 40 MG tablet Take 40 mg by mouth As Needed (edema). Take 1/2 tablet daily as needed     • irbesartan (AVAPRO) 75 MG tablet Take 75  "mg by mouth Daily.     • Multiple Vitamins-Minerals (MULTIVITAMIN ADULT PO) Take 1 tablet by mouth Daily.     • sennosides-docusate (senna-docusate sodium) 8.6-50 MG per tablet Take 2 tablets by mouth 2 (Two) Times a Day As Needed for Constipation.       No current facility-administered medications for this visit.         OBJECTIVE    /66   Pulse 76   Ht 157.5 cm (62\")   Wt 50.2 kg (110 lb 9.6 oz)   SpO2 99%   BMI 20.23 kg/m²         Review of Systems : The following systems were reviewed and no changes were noted    Constitutional:  Denies recent weight loss, weight gain, fever or chills     HENT:  Denies any hearing loss, epistaxis, hoarseness, or difficulty speaking.     Eyes: Wears eyeglasses or contact lenses     Respiratory:  Denies dyspnea with exertion,no cough, wheezing, or hemoptysis.     Cardiovascular: Negative for palpations, chest pain, orthopnea, PND    Gastrointestinal:  Denies change in bowel habits, dyspepsia, ulcer disease, hematochezia, or melena.     Endocrine: Negative for cold intolerance, heat intolerance, polydipsia, polyphagia and polyuria.     Genitourinary: CKD      Musculoskeletal: Denies any history of arthritic symptoms or back problems     Neurological:  Denies any history of recurrent headaches, strokes, TIA, or seizure disorder.     Hematological: Denies any food allergies, seasonal allergies, bleeding disorders, or lymphadenopathy.     Psychiatric/Behavioral: Denies any history of depression, substance abuse, or change in cognitive function.       Physical Exam : The following systems were reassessed and no changes noted    Constitutional: Cooperative, alert and oriented,  in no acute distress.     HENT:   Head: Normocephalic, normal hair patterns, no masses or tenderness.  Ears, Nose, and Throat: No gross abnormalities. No pallor or cyanosis.   Eyes: EOMS intact, PERRL, conjunctivae and lids unremarkable. Fundoscopic exam and visual fields not performed.   Neck: No " palpable masses or adenopathy, no thyromegaly, no JVD, carotid pulses are full and equal bilaterally and without  Bruits.     Cardiovascular: Regular rhythm, S1 and S2 normal, no S3 or S4.  No murmurs, gallops, or rubs detected.     Pulmonary/Chest: Chest: normal symmetry,  normal respiratory excursion, no intercostal retraction, no use of accessory muscles.            Pulmonary: Normal breath sounds. No rales or ronchi.    Abdominal: Abdomen soft, bowel sounds normoactive, no masses, no hepatosplenomegaly, non-tender, no bruits.     Musculoskeletal: No deformities, clubbing, cyanosis, erythema, or edema observed.     Neurological: No gross motor or sensory deficits noted, affect appropriate, oriented to time, person, place.       Procedures      Lab Results   Component Value Date    WBC 4.83 02/04/2021    HGB 10.5 (L) 02/04/2021    HCT 31.7 (L) 02/04/2021    MCV 87.6 02/04/2021     02/04/2021     Lab Results   Component Value Date    GLUCOSE 99 02/04/2021    BUN 14 02/04/2021    CREATININE 1.63 (H) 02/04/2021    EGFRIFNONA 30 (L) 02/04/2021    BCR 8.6 02/04/2021    CO2 28.0 02/04/2021    CALCIUM 9.4 02/04/2021    ALBUMIN 4.00 11/25/2020    AST 23 11/25/2020    ALT 7 11/25/2020     Lab Results   Component Value Date    CHOL 163 01/22/2018     Lab Results   Component Value Date    TRIG 89 07/24/2019    TRIG 71 01/22/2018    TRIG 89 01/19/2015     Lab Results   Component Value Date    HDL 58 07/24/2019    HDL 61 01/22/2018     No components found for: LDLCALC  Lab Results   Component Value Date    LDL 95 07/24/2019    LDL 72 01/22/2018    LDL 92 01/19/2015     No results found for: HDLLDLRATIO  No components found for: CHOLHDL  Lab Results   Component Value Date    HGBA1C 5.7 (H) 04/02/2014     Lab Results   Component Value Date    TSH 3.65 01/19/2016           ASSESSMENT AND PLAN  Ada Son is stable at this time with no clinical evidence of angina, arrhythmia or congestive heart failure.  She has CKD which is  stable and follows up with Dr. Robertson on a regular basis.  I have encouraged her to maintain a high fluid intake.  I have continued antiplatelet therapy with aspirin and Plavix, antihypertensive therapy with amlodipine, irbesartan, carvedilol, lipid-lowering therapy with atorvastatin.    She takes Lasix 20 mg on a as needed basis.    Diagnoses and all orders for this visit:    1. Ischemic cardiomyopathy (Primary)    2. S/P CABG (coronary artery bypass graft)    3. Essential hypertension    4. Mixed hyperlipidemia        Patient's Body mass index is 20.23 kg/m². BMI is within normal parameters. No follow-up required..  Patient is a non-smoker      Kalee Cisneros MD  3/24/2021  09:57 CDT

## 2021-06-22 NOTE — PROGRESS NOTES
CVTS Office Progress Note       Subjective   Patient ID: Naomi Duong Son is a 86 y.o. female is here today for follow-up.    Chief Complaint:    Chief Complaint   Patient presents with   • Carotid Artery Disease       The following portions of the patient's history were reviewed and updated as appropriate: allergies, current medications, past family history, past medical history, past social history, past surgical history and problem list.  Recent images independently reviewed.  Available laboratory values reviewed.    PCP:  [facility]  Cardiology:  Neptali    86 y.o. female with CAD, NSTEMI, ischemic  cardiomyopathy (EF 10-15%), CHF, HTN, CKD3,  dyslipidemia,  CHF, and carotid stenosis.  She returns  today in scheduled follow up for carotid stenosis   evaluation.  She denies any neurosensory or motor  symptoms.   Denies visual or motor changes.  No  CVA/TIA. Progressing well      12/19/2013 Carotid Duplex:  CASPER 16-49% antegrade.   LICA 50-79% antegrade.  12/18/14  Carotid Duplex:  CASPER 50-79% (ratio 2.4)   LICA  50-79% (ratio 2.4).  6/23/15: Carotid duplex: CASPER 50-79%(ratio 2.9) LICA  50-79%  12/29/15: Carotid duplex: CASPER 50-79% (ratio 3.6). LICA  50-79% ratio 1.8)   7/28/16: Carotid duplex: CASPER 50-79% (mPSV 239cm/s  ratio 2.9) LICA 50-79% (mPSV 236cm/s ratio 1.7)  1/31/17: Carotid duplex: CASPER 50-69% (mPSV 185cm/s,  ratio 1.7) LICA 50-69% (mPSV 202cm/s, ratio 2.0)  2/3/18: Carotid duplex: CASPER 50-69% (uGIO441xl/s, ratio  3.1) LICA 50-69% (mPSV 407cm/s, ratio 0.9)  2/14/19: Carotid duplex: CASPER >70% (204/3.0) LICA  50-69% (143/1.1) Antegrade  9/10/19: Carotid duplex: CASPER 50-69% (218/2.7) LICA  50-69% (268/4.5) Antegrade flow  3/11/2020: Carotid Duplex: RIGHT >70% (142/4.1) XVU80-28% (166/0.7) Antegrade   3/19/2020: CTA Carotids: CASPER >70% LICA 70%  10/23/2020: Carotid Duplex: RIGHT >70% (235/2.9) LEFT 50-69% (168/0.9) Antegrade   11/17/2020: RIGHT TCAR     12/2020  Carotid Duplex: CASPER 0-49% mPSV 96c/s Ratio 1.2, Antegrade vertebrals     2/10/2021: LEFT TCAR  3/2021 Carotid Duplex: CASPER 0-49% mPSV 99c/s Ratio 2.4, LICA 0-49% mPSV 93c/s Ratio 1.5, Antegrade vertebrals  6/2021 Left carotid duplex:  LICA 0-49% mPSV 103c/s Ratio 1.1, Antegrade vertebrals    11/16/2013 Echocardiogram:  LA 38, LV 56, RV 49, IVS 9.  EF 20%.  GIULIA 2.4.  11/22/2013 Cardiac Catheterization:  LAD 70-80%, D1 70-80%, CX 95%, RCA 30% mild irreg.  moderate MR, EF 10-15%.  /24.  12/18/2013 MISSY:  EF 20%, global severe hypokinesis, no clot, LVH.  central moderate MR.  mild to moderate AI.  12/19/2013 Myocardial Viability Study:  anterior wall normal.  inferior no appreciable viable tissue in infarcted area.  lateral small amount vialble tissue on delayed images, most nonviable.  12/19/2013 Lower extremity vein mapping:  RIGHT 2.3-5.0mm.  LEFT 1.8-3.8mm.  3/10/2014 Echocardiogram:  LA 37, LV 58, RV 49, IVS 9.  EF 35%.  GIULIA 2.4. tr MR  4/8/14: OFF PUMP CABG X 1  4/22/14: CXR : Bilateral pleural effusions, left greater than right, and  minimal bibasilar atelectasis.  5/28/14: Echo: EF 45-50%. LV borderline reduced. LA mildly dilated. Borderline global hypokineses of LV.       Past Medical History:   Diagnosis Date   • Acute bronchitis    • Arrhythmia    • Arthritis    • Cardiomyopathy (CMS/HCC)    • Carotid artery stenosis    • Chronic kidney disease     stage 4   • Congestive heart failure (CMS/HCC)    • Contusion of elbow    • Cough    • Dyslipidemia    • Essential hypertension    • Fatigue    • Generalized ischemic myocardial dysfunction    • GERD (gastroesophageal reflux disease)    • Hyperlipidemia    • Hypertension    • Iron deficiency anemia    • MI (myocardial infarction) (CMS/HCC)    • Mitral valve disease    • Multiple vessel coronary artery disease    • Surgical follow-up care    • UTI (urinary tract infection)      Past Surgical History:   Procedure Laterality Date   • CARDIAC CATHETERIZATION   2013    Multi-vessel CAD with critical lesions noted in the LAD, diagonal CA and lesion complex. Critical lesion noted in the obtuse marginal CA and moderate lesion noted in the proximal RCA. LV dysfunction with LV dilatation with global hypokinesis of LV.   • CORONARY ARTERY BYPASS GRAFT  2014    CABG ( off pump), placement of LIMA to LAD coronary artery (1 distal anastomosis), placement of left femoral arterial line, vascular ultrasound guidance.   • CORONARY ARTERY BYPASS GRAFT  2014    Off Pump LIMA-LAD   • TRANSESOPHAGEAL ECHOCARDIOGRAM (MISSY)  03/10/2014    with color flow-Depressed left ventricular systolic function with EF of 35%. Grade I diastolic dysfunction of the left ventricular myocardium. Mild AV regurgitation. Mild enlargement of the left ventricular cavity   • VASCULAR SURGERY      RIGHT CAROTID     Family History   Problem Relation Age of Onset   • Heart disease Other    • Hypertension Other    • No Known Problems Mother    • No Known Problems Father      Social History     Tobacco Use   • Smoking status: Former Smoker     Quit date:      Years since quittin.4   • Smokeless tobacco: Never Used   Vaping Use   • Vaping Use: Never used   Substance Use Topics   • Alcohol use: No   • Drug use: No       ALLERGIES:   Patient has no known allergies.    MEDICATIONS:      Current Outpatient Medications:   •  acetaminophen (TYLENOL) 325 MG tablet, Take 2 tablets by mouth Every 4 (Four) Hours As Needed for Mild Pain ., Disp:  , Rfl:   •  aspirin 81 MG chewable tablet, Chew 81 mg Daily., Disp: , Rfl:   •  atorvastatin (LIPITOR) 10 MG tablet, TAKE ONE TABLET BY MOUTH DAILY, Disp: 30 tablet, Rfl: 0  •  carvedilol (Coreg) 12.5 MG tablet, Take 1 tablet by mouth 2 (Two) Times a Day With Meals., Disp: 60 tablet, Rfl: 0  •  carvedilol (COREG) 12.5 MG tablet, Take 1 tablet by mouth 2 (Two) Times a Day., Disp: 180 tablet, Rfl: 3  •  clopidogrel (PLAVIX) 75 MG tablet, TAKE ONE TABLET BY MOUTH  DAILY, Disp: 90 tablet, Rfl: 5  •  furosemide (LASIX) 40 MG tablet, Take 40 mg by mouth As Needed (edema). Take 1/2 tablet daily as needed, Disp: , Rfl:   •  irbesartan (AVAPRO) 75 MG tablet, Take 75 mg by mouth Daily., Disp: , Rfl:   •  Multiple Vitamins-Minerals (MULTIVITAMIN ADULT PO), Take 1 tablet by mouth Daily., Disp: , Rfl:   •  sennosides-docusate (senna-docusate sodium) 8.6-50 MG per tablet, Take 2 tablets by mouth 2 (Two) Times a Day As Needed for Constipation., Disp:  , Rfl:     Review of Systems   Constitutional: Positive for decreased appetite and malaise/fatigue. Negative for fever.   HENT: Positive for hearing loss.    Eyes: Negative for visual disturbance.   Cardiovascular: Negative for claudication and cyanosis.   Respiratory: Negative for shortness of breath.    Skin: Negative for color change and nail changes.   Musculoskeletal: Negative for muscle weakness.   Gastrointestinal: Negative for dysphagia.   Neurological: Negative for focal weakness, light-headedness, loss of balance, numbness, paresthesias and weakness.   Psychiatric/Behavioral: Positive for memory loss. Negative for altered mental status. The patient is nervous/anxious.    All other systems reviewed and are negative.       Objective   Heart Rate:  [69] 69  BP: (128)/(78) 128/78   Vitals:    06/22/21 1051   BP: 128/78   Pulse: 69   SpO2: 98%      Body mass index is 19.39 kg/m².  Physical Exam   Constitutional: She is oriented to person, place, and time.   HENT:   Head: Atraumatic.   Neck: Carotid bruit is not present.   (L) Neck Inc: CDI   Cardiovascular: Normal rate and normal heart sounds.   Pulmonary/Chest: Effort normal and breath sounds normal.   Abdominal: Soft. Bowel sounds are normal.   Musculoskeletal: Normal range of motion.   Neurological: She is alert and oriented to person, place, and time. Gait normal.   Skin: Skin is warm and dry. Capillary refill takes less than 2 seconds.   Psychiatric: Thought content normal.    Vitals reviewed.    Lab Results   Component Value Date    GLUCOSE 99 02/04/2021    BUN 14 02/04/2021    CREATININE 1.63 (H) 02/04/2021    EGFRIFNONA 30 (L) 02/04/2021    BCR 8.6 02/04/2021    K 4.6 02/04/2021    CO2 28.0 02/04/2021    CALCIUM 9.4 02/04/2021    ALBUMIN 4.00 11/25/2020    AST 23 11/25/2020    ALT 7 11/25/2020     Lab Results   Component Value Date    WBC 4.83 02/04/2021    HGB 10.5 (L) 02/04/2021    HCT 31.7 (L) 02/04/2021    MCV 87.6 02/04/2021     02/04/2021           Assessment & Plan     Independent Review of Radiographic Studies:   6/2021 Left carotid duplex:  LICA 0-49% mPSV 103c/s Ratio 1.1, Antegrade vertebrals    1. Carotid stenosis, asymptomatic, bilateral  Long term DAPT, Statin  Asymptomatic  L ICA velocities stable post procedure.     Repeat bilateral duplex in 6 months    - Duplex Carotid Ultrasound CAR; Future    2. Mixed hyperlipidemia  Lipid-lowering therapy has been proven beneficial in patients with cardio-vascular disease. Current guidelines recommend statin treatment for all patients with PAD,CAD and carotid stenosis. Statins are beneficial in preventing cardiovascular events, increasing functional capacity and lower the risk of adverse limb loss in PAD. Statins decrease the progression of plaque formation and may improve peripheral vessel lining, and aid in reversing atherosclerosis.                      This document has been electronically signed by CHANA Nowak on June 22, 2021 15:30 CDT

## 2021-06-22 NOTE — PATIENT INSTRUCTIONS
The results of your carotid ultrasound shows mild disease of the right carotid and is stable from previous exam, ultrasound of the left carotid shows mild disease and is stable from previous exam.    Follow up in 6 months    If you should experience any neurological symptoms including but not limited to visual or speech disturbances confusion, seizures, or weakness of limbs of one side of your body notify [facility] center immediately for evaluation or if after hours present to the nearest Emergency Department.

## 2021-07-01 PROBLEM — I50.9 ACUTE CONGESTIVE HEART FAILURE (HCC): Status: ACTIVE | Noted: 2021-01-01

## 2021-07-01 PROBLEM — N18.30 CKD (CHRONIC KIDNEY DISEASE) STAGE 3, GFR 30-59 ML/MIN (HCC): Chronic | Status: ACTIVE | Noted: 2021-01-01

## 2021-07-01 PROBLEM — I50.23 ACUTE ON CHRONIC SYSTOLIC CONGESTIVE HEART FAILURE (HCC): Status: ACTIVE | Noted: 2021-01-01

## 2021-07-01 NOTE — ED PROVIDER NOTES
Subjective   Patient presents with planus shortness of breath x3 days.  Worsening when lying flat.  Patient also states she has had to get up to try to catch her breath.  Patient arrives to the emergency department with saturations in the 89 percentile.  Patient is taking oral Lasix at home.  Patient denies any fevers.  Does have a history of cardio myopathy as well as chronic kidney disease.  Does have a history of CHF.          Review of Systems   Constitutional: Positive for activity change. Negative for appetite change, chills and fever.   HENT: Negative.  Negative for congestion.    Eyes: Negative.  Negative for photophobia and visual disturbance.   Respiratory: Positive for shortness of breath. Negative for cough and chest tightness.    Cardiovascular: Negative.  Negative for chest pain and palpitations.   Gastrointestinal: Negative.  Negative for abdominal pain, constipation, diarrhea, nausea and vomiting.   Endocrine: Negative.    Genitourinary: Negative.  Negative for decreased urine volume, dysuria, flank pain and hematuria.   Musculoskeletal: Negative.  Negative for arthralgias, back pain, myalgias, neck pain and neck stiffness.   Skin: Negative.  Negative for pallor.   Neurological: Negative.  Negative for dizziness, syncope, weakness, light-headedness, numbness and headaches.   Psychiatric/Behavioral: Negative.  Negative for confusion and suicidal ideas. The patient is not nervous/anxious.    All other systems reviewed and are negative.      Past Medical History:   Diagnosis Date   • Acute bronchitis    • Arrhythmia    • Arthritis    • Cardiomyopathy (CMS/HCC)    • Carotid artery stenosis    • Chronic kidney disease     stage 4   • Congestive heart failure (CMS/HCC)    • Contusion of elbow    • Cough    • Dyslipidemia    • Essential hypertension    • Fatigue    • Generalized ischemic myocardial dysfunction    • GERD (gastroesophageal reflux disease)    • Hyperlipidemia    • Hypertension    • Iron  deficiency anemia    • MI (myocardial infarction) (CMS/HCC)    • Mitral valve disease    • Multiple vessel coronary artery disease    • Surgical follow-up care    • UTI (urinary tract infection)        No Known Allergies    Past Surgical History:   Procedure Laterality Date   • CARDIAC CATHETERIZATION  2013    Multi-vessel CAD with critical lesions noted in the LAD, diagonal CA and lesion complex. Critical lesion noted in the obtuse marginal CA and moderate lesion noted in the proximal RCA. LV dysfunction with LV dilatation with global hypokinesis of LV.   • CORONARY ARTERY BYPASS GRAFT  2014    CABG ( off pump), placement of LIMA to LAD coronary artery (1 distal anastomosis), placement of left femoral arterial line, vascular ultrasound guidance.   • CORONARY ARTERY BYPASS GRAFT  2014    Off Pump LIMA-LAD   • TRANSESOPHAGEAL ECHOCARDIOGRAM (MISSY)  03/10/2014    with color flow-Depressed left ventricular systolic function with EF of 35%. Grade I diastolic dysfunction of the left ventricular myocardium. Mild AV regurgitation. Mild enlargement of the left ventricular cavity   • VASCULAR SURGERY      RIGHT CAROTID       Family History   Problem Relation Age of Onset   • Heart disease Other    • Hypertension Other    • No Known Problems Mother    • No Known Problems Father        Social History     Socioeconomic History   • Marital status:      Spouse name: Not on file   • Number of children: Not on file   • Years of education: Not on file   • Highest education level: Not on file   Tobacco Use   • Smoking status: Former Smoker     Quit date: 2000     Years since quittin.5   • Smokeless tobacco: Never Used   Vaping Use   • Vaping Use: Never used   Substance and Sexual Activity   • Alcohol use: No   • Drug use: No   • Sexual activity: Defer           Objective   Physical Exam  Vitals and nursing note reviewed.   Constitutional:       General: She is not in acute distress.     Appearance: She is  well-developed. She is ill-appearing. She is not diaphoretic.   HENT:      Head: Normocephalic and atraumatic.      Nose: Nose normal.   Eyes:      General: No scleral icterus.     Conjunctiva/sclera: Conjunctivae normal.   Neck:      Vascular: No JVD.   Cardiovascular:      Rate and Rhythm: Normal rate and regular rhythm.      Heart sounds: Normal heart sounds. No murmur heard.   No friction rub. No gallop.    Pulmonary:      Effort: Respiratory distress present.      Breath sounds: No wheezing or rales.   Chest:      Chest wall: No tenderness.   Abdominal:      General: There is no distension.      Palpations: Abdomen is soft. There is no mass.      Tenderness: There is no abdominal tenderness. There is no guarding or rebound.   Musculoskeletal:         General: No tenderness or deformity. Normal range of motion.      Cervical back: Normal range of motion and neck supple.   Lymphadenopathy:      Cervical: No cervical adenopathy.   Skin:     General: Skin is warm and dry.      Capillary Refill: Capillary refill takes less than 2 seconds.      Coloration: Skin is not pale.      Findings: No erythema or rash.   Neurological:      Mental Status: She is alert and oriented to person, place, and time.      Cranial Nerves: No cranial nerve deficit.      Motor: No abnormal muscle tone.   Psychiatric:         Behavior: Behavior normal.         Thought Content: Thought content normal.         Judgment: Judgment normal.         Procedures           ED Course                                 Labs Reviewed   COMPREHENSIVE METABOLIC PANEL - Abnormal; Notable for the following components:       Result Value    Glucose 123 (*)     Creatinine 1.32 (*)     Sodium 132 (*)     eGFR Non  Amer 38 (*)     All other components within normal limits    Narrative:     GFR Normal >60  Chronic Kidney Disease <60  Kidney Failure <15     BNP (IN-HOUSE) - Abnormal; Notable for the following components:    proBNP 12,193.0 (*)     All other  components within normal limits    Narrative:     Among patients with dyspnea, NT-proBNP is highly sensitive for the detection of acute congestive heart failure. In addition NT-proBNP of <300 pg/ml effectively rules out acute congestive heart failure with 99% negative predictive value.    Results may be falsely decreased if patient taking Biotin.     CBC WITH AUTO DIFFERENTIAL - Abnormal; Notable for the following components:    Hemoglobin 10.8 (*)     Lymphocyte % 13.6 (*)     Eosinophil % 0.0 (*)     All other components within normal limits   TROPONIN (IN-HOUSE) - Normal    Narrative:     Troponin T Reference Range:  <= 0.03 ng/mL-   Negative for AMI  >0.03 ng/mL-     Abnormal for myocardial necrosis.  Clinicians would have to utilize clinical acumen, EKG, Troponin and serial changes to determine if it is an Acute Myocardial Infarction or myocardial injury due to an underlying chronic condition.       Results may be falsely decreased if patient taking Biotin.     COVID-19 AND FLU A/B, NP SWAB IN TRANSPORT MEDIA 8-12 HR TAT   CBC AND DIFFERENTIAL    Narrative:     The following orders were created for panel order CBC & Differential.  Procedure                               Abnormality         Status                     ---------                               -----------         ------                     CBC Auto Differential[[id number]]        Abnormal            Final result                 Please view results for these tests on the individual orders.       XR Chest 1 View   Final Result   *  CHF.    *  Cardiomegaly.   *  Post-CABG changes.   *  Small to moderate left and small right pleural effusion with   underlying areas of atelectasis and/or infiltrate.      Electronically signed by:  Sabino Meehan DO  7/1/2021 6:51 AM CDT   Workstation: [id number]        Signs and symptoms consistent with CHF with acute respiratory failure.  Patient will be admitted for diuresis and supplemental oxygen support.           MDM    Final diagnoses:   Acute congestive heart failure, unspecified heart failure type (CMS/[facility])   Acute respiratory failure with hypoxemia (CMS/[facility])       ED Disposition  ED Disposition     ED Disposition Condition Comment    Decision to Admit  Level of Care: Telemetry [5]   Diagnosis: Acute congestive heart failure, unspecified heart failure type (CMS/[facility]) [[id number]]   Admitting Physician: OLIVER MAI [[id number]]   Attending Physician: OLIVER MAI [[id number]]   Certification: I Certify That Inpatient Hospital Services Are Medically Necessary For Greater Than 2 Midnights            No follow-up provider specified.       Medication List      No changes were made to your prescriptions during this visit.          Chang Nelson MD  07/01/21 0752

## 2021-07-01 NOTE — H&P
History and Physical  Roberto Jaramillo MD  Hospitalist    Date of admission: 2021    Patient Care Team[facility]  [facility] - General    Chief complaint   Chief Complaint   Patient presents with   • Shortness of Breath     Subjective     Patient is a 86 y.o. female admitted for worsening dyspnea, orthopnea, diminished effort capacity, poor appetite. She denies having had chest pain or lower extremity edema.    She has longstanding cardiac problems including coronary artery disease, heart failure, previous coronary bypass.    History  Past Medical History:   Diagnosis Date   • CAD (coronary artery disease)    • Carotid artery stenosis    • Chronic systolic heart failure (CMS/[facility])    • CKD (chronic kidney disease) stage 3, GFR 30-59 ml/min (CMS/[facility])    • GERD (gastroesophageal reflux disease)    • Hypercholesterolemia    • Hyperlipidemia    • Hypertension    • Iron deficiency anemia    • Ischemic cardiomyopathy    • MI (myocardial infarction) (CMS/[facility])    • Mitral valve disease    • Osteoarthritis    • UTI (urinary tract infection)      Past Surgical History:   Procedure Laterality Date   • CARDIAC CATHETERIZATION     • CAROTID ENDARTERECTOMY     • CORONARY ARTERY BYPASS GRAFT     • TRANSESOPHAGEAL ECHOCARDIOGRAM (MISSY)       Family History   Problem Relation Age of Onset   • Hypertension Father      Social History     Tobacco Use   • Smoking status: Former Smoker     Quit date:      Years since quittin.5   • Smokeless tobacco: Never Used   Vaping Use   • Vaping Use: Never used   Substance Use Topics   • Alcohol use: No   • Drug use: No     Medications Prior to Admission   Medication Sig Dispense Refill Last Dose   • acetaminophen (TYLENOL) 325 MG tablet Take 2 tablets by mouth Every 4 (Four) Hours As Needed for Mild Pain .   Past Week at Unknown time   • aspirin 81 MG chewable tablet Chew 81 mg Daily.   2021 at Unknown time   • atorvastatin (LIPITOR)  10 MG tablet TAKE ONE TABLET BY MOUTH DAILY 30 tablet 0 6/30/2021 at Unknown time   • carvedilol (Coreg) 12.5 MG tablet Take 1 tablet by mouth 2 (Two) Times a Day With Meals. 60 tablet 0 6/30/2021 at Unknown time   • clopidogrel (PLAVIX) 75 MG tablet TAKE ONE TABLET BY MOUTH DAILY 90 tablet 5 6/30/2021 at Unknown time   • furosemide (LASIX) 40 MG tablet Take 40 mg by mouth As Needed (edema). Take 1/2 tablet daily as needed   Past Week at Unknown time   • irbesartan (AVAPRO) 75 MG tablet Take 75 mg by mouth Daily.   6/30/2021 at Unknown time   • Multiple Vitamins-Minerals (MULTIVITAMIN ADULT PO) Take 1 tablet by mouth Daily.   Past Month at Unknown time   • sennosides-docusate (senna-docusate sodium) 8.6-50 MG per tablet Take 2 tablets by mouth 2 (Two) Times a Day As Needed for Constipation.        Allergies:  Patient has no known allergies.    Review of Systems  Review of Systems   Constitutional: Positive for fatigue. Negative for fever.   Respiratory: Positive for cough and shortness of breath. Negative for wheezing.    Cardiovascular: Negative for chest pain, palpitations and leg swelling.   Gastrointestinal: Negative for abdominal pain, constipation, diarrhea, nausea and vomiting.   Genitourinary: Negative for dysuria, flank pain, frequency, hematuria, urgency and vaginal bleeding.   Musculoskeletal: Positive for arthralgias.   Skin: Positive for pallor. Negative for color change.   Neurological: Positive for weakness. Negative for seizures, syncope, light-headedness, numbness and headaches.   Psychiatric/Behavioral: Negative for agitation, behavioral problems and confusion.   All other systems reviewed and are negative.      Objective     Vital Signs  Temp:  [96.2 °F (35.7 °C)-98.6 °F (37 °C)] 96.2 °F (35.7 °C)  Heart Rate:  [54-81] 54  Resp:  [18] 18  BP: (135-178)/(58-96) 139/64    Physical Exam:  Physical Exam  Vitals reviewed.   Constitutional:       General: She is not in acute distress.     Appearance:  She is cachectic. She is ill-appearing.   HENT:      Head: Normocephalic and atraumatic.   Eyes:      General: No scleral icterus.  Cardiovascular:      Rate and Rhythm: Normal rate and regular rhythm.   Pulmonary:      Effort: No respiratory distress.      Breath sounds: No stridor. Rales present. No wheezing.   Chest:      Chest wall: No tenderness.   Abdominal:      General: Bowel sounds are normal. There is no distension.      Palpations: Abdomen is soft. There is no mass.      Tenderness: There is no abdominal tenderness. There is no right CVA tenderness or guarding.      Hernia: No hernia is present.   Musculoskeletal:         General: No swelling, tenderness or deformity. Normal range of motion.      Cervical back: Normal range of motion and neck supple. No rigidity or tenderness.      Right lower leg: No edema.      Left lower leg: No edema.   Skin:     Coloration: Skin is pale. Skin is not jaundiced.      Findings: No bruising or lesion.   Neurological:      General: No focal deficit present.      Mental Status: She is alert and oriented to person, place, and time. Mental status is at baseline.      Cranial Nerves: No cranial nerve deficit.      Sensory: No sensory deficit.      Motor: No weakness.      Gait: Gait normal.   Psychiatric:         Mood and Affect: Mood normal.         Behavior: Behavior normal.         Results Review:   Lab Results (last 24 hours)     Procedure Component Value Units Date/Time    Magnesium [[id number]]  (Normal) Collected: 07/01/21 1001    Specimen: Blood Updated: 07/01/21 1155     Magnesium 2.1 mg/dL     Extra Tubes [[id number]] Collected: 07/01/21 1001    Specimen: Blood Updated: 07/01/21 1115    Narrative:      The following orders were created for panel order Extra Tubes.  Procedure                               Abnormality         Status                     ---------                               -----------         ------                     Gold Top - [facility][[id number]]                                    Final result                 Please view results for these tests on the individual orders.    Gold Top - SST [[id number]] Collected: 07/01/21 1001    Specimen: Blood Updated: 07/01/21 1115     Extra Tube Hold for add-ons.     Comment: Auto resulted.       COVID-19 and FLU A/B PCR - Swab, Nasopharynx [[id number]]  (Normal) Collected: 07/01/21 0744    Specimen: Swab from Nasopharynx Updated: 07/01/21 0820     COVID19 Not Detected     Influenza A PCR Not Detected     Influenza B PCR Not Detected    Narrative:      Fact sheet for providers: https://www.fda.gov/media/027089/download    Fact sheet for patients: https://www.fda.gov/media/998527/download    Test performed by PCR.    Comprehensive Metabolic Panel [[id number]]  (Abnormal) Collected: 07/01/21 0632    Specimen: Blood Updated: 07/01/21 0701     Glucose 123 mg/dL      BUN 14 mg/dL      Creatinine 1.32 mg/dL      Sodium 132 mmol/L      Potassium 4.7 mmol/L      Chloride 99 mmol/L      CO2 25.0 mmol/L      Calcium 9.5 mg/dL      Total Protein 7.3 g/dL      Albumin 3.80 g/dL      ALT (SGPT) 6 U/L      AST (SGOT) 16 U/L      Alkaline Phosphatase 96 U/L      Total Bilirubin 0.4 mg/dL      eGFR Non African Amer 38 mL/min/1.73      Globulin 3.5 gm/dL      A/G Ratio 1.1 g/dL      BUN/Creatinine Ratio 10.6     Anion Gap 8.0 mmol/L     Narrative:      GFR Normal >60  Chronic Kidney Disease <60  Kidney Failure <15      Troponin [[id number]]  (Normal) Collected: 07/01/21 0632    Specimen: Blood Updated: 07/01/21 0658     Troponin T <0.010 ng/mL     Narrative:      Troponin T Reference Range:  <= 0.03 ng/mL-   Negative for AMI  >0.03 ng/mL-     Abnormal for myocardial necrosis.  Clinicians would have to utilize clinical acumen, EKG, Troponin and serial changes to determine if it is an Acute Myocardial Infarction or myocardial injury due to an underlying chronic condition.       Results may be falsely decreased if patient taking Biotin.      BNP  [[id number]]  (Abnormal) Collected: 07/01/21 0632    Specimen: Blood Updated: 07/01/21 0658     proBNP 12,193.0 pg/mL     Narrative:      Among patients with dyspnea, NT-proBNP is highly sensitive for the detection of acute congestive heart failure. In addition NT-proBNP of <300 pg/ml effectively rules out acute congestive heart failure with 99% negative predictive value.    Results may be falsely decreased if patient taking Biotin.      CBC & Differential [[id number]]  (Abnormal) Collected: 07/01/21 0632    Specimen: Blood Updated: 07/01/21 0635    Narrative:      The following orders were created for panel order CBC & Differential.  Procedure                               Abnormality         Status                     ---------                               -----------         ------                     CBC Auto Differential[[id number]]        Abnormal            Final result                 Please view results for these tests on the individual orders.    CBC Auto Differential [[id number]]  (Abnormal) Collected: 07/01/21 0632    Specimen: Blood Updated: 07/01/21 0635     WBC 6.03 10*3/mm3      RBC 4.01 10*6/mm3      Hemoglobin 10.8 g/dL      Hematocrit 34.1 %      MCV 85.0 fL      MCH 26.9 pg      MCHC 31.7 g/dL      RDW 14.6 %      RDW-SD 45.0 fl      MPV 10.3 fL      Platelets 235 10*3/mm3      Neutrophil % 75.8 %      Lymphocyte % 13.6 %      Monocyte % 9.3 %      Eosinophil % 0.0 %      Basophil % 1.0 %      Immature Grans % 0.3 %      Neutrophils, Absolute 4.57 10*3/mm3      Lymphocytes, Absolute 0.82 10*3/mm3      Monocytes, Absolute 0.56 10*3/mm3      Eosinophils, Absolute 0.00 10*3/mm3      Basophils, Absolute 0.06 10*3/mm3      Immature Grans, Absolute 0.02 10*3/mm3      nRBC 0.0 /100 WBC           Imaging Results (Last 24 Hours)     Procedure Component Value Units Date/Time    XR Chest 1 View [[id number]] Collected: 07/01/21 0631     Updated: 07/01/21 0652    Narrative:      CLINICAL  HISTORY:  dyspnea    EXAMINATION:  XR CHEST 1 VW    COMPARISON:  4/22/2014    FINDINGS:  The heart is enlarged. There are post-CABG changes. There is a  moderate degree of pulmonary vascular congestion with  interstitial edema. There is a small to moderate left and small  right pleural effusion with underlying areas of bibasilar  density/consolidation which could represent any combination of  atelectasis and/or infiltrate.       Impression:      *  CHF.   *  Cardiomegaly.  *  Post-CABG changes.  *  Small to moderate left and small right pleural effusion with  underlying areas of atelectasis and/or infiltrate.    Electronically signed by:  Sabino Meehan DO  7/1/2021 6:51 AM CDT  Workstation: [id number]          Assessment/Plan       Acute on chronic systolic congestive heart failure (CMS/HCC)    Ischemic cardiomyopathy    CKD (chronic kidney disease) stage 3, GFR 30-59 ml/min (CMS/HCC)    Continue with the fluid restriction, IV Lasix, resume some of her home medications including the Cozaar, beta-blocker. Follow-up on the repeat Echo. Cardiology consult appreciated.    I have utilized all available immediate resources to obtain, update, or review the patient's current medications.    I confirmed that the patient's Advance Care Plan is present, code status is documented, or surrogate decision maker is listed in the patient's medical record.      07/01/21  19:26 CDT

## 2021-07-01 NOTE — CONSULTS
[facility] Cardiology Consult    Referring Provider: Dr. Jaramillo    Reason for Consultation: CHF exacerbation    [facility] Care Team:  [facility] - General    Chief complaint   Chief Complaint   Patient presents with    Shortness of Breath       Subjective .     History of present illness:     Ms. Abdalla is a 86 year old patient of Dr. Le who presents to [facility] ER for acute onset of dyspnea and orthopnea. She had been feeling poorly for about 5 days. Two days ago, her breathing became labored with small amounts of activity. Last night, she was unable to lie flat to sleep. This morning, she presented to the ER for evaluation. On arrival, her o2 saturation was 89%. She was placed on NC, nitro paste was placed, and Lasix was given. Upon my exam 5 hours later, she is resting comfortably.   She denies chest pain. She is s/p CABG x1 in 2014. She has had left and right TCARs. She is medication compliant and in overall general good health.     She has not had a CHF exacerbation before. CXR shows volume overload and atelectasis. She has not had a recent illness or fever.     On exam, she is without leg swelling or abdominal distention. She does not have JVD. She has frequent PVCs on the monitor.       Review of Systems  Review of Systems   Constitutional: Negative for diaphoresis, fatigue, fever and unexpected weight change.   Respiratory: Positive for shortness of breath. Negative for cough, chest tightness and wheezing.    Cardiovascular: Negative for chest pain, palpitations and leg swelling.   Gastrointestinal: Negative for abdominal distention and blood in stool.   Genitourinary: Negative for hematuria.   Musculoskeletal: Negative for arthralgias and myalgias.   Skin: Negative for pallor and rash.   Neurological: Negative for dizziness, syncope and weakness.   Hematological: Does not bruise/bleed easily.   Psychiatric/Behavioral: Negative for confusion. The patient  "is not nervous/anxious.        History  Past Medical History:   Diagnosis Date    CAD (coronary artery disease)     Carotid artery stenosis     Chronic systolic heart failure (CMS/[facility])     CKD (chronic kidney disease) stage 3, GFR 30-59 ml/min (CMS/[facility])     GERD (gastroesophageal reflux disease)     Hypercholesterolemia     Hyperlipidemia     Hypertension     Iron deficiency anemia     Ischemic cardiomyopathy     MI (myocardial infarction) (CMS/[facility])     Mitral valve disease     Osteoarthritis     UTI (urinary tract infection)    ,   Past Surgical History:   Procedure Laterality Date    CARDIAC CATHETERIZATION      CAROTID ENDARTERECTOMY      CORONARY ARTERY BYPASS GRAFT      TRANSESOPHAGEAL ECHOCARDIOGRAM (MISSY)     ,   Family History   Problem Relation Age of Onset    Hypertension Father    ,   Social History     Tobacco Use    Smoking status: Former Smoker     Quit date:      Years since quittin.5    Smokeless tobacco: Never Used   Vaping Use    Vaping Use: Never used   Substance Use Topics    Alcohol use: No    Drug use: No   , (Not in a hospital admission)     ALLERGIES  Patient has no known allergies.    The following portions of the patient's history were reviewed and updated as appropriate: allergies, current medications, past family history, past medical history, past social history, past surgical history and problem list.     Old and outside records reviewed (if available) and pertinent information is included in the objective data.     Objective     Vital Sign Min/Max for last 24 hours  Temp  Min: 98.6 °F (37 °C)  Max: 98.6 °F (37 °C)   BP  Min: 142/96  Max: 178/77   Pulse  Min: 68  Max: 81   Resp  Min: 18  Max: 18   SpO2  Min: 89 %  Max: 98 %   Flow (L/min)  Min: 2  Max: 2   Weight  Min: 48.1 kg (106 lb)  Max: 48.1 kg (106 lb)     Flowsheet Rows        First Filed Value   Admission Height  157.5 cm (62\") Documented at 2021 0603   Admission Weight  48.1 kg (106 lb) Documented at 2021 " 0603               Physical Exam:  Physical Exam  Vitals and nursing note reviewed.   Constitutional:       General: She is not in acute distress.     Appearance: She is well-developed. She is not diaphoretic.   HENT:      Head: Normocephalic.   Eyes:      Conjunctiva/sclera: Conjunctivae normal.   Neck:      Vascular: No JVD.   Cardiovascular:      Rate and Rhythm: Normal rate and regular rhythm. Frequent extrasystoles are present.     Heart sounds: Normal heart sounds, S1 normal and S2 normal. No murmur heard.   No friction rub. No gallop.    Pulmonary:      Effort: Pulmonary effort is normal. No respiratory distress.      Breath sounds: Normal breath sounds. No wheezing or rales.   Abdominal:      General: Bowel sounds are normal. There is no distension.      Palpations: Abdomen is soft.   Musculoskeletal:         General: Normal range of motion.   Skin:     General: Skin is warm and dry.      Findings: No erythema.   Neurological:      Mental Status: She is alert and oriented to person, place, and time.   Psychiatric:         Behavior: Behavior normal.         Thought Content: Thought content normal.         Judgment: Judgment normal.            Results Reviewed by myself:     Results from last 7 days   Lab Units 07/01/21  0632   SODIUM mmol/L 132*   POTASSIUM mmol/L 4.7   CHLORIDE mmol/L 99   CO2 mmol/L 25.0   BUN mg/dL 14   CREATININE mg/dL 1.32*   CALCIUM mg/dL 9.5   BILIRUBIN mg/dL 0.4   ALK PHOS U/L 96   ALT (SGPT) U/L 6   AST (SGOT) U/L 16   GLUCOSE mg/dL 123*       Estimated Creatinine Clearance: 24.1 mL/min (A) (by C-G formula based on SCr of 1.32 mg/dL (H)).          Results from last 7 days   Lab Units 07/01/21  0632   WBC 10*3/mm3 6.03   HEMOGLOBIN g/dL 10.8*   PLATELETS 10*3/mm3 235       Lab Results   Component Value Date    TROPONINT <0.010 07/01/2021     Lab Results   Component Value Date    PROBNP 12,193.0 (H) 07/01/2021       No intake/output data recorded.    Cardiographics              Results  for orders placed during the hospital encounter of 01/25/19    Adult Transthoracic Echo Complete W/ Cont if Necessary Per Protocol    Interpretation Summary  · The left ventricular cavity is mildly dilated.  · Estimated EF = 48%.  · Left ventricular systolic function is low normal.  · Left ventricular diastolic dysfunction (grade I) consistent with impaired relaxation.  · Left atrial cavity size is borderline dilated.  · Mild aortic valve regurgitation is present.  · Mild mitral valve regurgitation is present  · Mild tricuspid valve regurgitation is present.      XR Chest 1 View    Result Date: 7/1/2021  *  CHF. *  Cardiomegaly. *  Post-CABG changes. *  Small to moderate left and small right pleural effusion with underlying areas of atelectasis and/or infiltrate. Electronically signed by:  Sabino Meehan DO  7/1/2021 6:51 AM CDT Workstation: [id number]      Intake/Output       None              Assessment/Plan       Acute on chronic systolic congestive heart failure (CMS/HCC)  ICM: EF:38%. NYHA Class IV, Stage C. Patient is mildly volume overloaded and in a well perfused physiologic state. Hemodynamics are unacceptable- HTN  BETA-BLOCKER:   Carvedilol 12.5mg BID  CORLANOR:  N/a, HR 66  ACE/ARB/ENTRESTO:  Losartan  25mg   DIURETIC: Lasix 20mg IV BID. Had 40mg IV with 400ml+ out.  ALDOSTERONE ANTAGONIST: indicated, CKD III  IMDUR/HYDRALAZINE: nitro Paste 1inch  DIGOXIN: n/a  Fluid restriction: 2 L  Sodium restriction:2 grams  Daily Weights  6MWT: n/a    - She is hypertensive. Will reorder Nitro paste and then remove as indicated as she has not had her PO AM medications yet. They are now re-ordered.   - Her breathing has improved.  - Frequent PVCs (bigimeny and trigeminy on monitor). Will check Mg level and give 2gm IVPB. Monitor electrolytes closely.   - repeat ECHO      Disposition: observation care. Will follow along.       I discussed the patient's findings and my recommendations with patient and consulting provider  and Dr. Cisneros              This document has been electronically signed by CHANA Lopez on July 1, 2021 11:31 CDT      Electronically signed by CHANA Lopez, 07/01/21, 11:31 AM CDT.    Ada Son is an 86-year-old female who is well-known to me.  Her history is remarkable for coronary artery disease with ischemic cardiomyopathy, status post CABG in 2014, hypertension, hyperlipidemia, CKD, peripheral vascular disease status post bilateral TCARs, GERD.  She presented with a 5-day history of increasing dyspnea on exertion, PND, orthopnea with clinical and lab evidence of congestive heart failure.  Her BNP is markedly elevated.  She denied any chest pain and EKG showed no acute ST-T wave changes or arrhythmia.  Her troponins are within normal limits.  Her blood pressure however has been elevated, and her symptoms of improved following IV diuretics, optimization of blood pressure and oxygen.  She denied any fevers, chills, abdominal pain, nausea or vomiting.    On examination: The patient was comfortable at rest.  Patient is frail  Pulse rate was 72 bpm regular.  Blood pressure 161/70 mmHg  There was no pallor icterus or cyanosis  Neck exam showed no jugular venous distention.  Previous carotid endarterectomy scar seen.  Exam of the heart showed normal first and second heart sounds with no S3 or S4  Lung exam showed decreased breath sounds in both lung bases with no definite rales.  Abdomen was soft with no mass or tenderness.  Extremities showed no edema.    Her EKG, x-ray of the chest, labs were reviewed in detail.    Agree with assessment and plan as outlined by CHANA Brice.  Elevated blood pressure contributing to congestive heart failure.  No clinical evidence of ongoing ischemia.  Troponin within normal limits.  Continue current management with carvedilol, IV Lasix, losartan.  Will consider changing Nitropaste to isosorbide mononitrate.  Amlodipine could be considered if blood pressure is  not under control.  Will review echocardiogram ordered.

## 2021-07-02 NOTE — PROGRESS NOTES
Progress Note  Roberto Jaramillo MD  Hospitalist    Date of visit: 7/2/2021     LOS: 1 day   [facility] - General    Chief Complaint: Shortness of breath    Subjective     Interval History:     Patient Complaints: shortness of breath, improved with the help of diuresis.    History taken from: Patient    Medication Review:   Current Facility-Administered Medications   Medication Dose Route Frequency Provider Last Rate Last Admin   • acetaminophen (TYLENOL) tablet 650 mg  650 mg Oral Q4H PRN Roberto Jaramillo MD       • albuterol (PROVENTIL) nebulizer solution 0.083% 2.5 mg/3mL  2.5 mg Nebulization Q6H PRN Roberto Jaramillo MD       • aspirin chewable tablet 81 mg  81 mg Oral Daily Roberto Jaramillo MD   81 mg at 07/02/21 0842   • atorvastatin (LIPITOR) tablet 10 mg  10 mg Oral Daily Roberto Jaramillo MD   10 mg at 07/02/21 0842   • carvedilol (COREG) tablet 12.5 mg  12.5 mg Oral BID With Meals Roberto Jaramillo MD   12.5 mg at 07/02/21 0842   • clopidogrel (PLAVIX) tablet 75 mg  75 mg Oral Daily Roberto Jaramillo MD   75 mg at 07/02/21 0842   • [START ON 7/4/2021] furosemide (LASIX) tablet 20 mg  20 mg Oral Daily Crissy Dewey APRN       • isosorbide mononitrate (IMDUR) 24 hr tablet 30 mg  30 mg Oral Q24H Kalee Cisneros MD   30 mg at 07/02/21 0842   • losartan (COZAAR) tablet 25 mg  25 mg Oral Q24H Roberto Jaramillo MD   25 mg at 07/02/21 0842   • sodium chloride 0.9 % flush 10 mL  10 mL Intravenous Q12H Roberto Jaramillo MD   10 mL at 07/02/21 0940   • sodium chloride 0.9 % flush 10 mL  10 mL Intravenous PRN Roberto Jaramillo MD           Review of Systems:   Review of Systems   Constitutional: Positive for fatigue. Negative for fever.   Respiratory: Positive for cough and shortness of breath. Negative for wheezing.    Cardiovascular: Negative for chest pain and palpitations.   Gastrointestinal: Negative for abdominal distention, anal  bleeding, diarrhea, nausea and vomiting.   Genitourinary: Negative for decreased urine volume, dysuria, frequency, hematuria, menstrual problem and urgency.   Musculoskeletal: Negative for arthralgias, back pain and gait problem.   Skin: Positive for pallor. Negative for color change and rash.   Neurological: Positive for weakness. Negative for seizures, syncope, numbness and headaches.   Psychiatric/Behavioral: Negative for agitation, behavioral problems and confusion.   All other systems reviewed and are negative.      Objective     Vital Signs  Temp:  [96.2 °F (35.7 °C)-97.3 °F (36.3 °C)] 96.9 °F (36.1 °C)  Heart Rate:  [54-83] 65  Resp:  [16-18] 18  BP: (100-139)/(50-64) 118/58    Physical Exam:  Physical Exam  Vitals reviewed.   Constitutional:       General: She is not in acute distress.     Appearance: She is ill-appearing.   HENT:      Head: Normocephalic and atraumatic.   Eyes:      General: No scleral icterus.     Extraocular Movements: Extraocular movements intact.      Pupils: Pupils are equal, round, and reactive to light.   Cardiovascular:      Rate and Rhythm: Normal rate and regular rhythm.   Pulmonary:      Effort: Pulmonary effort is normal. No respiratory distress.      Breath sounds: No stridor. Rales present. No wheezing.   Abdominal:      General: There is no distension.      Palpations: There is no mass.      Tenderness: There is no abdominal tenderness. There is no right CVA tenderness, left CVA tenderness or guarding.      Hernia: No hernia is present.   Musculoskeletal:         General: No swelling or deformity. Normal range of motion.      Cervical back: Normal range of motion and neck supple. No rigidity or tenderness.      Right lower leg: No edema.      Left lower leg: No edema.   Skin:     General: Skin is dry.      Coloration: Skin is pale. Skin is not jaundiced.      Findings: No bruising.   Neurological:      General: No focal deficit present.      Mental Status: She is alert and  oriented to person, place, and time. Mental status is at baseline.      Cranial Nerves: No cranial nerve deficit.      Motor: No weakness.   Psychiatric:         Mood and Affect: Mood normal.         Behavior: Behavior normal.          Results Review:    Lab Results (last 24 hours)     Procedure Component Value Units Date/Time    Comprehensive Metabolic Panel [[id number]]  (Abnormal) Collected: 07/02/21 0811    Specimen: Blood Updated: 07/02/21 0837     Glucose 86 mg/dL      BUN 20 mg/dL      Creatinine 1.54 mg/dL      Sodium 136 mmol/L      Potassium 4.3 mmol/L      Chloride 99 mmol/L      CO2 28.0 mmol/L      Calcium 9.4 mg/dL      Total Protein 6.6 g/dL      Albumin 3.60 g/dL      ALT (SGPT) 5 U/L      AST (SGOT) 15 U/L      Alkaline Phosphatase 84 U/L      Total Bilirubin 0.5 mg/dL      eGFR Non African Amer 32 mL/min/1.73      Globulin 3.0 gm/dL      A/G Ratio 1.2 g/dL      BUN/Creatinine Ratio 13.0     Anion Gap 9.0 mmol/L     Narrative:      GFR Normal >60  Chronic Kidney Disease <60  Kidney Failure <15      CBC & Differential [[id number]]  (Abnormal) Collected: 07/02/21 0811    Specimen: Blood Updated: 07/02/21 0818    Narrative:      The following orders were created for panel order CBC & Differential.  Procedure                               Abnormality         Status                     ---------                               -----------         ------                     CBC Auto Differential[[id number]]        Abnormal            Final result                 Please view results for these tests on the individual orders.    CBC Auto Differential [[id number]]  (Abnormal) Collected: 07/02/21 0811    Specimen: Blood Updated: 07/02/21 0818     WBC 5.87 10*3/mm3      RBC 3.61 10*6/mm3      Hemoglobin 9.7 g/dL      Hematocrit 30.2 %      MCV 83.7 fL      MCH 26.9 pg      MCHC 32.1 g/dL      RDW 14.5 %      RDW-SD 43.9 fl      MPV 10.3 fL      Platelets 205 10*3/mm3      Neutrophil % 70.4 %      Lymphocyte % 15.8 %       Monocyte % 12.6 %      Eosinophil % 0.0 %      Basophil % 0.9 %      Immature Grans % 0.3 %      Neutrophils, Absolute 4.13 10*3/mm3      Lymphocytes, Absolute 0.93 10*3/mm3      Monocytes, Absolute 0.74 10*3/mm3      Eosinophils, Absolute 0.00 10*3/mm3      Basophils, Absolute 0.05 10*3/mm3      Immature Grans, Absolute 0.02 10*3/mm3      nRBC 0.0 /100 WBC           Imaging Results (Last 24 Hours)     ** No results found for the last 24 hours. **          Assessment/Plan       Acute on chronic systolic congestive heart failure (CMS/HCC)    Ischemic cardiomyopathy    CKD (chronic kidney disease) stage 3, GFR 30-59 ml/min (CMS/[facility])    Continue with the IV Lasix, the other meds as listed, fluid restriction and supplemental oxygen as needed.    I confirmed that the patient's Advance Care Plan is present, code status is documented, or surrogate decision maker is listed in the patient's medical record.      Roberto Jaramillo MD  07/02/21  16:29 CDT

## 2021-07-02 NOTE — DISCHARGE INSTRUCTIONS
Take Lasix on Sunday or Monday. Will see how she responded on Wednesday at her follow up. Do not feel she will need everyday.

## 2021-07-02 NOTE — PROGRESS NOTES
"[facility] Progress Note   LOS: 1 day   Patient Care Team:  Sundar Alvarado [facility] PCP - General    Chief Complaint:  dyspnea    Subjective     Interval History:   Patient Denies: shortness of air, chest pain, PND, orthopnea, palpitations, dizziness, syncope and edema  Patient Complaints: no complaints today  History taken from: patient    Good response to diuretics. Breathing improved. BP better with addition of Imdur.   Encouraged her to ambulate around the room and in the hallway to evaluate for ongoing dyspnea not related to pulmonary edema.       Objective     Vital Sign Min/Max for last 24 hours  Temp  Min: 96.2 °F (35.7 °C)  Max: 97.3 °F (36.3 °C)   BP  Min: 100/51  Max: 178/79   Pulse  Min: 54  Max: 83   Resp  Min: 16  Max: 18   SpO2  Min: 91 %  Max: 97 %   Flow (L/min)  Min: 1  Max: 2   Weight  Min: 45.7 kg (100 lb 12.8 oz)  Max: 48.1 kg (106 lb 0.7 oz)     Flowsheet Rows        First Filed Value   Admission Height  157.5 cm (62\") Documented at 07/01/2021 0603   Admission Weight  48.1 kg (106 lb) Documented at 07/01/2021 0603              07/01/21  1406 07/01/21  1752 07/02/21  0559   Weight: 48.1 kg (106 lb 0.7 oz) 46.4 kg (102 lb 3.2 oz) 45.7 kg (100 lb 12.8 oz)       Physical Exam:  Physical Exam  Vitals and nursing note reviewed.   Constitutional:       General: She is not in acute distress.     Appearance: She is well-developed. She is not diaphoretic.   HENT:      Head: Normocephalic.   Eyes:      Conjunctiva/sclera: Conjunctivae normal.   Neck:      Vascular: No JVD.   Cardiovascular:      Rate and Rhythm: Normal rate and regular rhythm.      Heart sounds: Normal heart sounds, S1 normal and S2 normal. No murmur heard.   No friction rub. No gallop.    Pulmonary:      Effort: Pulmonary effort is normal. No respiratory distress.      Breath sounds: Normal breath sounds. No wheezing or rales.   Abdominal:      General: Bowel sounds are normal. There is no " distension.      Palpations: Abdomen is soft.   Musculoskeletal:         General: Normal range of motion.   Skin:     General: Skin is warm and dry.      Findings: No erythema.   Neurological:      Mental Status: She is alert and oriented to person, place, and time.   Psychiatric:         Behavior: Behavior normal.         Thought Content: Thought content normal.         Judgment: Judgment normal.          Results Reviewed by myself:     Results from last 7 days   Lab Units 07/02/21  0811 07/01/21  0632   SODIUM mmol/L 136 132*   POTASSIUM mmol/L 4.3 4.7   CHLORIDE mmol/L 99 99   CO2 mmol/L 28.0 25.0   BUN mg/dL 20 14   CREATININE mg/dL 1.54* 1.32*   CALCIUM mg/dL 9.4 9.5   BILIRUBIN mg/dL 0.5 0.4   ALK PHOS U/L 84 96   ALT (SGPT) U/L 5 6   AST (SGOT) U/L 15 16   GLUCOSE mg/dL 86 123*       Estimated Creatinine Clearance: 19.6 mL/min (A) (by C-G formula based on SCr of 1.54 mg/dL (H)).    Results from last 7 days   Lab Units 07/01/21  1001   MAGNESIUM mg/dL 2.1             Results from last 7 days   Lab Units 07/02/21  0811 07/01/21  0632   WBC 10*3/mm3 5.87 6.03   HEMOGLOBIN g/dL 9.7* 10.8*   PLATELETS 10*3/mm3 205 235           Lab Results   Component Value Date    PROBNP 12,193.0 (H) 07/01/2021       I/O last 3 completed shifts:  In: 50 [P.O.:50]  Out: 800 [Urine:800]    Cardiographics:  ECG/EMG Results (last 24 hours)       Procedure Component Value Units Date/Time    SCANNED EKG [[id number]] Resulted: 07/01/21     Updated: 07/01/21 2000    Adult Transthoracic Echo Complete w/ Color, Spectral and Contrast if Necessary Per Protocol [[id number]] Collected: 07/01/21 1307     Updated: 07/01/21 2021     BSA 1.5 m^2      RVIDd 2.5 cm      IVSd 1.3 cm      LVIDd 5.0 cm      LVIDs 4.3 cm      LVPWd 1.1 cm      IVS/LVPW 1.2     FS 12.9 %      EDV(Teich) 117.1 ml      ESV(Teich) 84.9 ml      EF(Teich) 27.5 %      EDV(cubed) 123.5 ml      ESV(cubed) 81.7 ml      EF(cubed) 33.8 %      LV mass(C)d 229.6 grams      LV mass(C)dI  157.3 grams/m^2      SV(Teich) 32.2 ml      SI(Teich) 22.1 ml/m^2      SV(cubed) 41.8 ml      SI(cubed) 28.6 ml/m^2      Ao root diam 3.3 cm      Ao root area 8.6 cm^2      ACS 1.7 cm      LA dimension 4.3 cm      LA/Ao 1.3     LVOT diam 1.7 cm      LVOT area 2.3 cm^2      LVOT area(traced) 2.3 cm^2      LVLd ap4 7.6 cm      EDV(MOD-sp4) 87.7 ml      LVLs ap4 7.5 cm      ESV(MOD-sp4) 67.3 ml      EF(MOD-sp4) 23.3 %      LVLd ap2 8.3 cm      EDV(MOD-sp2) 121.0 ml      LVLs ap2 7.6 cm      ESV(MOD-sp2) 78.3 ml      EF(MOD-sp2) 35.3 %      SV(MOD-sp4) 20.4 ml      SI(MOD-sp4) 14.0 ml/m^2      SV(MOD-sp2) 42.7 ml      SI(MOD-sp2) 29.3 ml/m^2      Ao root area (BSA corrected) 2.3     LV Morel Vol (BSA corrected) 60.1 ml/m^2      LV Sys Vol (BSA corrected) 46.1 ml/m^2      MV E max freddy 63.4 cm/sec      MV A max freddy 72.4 cm/sec      MV E/A 0.88     MV V2 max 76.1 cm/sec      MV max PG 2.3 mmHg      MV V2 mean 47.2 cm/sec      MV mean PG 1.0 mmHg      MV V2 VTI 21.6 cm      MVA(VTI) 1.4 cm^2      MV P1/2t max freddy 76.2 cm/sec      MV P1/2t 93.0 msec      MVA(P1/2t) 2.4 cm^2      MV dec slope 240.0 cm/sec^2      Ao pk freddy 106.0 cm/sec      Ao max PG 4.5 mmHg      Ao max PG (full) 1.7 mmHg      Ao V2 mean 63.9 cm/sec      Ao mean PG 2.0 mmHg      Ao mean PG (full) 1.0 mmHg      Ao V2 VTI 19.5 cm      GIULIA(I,A) 1.6 cm^2      GIULIA(I,D) 1.6 cm^2      GIULIA(V,A) 1.8 cm^2      GIULIA(V,D) 1.8 cm^2      AI max freddy 329.0 cm/sec      AI max PG 43.3 mmHg      AI dec slope 224.0 cm/sec^2      AI P1/2t 430.2 msec      LV V1 max PG 2.8 mmHg      LV V1 mean PG 1.0 mmHg      LV V1 max 83.6 cm/sec      LV V1 mean 44.8 cm/sec      LV V1 VTI 13.5 cm      MR max freddy 558.0 cm/sec      MR max .5 mmHg      SV(Ao) 166.8 ml      SI(Ao) 114.3 ml/m^2      SV(LVOT) 30.6 ml      SI(LVOT) 21.0 ml/m^2      PA V2 max 90.9 cm/sec      PA max PG 3.3 mmHg      TR max freddy 273.0 cm/sec      RVSP(TR) 39.8 mmHg      RAP systole 10.0 mmHg      MVA P1/2T LCG 2.9 cm^2        CV ECHO PRIYA - BZI_BMI 19.4 kilograms/m^2       CV ECHO PRIYA - BSA([facility]) 1.4 m^2       CV ECHO PRIYA - BZI_METRIC_WEIGHT 48.1 kg       CV ECHO PRIYA - BZI_METRIC_HEIGHT 157.5 cm      Target HR (85%) 116 bpm      Max. Pred. HR (100%) 136 bpm      Echo EF Estimated 35 %     Narrative:      · Estimated left ventricular EF = 35% Left ventricular ejection fraction   appears to be 31 - 35%. Left ventricular systolic function is moderately   decreased. The left ventricular cavity is mildly dilated. Left ventricular   wall thickness is consistent with mild concentric hypertrophy. There is   left ventricular global hypokinesis noted. Left ventricular diastolic   function is consistent with (grade Ia w/high LAP) impaired relaxation. No   evidence of left ventricular thrombus or mass present.  · Mild aortic valve insufficiency, mild mitral regurgitation, mild   tricuspid regurgitation  · Estimated right ventricular systolic pressure from tricuspid   regurgitation is mildly elevated (35-45 mmHg).               Results for orders placed during the hospital encounter of 07/01/21    Adult Transthoracic Echo Complete w/ Color, Spectral and Contrast if Necessary Per Protocol    Interpretation Summary  · Estimated left ventricular EF = 35% Left ventricular ejection fraction appears to be 31 - 35%. Left ventricular systolic function is moderately decreased. The left ventricular cavity is mildly dilated. Left ventricular wall thickness is consistent with mild concentric hypertrophy. There is left ventricular global hypokinesis noted. Left ventricular diastolic function is consistent with (grade Ia w/high LAP) impaired relaxation. No evidence of left ventricular thrombus or mass present.  · Mild aortic valve insufficiency, mild mitral regurgitation, mild tricuspid regurgitation  · Estimated right ventricular systolic pressure from tricuspid regurgitation is mildly elevated (35-45 mmHg).      XR Chest 1 View    Result  Date: 7/1/2021  *  CHF. *  Cardiomegaly. *  Post-CABG changes. *  Small to moderate left and small right pleural effusion with underlying areas of atelectasis and/or infiltrate. Electronically signed by:  Sabino Joycebrayan NELSON  7/1/2021 6:51 AM CDT Workstation: [id number]      Medication Review:     Current Facility-Administered Medications:     acetaminophen (TYLENOL) tablet 650 mg, 650 mg, Oral, Q4H PRN, Roberto Jaramillo MD    albuterol (PROVENTIL) nebulizer solution 0.083% 2.5 mg/3mL, 2.5 mg, Nebulization, Q6H PRN, Roberto Jaramillo MD    aspirin chewable tablet 81 mg, 81 mg, Oral, Daily, Roberto Jaramillo MD, 81 mg at 07/02/21 0842    atorvastatin (LIPITOR) tablet 10 mg, 10 mg, Oral, Daily, Roberto Jaramillo MD, 10 mg at 07/02/21 0842    carvedilol (COREG) tablet 12.5 mg, 12.5 mg, Oral, BID With Meals, Roberto Jaramillo MD, 12.5 mg at 07/02/21 0842    clopidogrel (PLAVIX) tablet 75 mg, 75 mg, Oral, Daily, Roberto Jaramillo MD, 75 mg at 07/02/21 0842    [START ON 7/4/2021] furosemide (LASIX) tablet 20 mg, 20 mg, Oral, Daily, Crissy Dewey APRN    isosorbide mononitrate (IMDUR) 24 hr tablet 30 mg, 30 mg, Oral, Q24H, Kalee Cisneros MD, 30 mg at 07/02/21 0842    losartan (COZAAR) tablet 25 mg, 25 mg, Oral, Q24H, Roberto Jaramillo MD, 25 mg at 07/02/21 0842    sodium chloride 0.9 % flush 10 mL, 10 mL, Intravenous, Q12H, Roberto Jaramillo MD, 10 mL at 07/02/21 0940    sodium chloride 0.9 % flush 10 mL, 10 mL, Intravenous, PRN, Roberto Jaramillo MD    Patient's recent labs and results as included in the subjective data were reviewed by me. Any pertinent outside data will be included.        Assessment/Plan       Acute on chronic systolic congestive heart failure (CMS/HCC)  ICM: EF:35%. NYHA Class III, Stage C. Patient is euvolemic and in a well perfused physiologic state. Hemodynamics are improved.   BETA-BLOCKER:   Carvedilol 12.5mg BID  CORLANOR:  N/a, HR 66  ACE/ARB/ENTRESTO:  Losartan  25mg     DIURETIC: Lasix 20mg PO  daily start Sunday or Monday. I do not feel she will need daily diuretic.    ALDOSTERONE ANTAGONIST: indicated, CKD III  IMDUR/HYDRALAZINE: Imdur 30mg daily, new  DIGOXIN: n/a  Fluid restriction: 2 L  Sodium restriction:2 grams  Daily Weights  6MWT: n/a         CKD (chronic kidney disease) stage 3, GFR 30-59 ml/min (CMS/HCC)  - slight bump in creatinine following diuresis.   - Will give lasix holiday tomorrow. Begin Lasix 20mg PO daily on Sunday. WIll evaluate the need for ongoing diuretic as outpatient. I do not feel that she will need daily diuretic.        Plan for disposition:Where: home When:  today or tomorrow per primary team. Encouraged her to ambulate around room and in ridley with assistance to evaluate for dyspnea.     Follow up CHF clinic on Wednesday (after her hair appt). Dr. Gunderson as scheduled.   She will call for any needs.             This document has been electronically signed by CHANA Lopez on July 2, 2021 09:55 CDT      Electronically signed by CHANA Lopez, 07/02/21, 9:55 AM CDT.    Patient examined and chart reviewed.  Progress as noted by CHANA Brice.  Symptomatically, the patient is much better and ambulating without problems.  Dyspnea has improved.  Blood pressure has optimized.  She denied any chest pain.  No PND or orthopnea.    On examination heart rate 66 bpm regular,  Blood pressure 118/70 mmHg  No pallor or icterus  Exam of the heart showed normal first and second heart sounds with no S3 or S4.  Lung exam showed normal breath sounds with no rales or rhonchi  Abdomen is soft with no mass or tenderness  Extremities showed no edema    Labs reviewed.  Creatinine 1.54 hemoglobin 9.7    Continue current management with carvedilol, isosorbide mononitrate, losartan.  Antiplatelet therapy with aspirin and lipid-lowering therapy with atorvastatin will be continued.  Agree with decrease the dose of Lasix.    We will follow patient as outpatient.

## 2021-07-02 NOTE — CONSULTS
Adult Nutrition  Assessment    Patient Name:  Naomi Duong Son  YOB: 1935  MRN: [id number]  Admit Date:  7/1/2021    Assessment Date:  7/2/2021    Comments:  Pt presents at BMI 18 and reports weight loss of ~10# in the past couple of months d/t poor appetite from feeling ill. Meets criteria for severe malnutrition based on above and fat/muscle wasting revealed during NFPE and noted on MSA below. Denies gi distress. Pt does not like commercial supplements, but will take homemade milkshake---RD added to menu. Pt expresses interest in appetite stimulant---RD will discuss w/MD.      Reason for Assessment     Row Name 07/02/21 1002          Reason for Assessment    Reason For Assessment  identified at risk by screening criteria     Diagnosis  cardiac disease     Identified At Risk by Screening Criteria  BMI;reduced oral intake over the last month         Nutrition/Diet History     Row Name 07/02/21 1002          Nutrition/Diet History    Typical Food/Fluid Intake  Pt says she has never been a big eater, but she has not been feeling well recently and has been eating even less. Says 10# wt loss indicated by epic wts is probably correct. She dislikes commercial supplements but will take homemade milkshake. She is interested in an appetite stimulant.         Anthropometrics     Row Name 07/02/21 0559          Anthropometrics    Weight  45.7 kg (100 lb 12.8 oz)         Labs/Tests/Procedures/Meds     Row Name 07/02/21 1004          Labs/Procedures/Meds    Lab Results Reviewed  reviewed        Diagnostic Tests/Procedures    Diagnostic Test/Procedure Reviewed  reviewed        Medications    Pertinent Medications Reviewed  reviewed         Physical Findings     Row Name 07/02/21 1005          Physical Findings    Overall Physical Appearance  loss of muscle mass;loss of subcutaneous fat;underweight         Estimated/Assessed Needs     Row Name 07/02/21 1005          Calculation Measurements    Weight Used For  Calculations  49.9 kg (110 lb)        Estimated/Assessed Needs    Additional Documentation  Protein Requirements (Group);KCAL/KG (Group);Fluid Requirements (Group)        KCAL/KG    KCAL/KG  25 Kcal/Kg (kcal)     25 Kcal/Kg (kcal)  1247.4        Protein Requirements    Weight Used For Protein Calculations  49.9 kg (110 lb)     Est Protein Requirement Amount (gms/kg)  0.8 gm protein     Estimated Protein Requirements (gms/day)  39.92        Fluid Requirements    Fluid Requirements (mL/day)  1200     RDA Method (mL)  1200         Nutrition Prescription Ordered     Row Name 07/02/21 1005          Nutrition Prescription PO    Current PO Diet  Regular     Fluid Consistency  Thin     Common Modifiers  Cardiac         Evaluation of Received Nutrient/Fluid Intake     Row Name 07/02/21 1006          PO Evaluation    Number of Meals  2     % PO Intake  0 x 1, 75% x 1           Malnutrition Severity Assessment     Row Name 07/02/21 1006          Malnutrition Severity Assessment    Malnutrition Type  Chronic Disease - Related Malnutrition        Unintentional Weight Loss     Unintentional Weight Loss Findings  Severe     Unintentional Weight Loss   Weight loss of 10% in six months        Muscle Loss    Loss of Muscle Mass Findings  Severe     [place]  Severe - deep hollowing/scooping, lack of muscle to touch, facial bones well defined     Clavicle Bone Region  Severe - protruding prominent bone     Scapular Bone Region  Severe - prominent bones, depressions easily visible between ribs, scapula, spine, shoulders        Fat Loss    Subcutaneous Fat Loss Findings  Severe     Orbital Region   Severe - pronounced hollowness/depression, dark circles, loose saggy skin     Upper Arm Region  Severe - mostly skin, very little space between folds, fingers touch        Criteria Met (Must meet criteria for severity in at least 2 of these categories: M Wasting, Fat Loss, Fluid, Secondary Signs, Wt. Status, Intake)    Patient meets  criteria for   Severe Malnutrition           Electronically signed by:  Keyana Barkley RD  07/02/21 10:11 CDT

## 2021-07-02 NOTE — PLAN OF CARE
Goal Outcome Evaluation:  Plan of Care Reviewed With: patient, family           Outcome Summary: Pt w/poor appetite and wt loss pta. Says she ate well this am. Will take homemade milkshakes for extra calories. Pt is interested in appetite stimulant.

## 2021-07-03 PROBLEM — E43 SEVERE MALNUTRITION: Status: ACTIVE | Noted: 2021-01-01

## 2021-07-03 PROBLEM — J96.01 ACUTE RESPIRATORY FAILURE WITH HYPOXIA (HCC): Status: ACTIVE | Noted: 2021-01-01

## 2021-07-03 NOTE — DISCHARGE SUMMARY
Discharge Summary  Roberto Jaramillo MD  Hospitalist     Date of Discharge:  7/3/2021    Discharge Diagnosis:      Acute on chronic systolic congestive heart failure (CMS/HCC)    Ischemic cardiomyopathy    Acute respiratory failure with hypoxia (CMS/HCC)    CKD (chronic kidney disease) stage 3, GFR 30-59 ml/min (CMS/HCC)    Severe malnutrition (CMS/HCC)      Presenting Problem/History of Present Illness  Dyspnea     Hospital Course  Patient is a 86 y.o. female admitted for dyspnea related to an acute exacerbation of the underlying systolic heart failure accompanied by hypoxia. She improved with the help of IV Lasix, adjustment of her medications, improvement of her BP and with supplemental O2.    Her vital signs are stable, her BP is now around 100/60 to 120/65, she is afebrile and her Oxygen saturation is 94% on 2 L/min NC (87% on room air). She will follow-up with her primary care physician within a week and Cardiology as scheduled.    Consults:   Consults     Date and Time Order Name Status Description    7/1/2021 11:10 AM Inpatient Cardiology Consult Completed         Pertinent Test Results: LVEF of 30 to 35% on the recent Echo    Condition on Discharge: Stable    Vital Signs  Temp:  [97.2 °F (36.2 °C)-97.9 °F (36.6 °C)] 97.9 °F (36.6 °C)  Heart Rate:  [65-80] 71  Resp:  [18] 18  BP: ()/(50-63) 99/50    Physical Exam:  Physical Exam  Vitals reviewed.   Constitutional:       General: She is not in acute distress.     Appearance: She is cachectic. She is ill-appearing.   HENT:      Head: Normocephalic and atraumatic.   Eyes:      Pupils: Pupils are equal, round, and reactive to light.   Cardiovascular:      Rate and Rhythm: Normal rate and regular rhythm.   Pulmonary:      Effort: Pulmonary effort is normal. No respiratory distress.      Breath sounds: Normal breath sounds. No stridor. No wheezing or rales.   Abdominal:      General: Bowel sounds are normal. There is no distension.      Palpations: Abdomen is  soft. There is no mass.      Tenderness: There is no abdominal tenderness. There is no right CVA tenderness, left CVA tenderness or guarding.   Musculoskeletal:         General: No swelling, tenderness or deformity. Normal range of motion.      Cervical back: Normal range of motion and neck supple. No rigidity or tenderness.      Right lower leg: No edema.      Left lower leg: No edema.   Skin:     General: Skin is warm and dry.      Coloration: Skin is pale. Skin is not jaundiced.      Findings: No bruising or lesion.   Neurological:      General: No focal deficit present.      Mental Status: She is alert and oriented to person, place, and time. Mental status is at baseline.      Cranial Nerves: No cranial nerve deficit.      Sensory: No sensory deficit.      Motor: No weakness.      Gait: Gait normal.   Psychiatric:         Mood and Affect: Mood normal.         Behavior: Behavior normal.         Discharge Disposition  Home or Self Care    Discharge Medications     Discharge Medications      New Medications      Instructions Start Date   albuterol sulfate  (90 Base) MCG/ACT inhaler  Commonly known as: PROVENTIL HFA;VENTOLIN HFA;PROAIR HFA   2 puffs, Inhalation, Every 4 Hours PRN      isosorbide mononitrate 30 MG 24 hr tablet  Commonly known as: IMDUR   30 mg, Oral, Every 24 Hours Scheduled   Start Date: July 4, 2021        Changes to Medications      Instructions Start Date   furosemide 40 MG tablet  Commonly known as: LASIX  What changed:   · when to take this  · reasons to take this  · additional instructions   40 mg, Oral, 2 Times Daily         Continue These Medications      Instructions Start Date   acetaminophen 325 MG tablet  Commonly known as: TYLENOL   650 mg, Oral, Every 4 Hours PRN      aspirin 81 MG chewable tablet   81 mg, Oral, Daily      atorvastatin 10 MG tablet  Commonly known as: LIPITOR   TAKE ONE TABLET BY MOUTH DAILY      carvedilol 12.5 MG tablet  Commonly known as: Coreg   12.5 mg,  Oral, 2 Times Daily With Meals      clopidogrel 75 MG tablet  Commonly known as: PLAVIX   TAKE ONE TABLET BY MOUTH DAILY      irbesartan 75 MG tablet  Commonly known as: AVAPRO   75 mg, Oral, Daily      multivitamin with minerals tablet tablet   1 tablet, Oral, Daily      sennosides-docusate 8.6-50 MG per tablet  Commonly known as: PERICOLACE   2 tablets, Oral, 2 Times Daily PRN             Discharge Diet:   Diet Instructions     Diet: Cardiac      Discharge Diet: Cardiac    Fluid Restriction per day: 1500 mL Fluid          Activity at Discharge:   Activity Instructions     Activity as Tolerated            Follow-up Appointments  Future Appointments   Date Time Provider Department Center   7/7/2021  1:00 PM Crissy Dewey APRN [facility] None   10/1/2021 10:00 AM Kalee Cisneros MD [facility] None   [facility]   12/23/2021 11:30 AM Ryan Dubois APRN [facility] None     Additional Instructions for the Follow-ups that You Need to Schedule     Discharge Follow-up with PCP   As directed       Currently Documented PCP:    [facility]    PCP Phone Number:    [phone]     Follow Up Details: in 1 week - PCP of choice                Roberto Jaramillo MD  07/03/21  17:00 CDT

## 2021-07-03 NOTE — PLAN OF CARE
Problem: Heart Failure Comorbidity  Goal: Maintenance of Heart Failure Symptom Control  Outcome: Ongoing, Progressing     Problem: Heart Failure Comorbidity  Goal: Maintenance of Heart Failure Symptom Control  Intervention: Maintain Heart Failure-Management Strategies  Recent Flowsheet Documentation  Taken 7/2/2021 2000 by Shawn Becerra RN  Medication Review/Management: medications reviewed     Problem: Adult Inpatient Plan of Care  Goal: Plan of Care Review  Outcome: Ongoing, Progressing  Flowsheets  Taken 7/2/2021 1017 by Keyana Barkley RD  Plan of Care Reviewed With:   patient   family  Taken 7/2/2021 0536 by Halley Morris RN  Progress: no change     Problem: Adult Inpatient Plan of Care  Goal: Patient-Specific Goal (Individualized)  Outcome: Ongoing, Progressing     Problem: Adult Inpatient Plan of Care  Goal: Absence of Hospital-Acquired Illness or Injury  Outcome: Ongoing, Progressing     Problem: Adult Inpatient Plan of Care  Goal: Absence of Hospital-Acquired Illness or Injury  Intervention: Identify and Manage Fall Risk  Flowsheets  Taken 7/3/2021 0000  Safety Promotion/Fall Prevention:   safety round/check completed   nonskid shoes/slippers when out of bed   clutter free environment maintained   assistive device/personal items within reach  Taken 7/2/2021 2200  Safety Promotion/Fall Prevention:   assistive device/personal items within reach   clutter free environment maintained   nonskid shoes/slippers when out of bed   safety round/check completed  Taken 7/2/2021 2100  Safety Promotion/Fall Prevention:   assistive device/personal items within reach   clutter free environment maintained   nonskid shoes/slippers when out of bed   safety round/check completed  Taken 7/2/2021 2000  Safety Promotion/Fall Prevention:   safety round/check completed   nonskid shoes/slippers when out of bed   clutter free environment maintained   assistive device/personal items within reach  Taken 7/2/2021 1900  Safety  Promotion/Fall Prevention:   assistive device/personal items within reach   clutter free environment maintained   nonskid shoes/slippers when out of bed   safety round/check completed     Problem: Adult Inpatient Plan of Care  Goal: Absence of Hospital-Acquired Illness or Injury  Intervention: Prevent Skin Injury  Flowsheets  Taken 7/3/2021 0000  Body Position: position changed independently  Taken 7/2/2021 2200  Body Position: position changed independently  Taken 7/2/2021 2100  Body Position: position changed independently  Taken 7/2/2021 2000  Body Position: position changed independently  Taken 7/2/2021 1900  Body Position: position changed independently     Problem: Adult Inpatient Plan of Care  Goal: Absence of Hospital-Acquired Illness or Injury  Intervention: Prevent and Manage VTE (venous thromboembolism) Risk  Flowsheets  Taken 7/3/2021 0000  VTE Prevention/Management: (pt is taking plavix) --  Taken 7/2/2021 2200  VTE Prevention/Management: (pt is taking plavix) --  Taken 7/2/2021 2100  VTE Prevention/Management: (pt is taking plavix) --  Taken 7/2/2021 2000  VTE Prevention/Management: (pt is taking plavix) --  Taken 7/2/2021 1900  VTE Prevention/Management: (pt is taking plavix) --     Problem: Adult Inpatient Plan of Care  Goal: Optimal Comfort and Wellbeing  Outcome: Ongoing, Progressing     Problem: Adult Inpatient Plan of Care  Goal: Optimal Comfort and Wellbeing  Intervention: Provide Person-Centered Care  Flowsheets (Taken 7/2/2021 2000)  Trust Relationship/Rapport:   care explained   questions encouraged     Problem: Adult Inpatient Plan of Care  Goal: Readiness for Transition of Care  Outcome: Ongoing, Progressing     Problem: Adult Inpatient Plan of Care  Goal: Readiness for Transition of Care  Intervention: Mutually Develop Transition Plan  Flowsheets  Taken 7/1/2021 1037 by Daisy Busch RN  Transportation Anticipated: family or friend will provide  Patient/Family Anticipated Services at  Transition: none  Patient/Family Anticipates Transition to: home  Taken 7/1/2021 1035 by Daisy Busch, RN  Equipment Currently Used at Home: none   Goal Outcome Evaluation:

## 2021-07-03 NOTE — DISCHARGE PLACEMENT REQUEST
"Naomi Abdalla (86 y.o. Female)     Date of Birth Social Security Number Address Home Phone MRN    1935  [id number] [facility] [address] [phone] [id number]    Congregational Marital Status          Christianity        Admission Date Admission Type Admitting Provider Attending Provider Department, Room/Bed    7/1/21 Emergency Richmond Madrigal MD Iqbal, Javed Syed, MD [address], 303/1    Discharge Date Discharge Disposition Discharge Destination         Home or Self Care              Attending Provider: Richmond Madrigal MD    Allergies: No Known Allergies    Isolation: None   Infection: None   Code Status: CPR    Ht: 157.5 cm (62\")   Wt: 45.5 kg (100 lb 3.2 oz)    Admission Cmt: None   Principal Problem: Acute on chronic systolic congestive heart failure (CMS/HCC) [I50.23]                 Active Insurance as of 7/1/2021     Primary Coverage     Payor Plan Insurance Group Employer/Plan Group    MEDICARE MEDICARE A & B      Payor Plan Address Payor Plan Phone Number Payor Plan Fax Number Effective Dates    [address] [phone]  1/1/2002 - None Entered    [facility] [id number]       Subscriber Name Subscriber Birth Date Member ID       NAOMI ABDALLA 1935 [id number]           Secondary Coverage     Payor Plan Insurance Group Employer/Plan Group     FOR LIFE  FOR LIFE  SUP  [id number]     Payor Plan Address Payor Plan Phone Number Payor Plan Fax Number Effective Dates    [address] [phone]  7/17/2016 - None Entered    [facility] [address]       Subscriber Name Subscriber Birth Date Member ID       NAOMI ABDALLA 1935 [id number]                 Emergency Contacts      (Rel.) Home Phone Work Phone Mobile Phone    Alden Abdalla (Son) [phone] -- [phone]               History & Physical      Roberto Jaramillo MD at 07/01/21 1048          History and Physical  Roberto Jaramillo MD  Hospitalist    Date of admission: 7/1/2021    Patient Care " Team:  [facility] PCP - General    Chief complaint   Chief Complaint   Patient presents with   • Shortness of Breath     Subjective     Patient is a 86 y.o. female admitted for worsening dyspnea, orthopnea, diminished effort capacity, poor appetite. She denies having had chest pain or lower extremity edema.    She has longstanding cardiac problems including coronary artery disease, heart failure, previous coronary bypass.    History  Past Medical History:   Diagnosis Date   • CAD (coronary artery disease)    • Carotid artery stenosis    • Chronic systolic heart failure (CMS/[facility])    • CKD (chronic kidney disease) stage 3, GFR 30-59 ml/min (CMS/[facility])    • GERD (gastroesophageal reflux disease)    • Hypercholesterolemia    • Hyperlipidemia    • Hypertension    • Iron deficiency anemia    • Ischemic cardiomyopathy    • MI (myocardial infarction) (CMS/[facility])    • Mitral valve disease    • Osteoarthritis    • UTI (urinary tract infection)      Past Surgical History:   Procedure Laterality Date   • CARDIAC CATHETERIZATION     • CAROTID ENDARTERECTOMY     • CORONARY ARTERY BYPASS GRAFT     • TRANSESOPHAGEAL ECHOCARDIOGRAM (MISSY)       Family History   Problem Relation Age of Onset   • Hypertension Father      Social History     Tobacco Use   • Smoking status: Former Smoker     Quit date:      Years since quittin.5   • Smokeless tobacco: Never Used   Vaping Use   • Vaping Use: Never used   Substance Use Topics   • Alcohol use: No   • Drug use: No     Medications Prior to Admission   Medication Sig Dispense Refill Last Dose   • acetaminophen (TYLENOL) 325 MG tablet Take 2 tablets by mouth Every 4 (Four) Hours As Needed for Mild Pain .   Past Week at Unknown time   • aspirin 81 MG chewable tablet Chew 81 mg Daily.   2021 at Unknown time   • atorvastatin (LIPITOR) 10 MG tablet TAKE ONE TABLET BY MOUTH DAILY 30 tablet 0 2021 at Unknown time   • carvedilol  (Coreg) 12.5 MG tablet Take 1 tablet by mouth 2 (Two) Times a Day With Meals. 60 tablet 0 6/30/2021 at Unknown time   • clopidogrel (PLAVIX) 75 MG tablet TAKE ONE TABLET BY MOUTH DAILY 90 tablet 5 6/30/2021 at Unknown time   • furosemide (LASIX) 40 MG tablet Take 40 mg by mouth As Needed (edema). Take 1/2 tablet daily as needed   Past Week at Unknown time   • irbesartan (AVAPRO) 75 MG tablet Take 75 mg by mouth Daily.   6/30/2021 at Unknown time   • Multiple Vitamins-Minerals (MULTIVITAMIN ADULT PO) Take 1 tablet by mouth Daily.   Past Month at Unknown time   • sennosides-docusate (senna-docusate sodium) 8.6-50 MG per tablet Take 2 tablets by mouth 2 (Two) Times a Day As Needed for Constipation.        Allergies:  Patient has no known allergies.    Review of Systems  Review of Systems   Constitutional: Positive for fatigue. Negative for fever.   Respiratory: Positive for cough and shortness of breath. Negative for wheezing.    Cardiovascular: Negative for chest pain, palpitations and leg swelling.   Gastrointestinal: Negative for abdominal pain, constipation, diarrhea, nausea and vomiting.   Genitourinary: Negative for dysuria, flank pain, frequency, hematuria, urgency and vaginal bleeding.   Musculoskeletal: Positive for arthralgias.   Skin: Positive for pallor. Negative for color change.   Neurological: Positive for weakness. Negative for seizures, syncope, light-headedness, numbness and headaches.   Psychiatric/Behavioral: Negative for agitation, behavioral problems and confusion.   All other systems reviewed and are negative.      Objective     Vital Signs  Temp:  [96.2 °F (35.7 °C)-98.6 °F (37 °C)] 96.2 °F (35.7 °C)  Heart Rate:  [54-81] 54  Resp:  [18] 18  BP: (135-178)/(58-96) 139/64    Physical Exam:  Physical Exam  Vitals reviewed.   Constitutional:       General: She is not in acute distress.     Appearance: She is cachectic. She is ill-appearing.   HENT:      Head: Normocephalic and atraumatic.   Eyes:       General: No scleral icterus.  Cardiovascular:      Rate and Rhythm: Normal rate and regular rhythm.   Pulmonary:      Effort: No respiratory distress.      Breath sounds: No stridor. Rales present. No wheezing.   Chest:      Chest wall: No tenderness.   Abdominal:      General: Bowel sounds are normal. There is no distension.      Palpations: Abdomen is soft. There is no mass.      Tenderness: There is no abdominal tenderness. There is no right CVA tenderness or guarding.      Hernia: No hernia is present.   Musculoskeletal:         General: No swelling, tenderness or deformity. Normal range of motion.      Cervical back: Normal range of motion and neck supple. No rigidity or tenderness.      Right lower leg: No edema.      Left lower leg: No edema.   Skin:     Coloration: Skin is pale. Skin is not jaundiced.      Findings: No bruising or lesion.   Neurological:      General: No focal deficit present.      Mental Status: She is alert and oriented to person, place, and time. Mental status is at baseline.      Cranial Nerves: No cranial nerve deficit.      Sensory: No sensory deficit.      Motor: No weakness.      Gait: Gait normal.   Psychiatric:         Mood and Affect: Mood normal.         Behavior: Behavior normal.         Results Review:   Lab Results (last 24 hours)     Procedure Component Value Units Date/Time    Magnesium [[id number]]  (Normal) Collected: 07/01/21 1001    Specimen: Blood Updated: 07/01/21 1155     Magnesium 2.1 mg/dL     Extra Tubes [[id number]] Collected: 07/01/21 1001    Specimen: Blood Updated: 07/01/21 1115    Narrative:      The following orders were created for panel order Extra Tubes.  Procedure                               Abnormality         Status                     ---------                               -----------         ------                     Gold Top - [facility][[id number]]                                   Final result                 Please view results for these tests on the  individual orders.    Gold Top - SST [[id number]] Collected: 07/01/21 1001    Specimen: Blood Updated: 07/01/21 1115     Extra Tube Hold for add-ons.     Comment: Auto resulted.       COVID-19 and FLU A/B PCR - Swab, Nasopharynx [[id number]]  (Normal) Collected: 07/01/21 0744    Specimen: Swab from Nasopharynx Updated: 07/01/21 0820     COVID19 Not Detected     Influenza A PCR Not Detected     Influenza B PCR Not Detected    Narrative:      Fact sheet for providers: https://www.fda.gov/media/343158/download    Fact sheet for patients: https://www.fda.gov/media/023607/download    Test performed by PCR.    Comprehensive Metabolic Panel [[id number]]  (Abnormal) Collected: 07/01/21 0632    Specimen: Blood Updated: 07/01/21 0701     Glucose 123 mg/dL      BUN 14 mg/dL      Creatinine 1.32 mg/dL      Sodium 132 mmol/L      Potassium 4.7 mmol/L      Chloride 99 mmol/L      CO2 25.0 mmol/L      Calcium 9.5 mg/dL      Total Protein 7.3 g/dL      Albumin 3.80 g/dL      ALT (SGPT) 6 U/L      AST (SGOT) 16 U/L      Alkaline Phosphatase 96 U/L      Total Bilirubin 0.4 mg/dL      eGFR Non African Amer 38 mL/min/1.73      Globulin 3.5 gm/dL      A/G Ratio 1.1 g/dL      BUN/Creatinine Ratio 10.6     Anion Gap 8.0 mmol/L     Narrative:      GFR Normal >60  Chronic Kidney Disease <60  Kidney Failure <15      Troponin [[id number]]  (Normal) Collected: 07/01/21 0632    Specimen: Blood Updated: 07/01/21 0658     Troponin T <0.010 ng/mL     Narrative:      Troponin T Reference Range:  <= 0.03 ng/mL-   Negative for AMI  >0.03 ng/mL-     Abnormal for myocardial necrosis.  Clinicians would have to utilize clinical acumen, EKG, Troponin and serial changes to determine if it is an Acute Myocardial Infarction or myocardial injury due to an underlying chronic condition.       Results may be falsely decreased if patient taking Biotin.      BNP [[id number]]  (Abnormal) Collected: 07/01/21 0632    Specimen: Blood Updated: 07/01/21 0658     proBNP  12,193.0 pg/mL     Narrative:      Among patients with dyspnea, NT-proBNP is highly sensitive for the detection of acute congestive heart failure. In addition NT-proBNP of <300 pg/ml effectively rules out acute congestive heart failure with 99% negative predictive value.    Results may be falsely decreased if patient taking Biotin.      CBC & Differential [[id number]]  (Abnormal) Collected: 07/01/21 0632    Specimen: Blood Updated: 07/01/21 0635    Narrative:      The following orders were created for panel order CBC & Differential.  Procedure                               Abnormality         Status                     ---------                               -----------         ------                     CBC Auto Differential[[id number]]        Abnormal            Final result                 Please view results for these tests on the individual orders.    CBC Auto Differential [[id number]]  (Abnormal) Collected: 07/01/21 0632    Specimen: Blood Updated: 07/01/21 0635     WBC 6.03 10*3/mm3      RBC 4.01 10*6/mm3      Hemoglobin 10.8 g/dL      Hematocrit 34.1 %      MCV 85.0 fL      MCH 26.9 pg      MCHC 31.7 g/dL      RDW 14.6 %      RDW-SD 45.0 fl      MPV 10.3 fL      Platelets 235 10*3/mm3      Neutrophil % 75.8 %      Lymphocyte % 13.6 %      Monocyte % 9.3 %      Eosinophil % 0.0 %      Basophil % 1.0 %      Immature Grans % 0.3 %      Neutrophils, Absolute 4.57 10*3/mm3      Lymphocytes, Absolute 0.82 10*3/mm3      Monocytes, Absolute 0.56 10*3/mm3      Eosinophils, Absolute 0.00 10*3/mm3      Basophils, Absolute 0.06 10*3/mm3      Immature Grans, Absolute 0.02 10*3/mm3      nRBC 0.0 /100 WBC           Imaging Results (Last 24 Hours)     Procedure Component Value Units Date/Time    XR Chest 1 View [[id number]] Collected: 07/01/21 0631     Updated: 07/01/21 0652    Narrative:      CLINICAL HISTORY:  dyspnea    EXAMINATION:  XR CHEST 1 VW    COMPARISON:  4/22/2014    FINDINGS:  The heart is enlarged. There are  "post-CABG changes. There is a  moderate degree of pulmonary vascular congestion with  interstitial edema. There is a small to moderate left and small  right pleural effusion with underlying areas of bibasilar  density/consolidation which could represent any combination of  atelectasis and/or infiltrate.       Impression:      *  CHF.   *  Cardiomegaly.  *  Post-CABG changes.  *  Small to moderate left and small right pleural effusion with  underlying areas of atelectasis and/or infiltrate.    Electronically signed by:  Sabino Meehan DO  2021 6:51 AM CDT  Workstation: [id number]          Assessment/Plan       Acute on chronic systolic congestive heart failure (CMS/HCC)    Ischemic cardiomyopathy    CKD (chronic kidney disease) stage 3, GFR 30-59 ml/min (CMS/HCC)    Continue with the fluid restriction, IV Lasix, resume some of her home medications including the Cozaar, beta-blocker. Follow-up on the repeat Echo. Cardiology consult appreciated.    I have utilized all available immediate resources to obtain, update, or review the patient's current medications.    I confirmed that the patient's Advance Care Plan is present, code status is documented, or surrogate decision maker is listed in the patient's medical record.      21  19:26 CDT      Electronically signed by Roberto Jaramillo MD at 21 192       [address] [address]  Dept. Phone:  [phone]  Dept. Fax:  [phone] Date Ordered: Jul 3, 2021         Patient:  Naomi Duong Son MRN:  [id number]   [address] [address] :  1935  SSN:    Phone: [phone] Sex:  F     Weight: 45.5 kg (100 lb 3.2 oz)         Ht Readings from Last 1 Encounters:   21 157.5 cm (62\")         Home Oxygen Therapy          (Order ID: [id number])    Diagnosis:  Acute respiratory failure with hypoxemia (CMS/HCC) (J96.01 [ICD-10-CM] 518.81 [ICD-9-CM])   Quantity:  1     Delivery " Modality: Nasal Cannula  Liters Per Minute: 2  Duration: Continuous  Equipment:  Oxygen Concentrator &  &  Portable Gaseous Oxygen System & Portable Oxygen Contents Gaseous &  Conserving Regulator  The face to face evaluation was performed on: 7/3/2021  Length of Need (99 Months = Lifetime): 99 Months = Lifetime        Authorizing Provider's Phone: [phone]   Authorizing Provider:Roberto Jaramillo MD  Authorizing Provider's NPI: [id number]  Order Entered By: Roberto Jaramillo MD 7/3/2021 11:46 AM     Electronically signed by: Roberto Jaramillo MD 7/3/2021 11:46 AM             07/03/21 1127 07/03/21 1129   Oxygen Therapy   SpO2 (!) 87 %  (walking in room ) 94 %  (sitting down)   Device (Oxygen Therapy) room air nasal cannula      07/03/21 1129   Oxygen Therapy   SpO2 94 %  (sitting down)   Device (Oxygen Therapy) nasal cannula   Flow (L/min) 2

## 2021-07-04 NOTE — OUTREACH NOTE
Prep Survey      Responses   [facility] facility patient discharged from?  [place]   Is LACE score < 7 ?  No   Emergency Room discharge w/ pulse ox?  No   Eligibility  Readm Mgmt   Discharge diagnosis  Acute on chronic systolic congestive heart failure   Does the patient have one of the following disease processes/diagnoses(primary or secondary)?  CHF   Does the patient have Home health ordered?  No   Is there a DME ordered?  Yes   What DME was ordered?  [facility]   Prep survey completed?  Yes          Jo Murray RN

## 2021-07-06 NOTE — TELEPHONE ENCOUNTER
Called pt pt wanted to let Dr. Gunderson know that she stop taking the isosorbide monontrate 30 mg (IMDUR) that was prescribe during ER discharge. Pt said was unable to tolerate it it made her dizzy and unable to be stable on her feet. Pt said she have a appt tomorrow with Dr. Dewey ----- Message from Andree Avila sent at 7/6/2021 10:59 AM CDT -----  She called and left a  yesterday.  She was recently discharged and has some questions about her medications.      Please call her at [phone]    Thanks    Andree

## 2021-07-07 NOTE — PROGRESS NOTES
[facility] CHF CLINIC OFFICE VISIT    Subjective:     Cardiomyopathy (chief complaint)      History of Present Illness    Ms. Abdalla is an 86 year old patient who presents to CHF clinic today following hospital discharge.     She is a patient of Dr. Gunderson's who presents to [facility] ER for acute onset of dyspnea and orthopnea. She had been feeling poorly for about 5 days. Two days ago, her breathing became labored with small amounts of activity. Last night, she was unable to lie flat to sleep. This morning, she presented to the ER for evaluation. On arrival, her o2 saturation was 89%. She was placed on NC, nitro paste was placed, and Lasix was given. Upon my exam 5 hours later, she is resting comfortably.   She denies chest pain. She is s/p CABG x1 in 2014. She has had left and right TCARs. She is medication compliant and in overall general good health.      She has not had a CHF exacerbation before. CXR shows volume overload and atelectasis. She has not had a recent illness or fever.      On exam, she is without leg swelling or abdominal distention. She does not have JVD. She has frequent PVCs on the monitor.     She was give 3 doses of 20mg IV lasix, nitro paste, and o2 with improvement in symptoms. Her BP was very elevated during hospital stay, but returned to normal before discharge. She was given Imdur, due to the positive response she had to the nitro paste, but she developed dizziness at home and this had to be stopped.   I had recommended that she take Lasix on Sunday following DC, but she was prescribed 40mg BID.       Diet: Small appetite, likes to eat food brought by family.   Fluids: no greater than 1500ml a day  Activity: stays active around her home. Lives alone.     PCP: she does not have a PCP  Cardiologist: Dr. Cisneros   Nephrologist: Dr. Robertson  Pulmonologist: n/a    Hospitalizations:  7/1/21- 7/3/21: CHF    Past Medical History:   Diagnosis Date   • CAD (coronary artery disease)    • Carotid artery stenosis    •  Chronic systolic heart failure (CMS/[facility])    • CKD (chronic kidney disease) stage 3, GFR 30-59 ml/min (CMS/[facility])    • GERD (gastroesophageal reflux disease)    • Hypercholesterolemia    • Hyperlipidemia    • Hypertension    • Iron deficiency anemia    • Ischemic cardiomyopathy    • MI (myocardial infarction) (CMS/[facility])    • Mitral valve disease    • Osteoarthritis    • UTI (urinary tract infection)      Past Surgical History:   Procedure Laterality Date   • CARDIAC CATHETERIZATION     • CAROTID ENDARTERECTOMY     • CORONARY ARTERY BYPASS GRAFT     • TRANSESOPHAGEAL ECHOCARDIOGRAM (MISSY)       Social History     Socioeconomic History   • Marital status:      Spouse name: Not on file   • Number of children: Not on file   • Years of education: Not on file   • Highest education level: Not on file   Tobacco Use   • Smoking status: Former Smoker     Quit date:      Years since quittin.5   • Smokeless tobacco: Never Used   Vaping Use   • Vaping Use: Never used   Substance and Sexual Activity   • Alcohol use: No   • Drug use: No   • Sexual activity: Not Currently     Family History   Problem Relation Age of Onset   • Hypertension Father        Allergies:  No Known Allergies    Review of Systems   Constitutional: Negative for chills, decreased appetite and fever.   HENT: Negative.    Eyes: Negative.    Cardiovascular: Negative for chest pain, claudication, dyspnea on exertion, irregular heartbeat, leg swelling and palpitations.   Respiratory: Negative for cough, shortness of breath and wheezing.    Endocrine: Negative.    Skin: Negative for dry skin, flushing and rash.   Musculoskeletal: Negative for falls and myalgias.   Gastrointestinal: Negative for abdominal pain, change in bowel habit and melena.   Genitourinary: Negative for frequency and hematuria.   Neurological: Negative for dizziness, light-headedness, loss of balance and weakness.   Psychiatric/Behavioral: Negative for altered mental status and memory  "loss. The patient is not nervous/anxious.        Current Outpatient Medications   Medication Sig Dispense Refill   • acetaminophen (TYLENOL) 325 MG tablet Take 2 tablets by mouth Every 4 (Four) Hours As Needed for Mild Pain .     • albuterol sulfate  (90 Base) MCG/ACT inhaler Inhale 2 puffs Every 4 (Four) Hours As Needed for Wheezing or Shortness of Air. 19 g 1   • aspirin 81 MG chewable tablet Chew 81 mg Daily.     • atorvastatin (LIPITOR) 10 MG tablet TAKE ONE TABLET BY MOUTH DAILY 30 tablet 0   • carvedilol (Coreg) 12.5 MG tablet Take 1 tablet by mouth 2 (Two) Times a Day With Meals. 60 tablet 0   • clopidogrel (PLAVIX) 75 MG tablet TAKE ONE TABLET BY MOUTH DAILY 90 tablet 5   • irbesartan (AVAPRO) 75 MG tablet Take 75 mg by mouth Daily.     • Multiple Vitamins-Minerals (MULTIVITAMIN ADULT PO) Take 1 tablet by mouth Daily.     • sennosides-docusate (senna-docusate sodium) 8.6-50 MG per tablet Take 2 tablets by mouth 2 (Two) Times a Day As Needed for Constipation.       No current facility-administered medications for this visit.        Objective:     Vitals:    07/07/21 1303   BP: 100/58   BP Location: Left arm   Patient Position: Sitting   Cuff Size: Adult   Pulse: 66   SpO2: 96%   Weight: 45.7 kg (100 lb 12.8 oz)   Height: 157.5 cm (62\")       Wt Readings from Last 3 Encounters:   07/07/21 45.7 kg (100 lb 12.8 oz)   07/03/21 45.5 kg (100 lb 3.2 oz)   06/22/21 48.1 kg (106 lb)            Constitutional:       General: Not in acute distress.     Appearance: Well-developed.   HENT:      Head: Normocephalic and atraumatic.   Neck:      Vascular: No JVD.   Pulmonary:      Effort: Pulmonary effort is normal. No respiratory distress.      Breath sounds: Normal breath sounds. No wheezing. No rales.   Cardiovascular:      Normal rate. Regular rhythm.   Pulses:     Intact distal pulses.   Abdominal:      General: Bowel sounds are normal.      Palpations: Abdomen is soft.   Musculoskeletal: Normal range of motion. "      Cervical back: Normal range of motion. Skin:     General: Skin is warm and dry.      Findings: No erythema.   Neurological:      Mental Status: Alert and oriented to person, place, and time.   Psychiatric:         Behavior: Behavior normal.         Thought Content: Thought content normal.         Judgment: Judgment normal.         Cardiographics  Results for orders placed during the hospital encounter of 07/01/21    Adult Transthoracic Echo Complete w/ Color, Spectral and Contrast if Necessary Per Protocol    Interpretation Summary  · Estimated left ventricular EF = 35% Left ventricular ejection fraction appears to be 31 - 35%. Left ventricular systolic function is moderately decreased. The left ventricular cavity is mildly dilated. Left ventricular wall thickness is consistent with mild concentric hypertrophy. There is left ventricular global hypokinesis noted. Left ventricular diastolic function is consistent with (grade Ia w/high LAP) impaired relaxation. No evidence of left ventricular thrombus or mass present.  · Mild aortic valve insufficiency, mild mitral regurgitation, mild tricuspid regurgitation  · Estimated right ventricular systolic pressure from tricuspid regurgitation is mildly elevated (35-45 mmHg).      XR Chest 1 View    Result Date: 7/1/2021  *  CHF. *  Cardiomegaly. *  Post-CABG changes. *  Small to moderate left and small right pleural effusion with underlying areas of atelectasis and/or infiltrate. Electronically signed by:  Sabino Meehan DO  7/1/2021 6:51 AM CDT Workstation: [id number]        Labs Reviewed by me:     Lab Results   Component Value Date    TSH 3.65 01/19/2016     Lab Results   Component Value Date    GLUCOSE 86 07/02/2021    BUN 20 07/02/2021    CREATININE 1.54 (H) 07/02/2021    EGFRIFNONA 32 (L) 07/02/2021    BCR 13.0 07/02/2021    K 4.3 07/02/2021    CO2 28.0 07/02/2021    CALCIUM 9.4 07/02/2021    ALBUMIN 3.60 07/02/2021    AST 15 07/02/2021    ALT 5 07/02/2021     Lab  Results   Component Value Date    WBC 5.87 07/02/2021    HGB 9.7 (L) 07/02/2021    HCT 30.2 (L) 07/02/2021    MCV 83.7 07/02/2021     07/02/2021       Lab Results   Component Value Date    TROPONINT <0.010 07/01/2021     Lab Results   Component Value Date    PROBNP 12,193.0 (H) 07/01/2021       PFTS:         The following portions of the patient's history were reviewed and updated as appropriate: allergies, current medications, past family history, past medical history, past social history, past surgical history and problem list.     Old records reviewed and pertinent information is included in the above objective data.     Assessment/Plan:      Diagnosis Plan   1. Ischemic cardiomyopathy  ICM: EF:35%. NYHA Class III, Stage C. Patient is euvolemic and in a well perfused physiologic state. Hemodynamics are improved.   BETA-BLOCKER:   Carvedilol 12.5mg BID  CORLANOR:  N/a, HR 66  ACE/ARB/ENTRESTO:  Irbesartan 75mg      DIURETIC: Lasix 20mg PO daily PRN.      ALDOSTERONE ANTAGONIST: indicated, CKD III  IMDUR/HYDRALAZINE: stop, dizziness.   DIGOXIN: n/a  Fluid restriction: 2 L  Sodium restriction:2 grams  Daily Weights  6MWT: n/a    Reiterated all educational points this visit.  Continue daily weight monitoring.  Continue restricted dietary sodium intake.  Discussed patient action plan for heart failure. Contact information for this office verbalized.  Recommended avoiding NSAIDs use.  Discussed warning signs requiring additional medical attention for heart failure.   Education points repeated back by patient.       Take BP 2x weekly and record.           Patient's Body mass index is 18.44 kg/m². indicating that she is within normal range (BMI 18.5-24.9). No BMI management plan needed..    45 minutes out of 60 minutes face to face spent counseling patient extensively on dietary Na+ intake, importance of activity, weight monitoring, compliance with medications and follow up appointments.    Follow up in 2 weeks.              This document has been electronically signed by CHANA Lopez on July 7, 2021 13:38 CDT

## 2021-07-07 NOTE — OUTREACH NOTE
CHF Week 1 Survey      Responses   [facility] patient discharged from?  [place]   Does the patient have one of the following disease processes/diagnoses(primary or secondary)?  CHF   CHF Week 1 attempt successful?  Yes   Call start time  1329   Call end time  1331   Discharge diagnosis  Acute on chronic systolic congestive heart failure   Meds reviewed with patient/caregiver?  Yes   Is the patient having any side effects they believe may be caused by any medication additions or changes?  No   Does the patient have all medications ordered at discharge?  Yes   Is the patient taking all medications as directed (includes completed medication regime)?  Yes   Comments regarding appointments  appt with Cardiology today at 1 pm   Does the patient have a primary care provider?   Yes   Has the patient kept scheduled appointments due by today?  N/A   Has home health visited the patient within 72 hours of discharge?  N/A   What DME was ordered?  [facility]   Has all DME been delivered?  Yes   DME comments  using 2L O2 as needed   Psychosocial issues?  No   Did the patient receive a copy of their discharge instructions?  Yes   Nursing interventions  Reviewed instructions with patient   What is the patient's perception of their health status since discharge?  Improving   Is the patient able to teach back signs and symptoms of worsening condition? (i.e. weight gain, shortness of air, etc.)  Yes   Is the patient/caregiver able to teach back the hierarchy of who to call/visit for symptoms/problems? PCP, Specialist, Home health nurse, Urgent Care, ED, 911  Yes    CHF Week 1 call completed?  Yes   Wrap up additional comments  Says she is doing well, on her way to f/u appt with cardiologist, no questions or concerns at this time.          Mima Gandhi RN

## 2021-07-08 NOTE — TELEPHONE ENCOUNTER
Patient called needing a refill on her     irbesartan (AVAPRO) 75 MG tablet [[id number]] (Order [id number])    atorvastatin (LIPITOR) 10 MG tablet [[id number]] (Order [id number])    Pharmacy    [facility] - [place], [place] - [address] [place] [address] [place] [facility] [place] [phone]  - [phone] [address] [place], [facility] [address]   Phone:  [phone]  Fax:  [phone]-

## 2021-07-14 NOTE — OUTREACH NOTE
CHF Week 2 Survey      Responses   [facility] patient discharged from?  [place]   Does the patient have one of the following disease processes/diagnoses(primary or secondary)?  CHF   Week 2 attempt successful?  No   Unsuccessful attempts  Attempt 1          Kirstin Johnson RN

## 2021-07-19 NOTE — OUTREACH NOTE
CHF Week 2 Survey      Responses   [facility] patient discharged from?  [place]   Does the patient have one of the following disease processes/diagnoses(primary or secondary)?  CHF   Week 2 attempt successful?  No   Unsuccessful attempts  Attempt 2          Radha Melo RN

## 2021-07-22 NOTE — PROGRESS NOTES
[facility] CHF CLINIC OFFICE VISIT    Subjective:     Congestive Heart Failure (chief complaint ) and Cardiomyopathy      History of Present Illness    Ms. Abdalla is an 86 year old patient who presents to CHF clinic today following hospital discharge.     She is a patient of Dr. Le who presents to [facility] ER for acute onset of dyspnea and orthopnea. She had been feeling poorly for about 5 days. Two days ago, her breathing became labored with small amounts of activity. Last night, she was unable to lie flat to sleep. This morning, she presented to the ER for evaluation. On arrival, her o2 saturation was 89%. She was placed on NC, nitro paste was placed, and Lasix was given. Upon my exam 5 hours later, she is resting comfortably.   She denies chest pain. She is s/p CABG x1 in 2014. She has had left and right TCARs. She is medication compliant and in overall general good health.      She has not had a CHF exacerbation before. CXR shows volume overload and atelectasis. She has not had a recent illness or fever.      On exam, she is without leg swelling or abdominal distention. She does not have JVD. She has frequent PVCs on the monitor.     She was give 3 doses of 20mg IV lasix, nitro paste, and o2 with improvement in symptoms. Her BP was very elevated during hospital stay, but returned to normal before discharge. She was given Imdur, due to the positive response she had to the nitro paste, but she developed dizziness at home and this had to be stopped.   I had recommended that she take Lasix on Sunday following DC, but she was prescribed 40mg BID. Thankfully, she only took 2 doses of 20mg and did not  the 40mg.      7/22/21: 2 week hospital follow up. BP doing OK. Not as low as last visit. Tolerating medications well. Has not had to have any lasix.   Walked today at the mall with her friends. Has been to [employer] today. No dyspnea. Doing well with her appetite. Covid vaccine back in January.       Diet: Small appetite, likes  to eat food brought by family.   Fluids: no greater than 1500ml a day  Activity: stays active around her home. Lives alone.     PCP: she does not have a PCP  Cardiologist: Dr. Cisneros   Nephrologist: Dr. Robertson  Pulmonologist: n/a    Hospitalizations:  21- 7/3/21: CHF    Past Medical History:   Diagnosis Date   • CAD (coronary artery disease)    • Carotid artery stenosis    • Chronic systolic heart failure (CMS/HCC)    • CKD (chronic kidney disease) stage 3, GFR 30-59 ml/min (CMS/[facility])    • GERD (gastroesophageal reflux disease)    • Hypercholesterolemia    • Hyperlipidemia    • Hypertension    • Iron deficiency anemia    • Ischemic cardiomyopathy    • MI (myocardial infarction) (CMS/[facility])    • Mitral valve disease    • Osteoarthritis    • UTI (urinary tract infection)      Past Surgical History:   Procedure Laterality Date   • CARDIAC CATHETERIZATION     • CAROTID ENDARTERECTOMY     • CORONARY ARTERY BYPASS GRAFT     • TRANSESOPHAGEAL ECHOCARDIOGRAM (MISSY)       Social History     Socioeconomic History   • Marital status:      Spouse name: Not on file   • Number of children: Not on file   • Years of education: Not on file   • Highest education level: Not on file   Tobacco Use   • Smoking status: Former Smoker     Quit date:      Years since quittin.5   • Smokeless tobacco: Never Used   Vaping Use   • Vaping Use: Never used   Substance and Sexual Activity   • Alcohol use: No   • Drug use: No   • Sexual activity: Not Currently     Family History   Problem Relation Age of Onset   • Hypertension Father        Allergies:  No Known Allergies    Review of Systems   Constitutional: Negative for chills, decreased appetite and fever.   HENT: Negative.    Eyes: Negative.    Cardiovascular: Negative for chest pain, claudication, dyspnea on exertion, irregular heartbeat, leg swelling and palpitations.   Respiratory: Negative for cough, shortness of breath and wheezing.    Endocrine: Negative.    Skin:  "Negative for dry skin, flushing and rash.   Musculoskeletal: Negative for falls and myalgias.   Gastrointestinal: Negative for abdominal pain, change in bowel habit and melena.   Genitourinary: Negative for frequency and hematuria.   Neurological: Negative for dizziness, light-headedness, loss of balance and weakness.   Psychiatric/Behavioral: Negative for altered mental status and memory loss. The patient is not nervous/anxious.        Current Outpatient Medications   Medication Sig Dispense Refill   • acetaminophen (TYLENOL) 325 MG tablet Take 2 tablets by mouth Every 4 (Four) Hours As Needed for Mild Pain .     • albuterol sulfate  (90 Base) MCG/ACT inhaler Inhale 2 puffs Every 4 (Four) Hours As Needed for Wheezing or Shortness of Air. 19 g 1   • aspirin 81 MG chewable tablet Chew 81 mg Daily.     • atorvastatin (LIPITOR) 10 MG tablet Take 1 tablet by mouth Daily. 90 tablet 3   • carvedilol (Coreg) 12.5 MG tablet Take 1 tablet by mouth 2 (Two) Times a Day With Meals. 60 tablet 0   • clopidogrel (PLAVIX) 75 MG tablet TAKE ONE TABLET BY MOUTH DAILY 90 tablet 5   • irbesartan (AVAPRO) 75 MG tablet Take 1 tablet by mouth Daily. 90 tablet 3   • Multiple Vitamins-Minerals (MULTIVITAMIN ADULT PO) Take 1 tablet by mouth Daily.     • sennosides-docusate (senna-docusate sodium) 8.6-50 MG per tablet Take 2 tablets by mouth 2 (Two) Times a Day As Needed for Constipation.       No current facility-administered medications for this visit.        Objective:     Vitals:    07/22/21 1410   BP: 128/54   BP Location: Left arm   Patient Position: Sitting   Cuff Size: Adult   Pulse: 69   Temp: 98 °F (36.7 °C)   SpO2: 95%   Weight: 47.9 kg (105 lb 9.6 oz)   Height: 157.5 cm (62\")       Wt Readings from Last 3 Encounters:   07/22/21 47.9 kg (105 lb 9.6 oz)   07/07/21 45.7 kg (100 lb 12.8 oz)   07/03/21 45.5 kg (100 lb 3.2 oz)            Constitutional:       General: Not in acute distress.     Appearance: Well-developed.   HENT: "      Head: Normocephalic and atraumatic.   Neck:      Vascular: No JVD.   Pulmonary:      Effort: Pulmonary effort is normal. No respiratory distress.      Breath sounds: Normal breath sounds. No wheezing. No rales.   Cardiovascular:      Normal rate. Regular rhythm.   Pulses:     Intact distal pulses.   Abdominal:      General: Bowel sounds are normal.      Palpations: Abdomen is soft.   Musculoskeletal: Normal range of motion.      Cervical back: Normal range of motion. Skin:     General: Skin is warm and dry.      Findings: No erythema.   Neurological:      Mental Status: Alert and oriented to person, place, and time.   Psychiatric:         Behavior: Behavior normal.         Thought Content: Thought content normal.         Judgment: Judgment normal.         Cardiographics  Results for orders placed during the hospital encounter of 07/01/21    Adult Transthoracic Echo Complete w/ Color, Spectral and Contrast if Necessary Per Protocol    Interpretation Summary  · Estimated left ventricular EF = 35% Left ventricular ejection fraction appears to be 31 - 35%. Left ventricular systolic function is moderately decreased. The left ventricular cavity is mildly dilated. Left ventricular wall thickness is consistent with mild concentric hypertrophy. There is left ventricular global hypokinesis noted. Left ventricular diastolic function is consistent with (grade Ia w/high LAP) impaired relaxation. No evidence of left ventricular thrombus or mass present.  · Mild aortic valve insufficiency, mild mitral regurgitation, mild tricuspid regurgitation  · Estimated right ventricular systolic pressure from tricuspid regurgitation is mildly elevated (35-45 mmHg).      XR Chest 1 View    Result Date: 7/1/2021  *  CHF. *  Cardiomegaly. *  Post-CABG changes. *  Small to moderate left and small right pleural effusion with underlying areas of atelectasis and/or infiltrate. Electronically signed by:  Sabino Meehan DO  7/1/2021 6:51 AM CDT  Workstation: [id number]        Labs Reviewed by me:     Lab Results   Component Value Date    TSH 3.65 01/19/2016     Lab Results   Component Value Date    GLUCOSE 86 07/02/2021    BUN 20 07/02/2021    CREATININE 1.54 (H) 07/02/2021    EGFRIFNONA 32 (L) 07/02/2021    BCR 13.0 07/02/2021    K 4.3 07/02/2021    CO2 28.0 07/02/2021    CALCIUM 9.4 07/02/2021    ALBUMIN 3.60 07/02/2021    AST 15 07/02/2021    ALT 5 07/02/2021     Lab Results   Component Value Date    WBC 5.87 07/02/2021    HGB 9.7 (L) 07/02/2021    HCT 30.2 (L) 07/02/2021    MCV 83.7 07/02/2021     07/02/2021       Lab Results   Component Value Date    TROPONINT <0.010 07/01/2021     Lab Results   Component Value Date    PROBNP 12,193.0 (H) 07/01/2021              The following portions of the patient's history were reviewed and updated as appropriate: allergies, current medications, past family history, past medical history, past social history, past surgical history and problem list.     Old records reviewed and pertinent information is included in the above objective data.     Assessment/Plan:      Diagnosis Plan   1. Ischemic cardiomyopathy  ICM: EF:35%. NYHA Class III, Stage C. Patient is euvolemic and in a well perfused physiologic state. Hemodynamics are improved.   BETA-BLOCKER:   Carvedilol 12.5mg BID  CORLANOR:  N/a, HR 66  ACE/ARB/ENTRESTO:  Irbesartan 75mg      DIURETIC: Lasix 20mg PO daily PRN. Has not had to have.      ALDOSTERONE ANTAGONIST: indicated, CKD III  IMDUR/HYDRALAZINE: stop, dizziness.   DIGOXIN: n/a  Fluid restriction: 2 L  Sodium restriction:2 grams  Daily Weights  6MWT: maybe next visit.     Reiterated all educational points this visit.  Continue daily weight monitoring.  Continue restricted dietary sodium intake.  Discussed patient action plan for heart failure. Contact information for this office verbalized.  Recommended avoiding NSAIDs use.  Discussed warning signs requiring additional medical attention for heart  failure.   Education points repeated back by patient.       Take BP 2x weekly and record.           Patient's Body mass index is 19.31 kg/m². indicating that she is within normal range (BMI 18.5-24.9). No BMI management plan needed..    Follow up in 1 month.             This document has been electronically signed by CHANA Lopez on July 22, 2021 14:15 CDT

## 2021-07-29 NOTE — OUTREACH NOTE
CHF Week 3 Survey      Responses   [facility] patient discharged from?  [place]   Does the patient have one of the following disease processes/diagnoses(primary or secondary)?  CHF   Week 3 attempt successful?  No   Unsuccessful attempts  Attempt 1          Ramila Monsalve RN

## 2021-08-02 NOTE — OUTREACH NOTE
CHF Week 3 Survey      Responses   [facility] patient discharged from?  [place]   Does the patient have one of the following disease processes/diagnoses(primary or secondary)?  CHF   Week 3 attempt successful?  No   Unsuccessful attempts  Attempt 2          Caity Lopez RN

## 2021-08-31 NOTE — PROGRESS NOTES
Naomi Abdalla  Procedure: 6 Minute Walk Test   8/31/21  Indication:CHF    Pretest: BP: 120/78               HR: 75               Sa02: 97               Dyspnea: 5               Fatigue: 5    Post Test: BP:130/88               HR: 84               Sa02:98               Dyspnea:7               Fatigue: 8    First 6MWT:yes    Supplemental oxygen during test: no    Stopped before 6 minutes: yes @ 2 minutes and 45 seconds    Pauses: 0    Results in distance walked: 141.52 meters    Did individual experience any pain or discomfort: 0    NYHA CLass III          This document has been electronically signed by CHANA Lopez on August 31, 2021 13:22 CDT        This document has been electronically signed by CHANA Lopez on August 31, 2021 13:22 CDT

## 2021-08-31 NOTE — PROGRESS NOTES
[facility] CHF CLINIC OFFICE VISIT    Subjective:     Congestive Heart Failure (chief complaint)      History of Present Illness    Ms. Abdalla is an 86 year old patient who presents to CHF clinic today following hospital discharge.     She is a patient of Dr. Le who presents to [facility] ER for acute onset of dyspnea and orthopnea. She had been feeling poorly for about 5 days. Two days ago, her breathing became labored with small amounts of activity. Last night, she was unable to lie flat to sleep. This morning, she presented to the ER for evaluation. On arrival, her o2 saturation was 89%. She was placed on NC, nitro paste was placed, and Lasix was given. Upon my exam 5 hours later, she is resting comfortably.   She denies chest pain. She is s/p CABG x1 in 2014. She has had left and right TCARs. She is medication compliant and in overall general good health.      She has not had a CHF exacerbation before. CXR shows volume overload and atelectasis. She has not had a recent illness or fever.      On exam, she is without leg swelling or abdominal distention. She does not have JVD. She has frequent PVCs on the monitor.     She was give 3 doses of 20mg IV lasix, nitro paste, and o2 with improvement in symptoms. Her BP was very elevated during hospital stay, but returned to normal before discharge. She was given Imdur, due to the positive response she had to the nitro paste, but she developed dizziness at home and this had to be stopped.   I had recommended that she take Lasix on Sunday following DC, but she was prescribed 40mg BID. Thankfully, she only took 2 doses of 20mg and did not  the 40mg.      7/22/21: 2 week hospital follow up. BP doing OK. Not as low as last visit. Tolerating medications well. Has not had to have any lasix.   Walked today at the mall with her friends. Has been to [employer] today. No dyspnea. Doing well with her appetite. Covid vaccine back in January.     8/31/21: 1 month follow up. Still doing well. She  is now on 20mg Lipitor. Saw Dr. Robertson with good report. Lasix  and Wednesday. No issues recently. Sleeping well. No bruising or bleeding.       Diet: Small appetite. Cooking meals. Using some canned food, some frozen.   Fluids: no greater than 1500ml a day. Sweet tea or water in bottle. Maybe 1 or 2 day.   Activity: stays active around her home. Lives alone.     PCP: she does not have a PCP  Cardiologist: Dr. Cisneros   Nephrologist: Dr. Robertson  Pulmonologist: n/a    Hospitalizations:  21- 7/3/21: CHF    Past Medical History:   Diagnosis Date   • CAD (coronary artery disease)    • Carotid artery stenosis    • Chronic systolic heart failure (CMS/HCC)    • CKD (chronic kidney disease) stage 3, GFR 30-59 ml/min (CMS/[facility])    • GERD (gastroesophageal reflux disease)    • Hypercholesterolemia    • Hyperlipidemia    • Hypertension    • Iron deficiency anemia    • Ischemic cardiomyopathy    • MI (myocardial infarction) (CMS/[facility])    • Mitral valve disease    • Osteoarthritis    • UTI (urinary tract infection)      Past Surgical History:   Procedure Laterality Date   • CARDIAC CATHETERIZATION     • CAROTID ENDARTERECTOMY     • CORONARY ARTERY BYPASS GRAFT     • TRANSESOPHAGEAL ECHOCARDIOGRAM (MISSY)       Social History     Socioeconomic History   • Marital status:      Spouse name: Not on file   • Number of children: Not on file   • Years of education: Not on file   • Highest education level: Not on file   Tobacco Use   • Smoking status: Former Smoker     Quit date:      Years since quittin.6   • Smokeless tobacco: Never Used   Vaping Use   • Vaping Use: Never used   Substance and Sexual Activity   • Alcohol use: No   • Drug use: No   • Sexual activity: Not Currently     Family History   Problem Relation Age of Onset   • Hypertension Father        Allergies:  No Known Allergies    Review of Systems   Constitutional: Negative for chills, decreased appetite and fever.   HENT: Negative.    Eyes:  "Negative.    Cardiovascular: Negative for chest pain, claudication, dyspnea on exertion, irregular heartbeat, leg swelling and palpitations.   Respiratory: Negative for cough, shortness of breath and wheezing.    Endocrine: Negative.    Skin: Negative for dry skin, flushing and rash.   Musculoskeletal: Negative for falls and myalgias.   Gastrointestinal: Negative for abdominal pain, change in bowel habit and melena.   Genitourinary: Negative for frequency and hematuria.   Neurological: Negative for dizziness, light-headedness, loss of balance and weakness.   Psychiatric/Behavioral: Negative for altered mental status and memory loss. The patient is not nervous/anxious.        Current Outpatient Medications   Medication Sig Dispense Refill   • albuterol sulfate  (90 Base) MCG/ACT inhaler Inhale 2 puffs Every 4 (Four) Hours As Needed for Wheezing or Shortness of Air. 19 g 1   • aspirin 81 MG chewable tablet Chew 81 mg Daily.     • atorvastatin (LIPITOR) 20 MG tablet      • carvedilol (Coreg) 12.5 MG tablet Take 1 tablet by mouth 2 (Two) Times a Day With Meals. 180 tablet 3   • clopidogrel (PLAVIX) 75 MG tablet Take 1 tablet by mouth Daily. 90 tablet 3   • furosemide (LASIX) 40 MG tablet      • irbesartan (AVAPRO) 75 MG tablet Take 1 tablet by mouth Daily. 90 tablet 3   • acetaminophen (TYLENOL) 325 MG tablet Take 2 tablets by mouth Every 4 (Four) Hours As Needed for Mild Pain .     • Multiple Vitamins-Minerals (MULTIVITAMIN ADULT PO) Take 1 tablet by mouth Daily.     • sennosides-docusate (senna-docusate sodium) 8.6-50 MG per tablet Take 2 tablets by mouth 2 (Two) Times a Day As Needed for Constipation.       No current facility-administered medications for this visit.        Objective:     Vitals:    08/31/21 1300   BP: 120/78   BP Location: Left arm   Patient Position: Sitting   Cuff Size: Adult   Pulse: 75   Temp: 97.3 °F (36.3 °C)   SpO2: 97%   Weight: 45.7 kg (100 lb 12.8 oz)   Height: 157.5 cm (62\") "       Wt Readings from Last 3 Encounters:   08/31/21 45.7 kg (100 lb 12.8 oz)   07/22/21 47.9 kg (105 lb 9.6 oz)   07/07/21 45.7 kg (100 lb 12.8 oz)            Constitutional:       General: Not in acute distress.     Appearance: Well-developed.   HENT:      Head: Normocephalic and atraumatic.   Neck:      Vascular: No JVD.   Pulmonary:      Effort: Pulmonary effort is normal. No respiratory distress.      Breath sounds: Normal breath sounds. No wheezing. No rales.   Cardiovascular:      Normal rate. Regular rhythm.   Pulses:     Intact distal pulses.   Abdominal:      General: Bowel sounds are normal.      Palpations: Abdomen is soft.   Musculoskeletal: Normal range of motion.      Cervical back: Normal range of motion. Skin:     General: Skin is warm and dry.      Findings: No erythema.   Neurological:      Mental Status: Alert and oriented to person, place, and time.   Psychiatric:         Behavior: Behavior normal.         Thought Content: Thought content normal.         Judgment: Judgment normal.         Cardiographics  Results for orders placed during the hospital encounter of 07/01/21    Adult Transthoracic Echo Complete w/ Color, Spectral and Contrast if Necessary Per Protocol    Interpretation Summary  · Estimated left ventricular EF = 35% Left ventricular ejection fraction appears to be 31 - 35%. Left ventricular systolic function is moderately decreased. The left ventricular cavity is mildly dilated. Left ventricular wall thickness is consistent with mild concentric hypertrophy. There is left ventricular global hypokinesis noted. Left ventricular diastolic function is consistent with (grade Ia w/high LAP) impaired relaxation. No evidence of left ventricular thrombus or mass present.  · Mild aortic valve insufficiency, mild mitral regurgitation, mild tricuspid regurgitation  · Estimated right ventricular systolic pressure from tricuspid regurgitation is mildly elevated (35-45 mmHg).      No radiology  results for the last 30 days.      Labs Reviewed by me:     Lab Results   Component Value Date    TSH 3.65 01/19/2016     Lab Results   Component Value Date    GLUCOSE 93 07/28/2021    BUN 15 07/28/2021    CREATININE 1.55 (H) 07/28/2021    EGFRIFNONA 32 (L) 07/28/2021    BCR 9.7 07/28/2021    K 4.3 07/28/2021    CO2 26.6 07/28/2021    CALCIUM 9.3 07/28/2021    ALBUMIN 4.00 07/28/2021    AST 21 07/28/2021    ALT 10 07/28/2021     Lab Results   Component Value Date    WBC 5.87 07/02/2021    HGB 10.6 (L) 07/28/2021    HCT 31.5 (L) 07/28/2021    MCV 83.7 07/02/2021     07/02/2021       Lab Results   Component Value Date    TROPONINT <0.010 07/01/2021     Lab Results   Component Value Date    PROBNP 12,193.0 (H) 07/01/2021              The following portions of the patient's history were reviewed and updated as appropriate: allergies, current medications, past family history, past medical history, past social history, past surgical history and problem list.     Old records reviewed and pertinent information is included in the above objective data.     Assessment/Plan:      Diagnosis Plan   1. Ischemic cardiomyopathy  ICM: EF:35%. NYHA Class II, Stage C. Patient is euvolemic and in a well perfused physiologic state. Hemodynamics are improved.   BETA-BLOCKER:   Carvedilol 12.5mg BID  CORLANOR:  N/a, HR 66  ACE/ARB/ENTRESTO:  Irbesartan 75mg      DIURETIC: Lasix 20mg PO daily PRN. Has not had to have but 1-2x a week if that.      ALDOSTERONE ANTAGONIST: indicated but CKD III  IMDUR/HYDRALAZINE: stop, dizziness.   DIGOXIN: n/a  Fluid restriction: 2 L  Sodium restriction:2 grams  Daily Weights  6MWT: 8/31/21: 141.2 meters in 2 minutes    Reiterated all educational points this visit.  Continue daily weight monitoring.  Continue restricted dietary sodium intake.  Discussed patient action plan for heart failure. Contact information for this office verbalized.  Recommended avoiding NSAIDs use.  Discussed warning signs  requiring additional medical attention for heart failure.   Education points repeated back by patient.       Take BP 2x weekly and record.           Patient's Body mass index is 18.44 kg/m². indicating that she is within normal range (BMI 18.5-24.9). No BMI management plan needed..    Follow up in 3 months.             This document has been electronically signed by CHANA Lopez on August 31, 2021 13:33 CDT

## 2021-10-05 NOTE — PROGRESS NOTES
Naomi Duong Son  86 y.o. female    1. S/P CABG (coronary artery bypass graft)    2. Mixed hyperlipidemia    3. Essential hypertension    4. Ischemic cardiomyopathy    5. Acute on chronic systolic congestive heart failure (HCC)        History of Present Illness:  Naomi Son is an 86-year-old female who is here for follow-up of her above-stated problems. Her history is remarkable for coronary artery disease with ischemic cardiomyopathy, status post CABG in 2014, hypertension, hyperlipidemia, CKD, peripheral vascular disease status post bilateral TCARs, GERD.  She presented with a 5-day history of increasing dyspnea on exertion, PND, orthopnea with clinical and lab evidence of congestive heart failure in 7/ 2021.  Her BNP is markedly elevated.  She denied any chest pain and EKG showed no acute ST-T wave changes or arrhythmia.  Her troponins are within normal limits. Her blood pressure however was elevated, and her symptoms of improved following IV diuretics, optimization of blood pressure and oxygen.    Echocardiogram performed showed:  · Estimated left ventricular EF = 35% Left ventricular ejection fraction appears to be 31 - 35%. Left ventricular systolic function is moderately decreased. The left ventricular cavity is mildly dilated. Left ventricular wall thickness is consistent with mild concentric hypertrophy. There is left ventricular global hypokinesis noted. Left ventricular diastolic function is consistent with (grade Ia w/high LAP) impaired relaxation. No evidence of left ventricular thrombus or mass present.  · Mild aortic valve insufficiency, mild mitral regurgitation, mild tricuspid regurgitation  · Estimated right ventricular systolic pressure from tricuspid regurgitation is mildly elevated (35-45 mmHg).    Following discharge from the hospital, the patient has been reasonably well and symptomatically better.  She is able to perform activities of daily living.  She has been compliant with her medications.  She  has followed up with nephrology and takes Lasix twice a week as advised.    No Known Allergies      Past Medical History:   Diagnosis Date   • CAD (coronary artery disease)    • Carotid artery stenosis    • Chronic systolic heart failure (HCC)    • CKD (chronic kidney disease) stage 3, GFR 30-59 ml/min ([facility])    • GERD (gastroesophageal reflux disease)    • Hypercholesterolemia    • Hyperlipidemia    • Hypertension    • Iron deficiency anemia    • Ischemic cardiomyopathy    • MI (myocardial infarction) ([facility])    • Mitral valve disease    • Osteoarthritis    • UTI (urinary tract infection)          Past Surgical History:   Procedure Laterality Date   • CARDIAC CATHETERIZATION     • CAROTID ENDARTERECTOMY     • CORONARY ARTERY BYPASS GRAFT     • TRANSESOPHAGEAL ECHOCARDIOGRAM (MISSY)           Family History   Problem Relation Age of Onset   • Hypertension Father          Social History     Socioeconomic History   • Marital status:      Spouse name: Not on file   • Number of children: Not on file   • Years of education: Not on file   • Highest education level: Not on file   Tobacco Use   • Smoking status: Former Smoker     Quit date:      Years since quittin.   • Smokeless tobacco: Never Used   Vaping Use   • Vaping Use: Never used   Substance and Sexual Activity   • Alcohol use: No   • Drug use: No   • Sexual activity: Not Currently         Current Outpatient Medications   Medication Sig Dispense Refill   • acetaminophen (TYLENOL) 325 MG tablet Take 2 tablets by mouth Every 4 (Four) Hours As Needed for Mild Pain .     • albuterol sulfate  (90 Base) MCG/ACT inhaler Inhale 2 puffs Every 4 (Four) Hours As Needed for Wheezing or Shortness of Air. 19 g 1   • aspirin 81 MG chewable tablet Chew 81 mg Daily.     • atorvastatin (LIPITOR) 20 MG tablet      • carvedilol (Coreg) 12.5 MG tablet Take 1 tablet by mouth 2 (Two) Times a Day With Meals. 180 tablet 3   • clopidogrel (PLAVIX) 75 MG tablet Take 1  "tablet by mouth Daily. 90 tablet 3   • furosemide (LASIX) 40 MG tablet 40 mg 2 (Two) Times a Week.     • irbesartan (AVAPRO) 75 MG tablet Take 1 tablet by mouth Daily. 90 tablet 3   • sennosides-docusate (senna-docusate sodium) 8.6-50 MG per tablet Take 2 tablets by mouth 2 (Two) Times a Day As Needed for Constipation.       No current facility-administered medications for this visit.         OBJECTIVE    /66   Pulse 76   Temp 96.4 °F (35.8 °C)   Ht 157.5 cm (62\")   Wt 45.8 kg (101 lb)   SpO2 95%   BMI 18.47 kg/m²         Review of Systems : The following systems were reviewed and no changes were noted as described in the history of present illness    Constitutional:  Denies recent weight loss, weight gain, fever or chills     HENT:  Denies any hearing loss, epistaxis, hoarseness, or difficulty speaking.     Eyes: Wears eyeglasses or contact lenses     Respiratory:  Denies dyspnea with exertion,no cough, wheezing, or hemoptysis.     Cardiovascular: Negative for palpations, chest pain, orthopnea, PND    Gastrointestinal:  Denies change in bowel habits, dyspepsia, ulcer disease, hematochezia, or melena.     Endocrine: Negative for cold intolerance, heat intolerance, polydipsia, polyphagia and polyuria.     Genitourinary: CKD      Musculoskeletal: Denies any history of arthritic symptoms or back problems     Neurological:  Denies any history of recurrent headaches, strokes, TIA, or seizure disorder.     Hematological: Denies any food allergies, seasonal allergies, bleeding disorders, or lymphadenopathy.     Psychiatric/Behavioral: Denies any history of depression, substance abuse, or change in cognitive function.       Physical Exam : The following systems were reassessed and no changes noted    Constitutional: Cooperative, alert and oriented,  in no acute distress.     HENT:   Head: Normocephalic, normal hair patterns, no masses or tenderness.  Ears, Nose, and Throat: No gross abnormalities. No pallor or " cyanosis.   Eyes: EOMS intact, PERRL, conjunctivae and lids unremarkable. Fundoscopic exam and visual fields not performed.   Neck: No palpable masses or adenopathy, no thyromegaly, no JVD, carotid pulses are full and equal bilaterally and without  Bruits.     Cardiovascular: Regular rhythm, S1 and S2 normal, no S3 or S4.  No murmurs, gallops, or rubs detected.     Pulmonary/Chest: Chest: normal symmetry,  normal respiratory excursion, no intercostal retraction, no use of accessory muscles.            Pulmonary: Normal breath sounds. No rales or ronchi.    Abdominal: Abdomen soft, bowel sounds normoactive, no masses, no hepatosplenomegaly, non-tender, no bruits.     Musculoskeletal: No deformities, clubbing, cyanosis, erythema, or edema observed.     Neurological: No gross motor or sensory deficits noted, affect appropriate, oriented to time, person, place.       Procedures      Lab Results   Component Value Date    WBC 5.87 07/02/2021    HGB 10.6 (L) 07/28/2021    HCT 31.5 (L) 07/28/2021    MCV 83.7 07/02/2021     07/02/2021     Lab Results   Component Value Date    GLUCOSE 93 07/28/2021    BUN 15 07/28/2021    CREATININE 1.55 (H) 07/28/2021    EGFRIFNONA 32 (L) 07/28/2021    BCR 9.7 07/28/2021    CO2 26.6 07/28/2021    CALCIUM 9.3 07/28/2021    ALBUMIN 4.00 07/28/2021    AST 21 07/28/2021    ALT 10 07/28/2021     Lab Results   Component Value Date    CHOL 184 07/28/2021    CHOL 163 01/22/2018     Lab Results   Component Value Date    TRIG 69 07/28/2021    TRIG 89 07/24/2019    TRIG 71 01/22/2018     Lab Results   Component Value Date    HDL 59 07/28/2021    HDL 58 07/24/2019    HDL 61 01/22/2018     No components found for: LDLCALC  Lab Results   Component Value Date     (H) 07/28/2021    LDL 95 07/24/2019    LDL 72 01/22/2018     No results found for: HDLLDLRATIO  No components found for: CHOLHDL  Lab Results   Component Value Date    HGBA1C 5.7 (H) 04/02/2014     Lab Results   Component Value Date     TSH 3.65 01/19/2016           ASSESSMENT AND PLAN  Ada Son has multiple medical issues as discussed under history of present illness but is compensated at the present time with no clinical evidence of angina, arrhythmia or congestive heart failure.    Her weight has remained stable and I have advised her to watch this closely.  She will continue her present dose of diuretics but take additional dose if there is significant weight gain or increasing dyspnea.    I have continued antiplatelet therapy with aspirin and Plavix, antihypertensive therapy with irbesartan, carvedilol, lipid-lowering therapy with atorvastatin.      Diagnoses and all orders for this visit:    1. S/P CABG (coronary artery bypass graft) ([facility])    2. Mixed hyperlipidemia    3. Essential hypertension    4. Ischemic cardiomyopathy    5. Acute on chronic systolic congestive heart failure (HCC)        Patient's Body mass index is 18.47 kg/m². BMI is within normal parameters. No follow-up required..  Patient is a non-smoker      Kalee Cisneros MD  10/5/2021  20:22 CDT

## 2021-12-08 NOTE — PROGRESS NOTES
[facility] CHF CLINIC OFFICE VISIT    Subjective:     Cardiomyopathy (Chief Complaint)      History of Present Illness    Ms. Abdalla is an 86 year old patient who presents to CHF clinic today following hospital discharge.     She is a patient of Dr. Le who presents to [facility] ER for acute onset of dyspnea and orthopnea. She had been feeling poorly for about 5 days. Two days ago, her breathing became labored with small amounts of activity. Last night, she was unable to lie flat to sleep. This morning, she presented to the ER for evaluation. On arrival, her o2 saturation was 89%. She was placed on NC, nitro paste was placed, and Lasix was given. Upon my exam 5 hours later, she is resting comfortably.   She denies chest pain. She is s/p CABG x1 in 2014. She has had left and right TCARs. She is medication compliant and in overall general good health.      She has not had a CHF exacerbation before. CXR shows volume overload and atelectasis. She has not had a recent illness or fever.      On exam, she is without leg swelling or abdominal distention. She does not have JVD. She has frequent PVCs on the monitor.     She was give 3 doses of 20mg IV lasix, nitro paste, and o2 with improvement in symptoms. Her BP was very elevated during hospital stay, but returned to normal before discharge. She was given Imdur, due to the positive response she had to the nitro paste, but she developed dizziness at home and this had to be stopped.   I had recommended that she take Lasix on Sunday following DC, but she was prescribed 40mg BID. Thankfully, she only took 2 doses of 20mg and did not  the 40mg.      7/22/21: 2 week hospital follow up. BP doing OK. Not as low as last visit. Tolerating medications well. Has not had to have any lasix.   Walked today at the mall with her friends. Has been to [place] today. No dyspnea. Doing well with her appetite. Covid vaccine back in January.     8/31/21: 1 month follow up. Still doing well. She is now on  20mg Lipitor. Saw Dr. Robertson with good report. Lasix  and Wednesday. No issues recently. Sleeping well. No bruising or bleeding.     21: Doing well. Still some fatigued, but overall better. Lasix is twice a week. She is still on oxygen at night time. She is inquiring about an inogen device. She said that o2 makes her feel better when she wears it. Would like to have another oxygen tank in her house for as needed use in the living room.   Her BP is WNL today. Her HR is good. She is tolerating all her medications well.       Diet: Small appetite. Cooking meals. Using some canned food, some frozen.   Fluids: no greater than 1500ml a day. Sweet tea or water in bottle. Maybe 1 or 2 day.   Activity: stays active around her home. Lives alone.     PCP: SAMANTHA  Cardiologist: Dr. Cisneros   Nephrologist: Dr. Robertson  Pulmonologist: n/a    Hospitalizations:  21- 7/3/21: CHF    Past Medical History:   Diagnosis Date   • CAD (coronary artery disease)    • Carotid artery stenosis    • Chronic systolic heart failure (HCC)    • CKD (chronic kidney disease) stage 3, GFR 30-59 ml/min ([facility])    • GERD (gastroesophageal reflux disease)    • Hypercholesterolemia    • Hyperlipidemia    • Hypertension    • Iron deficiency anemia    • Ischemic cardiomyopathy    • MI (myocardial infarction) ([facility])    • Mitral valve disease    • Osteoarthritis    • UTI (urinary tract infection)      Past Surgical History:   Procedure Laterality Date   • CARDIAC CATHETERIZATION     • CAROTID ENDARTERECTOMY     • CORONARY ARTERY BYPASS GRAFT     • TRANSESOPHAGEAL ECHOCARDIOGRAM (MISSY)       Social History     Socioeconomic History   • Marital status:    Tobacco Use   • Smoking status: Former Smoker     Quit date: 2000     Years since quittin.9   • Smokeless tobacco: Never Used   Vaping Use   • Vaping Use: Never used   Substance and Sexual Activity   • Alcohol use: No   • Drug use: No   • Sexual activity: Not Currently     Family History    Problem Relation Age of Onset   • Hypertension Father        Allergies:  No Known Allergies    Review of Systems   Constitutional: Negative for chills, decreased appetite and fever.   HENT: Negative.    Eyes: Negative.    Cardiovascular: Negative for chest pain, claudication, dyspnea on exertion, irregular heartbeat, leg swelling and palpitations.   Respiratory: Negative for cough, shortness of breath and wheezing.    Endocrine: Negative.    Skin: Negative for dry skin, flushing and rash.   Musculoskeletal: Negative for falls and myalgias.   Gastrointestinal: Negative for abdominal pain, change in bowel habit and melena.   Genitourinary: Negative for frequency and hematuria.   Neurological: Negative for dizziness, light-headedness, loss of balance and weakness.   Psychiatric/Behavioral: Negative for altered mental status and memory loss. The patient is not nervous/anxious.        Current Outpatient Medications   Medication Sig Dispense Refill   • acetaminophen (TYLENOL) 325 MG tablet Take 2 tablets by mouth Every 4 (Four) Hours As Needed for Mild Pain .     • albuterol sulfate  (90 Base) MCG/ACT inhaler Inhale 2 puffs Every 4 (Four) Hours As Needed for Wheezing or Shortness of Air. 19 g 1   • aspirin 81 MG chewable tablet Chew 81 mg Daily.     • atorvastatin (LIPITOR) 20 MG tablet      • carvedilol (Coreg) 12.5 MG tablet Take 1 tablet by mouth 2 (Two) Times a Day With Meals. 180 tablet 3   • clopidogrel (PLAVIX) 75 MG tablet Take 1 tablet by mouth Daily. 90 tablet 3   • furosemide (LASIX) 40 MG tablet 40 mg 2 (Two) Times a Week.     • irbesartan (AVAPRO) 75 MG tablet Take 1 tablet by mouth Daily. 90 tablet 3   • sennosides-docusate (senna-docusate sodium) 8.6-50 MG per tablet Take 2 tablets by mouth 2 (Two) Times a Day As Needed for Constipation.       No current facility-administered medications for this visit.        Objective:     Vitals:    12/08/21 1253   BP: 122/60   BP Location: Left arm   Patient  "Position: Sitting   Cuff Size: Adult   Pulse: 80   SpO2: 94%   Weight: 46.1 kg (101 lb 9.6 oz)   Height: 157.5 cm (62\")       Wt Readings from Last 3 Encounters:   12/08/21 46.1 kg (101 lb 9.6 oz)   10/05/21 45.8 kg (101 lb)   08/31/21 45.7 kg (100 lb 12.8 oz)            Constitutional:       General: Not in acute distress.     Appearance: Healthy appearance. Well-developed.   HENT:      Head: Normocephalic and atraumatic.   Neck:      Vascular: No JVD.   Pulmonary:      Effort: Pulmonary effort is normal. No respiratory distress.      Breath sounds: Normal breath sounds. No wheezing. No rales.   Cardiovascular:      Normal rate. Regular rhythm.   Pulses:     Intact distal pulses.   Abdominal:      General: Bowel sounds are normal.      Palpations: Abdomen is soft.   Musculoskeletal: Normal range of motion.      Cervical back: Normal range of motion. Skin:     General: Skin is warm and dry.      Findings: No erythema.   Neurological:      Mental Status: Alert and oriented to person, place, and time.   Psychiatric:         Behavior: Behavior normal.         Thought Content: Thought content normal.         Judgment: Judgment normal.         Cardiographics  Results for orders placed during the hospital encounter of 07/01/21    Adult Transthoracic Echo Complete w/ Color, Spectral and Contrast if Necessary Per Protocol    Interpretation Summary  · Estimated left ventricular EF = 35% Left ventricular ejection fraction appears to be 31 - 35%. Left ventricular systolic function is moderately decreased. The left ventricular cavity is mildly dilated. Left ventricular wall thickness is consistent with mild concentric hypertrophy. There is left ventricular global hypokinesis noted. Left ventricular diastolic function is consistent with (grade Ia w/high LAP) impaired relaxation. No evidence of left ventricular thrombus or mass present.  · Mild aortic valve insufficiency, mild mitral regurgitation, mild tricuspid regurgitation  · " Estimated right ventricular systolic pressure from tricuspid regurgitation is mildly elevated (35-45 mmHg).      No radiology results for the last 30 days.      Labs Reviewed by me:     Lab Results   Component Value Date    TSH 3.65 01/19/2016     Lab Results   Component Value Date    GLUCOSE 93 07/28/2021    BUN 15 07/28/2021    CREATININE 1.55 (H) 07/28/2021    EGFRIFNONA 32 (L) 07/28/2021    BCR 9.7 07/28/2021    K 4.3 07/28/2021    CO2 26.6 07/28/2021    CALCIUM 9.3 07/28/2021    ALBUMIN 4.00 07/28/2021    AST 21 07/28/2021    ALT 10 07/28/2021     Lab Results   Component Value Date    WBC 5.87 07/02/2021    HGB 10.6 (L) 07/28/2021    HCT 31.5 (L) 07/28/2021    MCV 83.7 07/02/2021     07/02/2021       Lab Results   Component Value Date    TROPONINT <0.010 07/01/2021     Lab Results   Component Value Date    PROBNP 12,193.0 (H) 07/01/2021              The following portions of the patient's history were reviewed and updated as appropriate: allergies, current medications, past family history, past medical history, past social history, past surgical history and problem list.     Old records reviewed and pertinent information is included in the above objective data.     Assessment/Plan:      Diagnosis Plan   1. Ischemic cardiomyopathy  ICM: EF:35%. NYHA Class II, Stage C. Patient is euvolemic and in a well perfused physiologic state. Hemodynamics are improved.   BETA-BLOCKER:   Carvedilol 12.5mg BID  CORLANOR:  N/a, HR 66  ACE/ARB/ENTRESTO:  Irbesartan 75mg      DIURETIC: Lasix 20mg PO daily PRN. Has not had to have but 1-2x a week if that.      ALDOSTERONE ANTAGONIST: indicated but CKD III  IMDUR/HYDRALAZINE: stop, dizziness.   DIGOXIN: n/a  Fluid restriction: 2 L  Sodium restriction:2 grams  Daily Weights  6MWT: 8/31/21: 141.2 meters in 2 minutes    Reiterated all educational points this visit.  Continue daily weight monitoring.  Continue restricted dietary sodium intake.  Discussed patient action plan for  heart failure. Contact information for this office verbalized.  Recommended avoiding NSAIDs use.  Discussed warning signs requiring additional medical attention for heart failure.   Education points repeated back by patient.       Take BP 2x weekly and record.     I offered her a CXR to re-evaluate left effusion. I can hear good lung sounds, but mildly diminished on that side. She declined.         Patient's Body mass index is 18.58 kg/m². indicating that she is within normal range (BMI 18.5-24.9). No BMI management plan needed..    Follow up in 3 months.             This document has been electronically signed by CHANA Lopez on December 8, 2021 13:10 CST

## 2022-01-01 ENCOUNTER — APPOINTMENT (OUTPATIENT)
Dept: CARDIOLOGY | Facility: HOSPITAL | Age: 87
End: 2022-01-01

## 2022-01-01 ENCOUNTER — APPOINTMENT (OUTPATIENT)
Dept: GENERAL RADIOLOGY | Facility: HOSPITAL | Age: 87
End: 2022-01-01

## 2022-01-01 ENCOUNTER — APPOINTMENT (OUTPATIENT)
Dept: ULTRASOUND IMAGING | Facility: HOSPITAL | Age: 87
End: 2022-01-01

## 2022-01-01 ENCOUNTER — APPOINTMENT (OUTPATIENT)
Dept: CT IMAGING | Facility: HOSPITAL | Age: 87
End: 2022-01-01

## 2022-01-01 ENCOUNTER — APPOINTMENT (OUTPATIENT)
Dept: NUCLEAR MEDICINE | Facility: HOSPITAL | Age: 87
End: 2022-01-01

## 2022-01-01 ENCOUNTER — OFFICE VISIT (OUTPATIENT)
Dept: CARDIAC SURGERY | Facility: CLINIC | Age: 87
End: 2022-01-01

## 2022-01-01 ENCOUNTER — HOSPITAL ENCOUNTER (INPATIENT)
Facility: HOSPITAL | Age: 87
LOS: 13 days | Discharge: HOSPICE | End: 2022-03-21
Attending: FAMILY MEDICINE | Admitting: INTERNAL MEDICINE

## 2022-01-01 VITALS
HEIGHT: 62 IN | DIASTOLIC BLOOD PRESSURE: 70 MMHG | WEIGHT: 101 LBS | OXYGEN SATURATION: 91 % | SYSTOLIC BLOOD PRESSURE: 132 MMHG | HEART RATE: 74 BPM | BODY MASS INDEX: 18.58 KG/M2

## 2022-01-01 VITALS
HEIGHT: 62 IN | HEART RATE: 103 BPM | SYSTOLIC BLOOD PRESSURE: 113 MMHG | TEMPERATURE: 97.7 F | DIASTOLIC BLOOD PRESSURE: 55 MMHG | OXYGEN SATURATION: 92 % | RESPIRATION RATE: 18 BRPM | WEIGHT: 103 LBS | BODY MASS INDEX: 18.95 KG/M2

## 2022-01-01 DIAGNOSIS — I65.23 BILATERAL CAROTID ARTERY STENOSIS: Primary | ICD-10-CM

## 2022-01-01 DIAGNOSIS — J96.21 ACUTE ON CHRONIC RESPIRATORY FAILURE WITH HYPOXIA: Primary | ICD-10-CM

## 2022-01-01 DIAGNOSIS — Z78.9 IMPAIRED MOBILITY AND ADLS: ICD-10-CM

## 2022-01-01 DIAGNOSIS — Z74.09 IMPAIRED MOBILITY AND ADLS: ICD-10-CM

## 2022-01-01 DIAGNOSIS — Z74.09 IMPAIRED FUNCTIONAL MOBILITY, BALANCE, GAIT, AND ENDURANCE: ICD-10-CM

## 2022-01-01 DIAGNOSIS — E78.2 MIXED HYPERLIPIDEMIA: ICD-10-CM

## 2022-01-01 DIAGNOSIS — J90 PLEURAL EFFUSION: ICD-10-CM

## 2022-01-01 DIAGNOSIS — I50.9 ACUTE ON CHRONIC CONGESTIVE HEART FAILURE, UNSPECIFIED HEART FAILURE TYPE: ICD-10-CM

## 2022-01-01 DIAGNOSIS — N19 RENAL FAILURE, UNSPECIFIED CHRONICITY: ICD-10-CM

## 2022-01-01 LAB
ABO GROUP BLD: NORMAL
ALBUMIN FLD-MCNC: 1.2 G/DL
ALBUMIN SERPL-MCNC: 3 G/DL (ref 3.5–5.2)
ALBUMIN SERPL-MCNC: 3.3 G/DL (ref 3.5–5.2)
ALBUMIN SERPL-MCNC: 3.5 G/DL (ref 3.5–5.2)
ALBUMIN SERPL-MCNC: 3.7 G/DL (ref 3.5–5.2)
ALBUMIN SERPL-MCNC: 3.9 G/DL (ref 3.5–5.2)
ALBUMIN SERPL-MCNC: 4.2 G/DL (ref 3.5–5.2)
ALBUMIN SERPL-MCNC: 4.5 G/DL (ref 3.5–5.2)
ALBUMIN/GLOB SERPL: 0.9 G/DL
ALBUMIN/GLOB SERPL: 1 G/DL
ALBUMIN/GLOB SERPL: 1 G/DL
ALBUMIN/GLOB SERPL: 1.1 G/DL
ALBUMIN/GLOB SERPL: 1.2 G/DL
ALBUMIN/GLOB SERPL: 1.3 G/DL
ALBUMIN/GLOB SERPL: 1.4 G/DL
ALBUMIN/GLOB SERPL: 2 G/DL
ALBUMIN/GLOB SERPL: 2.4 G/DL
ALP SERPL-CCNC: 54 U/L (ref 39–117)
ALP SERPL-CCNC: 59 U/L (ref 39–117)
ALP SERPL-CCNC: 64 U/L (ref 39–117)
ALP SERPL-CCNC: 69 U/L (ref 39–117)
ALP SERPL-CCNC: 76 U/L (ref 39–117)
ALP SERPL-CCNC: 80 U/L (ref 39–117)
ALP SERPL-CCNC: 80 U/L (ref 39–117)
ALP SERPL-CCNC: 84 U/L (ref 39–117)
ALP SERPL-CCNC: 90 U/L (ref 39–117)
ALT SERPL W P-5'-P-CCNC: 11 U/L (ref 1–33)
ALT SERPL W P-5'-P-CCNC: 14 U/L (ref 1–33)
ALT SERPL W P-5'-P-CCNC: 14 U/L (ref 1–33)
ALT SERPL W P-5'-P-CCNC: 18 U/L (ref 1–33)
ALT SERPL W P-5'-P-CCNC: 9 U/L (ref 1–33)
ALT SERPL W P-5'-P-CCNC: 9 U/L (ref 1–33)
ANION GAP SERPL CALCULATED.3IONS-SCNC: 10 MMOL/L (ref 5–15)
ANION GAP SERPL CALCULATED.3IONS-SCNC: 11 MMOL/L (ref 5–15)
ANION GAP SERPL CALCULATED.3IONS-SCNC: 11 MMOL/L (ref 5–15)
ANION GAP SERPL CALCULATED.3IONS-SCNC: 14 MMOL/L (ref 5–15)
ANION GAP SERPL CALCULATED.3IONS-SCNC: 3 MMOL/L (ref 5–15)
ANION GAP SERPL CALCULATED.3IONS-SCNC: 3 MMOL/L (ref 5–15)
ANION GAP SERPL CALCULATED.3IONS-SCNC: 4 MMOL/L (ref 5–15)
ANION GAP SERPL CALCULATED.3IONS-SCNC: 6 MMOL/L (ref 5–15)
ANION GAP SERPL CALCULATED.3IONS-SCNC: 7 MMOL/L (ref 5–15)
ANION GAP SERPL CALCULATED.3IONS-SCNC: 7 MMOL/L (ref 5–15)
ANION GAP SERPL CALCULATED.3IONS-SCNC: 8 MMOL/L (ref 5–15)
ANION GAP SERPL CALCULATED.3IONS-SCNC: 9 MMOL/L (ref 5–15)
APPEARANCE FLD: ABNORMAL
APPEARANCE FLD: ABNORMAL
APTT PPP: 30 SECONDS (ref 20–40.3)
APTT PPP: 30.6 SECONDS (ref 20–40.3)
APTT PPP: 32.6 SECONDS (ref 20–40.3)
APTT PPP: 37.1 SECONDS (ref 20–40.3)
APTT PPP: >240 SECONDS (ref 20–40.3)
AST SERPL-CCNC: 14 U/L (ref 1–32)
AST SERPL-CCNC: 15 U/L (ref 1–32)
AST SERPL-CCNC: 15 U/L (ref 1–32)
AST SERPL-CCNC: 16 U/L (ref 1–32)
AST SERPL-CCNC: 17 U/L (ref 1–32)
AST SERPL-CCNC: 19 U/L (ref 1–32)
AST SERPL-CCNC: 19 U/L (ref 1–32)
AST SERPL-CCNC: 22 U/L (ref 1–32)
AST SERPL-CCNC: 25 U/L (ref 1–32)
BACTERIA FLD CULT: NORMAL
BACTERIA SPEC ANAEROBE CULT: NORMAL
BACTERIA UR QL AUTO: ABNORMAL /HPF
BASOPHILS # BLD AUTO: 0.01 10*3/MM3 (ref 0–0.2)
BASOPHILS # BLD AUTO: 0.01 10*3/MM3 (ref 0–0.2)
BASOPHILS # BLD AUTO: 0.03 10*3/MM3 (ref 0–0.2)
BASOPHILS # BLD AUTO: 0.04 10*3/MM3 (ref 0–0.2)
BASOPHILS # BLD AUTO: 0.05 10*3/MM3 (ref 0–0.2)
BASOPHILS NFR BLD AUTO: 0.2 % (ref 0–1.5)
BASOPHILS NFR BLD AUTO: 0.3 % (ref 0–1.5)
BASOPHILS NFR BLD AUTO: 0.5 % (ref 0–1.5)
BASOPHILS NFR BLD AUTO: 0.5 % (ref 0–1.5)
BASOPHILS NFR BLD AUTO: 0.6 % (ref 0–1.5)
BASOPHILS NFR BLD AUTO: 0.7 % (ref 0–1.5)
BASOPHILS NFR BLD AUTO: 0.8 % (ref 0–1.5)
BASOPHILS NFR BLD AUTO: 0.8 % (ref 0–1.5)
BASOPHILS NFR BLD AUTO: 0.9 % (ref 0–1.5)
BH BB BLOOD EXPIRATION DATE: NORMAL
BH BB BLOOD TYPE BARCODE: NORMAL
BH BB DISPENSE STATUS: NORMAL
BH BB PRODUCT CODE: NORMAL
BH BB UNIT NUMBER: NORMAL
BH CV ECHO MEAS - ACS: 1.8 CM
BH CV ECHO MEAS - AI DEC SLOPE: 213 CM/SEC^2
BH CV ECHO MEAS - AI MAX PG: 35.3 MMHG
BH CV ECHO MEAS - AI MAX VEL: 297 CM/SEC
BH CV ECHO MEAS - AI P1/2T: 408.4 MSEC
BH CV ECHO MEAS - AO MAX PG (FULL): 0.58 MMHG
BH CV ECHO MEAS - AO MAX PG: 2.1 MMHG
BH CV ECHO MEAS - AO MEAN PG (FULL): 0 MMHG
BH CV ECHO MEAS - AO MEAN PG: 1 MMHG
BH CV ECHO MEAS - AO ROOT AREA (BSA CORRECTED): 2.6
BH CV ECHO MEAS - AO ROOT AREA: 11.3 CM^2
BH CV ECHO MEAS - AO ROOT DIAM: 3.8 CM
BH CV ECHO MEAS - AO V2 MAX: 73.2 CM/SEC
BH CV ECHO MEAS - AO V2 MEAN: 38.7 CM/SEC
BH CV ECHO MEAS - AO V2 VTI: 9.5 CM
BH CV ECHO MEAS - AVA(I,A): 2.6 CM^2
BH CV ECHO MEAS - AVA(I,D): 2.6 CM^2
BH CV ECHO MEAS - AVA(V,A): 2.7 CM^2
BH CV ECHO MEAS - AVA(V,D): 2.7 CM^2
BH CV ECHO MEAS - BSA(HAYCOCK): 1.4 M^2
BH CV ECHO MEAS - BSA: 1.4 M^2
BH CV ECHO MEAS - BZI_BMI: 18.8 KILOGRAMS/M^2
BH CV ECHO MEAS - BZI_METRIC_HEIGHT: 157.5 CM
BH CV ECHO MEAS - BZI_METRIC_WEIGHT: 46.7 KG
BH CV ECHO MEAS - EDV(CUBED): 132.7 ML
BH CV ECHO MEAS - EDV(MOD-SP2): 148 ML
BH CV ECHO MEAS - EDV(MOD-SP4): 126 ML
BH CV ECHO MEAS - EDV(TEICH): 123.8 ML
BH CV ECHO MEAS - EF(CUBED): 32.7 %
BH CV ECHO MEAS - EF(MOD-SP2): 37.2 %
BH CV ECHO MEAS - EF(MOD-SP4): 35.3 %
BH CV ECHO MEAS - EF(TEICH): 26.5 %
BH CV ECHO MEAS - ESV(CUBED): 89.3 ML
BH CV ECHO MEAS - ESV(MOD-SP2): 92.9 ML
BH CV ECHO MEAS - ESV(MOD-SP4): 81.5 ML
BH CV ECHO MEAS - ESV(TEICH): 91 ML
BH CV ECHO MEAS - FS: 12.4 %
BH CV ECHO MEAS - IVS/LVPW: 1.1
BH CV ECHO MEAS - IVSD: 1 CM
BH CV ECHO MEAS - LA DIMENSION: 4.3 CM
BH CV ECHO MEAS - LA/AO: 1.1
BH CV ECHO MEAS - LV DIASTOLIC VOL/BSA (35-75): 87.4 ML/M^2
BH CV ECHO MEAS - LV MASS(C)D: 181.6 GRAMS
BH CV ECHO MEAS - LV MASS(C)DI: 126 GRAMS/M^2
BH CV ECHO MEAS - LV MAX PG: 1.6 MMHG
BH CV ECHO MEAS - LV MEAN PG: 1 MMHG
BH CV ECHO MEAS - LV SYSTOLIC VOL/BSA (12-30): 56.5 ML/M^2
BH CV ECHO MEAS - LV V1 MAX: 62.5 CM/SEC
BH CV ECHO MEAS - LV V1 MEAN: 31 CM/SEC
BH CV ECHO MEAS - LV V1 VTI: 8 CM
BH CV ECHO MEAS - LVIDD: 5.1 CM
BH CV ECHO MEAS - LVIDS: 4.5 CM
BH CV ECHO MEAS - LVLD AP2: 9.2 CM
BH CV ECHO MEAS - LVLD AP4: 8.1 CM
BH CV ECHO MEAS - LVLS AP2: 8.5 CM
BH CV ECHO MEAS - LVLS AP4: 7.4 CM
BH CV ECHO MEAS - LVOT AREA (M): 3.1 CM^2
BH CV ECHO MEAS - LVOT AREA: 3.1 CM^2
BH CV ECHO MEAS - LVOT DIAM: 2 CM
BH CV ECHO MEAS - LVPWD: 0.93 CM
BH CV ECHO MEAS - MR MAX PG: 104 MMHG
BH CV ECHO MEAS - MR MAX VEL: 510 CM/SEC
BH CV ECHO MEAS - MV A MAX VEL: 39.2 CM/SEC
BH CV ECHO MEAS - MV DEC SLOPE: 908 CM/SEC^2
BH CV ECHO MEAS - MV E MAX VEL: 144 CM/SEC
BH CV ECHO MEAS - MV E/A: 3.7
BH CV ECHO MEAS - MV MAX PG: 10.1 MMHG
BH CV ECHO MEAS - MV MEAN PG: 2 MMHG
BH CV ECHO MEAS - MV P1/2T MAX VEL: 157 CM/SEC
BH CV ECHO MEAS - MV P1/2T: 50.6 MSEC
BH CV ECHO MEAS - MV V2 MAX: 159 CM/SEC
BH CV ECHO MEAS - MV V2 MEAN: 61.2 CM/SEC
BH CV ECHO MEAS - MV V2 VTI: 35.7 CM
BH CV ECHO MEAS - MVA P1/2T LCG: 1.4 CM^2
BH CV ECHO MEAS - MVA(P1/2T): 4.3 CM^2
BH CV ECHO MEAS - MVA(VTI): 0.71 CM^2
BH CV ECHO MEAS - PA MAX PG: 1.5 MMHG
BH CV ECHO MEAS - PA V2 MAX: 60.3 CM/SEC
BH CV ECHO MEAS - RAP SYSTOLE: 20 MMHG
BH CV ECHO MEAS - RVDD: 2.2 CM
BH CV ECHO MEAS - RVSP: 58.9 MMHG
BH CV ECHO MEAS - SI(AO): 75 ML/M^2
BH CV ECHO MEAS - SI(CUBED): 30.1 ML/M^2
BH CV ECHO MEAS - SI(LVOT): 17.5 ML/M^2
BH CV ECHO MEAS - SI(MOD-SP2): 38.2 ML/M^2
BH CV ECHO MEAS - SI(MOD-SP4): 30.9 ML/M^2
BH CV ECHO MEAS - SI(TEICH): 22.8 ML/M^2
BH CV ECHO MEAS - SV(AO): 108.1 ML
BH CV ECHO MEAS - SV(CUBED): 43.3 ML
BH CV ECHO MEAS - SV(LVOT): 25.2 ML
BH CV ECHO MEAS - SV(MOD-SP2): 55.1 ML
BH CV ECHO MEAS - SV(MOD-SP4): 44.5 ML
BH CV ECHO MEAS - SV(TEICH): 32.8 ML
BH CV ECHO MEAS - TR MAX VEL: 312 CM/SEC
BILIRUB SERPL-MCNC: 0.4 MG/DL (ref 0–1.2)
BILIRUB SERPL-MCNC: 0.5 MG/DL (ref 0–1.2)
BILIRUB SERPL-MCNC: 0.6 MG/DL (ref 0–1.2)
BILIRUB SERPL-MCNC: 0.6 MG/DL (ref 0–1.2)
BILIRUB SERPL-MCNC: 0.7 MG/DL (ref 0–1.2)
BILIRUB SERPL-MCNC: 0.7 MG/DL (ref 0–1.2)
BILIRUB UR QL STRIP: NEGATIVE
BLD GP AB SCN SERPL QL: NEGATIVE
BUN SERPL-MCNC: 32 MG/DL (ref 8–23)
BUN SERPL-MCNC: 35 MG/DL (ref 8–23)
BUN SERPL-MCNC: 38 MG/DL (ref 8–23)
BUN SERPL-MCNC: 41 MG/DL (ref 8–23)
BUN SERPL-MCNC: 42 MG/DL (ref 8–23)
BUN SERPL-MCNC: 43 MG/DL (ref 8–23)
BUN SERPL-MCNC: 44 MG/DL (ref 8–23)
BUN SERPL-MCNC: 47 MG/DL (ref 8–23)
BUN SERPL-MCNC: 50 MG/DL (ref 8–23)
BUN SERPL-MCNC: 61 MG/DL (ref 8–23)
BUN SERPL-MCNC: 67 MG/DL (ref 8–23)
BUN SERPL-MCNC: 76 MG/DL (ref 8–23)
BUN SERPL-MCNC: 76 MG/DL (ref 8–23)
BUN SERPL-MCNC: 81 MG/DL (ref 8–23)
BUN/CREAT SERPL: 18.1 (ref 7–25)
BUN/CREAT SERPL: 19.4 (ref 7–25)
BUN/CREAT SERPL: 22 (ref 7–25)
BUN/CREAT SERPL: 22.5 (ref 7–25)
BUN/CREAT SERPL: 22.7 (ref 7–25)
BUN/CREAT SERPL: 23 (ref 7–25)
BUN/CREAT SERPL: 23.1 (ref 7–25)
BUN/CREAT SERPL: 23.8 (ref 7–25)
BUN/CREAT SERPL: 24.1 (ref 7–25)
BUN/CREAT SERPL: 25.2 (ref 7–25)
BUN/CREAT SERPL: 25.3 (ref 7–25)
BUN/CREAT SERPL: 25.6 (ref 7–25)
BUN/CREAT SERPL: 26.5 (ref 7–25)
BUN/CREAT SERPL: 26.7 (ref 7–25)
BUN/CREAT SERPL: 27.6 (ref 7–25)
BUN/CREAT SERPL: 28.4 (ref 7–25)
CALCIUM SPEC-SCNC: 8.5 MG/DL (ref 8.6–10.5)
CALCIUM SPEC-SCNC: 8.5 MG/DL (ref 8.6–10.5)
CALCIUM SPEC-SCNC: 8.8 MG/DL (ref 8.6–10.5)
CALCIUM SPEC-SCNC: 8.9 MG/DL (ref 8.6–10.5)
CALCIUM SPEC-SCNC: 8.9 MG/DL (ref 8.6–10.5)
CALCIUM SPEC-SCNC: 9 MG/DL (ref 8.6–10.5)
CALCIUM SPEC-SCNC: 9.1 MG/DL (ref 8.6–10.5)
CALCIUM SPEC-SCNC: 9.2 MG/DL (ref 8.6–10.5)
CALCIUM SPEC-SCNC: 9.5 MG/DL (ref 8.6–10.5)
CALCIUM SPEC-SCNC: 9.8 MG/DL (ref 8.6–10.5)
CHLORIDE SERPL-SCNC: 88 MMOL/L (ref 98–107)
CHLORIDE SERPL-SCNC: 90 MMOL/L (ref 98–107)
CHLORIDE SERPL-SCNC: 91 MMOL/L (ref 98–107)
CHLORIDE SERPL-SCNC: 92 MMOL/L (ref 98–107)
CHLORIDE SERPL-SCNC: 95 MMOL/L (ref 98–107)
CHLORIDE SERPL-SCNC: 96 MMOL/L (ref 98–107)
CHLORIDE SERPL-SCNC: 96 MMOL/L (ref 98–107)
CHLORIDE SERPL-SCNC: 97 MMOL/L (ref 98–107)
CHLORIDE SERPL-SCNC: 98 MMOL/L (ref 98–107)
CHLORIDE SERPL-SCNC: 99 MMOL/L (ref 98–107)
CHOLEST SERPL-MCNC: 164 MG/DL (ref 0–200)
CK SERPL-CCNC: 36 U/L (ref 20–180)
CLARITY UR: ABNORMAL
CO2 SERPL-SCNC: 28 MMOL/L (ref 22–29)
CO2 SERPL-SCNC: 30 MMOL/L (ref 22–29)
CO2 SERPL-SCNC: 32 MMOL/L (ref 22–29)
CO2 SERPL-SCNC: 35 MMOL/L (ref 22–29)
CO2 SERPL-SCNC: 36 MMOL/L (ref 22–29)
CO2 SERPL-SCNC: 37 MMOL/L (ref 22–29)
CO2 SERPL-SCNC: 39 MMOL/L (ref 22–29)
CO2 SERPL-SCNC: 39 MMOL/L (ref 22–29)
CO2 SERPL-SCNC: 41 MMOL/L (ref 22–29)
COLOR FLD: YELLOW
COLOR FLD: YELLOW
COLOR UR: ABNORMAL
CREAT SERPL-MCNC: 1.56 MG/DL (ref 0.57–1)
CREAT SERPL-MCNC: 1.63 MG/DL (ref 0.57–1)
CREAT SERPL-MCNC: 1.66 MG/DL (ref 0.57–1)
CREAT SERPL-MCNC: 1.72 MG/DL (ref 0.57–1)
CREAT SERPL-MCNC: 1.73 MG/DL (ref 0.57–1)
CREAT SERPL-MCNC: 1.77 MG/DL (ref 0.57–1)
CREAT SERPL-MCNC: 1.8 MG/DL (ref 0.57–1)
CREAT SERPL-MCNC: 1.86 MG/DL (ref 0.57–1)
CREAT SERPL-MCNC: 1.87 MG/DL (ref 0.57–1)
CREAT SERPL-MCNC: 1.91 MG/DL (ref 0.57–1)
CREAT SERPL-MCNC: 2.07 MG/DL (ref 0.57–1)
CREAT SERPL-MCNC: 2.15 MG/DL (ref 0.57–1)
CREAT SERPL-MCNC: 2.53 MG/DL (ref 0.57–1)
CREAT SERPL-MCNC: 3.15 MG/DL (ref 0.57–1)
CREAT SERPL-MCNC: 3.3 MG/DL (ref 0.57–1)
CREAT SERPL-MCNC: 3.4 MG/DL (ref 0.57–1)
CREAT UR-MCNC: 112.4 MG/DL
CREAT UR-MCNC: 122.9 MG/DL
CROSSMATCH INTERPRETATION: NORMAL
D-DIMER, QUANTITATIVE (MAD,POW, STR): 3153 NG/ML (FEU) (ref 0–470)
DEPRECATED RDW RBC AUTO: 47.4 FL (ref 37–54)
DEPRECATED RDW RBC AUTO: 47.7 FL (ref 37–54)
DEPRECATED RDW RBC AUTO: 47.7 FL (ref 37–54)
DEPRECATED RDW RBC AUTO: 47.9 FL (ref 37–54)
DEPRECATED RDW RBC AUTO: 48.4 FL (ref 37–54)
DEPRECATED RDW RBC AUTO: 49 FL (ref 37–54)
DEPRECATED RDW RBC AUTO: 49.4 FL (ref 37–54)
DEPRECATED RDW RBC AUTO: 49.5 FL (ref 37–54)
DEPRECATED RDW RBC AUTO: 49.5 FL (ref 37–54)
DEPRECATED RDW RBC AUTO: 49.8 FL (ref 37–54)
DEPRECATED RDW RBC AUTO: 50.8 FL (ref 37–54)
DEPRECATED RDW RBC AUTO: 51.6 FL (ref 37–54)
DEPRECATED RDW RBC AUTO: 52.3 FL (ref 37–54)
DEPRECATED RDW RBC AUTO: 53.5 FL (ref 37–54)
DEPRECATED RDW RBC AUTO: 54.4 FL (ref 37–54)
DEPRECATED RDW RBC AUTO: 54.6 FL (ref 37–54)
EGFRCR SERPLBLD CKD-EPI 2021: 12.7 ML/MIN/1.73
EGFRCR SERPLBLD CKD-EPI 2021: 13.2 ML/MIN/1.73
EGFRCR SERPLBLD CKD-EPI 2021: 14 ML/MIN/1.73
EGFRCR SERPLBLD CKD-EPI 2021: 18.2 ML/MIN/1.73
EGFRCR SERPLBLD CKD-EPI 2021: 22.1 ML/MIN/1.73
EGFRCR SERPLBLD CKD-EPI 2021: 23.1 ML/MIN/1.73
EGFRCR SERPLBLD CKD-EPI 2021: 25.4 ML/MIN/1.73
EGFRCR SERPLBLD CKD-EPI 2021: 26.1 ML/MIN/1.73
EGFRCR SERPLBLD CKD-EPI 2021: 26.3 ML/MIN/1.73
EGFRCR SERPLBLD CKD-EPI 2021: 27.3 ML/MIN/1.73
EGFRCR SERPLBLD CKD-EPI 2021: 27.9 ML/MIN/1.73
EGFRCR SERPLBLD CKD-EPI 2021: 28.7 ML/MIN/1.73
EGFRCR SERPLBLD CKD-EPI 2021: 28.9 ML/MIN/1.73
EGFRCR SERPLBLD CKD-EPI 2021: 30.1 ML/MIN/1.73
EGFRCR SERPLBLD CKD-EPI 2021: 30.8 ML/MIN/1.73
EGFRCR SERPLBLD CKD-EPI 2021: 32.4 ML/MIN/1.73
EOSINOPHIL # BLD AUTO: 0 10*3/MM3 (ref 0–0.4)
EOSINOPHIL # BLD AUTO: 0.01 10*3/MM3 (ref 0–0.4)
EOSINOPHIL # BLD AUTO: 0.19 10*3/MM3 (ref 0–0.4)
EOSINOPHIL # BLD AUTO: 0.19 10*3/MM3 (ref 0–0.4)
EOSINOPHIL # BLD AUTO: 0.22 10*3/MM3 (ref 0–0.4)
EOSINOPHIL # BLD AUTO: 0.24 10*3/MM3 (ref 0–0.4)
EOSINOPHIL # BLD AUTO: 0.24 10*3/MM3 (ref 0–0.4)
EOSINOPHIL # BLD AUTO: 0.26 10*3/MM3 (ref 0–0.4)
EOSINOPHIL NFR BLD AUTO: 0 % (ref 0.3–6.2)
EOSINOPHIL NFR BLD AUTO: 0.2 % (ref 0.3–6.2)
EOSINOPHIL NFR BLD AUTO: 3.1 % (ref 0.3–6.2)
EOSINOPHIL NFR BLD AUTO: 3.2 % (ref 0.3–6.2)
EOSINOPHIL NFR BLD AUTO: 3.3 % (ref 0.3–6.2)
EOSINOPHIL NFR BLD AUTO: 3.7 % (ref 0.3–6.2)
EOSINOPHIL NFR BLD AUTO: 3.8 % (ref 0.3–6.2)
EOSINOPHIL NFR BLD AUTO: 4.6 % (ref 0.3–6.2)
ERYTHROCYTE [DISTWIDTH] IN BLOOD BY AUTOMATED COUNT: 14.3 % (ref 12.3–15.4)
ERYTHROCYTE [DISTWIDTH] IN BLOOD BY AUTOMATED COUNT: 14.4 % (ref 12.3–15.4)
ERYTHROCYTE [DISTWIDTH] IN BLOOD BY AUTOMATED COUNT: 14.5 % (ref 12.3–15.4)
ERYTHROCYTE [DISTWIDTH] IN BLOOD BY AUTOMATED COUNT: 14.6 % (ref 12.3–15.4)
ERYTHROCYTE [DISTWIDTH] IN BLOOD BY AUTOMATED COUNT: 14.8 % (ref 12.3–15.4)
ERYTHROCYTE [DISTWIDTH] IN BLOOD BY AUTOMATED COUNT: 15.2 % (ref 12.3–15.4)
ERYTHROCYTE [DISTWIDTH] IN BLOOD BY AUTOMATED COUNT: 15.2 % (ref 12.3–15.4)
ERYTHROCYTE [DISTWIDTH] IN BLOOD BY AUTOMATED COUNT: 15.3 % (ref 12.3–15.4)
ERYTHROCYTE [DISTWIDTH] IN BLOOD BY AUTOMATED COUNT: 15.8 % (ref 12.3–15.4)
ERYTHROCYTE [DISTWIDTH] IN BLOOD BY AUTOMATED COUNT: 16.4 % (ref 12.3–15.4)
FERRITIN SERPL-MCNC: 61.42 NG/ML (ref 13–150)
FLUAV RNA RESP QL NAA+PROBE: NOT DETECTED
FLUBV RNA RESP QL NAA+PROBE: NOT DETECTED
GLOBULIN UR ELPH-MCNC: 1.9 GM/DL
GLOBULIN UR ELPH-MCNC: 2.1 GM/DL
GLOBULIN UR ELPH-MCNC: 2.6 GM/DL
GLOBULIN UR ELPH-MCNC: 2.8 GM/DL
GLOBULIN UR ELPH-MCNC: 2.9 GM/DL
GLOBULIN UR ELPH-MCNC: 3.3 GM/DL
GLOBULIN UR ELPH-MCNC: 3.4 GM/DL
GLOBULIN UR ELPH-MCNC: 3.5 GM/DL
GLOBULIN UR ELPH-MCNC: 3.6 GM/DL
GLUCOSE BLDC GLUCOMTR-MCNC: 100 MG/DL (ref 70–130)
GLUCOSE BLDC GLUCOMTR-MCNC: 124 MG/DL (ref 70–130)
GLUCOSE BLDC GLUCOMTR-MCNC: 128 MG/DL (ref 70–130)
GLUCOSE SERPL-MCNC: 106 MG/DL (ref 65–99)
GLUCOSE SERPL-MCNC: 110 MG/DL (ref 65–99)
GLUCOSE SERPL-MCNC: 124 MG/DL (ref 65–99)
GLUCOSE SERPL-MCNC: 126 MG/DL (ref 65–99)
GLUCOSE SERPL-MCNC: 86 MG/DL (ref 65–99)
GLUCOSE SERPL-MCNC: 88 MG/DL (ref 65–99)
GLUCOSE SERPL-MCNC: 88 MG/DL (ref 65–99)
GLUCOSE SERPL-MCNC: 92 MG/DL (ref 65–99)
GLUCOSE SERPL-MCNC: 93 MG/DL (ref 65–99)
GLUCOSE SERPL-MCNC: 94 MG/DL (ref 65–99)
GLUCOSE SERPL-MCNC: 95 MG/DL (ref 65–99)
GLUCOSE SERPL-MCNC: 98 MG/DL (ref 65–99)
GLUCOSE SERPL-MCNC: 98 MG/DL (ref 65–99)
GLUCOSE UR STRIP-MCNC: NEGATIVE MG/DL
GRAM STN SPEC: NORMAL
HANSEL STAIN: NEGATIVE
HCT VFR BLD AUTO: 23 % (ref 34–46.6)
HCT VFR BLD AUTO: 24.8 % (ref 34–46.6)
HCT VFR BLD AUTO: 26.8 % (ref 34–46.6)
HCT VFR BLD AUTO: 28.3 % (ref 34–46.6)
HCT VFR BLD AUTO: 28.3 % (ref 34–46.6)
HCT VFR BLD AUTO: 28.7 % (ref 34–46.6)
HCT VFR BLD AUTO: 29.4 % (ref 34–46.6)
HCT VFR BLD AUTO: 30 % (ref 34–46.6)
HCT VFR BLD AUTO: 30.7 % (ref 34–46.6)
HCT VFR BLD AUTO: 31.1 % (ref 34–46.6)
HCT VFR BLD AUTO: 31.7 % (ref 34–46.6)
HCT VFR BLD AUTO: 32 % (ref 34–46.6)
HCT VFR BLD AUTO: 32.2 % (ref 34–46.6)
HCT VFR BLD AUTO: 33.7 % (ref 34–46.6)
HCT VFR BLD AUTO: 34.7 % (ref 34–46.6)
HCT VFR BLD AUTO: 36.9 % (ref 34–46.6)
HDLC SERPL-MCNC: 53 MG/DL (ref 40–60)
HGB BLD-MCNC: 10.3 G/DL (ref 12–15.9)
HGB BLD-MCNC: 10.4 G/DL (ref 12–15.9)
HGB BLD-MCNC: 10.6 G/DL (ref 12–15.9)
HGB BLD-MCNC: 7.1 G/DL (ref 12–15.9)
HGB BLD-MCNC: 7.6 G/DL (ref 12–15.9)
HGB BLD-MCNC: 8.3 G/DL (ref 12–15.9)
HGB BLD-MCNC: 8.8 G/DL (ref 12–15.9)
HGB BLD-MCNC: 8.9 G/DL (ref 12–15.9)
HGB BLD-MCNC: 9.2 G/DL (ref 12–15.9)
HGB BLD-MCNC: 9.4 G/DL (ref 12–15.9)
HGB BLD-MCNC: 9.6 G/DL (ref 12–15.9)
HGB BLD-MCNC: 9.8 G/DL (ref 12–15.9)
HGB UR QL STRIP.AUTO: ABNORMAL
HOLD SPECIMEN: NORMAL
HYALINE CASTS UR QL AUTO: ABNORMAL /LPF
IMM GRANULOCYTES # BLD AUTO: 0.01 10*3/MM3 (ref 0–0.05)
IMM GRANULOCYTES # BLD AUTO: 0.02 10*3/MM3 (ref 0–0.05)
IMM GRANULOCYTES # BLD AUTO: 0.03 10*3/MM3 (ref 0–0.05)
IMM GRANULOCYTES # BLD AUTO: 0.04 10*3/MM3 (ref 0–0.05)
IMM GRANULOCYTES # BLD AUTO: 0.06 10*3/MM3 (ref 0–0.05)
IMM GRANULOCYTES NFR BLD AUTO: 0.2 % (ref 0–0.5)
IMM GRANULOCYTES NFR BLD AUTO: 0.2 % (ref 0–0.5)
IMM GRANULOCYTES NFR BLD AUTO: 0.3 % (ref 0–0.5)
IMM GRANULOCYTES NFR BLD AUTO: 0.3 % (ref 0–0.5)
IMM GRANULOCYTES NFR BLD AUTO: 0.4 % (ref 0–0.5)
IMM GRANULOCYTES NFR BLD AUTO: 0.4 % (ref 0–0.5)
IMM GRANULOCYTES NFR BLD AUTO: 0.5 % (ref 0–0.5)
IMM GRANULOCYTES NFR BLD AUTO: 0.6 % (ref 0–0.5)
IMM GRANULOCYTES NFR BLD AUTO: 1.1 % (ref 0–0.5)
INR PPP: 0.97 (ref 0.8–1.2)
INR PPP: 1.08 (ref 0.8–1.2)
IRON 24H UR-MRATE: 35 MCG/DL (ref 37–145)
IRON SATN MFR SERPL: 12 % (ref 20–50)
KETONES UR QL STRIP: ABNORMAL
LAB AP CASE REPORT: NORMAL
LDH FLD-CCNC: 65 U/L
LDH FLD-CCNC: 68 U/L
LDH SERPL-CCNC: 292 U/L (ref 135–214)
LDLC SERPL CALC-MCNC: 93 MG/DL (ref 0–100)
LDLC/HDLC SERPL: 1.72 {RATIO}
LEUKOCYTE ESTERASE UR QL STRIP.AUTO: ABNORMAL
LV EF 2D ECHO EST: 35 %
LYMPHOCYTES # BLD AUTO: 0.35 10*3/MM3 (ref 0.7–3.1)
LYMPHOCYTES # BLD AUTO: 0.35 10*3/MM3 (ref 0.7–3.1)
LYMPHOCYTES # BLD AUTO: 0.37 10*3/MM3 (ref 0.7–3.1)
LYMPHOCYTES # BLD AUTO: 0.42 10*3/MM3 (ref 0.7–3.1)
LYMPHOCYTES # BLD AUTO: 0.5 10*3/MM3 (ref 0.7–3.1)
LYMPHOCYTES # BLD AUTO: 0.51 10*3/MM3 (ref 0.7–3.1)
LYMPHOCYTES # BLD AUTO: 0.52 10*3/MM3 (ref 0.7–3.1)
LYMPHOCYTES # BLD AUTO: 0.54 10*3/MM3 (ref 0.7–3.1)
LYMPHOCYTES # BLD AUTO: 0.54 10*3/MM3 (ref 0.7–3.1)
LYMPHOCYTES # BLD AUTO: 0.55 10*3/MM3 (ref 0.7–3.1)
LYMPHOCYTES # BLD AUTO: 0.56 10*3/MM3 (ref 0.7–3.1)
LYMPHOCYTES # BLD AUTO: 0.57 10*3/MM3 (ref 0.7–3.1)
LYMPHOCYTES # BLD AUTO: 0.61 10*3/MM3 (ref 0.7–3.1)
LYMPHOCYTES # BLD AUTO: 0.64 10*3/MM3 (ref 0.7–3.1)
LYMPHOCYTES # BLD AUTO: 0.67 10*3/MM3 (ref 0.7–3.1)
LYMPHOCYTES NFR BLD AUTO: 10.1 % (ref 19.6–45.3)
LYMPHOCYTES NFR BLD AUTO: 10.7 % (ref 19.6–45.3)
LYMPHOCYTES NFR BLD AUTO: 10.8 % (ref 19.6–45.3)
LYMPHOCYTES NFR BLD AUTO: 11.7 % (ref 19.6–45.3)
LYMPHOCYTES NFR BLD AUTO: 12.6 % (ref 19.6–45.3)
LYMPHOCYTES NFR BLD AUTO: 13.2 % (ref 19.6–45.3)
LYMPHOCYTES NFR BLD AUTO: 15.5 % (ref 19.6–45.3)
LYMPHOCYTES NFR BLD AUTO: 5.6 % (ref 19.6–45.3)
LYMPHOCYTES NFR BLD AUTO: 5.8 % (ref 19.6–45.3)
LYMPHOCYTES NFR BLD AUTO: 6.2 % (ref 19.6–45.3)
LYMPHOCYTES NFR BLD AUTO: 7.3 % (ref 19.6–45.3)
LYMPHOCYTES NFR BLD AUTO: 7.7 % (ref 19.6–45.3)
LYMPHOCYTES NFR BLD AUTO: 8.8 % (ref 19.6–45.3)
LYMPHOCYTES NFR BLD AUTO: 9.3 % (ref 19.6–45.3)
LYMPHOCYTES NFR BLD AUTO: 9.4 % (ref 19.6–45.3)
Lab: NORMAL
MAGNESIUM SERPL-MCNC: 2.1 MG/DL (ref 1.6–2.4)
MAGNESIUM SERPL-MCNC: 2.1 MG/DL (ref 1.6–2.4)
MAGNESIUM SERPL-MCNC: 2.2 MG/DL (ref 1.6–2.4)
MAGNESIUM SERPL-MCNC: 2.5 MG/DL (ref 1.6–2.4)
MCH RBC QN AUTO: 27.4 PG (ref 26.6–33)
MCH RBC QN AUTO: 27.8 PG (ref 26.6–33)
MCH RBC QN AUTO: 27.8 PG (ref 26.6–33)
MCH RBC QN AUTO: 27.9 PG (ref 26.6–33)
MCH RBC QN AUTO: 28 PG (ref 26.6–33)
MCH RBC QN AUTO: 28.1 PG (ref 26.6–33)
MCH RBC QN AUTO: 28.2 PG (ref 26.6–33)
MCH RBC QN AUTO: 28.3 PG (ref 26.6–33)
MCH RBC QN AUTO: 28.5 PG (ref 26.6–33)
MCH RBC QN AUTO: 28.6 PG (ref 26.6–33)
MCH RBC QN AUTO: 28.7 PG (ref 26.6–33)
MCH RBC QN AUTO: 28.7 PG (ref 26.6–33)
MCH RBC QN AUTO: 29 PG (ref 26.6–33)
MCHC RBC AUTO-ENTMCNC: 28.7 G/DL (ref 31.5–35.7)
MCHC RBC AUTO-ENTMCNC: 29.2 G/DL (ref 31.5–35.7)
MCHC RBC AUTO-ENTMCNC: 29.6 G/DL (ref 31.5–35.7)
MCHC RBC AUTO-ENTMCNC: 29.9 G/DL (ref 31.5–35.7)
MCHC RBC AUTO-ENTMCNC: 30 G/DL (ref 31.5–35.7)
MCHC RBC AUTO-ENTMCNC: 30 G/DL (ref 31.5–35.7)
MCHC RBC AUTO-ENTMCNC: 30.6 G/DL (ref 31.5–35.7)
MCHC RBC AUTO-ENTMCNC: 30.9 G/DL (ref 31.5–35.7)
MCHC RBC AUTO-ENTMCNC: 30.9 G/DL (ref 31.5–35.7)
MCHC RBC AUTO-ENTMCNC: 31 G/DL (ref 31.5–35.7)
MCHC RBC AUTO-ENTMCNC: 31 G/DL (ref 31.5–35.7)
MCHC RBC AUTO-ENTMCNC: 31.1 G/DL (ref 31.5–35.7)
MCHC RBC AUTO-ENTMCNC: 31.1 G/DL (ref 31.5–35.7)
MCHC RBC AUTO-ENTMCNC: 31.3 G/DL (ref 31.5–35.7)
MCV RBC AUTO: 89.6 FL (ref 79–97)
MCV RBC AUTO: 90.1 FL (ref 79–97)
MCV RBC AUTO: 90.2 FL (ref 79–97)
MCV RBC AUTO: 90.9 FL (ref 79–97)
MCV RBC AUTO: 91.1 FL (ref 79–97)
MCV RBC AUTO: 91.3 FL (ref 79–97)
MCV RBC AUTO: 91.5 FL (ref 79–97)
MCV RBC AUTO: 92.2 FL (ref 79–97)
MCV RBC AUTO: 93.4 FL (ref 79–97)
MCV RBC AUTO: 93.5 FL (ref 79–97)
MCV RBC AUTO: 93.6 FL (ref 79–97)
MCV RBC AUTO: 94 FL (ref 79–97)
MCV RBC AUTO: 94.1 FL (ref 79–97)
MCV RBC AUTO: 95.5 FL (ref 79–97)
MCV RBC AUTO: 96.7 FL (ref 79–97)
MCV RBC AUTO: 98.7 FL (ref 79–97)
MONOCYTES # BLD AUTO: 0.49 10*3/MM3 (ref 0.1–0.9)
MONOCYTES # BLD AUTO: 0.51 10*3/MM3 (ref 0.1–0.9)
MONOCYTES # BLD AUTO: 0.54 10*3/MM3 (ref 0.1–0.9)
MONOCYTES # BLD AUTO: 0.56 10*3/MM3 (ref 0.1–0.9)
MONOCYTES # BLD AUTO: 0.56 10*3/MM3 (ref 0.1–0.9)
MONOCYTES # BLD AUTO: 0.58 10*3/MM3 (ref 0.1–0.9)
MONOCYTES # BLD AUTO: 0.59 10*3/MM3 (ref 0.1–0.9)
MONOCYTES # BLD AUTO: 0.66 10*3/MM3 (ref 0.1–0.9)
MONOCYTES # BLD AUTO: 0.67 10*3/MM3 (ref 0.1–0.9)
MONOCYTES # BLD AUTO: 0.69 10*3/MM3 (ref 0.1–0.9)
MONOCYTES # BLD AUTO: 0.76 10*3/MM3 (ref 0.1–0.9)
MONOCYTES # BLD AUTO: 0.8 10*3/MM3 (ref 0.1–0.9)
MONOCYTES # BLD AUTO: 0.84 10*3/MM3 (ref 0.1–0.9)
MONOCYTES # BLD AUTO: 0.84 10*3/MM3 (ref 0.1–0.9)
MONOCYTES # BLD AUTO: 0.89 10*3/MM3 (ref 0.1–0.9)
MONOCYTES NFR BLD AUTO: 10.5 % (ref 5–12)
MONOCYTES NFR BLD AUTO: 10.9 % (ref 5–12)
MONOCYTES NFR BLD AUTO: 11.7 % (ref 5–12)
MONOCYTES NFR BLD AUTO: 11.8 % (ref 5–12)
MONOCYTES NFR BLD AUTO: 12.5 % (ref 5–12)
MONOCYTES NFR BLD AUTO: 13.1 % (ref 5–12)
MONOCYTES NFR BLD AUTO: 13.6 % (ref 5–12)
MONOCYTES NFR BLD AUTO: 13.7 % (ref 5–12)
MONOCYTES NFR BLD AUTO: 13.7 % (ref 5–12)
MONOCYTES NFR BLD AUTO: 14.2 % (ref 5–12)
MONOCYTES NFR BLD AUTO: 15.1 % (ref 5–12)
MONOCYTES NFR BLD AUTO: 15.6 % (ref 5–12)
MONOCYTES NFR BLD AUTO: 7.7 % (ref 5–12)
MONOCYTES NFR BLD AUTO: 9.5 % (ref 5–12)
MONOCYTES NFR BLD AUTO: 9.6 % (ref 5–12)
MONOS+MACROS NFR FLD: 86 %
MONOS+MACROS NFR FLD: 89 %
MRSA DNA SPEC QL NAA+PROBE: NEGATIVE
NEUTROPHILS NFR BLD AUTO: 2.74 10*3/MM3 (ref 1.7–7)
NEUTROPHILS NFR BLD AUTO: 3 10*3/MM3 (ref 1.7–7)
NEUTROPHILS NFR BLD AUTO: 3.07 10*3/MM3 (ref 1.7–7)
NEUTROPHILS NFR BLD AUTO: 3.49 10*3/MM3 (ref 1.7–7)
NEUTROPHILS NFR BLD AUTO: 3.88 10*3/MM3 (ref 1.7–7)
NEUTROPHILS NFR BLD AUTO: 4.21 10*3/MM3 (ref 1.7–7)
NEUTROPHILS NFR BLD AUTO: 4.33 10*3/MM3 (ref 1.7–7)
NEUTROPHILS NFR BLD AUTO: 4.34 10*3/MM3 (ref 1.7–7)
NEUTROPHILS NFR BLD AUTO: 4.46 10*3/MM3 (ref 1.7–7)
NEUTROPHILS NFR BLD AUTO: 4.47 10*3/MM3 (ref 1.7–7)
NEUTROPHILS NFR BLD AUTO: 4.49 10*3/MM3 (ref 1.7–7)
NEUTROPHILS NFR BLD AUTO: 4.71 10*3/MM3 (ref 1.7–7)
NEUTROPHILS NFR BLD AUTO: 4.96 10*3/MM3 (ref 1.7–7)
NEUTROPHILS NFR BLD AUTO: 5.25 10*3/MM3 (ref 1.7–7)
NEUTROPHILS NFR BLD AUTO: 5.47 10*3/MM3 (ref 1.7–7)
NEUTROPHILS NFR BLD AUTO: 71.1 % (ref 42.7–76)
NEUTROPHILS NFR BLD AUTO: 71.3 % (ref 42.7–76)
NEUTROPHILS NFR BLD AUTO: 71.9 % (ref 42.7–76)
NEUTROPHILS NFR BLD AUTO: 72.1 % (ref 42.7–76)
NEUTROPHILS NFR BLD AUTO: 72.9 % (ref 42.7–76)
NEUTROPHILS NFR BLD AUTO: 73.1 % (ref 42.7–76)
NEUTROPHILS NFR BLD AUTO: 73.5 % (ref 42.7–76)
NEUTROPHILS NFR BLD AUTO: 75.6 % (ref 42.7–76)
NEUTROPHILS NFR BLD AUTO: 76.4 % (ref 42.7–76)
NEUTROPHILS NFR BLD AUTO: 76.7 % (ref 42.7–76)
NEUTROPHILS NFR BLD AUTO: 76.8 % (ref 42.7–76)
NEUTROPHILS NFR BLD AUTO: 78.5 % (ref 42.7–76)
NEUTROPHILS NFR BLD AUTO: 79 % (ref 42.7–76)
NEUTROPHILS NFR BLD AUTO: 79.7 % (ref 42.7–76)
NEUTROPHILS NFR BLD AUTO: 85.4 % (ref 42.7–76)
NEUTROPHILS NFR FLD MANUAL: 11 %
NEUTROPHILS NFR FLD MANUAL: 14 %
NITRITE UR QL STRIP: NEGATIVE
NRBC BLD AUTO-RTO: 0 /100 WBC (ref 0–0.2)
NT-PROBNP SERPL-MCNC: ABNORMAL PG/ML (ref 0–1800)
NT-PROBNP SERPL-MCNC: ABNORMAL PG/ML (ref 0–1800)
PATH REPORT.FINAL DX SPEC: NORMAL
PH UR STRIP.AUTO: <=5 [PH] (ref 5–9)
PHOSPHATE SERPL-MCNC: 4.6 MG/DL (ref 2.5–4.5)
PHOSPHATE SERPL-MCNC: 4.9 MG/DL (ref 2.5–4.5)
PHOSPHATE SERPL-MCNC: 5.1 MG/DL (ref 2.5–4.5)
PLATELET # BLD AUTO: 157 10*3/MM3 (ref 140–450)
PLATELET # BLD AUTO: 158 10*3/MM3 (ref 140–450)
PLATELET # BLD AUTO: 159 10*3/MM3 (ref 140–450)
PLATELET # BLD AUTO: 168 10*3/MM3 (ref 140–450)
PLATELET # BLD AUTO: 187 10*3/MM3 (ref 140–450)
PLATELET # BLD AUTO: 191 10*3/MM3 (ref 140–450)
PLATELET # BLD AUTO: 194 10*3/MM3 (ref 140–450)
PLATELET # BLD AUTO: 197 10*3/MM3 (ref 140–450)
PLATELET # BLD AUTO: 199 10*3/MM3 (ref 140–450)
PLATELET # BLD AUTO: 199 10*3/MM3 (ref 140–450)
PLATELET # BLD AUTO: 205 10*3/MM3 (ref 140–450)
PLATELET # BLD AUTO: 217 10*3/MM3 (ref 140–450)
PLATELET # BLD AUTO: 220 10*3/MM3 (ref 140–450)
PLATELET # BLD AUTO: 228 10*3/MM3 (ref 140–450)
PLATELET # BLD AUTO: 229 10*3/MM3 (ref 140–450)
PLATELET # BLD AUTO: 250 10*3/MM3 (ref 140–450)
PMV BLD AUTO: 10.4 FL (ref 6–12)
PMV BLD AUTO: 10.5 FL (ref 6–12)
PMV BLD AUTO: 10.6 FL (ref 6–12)
PMV BLD AUTO: 10.7 FL (ref 6–12)
PMV BLD AUTO: 11 FL (ref 6–12)
PMV BLD AUTO: 11.1 FL (ref 6–12)
POTASSIUM SERPL-SCNC: 4.5 MMOL/L (ref 3.5–5.2)
POTASSIUM SERPL-SCNC: 4.6 MMOL/L (ref 3.5–5.2)
POTASSIUM SERPL-SCNC: 4.8 MMOL/L (ref 3.5–5.2)
POTASSIUM SERPL-SCNC: 4.9 MMOL/L (ref 3.5–5.2)
POTASSIUM SERPL-SCNC: 5 MMOL/L (ref 3.5–5.2)
POTASSIUM SERPL-SCNC: 5.1 MMOL/L (ref 3.5–5.2)
POTASSIUM SERPL-SCNC: 5.1 MMOL/L (ref 3.5–5.2)
POTASSIUM SERPL-SCNC: 5.3 MMOL/L (ref 3.5–5.2)
POTASSIUM SERPL-SCNC: 5.4 MMOL/L (ref 3.5–5.2)
POTASSIUM SERPL-SCNC: 5.4 MMOL/L (ref 3.5–5.2)
POTASSIUM SERPL-SCNC: 5.5 MMOL/L (ref 3.5–5.2)
PROCALCITONIN SERPL-MCNC: 0.17 NG/ML (ref 0–0.25)
PROCALCITONIN SERPL-MCNC: 0.64 NG/ML (ref 0–0.25)
PROT ?TM UR-MCNC: 88.8 MG/DL
PROT FLD-MCNC: 1.9 G/DL
PROT FLD-MCNC: 2 G/DL
PROT SERPL-MCNC: 5.9 G/DL (ref 6–8.5)
PROT SERPL-MCNC: 6.3 G/DL (ref 6–8.5)
PROT SERPL-MCNC: 6.4 G/DL (ref 6–8.5)
PROT SERPL-MCNC: 6.8 G/DL (ref 6–8.5)
PROT SERPL-MCNC: 7.1 G/DL (ref 6–8.5)
PROT SERPL-MCNC: 7.2 G/DL (ref 6–8.5)
PROT SERPL-MCNC: 7.3 G/DL (ref 6–8.5)
PROT UR QL STRIP: ABNORMAL
PROT/CREAT UR: 790 MG/G CREA (ref 0–200)
PROTHROMBIN TIME: 12.8 SECONDS (ref 11.1–15.3)
PROTHROMBIN TIME: 13.9 SECONDS (ref 11.1–15.3)
QT INTERVAL: 324 MS
QT INTERVAL: 326 MS
QTC INTERVAL: 385 MS
QTC INTERVAL: 398 MS
RBC # BLD AUTO: 2.53 10*6/MM3 (ref 3.77–5.28)
RBC # BLD AUTO: 2.71 10*6/MM3 (ref 3.77–5.28)
RBC # BLD AUTO: 2.97 10*6/MM3 (ref 3.77–5.28)
RBC # BLD AUTO: 3.07 10*6/MM3 (ref 3.77–5.28)
RBC # BLD AUTO: 3.07 10*6/MM3 (ref 3.77–5.28)
RBC # BLD AUTO: 3.08 10*6/MM3 (ref 3.77–5.28)
RBC # BLD AUTO: 3.16 10*6/MM3 (ref 3.77–5.28)
RBC # BLD AUTO: 3.28 10*6/MM3 (ref 3.77–5.28)
RBC # BLD AUTO: 3.31 10*6/MM3 (ref 3.77–5.28)
RBC # BLD AUTO: 3.33 10*6/MM3 (ref 3.77–5.28)
RBC # BLD AUTO: 3.33 10*6/MM3 (ref 3.77–5.28)
RBC # BLD AUTO: 3.4 10*6/MM3 (ref 3.77–5.28)
RBC # BLD AUTO: 3.48 10*6/MM3 (ref 3.77–5.28)
RBC # BLD AUTO: 3.61 10*6/MM3 (ref 3.77–5.28)
RBC # BLD AUTO: 3.74 10*6/MM3 (ref 3.77–5.28)
RBC # BLD AUTO: 3.8 10*6/MM3 (ref 3.77–5.28)
RBC # FLD AUTO: 2000 /MM3 (ref 0–0)
RBC # FLD AUTO: 3000 /MM3 (ref 0–0)
RBC # UR STRIP: ABNORMAL /HPF
REF LAB TEST METHOD: ABNORMAL
RH BLD: NEGATIVE
SARS-COV-2 RNA RESP QL NAA+PROBE: NOT DETECTED
SODIUM SERPL-SCNC: 131 MMOL/L (ref 136–145)
SODIUM SERPL-SCNC: 133 MMOL/L (ref 136–145)
SODIUM SERPL-SCNC: 134 MMOL/L (ref 136–145)
SODIUM SERPL-SCNC: 136 MMOL/L (ref 136–145)
SODIUM SERPL-SCNC: 136 MMOL/L (ref 136–145)
SODIUM SERPL-SCNC: 137 MMOL/L (ref 136–145)
SODIUM SERPL-SCNC: 138 MMOL/L (ref 136–145)
SODIUM SERPL-SCNC: 139 MMOL/L (ref 136–145)
SODIUM SERPL-SCNC: 140 MMOL/L (ref 136–145)
SODIUM SERPL-SCNC: 140 MMOL/L (ref 136–145)
SODIUM UR-SCNC: 20 MMOL/L
SP GR UR STRIP: 1.02 (ref 1–1.03)
SPECIMEN VOL FLD: 1200 ML
SPECIMEN VOL FLD: 900 ML
SQUAMOUS #/AREA URNS HPF: ABNORMAL /HPF
T&S EXPIRATION DATE: NORMAL
TIBC SERPL-MCNC: 283 MCG/DL (ref 298–536)
TRANSFERRIN SERPL-MCNC: 190 MG/DL (ref 200–360)
TRIGL SERPL-MCNC: 99 MG/DL (ref 0–150)
TROPONIN T SERPL-MCNC: <0.01 NG/ML (ref 0–0.03)
TROPONIN T SERPL-MCNC: <0.01 NG/ML (ref 0–0.03)
UNIT  ABO: NORMAL
UNIT  RH: NORMAL
UROBILINOGEN UR QL STRIP: ABNORMAL
VLDLC SERPL-MCNC: 18 MG/DL (ref 5–40)
WBC # FLD: 240.5 /MM3 (ref 0–5)
WBC # FLD: 402 /MM3 (ref 0–5)
WBC # UR STRIP: ABNORMAL /HPF
WBC NRBC COR # BLD: 3.85 10*3/MM3 (ref 3.4–10.8)
WBC NRBC COR # BLD: 4.12 10*3/MM3 (ref 3.4–10.8)
WBC NRBC COR # BLD: 4.31 10*3/MM3 (ref 3.4–10.8)
WBC NRBC COR # BLD: 4.83 10*3/MM3 (ref 3.4–10.8)
WBC NRBC COR # BLD: 4.86 10*3/MM3 (ref 3.4–10.8)
WBC NRBC COR # BLD: 5.13 10*3/MM3 (ref 3.4–10.8)
WBC NRBC COR # BLD: 5.43 10*3/MM3 (ref 3.4–10.8)
WBC NRBC COR # BLD: 5.52 10*3/MM3 (ref 3.4–10.8)
WBC NRBC COR # BLD: 5.69 10*3/MM3 (ref 3.4–10.8)
WBC NRBC COR # BLD: 5.81 10*3/MM3 (ref 3.4–10.8)
WBC NRBC COR # BLD: 5.91 10*3/MM3 (ref 3.4–10.8)
WBC NRBC COR # BLD: 6.14 10*3/MM3 (ref 3.4–10.8)
WBC NRBC COR # BLD: 6.28 10*3/MM3 (ref 3.4–10.8)
WBC NRBC COR # BLD: 6.4 10*3/MM3 (ref 3.4–10.8)
WBC NRBC COR # BLD: 6.52 10*3/MM3 (ref 3.4–10.8)
WBC NRBC COR # BLD: 6.85 10*3/MM3 (ref 3.4–10.8)
WHOLE BLOOD HOLD SPECIMEN: NORMAL
WHOLE BLOOD HOLD SPECIMEN: NORMAL
YEAST URNS QL MICRO: ABNORMAL /HPF

## 2022-01-01 PROCEDURE — 82550 ASSAY OF CK (CPK): CPT | Performed by: FAMILY MEDICINE

## 2022-01-01 PROCEDURE — 83735 ASSAY OF MAGNESIUM: CPT | Performed by: INTERNAL MEDICINE

## 2022-01-01 PROCEDURE — 97110 THERAPEUTIC EXERCISES: CPT

## 2022-01-01 PROCEDURE — 94799 UNLISTED PULMONARY SVC/PX: CPT

## 2022-01-01 PROCEDURE — 80048 BASIC METABOLIC PNL TOTAL CA: CPT | Performed by: INTERNAL MEDICINE

## 2022-01-01 PROCEDURE — 84145 PROCALCITONIN (PCT): CPT | Performed by: NURSE PRACTITIONER

## 2022-01-01 PROCEDURE — 97530 THERAPEUTIC ACTIVITIES: CPT

## 2022-01-01 PROCEDURE — 85730 THROMBOPLASTIN TIME PARTIAL: CPT | Performed by: RADIOLOGY

## 2022-01-01 PROCEDURE — 87205 SMEAR GRAM STAIN: CPT | Performed by: INTERNAL MEDICINE

## 2022-01-01 PROCEDURE — 87070 CULTURE OTHR SPECIMN AEROBIC: CPT | Performed by: NURSE PRACTITIONER

## 2022-01-01 PROCEDURE — P9047 ALBUMIN (HUMAN), 25%, 50ML: HCPCS | Performed by: NURSE PRACTITIONER

## 2022-01-01 PROCEDURE — 87075 CULTR BACTERIA EXCEPT BLOOD: CPT | Performed by: INTERNAL MEDICINE

## 2022-01-01 PROCEDURE — 25010000002 ALBUMIN HUMAN 25% PER 50 ML: Performed by: INTERNAL MEDICINE

## 2022-01-01 PROCEDURE — 89051 BODY FLUID CELL COUNT: CPT | Performed by: INTERNAL MEDICINE

## 2022-01-01 PROCEDURE — 84300 ASSAY OF URINE SODIUM: CPT | Performed by: INTERNAL MEDICINE

## 2022-01-01 PROCEDURE — 94760 N-INVAS EAR/PLS OXIMETRY 1: CPT

## 2022-01-01 PROCEDURE — 93010 ELECTROCARDIOGRAM REPORT: CPT | Performed by: INTERNAL MEDICINE

## 2022-01-01 PROCEDURE — 85730 THROMBOPLASTIN TIME PARTIAL: CPT | Performed by: INTERNAL MEDICINE

## 2022-01-01 PROCEDURE — 25010000002 HEPARIN (PORCINE) PER 1000 UNITS: Performed by: INTERNAL MEDICINE

## 2022-01-01 PROCEDURE — P9047 ALBUMIN (HUMAN), 25%, 50ML: HCPCS | Performed by: INTERNAL MEDICINE

## 2022-01-01 PROCEDURE — 93306 TTE W/DOPPLER COMPLETE: CPT | Performed by: INTERNAL MEDICINE

## 2022-01-01 PROCEDURE — 76942 ECHO GUIDE FOR BIOPSY: CPT

## 2022-01-01 PROCEDURE — 85025 COMPLETE CBC W/AUTO DIFF WBC: CPT | Performed by: NURSE PRACTITIONER

## 2022-01-01 PROCEDURE — 25010000002 CEFEPIME PER 500 MG: Performed by: NURSE PRACTITIONER

## 2022-01-01 PROCEDURE — 36415 COLL VENOUS BLD VENIPUNCTURE: CPT | Performed by: INTERNAL MEDICINE

## 2022-01-01 PROCEDURE — 85610 PROTHROMBIN TIME: CPT | Performed by: RADIOLOGY

## 2022-01-01 PROCEDURE — 83615 LACTATE (LD) (LDH) ENZYME: CPT | Performed by: NURSE PRACTITIONER

## 2022-01-01 PROCEDURE — 71045 X-RAY EXAM CHEST 1 VIEW: CPT

## 2022-01-01 PROCEDURE — 97535 SELF CARE MNGMENT TRAINING: CPT

## 2022-01-01 PROCEDURE — 25010000002 FUROSEMIDE PER 20 MG: Performed by: INTERNAL MEDICINE

## 2022-01-01 PROCEDURE — 25010000002 FUROSEMIDE PER 20 MG

## 2022-01-01 PROCEDURE — 85025 COMPLETE CBC W/AUTO DIFF WBC: CPT | Performed by: FAMILY MEDICINE

## 2022-01-01 PROCEDURE — 25010000002 NA FERRIC GLUC CPLX PER 12.5 MG: Performed by: INTERNAL MEDICINE

## 2022-01-01 PROCEDURE — 82962 GLUCOSE BLOOD TEST: CPT

## 2022-01-01 PROCEDURE — 88112 CYTOPATH CELL ENHANCE TECH: CPT

## 2022-01-01 PROCEDURE — 85025 COMPLETE CBC W/AUTO DIFF WBC: CPT | Performed by: INTERNAL MEDICINE

## 2022-01-01 PROCEDURE — 80053 COMPREHEN METABOLIC PANEL: CPT | Performed by: NURSE PRACTITIONER

## 2022-01-01 PROCEDURE — 94668 MNPJ CHEST WALL SBSQ: CPT

## 2022-01-01 PROCEDURE — 0W993ZX DRAINAGE OF RIGHT PLEURAL CAVITY, PERCUTANEOUS APPROACH, DIAGNOSTIC: ICD-10-PCS | Performed by: INTERNAL MEDICINE

## 2022-01-01 PROCEDURE — 84484 ASSAY OF TROPONIN QUANT: CPT | Performed by: FAMILY MEDICINE

## 2022-01-01 PROCEDURE — 93005 ELECTROCARDIOGRAM TRACING: CPT | Performed by: INTERNAL MEDICINE

## 2022-01-01 PROCEDURE — 87636 SARSCOV2 & INF A&B AMP PRB: CPT | Performed by: FAMILY MEDICINE

## 2022-01-01 PROCEDURE — G0378 HOSPITAL OBSERVATION PER HR: HCPCS

## 2022-01-01 PROCEDURE — 63710000001 INSULIN REGULAR HUMAN PER 5 UNITS: Performed by: INTERNAL MEDICINE

## 2022-01-01 PROCEDURE — 0W9B3ZZ DRAINAGE OF LEFT PLEURAL CAVITY, PERCUTANEOUS APPROACH: ICD-10-PCS | Performed by: INTERNAL MEDICINE

## 2022-01-01 PROCEDURE — 85610 PROTHROMBIN TIME: CPT | Performed by: FAMILY MEDICINE

## 2022-01-01 PROCEDURE — 99233 SBSQ HOSP IP/OBS HIGH 50: CPT | Performed by: NURSE PRACTITIONER

## 2022-01-01 PROCEDURE — 94667 MNPJ CHEST WALL 1ST: CPT

## 2022-01-01 PROCEDURE — 84157 ASSAY OF PROTEIN OTHER: CPT | Performed by: NURSE PRACTITIONER

## 2022-01-01 PROCEDURE — 85730 THROMBOPLASTIN TIME PARTIAL: CPT | Performed by: FAMILY MEDICINE

## 2022-01-01 PROCEDURE — 84157 ASSAY OF PROTEIN OTHER: CPT | Performed by: INTERNAL MEDICINE

## 2022-01-01 PROCEDURE — 86901 BLOOD TYPING SEROLOGIC RH(D): CPT | Performed by: NURSE PRACTITIONER

## 2022-01-01 PROCEDURE — 97116 GAIT TRAINING THERAPY: CPT

## 2022-01-01 PROCEDURE — 97166 OT EVAL MOD COMPLEX 45 MIN: CPT

## 2022-01-01 PROCEDURE — 25010000002 FUROSEMIDE PER 20 MG: Performed by: FAMILY MEDICINE

## 2022-01-01 PROCEDURE — 93306 TTE W/DOPPLER COMPLETE: CPT

## 2022-01-01 PROCEDURE — 86923 COMPATIBILITY TEST ELECTRIC: CPT

## 2022-01-01 PROCEDURE — 84484 ASSAY OF TROPONIN QUANT: CPT | Performed by: INTERNAL MEDICINE

## 2022-01-01 PROCEDURE — 93005 ELECTROCARDIOGRAM TRACING: CPT | Performed by: FAMILY MEDICINE

## 2022-01-01 PROCEDURE — 84132 ASSAY OF SERUM POTASSIUM: CPT | Performed by: NURSE PRACTITIONER

## 2022-01-01 PROCEDURE — 80061 LIPID PANEL: CPT | Performed by: INTERNAL MEDICINE

## 2022-01-01 PROCEDURE — 86900 BLOOD TYPING SEROLOGIC ABO: CPT | Performed by: NURSE PRACTITIONER

## 2022-01-01 PROCEDURE — 83735 ASSAY OF MAGNESIUM: CPT | Performed by: FAMILY MEDICINE

## 2022-01-01 PROCEDURE — 87205 SMEAR GRAM STAIN: CPT | Performed by: NURSE PRACTITIONER

## 2022-01-01 PROCEDURE — 25010000002 ALBUMIN HUMAN 25% PER 50 ML: Performed by: NURSE PRACTITIONER

## 2022-01-01 PROCEDURE — 83880 ASSAY OF NATRIURETIC PEPTIDE: CPT | Performed by: NURSE PRACTITIONER

## 2022-01-01 PROCEDURE — 84100 ASSAY OF PHOSPHORUS: CPT | Performed by: INTERNAL MEDICINE

## 2022-01-01 PROCEDURE — 0W9B3ZX DRAINAGE OF LEFT PLEURAL CAVITY, PERCUTANEOUS APPROACH, DIAGNOSTIC: ICD-10-PCS | Performed by: INTERNAL MEDICINE

## 2022-01-01 PROCEDURE — 87070 CULTURE OTHR SPECIMN AEROBIC: CPT | Performed by: INTERNAL MEDICINE

## 2022-01-01 PROCEDURE — 82042 OTHER SOURCE ALBUMIN QUAN EA: CPT | Performed by: INTERNAL MEDICINE

## 2022-01-01 PROCEDURE — 89051 BODY FLUID CELL COUNT: CPT | Performed by: NURSE PRACTITIONER

## 2022-01-01 PROCEDURE — 94664 DEMO&/EVAL PT USE INHALER: CPT

## 2022-01-01 PROCEDURE — 99233 SBSQ HOSP IP/OBS HIGH 50: CPT | Performed by: INTERNAL MEDICINE

## 2022-01-01 PROCEDURE — 82570 ASSAY OF URINE CREATININE: CPT | Performed by: INTERNAL MEDICINE

## 2022-01-01 PROCEDURE — 86900 BLOOD TYPING SEROLOGIC ABO: CPT

## 2022-01-01 PROCEDURE — 25010000002 FUROSEMIDE PER 20 MG: Performed by: NURSE PRACTITIONER

## 2022-01-01 PROCEDURE — 84145 PROCALCITONIN (PCT): CPT | Performed by: FAMILY MEDICINE

## 2022-01-01 PROCEDURE — 80053 COMPREHEN METABOLIC PANEL: CPT | Performed by: FAMILY MEDICINE

## 2022-01-01 PROCEDURE — 71250 CT THORAX DX C-: CPT

## 2022-01-01 PROCEDURE — 99232 SBSQ HOSP IP/OBS MODERATE 35: CPT | Performed by: INTERNAL MEDICINE

## 2022-01-01 PROCEDURE — 25010000002 HEPARIN (PORCINE) PER 1000 UNITS: Performed by: FAMILY MEDICINE

## 2022-01-01 PROCEDURE — 86850 RBC ANTIBODY SCREEN: CPT | Performed by: NURSE PRACTITIONER

## 2022-01-01 PROCEDURE — 81001 URINALYSIS AUTO W/SCOPE: CPT | Performed by: NURSE PRACTITIONER

## 2022-01-01 PROCEDURE — 83540 ASSAY OF IRON: CPT | Performed by: INTERNAL MEDICINE

## 2022-01-01 PROCEDURE — 87641 MR-STAPH DNA AMP PROBE: CPT | Performed by: NURSE PRACTITIONER

## 2022-01-01 PROCEDURE — 84466 ASSAY OF TRANSFERRIN: CPT | Performed by: INTERNAL MEDICINE

## 2022-01-01 PROCEDURE — 94640 AIRWAY INHALATION TREATMENT: CPT

## 2022-01-01 PROCEDURE — 36430 TRANSFUSION BLD/BLD COMPNT: CPT

## 2022-01-01 PROCEDURE — 82570 ASSAY OF URINE CREATININE: CPT | Performed by: NURSE PRACTITIONER

## 2022-01-01 PROCEDURE — 0W993ZZ DRAINAGE OF RIGHT PLEURAL CAVITY, PERCUTANEOUS APPROACH: ICD-10-PCS | Performed by: INTERNAL MEDICINE

## 2022-01-01 PROCEDURE — 71046 X-RAY EXAM CHEST 2 VIEWS: CPT

## 2022-01-01 PROCEDURE — 99213 OFFICE O/P EST LOW 20 MIN: CPT | Performed by: NURSE PRACTITIONER

## 2022-01-01 PROCEDURE — 76775 US EXAM ABDO BACK WALL LIM: CPT

## 2022-01-01 PROCEDURE — 99497 ADVNCD CARE PLAN 30 MIN: CPT | Performed by: NURSE PRACTITIONER

## 2022-01-01 PROCEDURE — P9016 RBC LEUKOCYTES REDUCED: HCPCS

## 2022-01-01 PROCEDURE — 97162 PT EVAL MOD COMPLEX 30 MIN: CPT

## 2022-01-01 PROCEDURE — 99285 EMERGENCY DEPT VISIT HI MDM: CPT

## 2022-01-01 PROCEDURE — 83880 ASSAY OF NATRIURETIC PEPTIDE: CPT | Performed by: FAMILY MEDICINE

## 2022-01-01 PROCEDURE — 87015 SPECIMEN INFECT AGNT CONCNTJ: CPT | Performed by: NURSE PRACTITIONER

## 2022-01-01 PROCEDURE — 84156 ASSAY OF PROTEIN URINE: CPT | Performed by: NURSE PRACTITIONER

## 2022-01-01 PROCEDURE — 83615 LACTATE (LD) (LDH) ENZYME: CPT | Performed by: INTERNAL MEDICINE

## 2022-01-01 PROCEDURE — 80053 COMPREHEN METABOLIC PANEL: CPT | Performed by: INTERNAL MEDICINE

## 2022-01-01 PROCEDURE — 82728 ASSAY OF FERRITIN: CPT | Performed by: INTERNAL MEDICINE

## 2022-01-01 PROCEDURE — 85027 COMPLETE CBC AUTOMATED: CPT | Performed by: INTERNAL MEDICINE

## 2022-01-01 PROCEDURE — 85379 FIBRIN DEGRADATION QUANT: CPT | Performed by: FAMILY MEDICINE

## 2022-01-01 RX ORDER — FUROSEMIDE 10 MG/ML
60 INJECTION INTRAMUSCULAR; INTRAVENOUS
Status: DISCONTINUED | OUTPATIENT
Start: 2022-01-01 | End: 2022-01-01

## 2022-01-01 RX ORDER — FUROSEMIDE 10 MG/ML
INJECTION INTRAMUSCULAR; INTRAVENOUS
Status: COMPLETED
Start: 2022-01-01 | End: 2022-01-01

## 2022-01-01 RX ORDER — ALBUMIN (HUMAN) 12.5 G/50ML
12.5 SOLUTION INTRAVENOUS ONCE
Status: COMPLETED | OUTPATIENT
Start: 2022-01-01 | End: 2022-01-01

## 2022-01-01 RX ORDER — ONDANSETRON 2 MG/ML
4 INJECTION INTRAMUSCULAR; INTRAVENOUS EVERY 6 HOURS PRN
Status: DISCONTINUED | OUTPATIENT
Start: 2022-01-01 | End: 2022-01-01 | Stop reason: HOSPADM

## 2022-01-01 RX ORDER — ACETAMINOPHEN 325 MG/1
650 TABLET ORAL EVERY 4 HOURS PRN
Status: DISCONTINUED | OUTPATIENT
Start: 2022-01-01 | End: 2022-01-01 | Stop reason: HOSPADM

## 2022-01-01 RX ORDER — SODIUM CHLORIDE 0.9 % (FLUSH) 0.9 %
10 SYRINGE (ML) INJECTION EVERY 12 HOURS SCHEDULED
Status: DISCONTINUED | OUTPATIENT
Start: 2022-01-01 | End: 2022-01-01 | Stop reason: HOSPADM

## 2022-01-01 RX ORDER — POTASSIUM CHLORIDE 7.45 MG/ML
10 INJECTION INTRAVENOUS
Status: DISCONTINUED | OUTPATIENT
Start: 2022-01-01 | End: 2022-01-01 | Stop reason: HOSPADM

## 2022-01-01 RX ORDER — ALBUTEROL SULFATE 2.5 MG/3ML
2.5 SOLUTION RESPIRATORY (INHALATION) EVERY 4 HOURS PRN
Status: DISCONTINUED | OUTPATIENT
Start: 2022-01-01 | End: 2022-01-01 | Stop reason: HOSPADM

## 2022-01-01 RX ORDER — ACETAMINOPHEN 650 MG/1
650 SUPPOSITORY RECTAL EVERY 4 HOURS PRN
Status: DISCONTINUED | OUTPATIENT
Start: 2022-01-01 | End: 2022-01-01 | Stop reason: HOSPADM

## 2022-01-01 RX ORDER — SODIUM CHLORIDE 9 MG/ML
INJECTION, SOLUTION INTRAVENOUS
Status: DISPENSED
Start: 2022-01-01 | End: 2022-01-01

## 2022-01-01 RX ORDER — ECHINACEA PURPUREA EXTRACT 125 MG
1 TABLET ORAL 4 TIMES DAILY PRN
Status: DISCONTINUED | OUTPATIENT
Start: 2022-01-01 | End: 2022-01-01 | Stop reason: HOSPADM

## 2022-01-01 RX ORDER — MAGNESIUM SULFATE HEPTAHYDRATE 40 MG/ML
4 INJECTION, SOLUTION INTRAVENOUS AS NEEDED
Status: DISCONTINUED | OUTPATIENT
Start: 2022-01-01 | End: 2022-01-01 | Stop reason: HOSPADM

## 2022-01-01 RX ORDER — ACETAMINOPHEN 160 MG/5ML
650 SOLUTION ORAL EVERY 4 HOURS PRN
Status: DISCONTINUED | OUTPATIENT
Start: 2022-01-01 | End: 2022-01-01 | Stop reason: HOSPADM

## 2022-01-01 RX ORDER — HEPARIN SOD,PORCINE/0.9 % NACL 25000/250
26 INTRAVENOUS SOLUTION INTRAVENOUS
Status: DISCONTINUED | OUTPATIENT
Start: 2022-01-01 | End: 2022-01-01

## 2022-01-01 RX ORDER — IPRATROPIUM BROMIDE AND ALBUTEROL SULFATE 2.5; .5 MG/3ML; MG/3ML
3 SOLUTION RESPIRATORY (INHALATION)
Status: DISCONTINUED | OUTPATIENT
Start: 2022-01-01 | End: 2022-01-01

## 2022-01-01 RX ORDER — SODIUM CHLORIDE 0.9 % (FLUSH) 0.9 %
10 SYRINGE (ML) INJECTION AS NEEDED
Status: DISCONTINUED | OUTPATIENT
Start: 2022-01-01 | End: 2022-01-01 | Stop reason: HOSPADM

## 2022-01-01 RX ORDER — DEXTROSE MONOHYDRATE 25 G/50ML
50 INJECTION, SOLUTION INTRAVENOUS ONCE
Status: COMPLETED | OUTPATIENT
Start: 2022-01-01 | End: 2022-01-01

## 2022-01-01 RX ORDER — MORPHINE SULFATE 2 MG/ML
2 INJECTION, SOLUTION INTRAMUSCULAR; INTRAVENOUS
Status: DISCONTINUED | OUTPATIENT
Start: 2022-01-01 | End: 2022-01-01 | Stop reason: HOSPADM

## 2022-01-01 RX ORDER — FUROSEMIDE 10 MG/ML
40 INJECTION INTRAMUSCULAR; INTRAVENOUS
Status: DISCONTINUED | OUTPATIENT
Start: 2022-01-01 | End: 2022-01-01

## 2022-01-01 RX ORDER — HEPARIN SODIUM 5000 [USP'U]/ML
80 INJECTION, SOLUTION INTRAVENOUS; SUBCUTANEOUS AS NEEDED
Status: DISCONTINUED | OUTPATIENT
Start: 2022-01-01 | End: 2022-01-01

## 2022-01-01 RX ORDER — FUROSEMIDE 10 MG/ML
40 INJECTION INTRAMUSCULAR; INTRAVENOUS EVERY 12 HOURS
Status: DISCONTINUED | OUTPATIENT
Start: 2022-01-01 | End: 2022-01-01

## 2022-01-01 RX ORDER — FUROSEMIDE 10 MG/ML
80 INJECTION INTRAMUSCULAR; INTRAVENOUS ONCE
Status: COMPLETED | OUTPATIENT
Start: 2022-01-01 | End: 2022-01-01

## 2022-01-01 RX ORDER — FUROSEMIDE 40 MG/1
40 TABLET ORAL DAILY
Status: DISCONTINUED | OUTPATIENT
Start: 2022-01-01 | End: 2022-01-01

## 2022-01-01 RX ORDER — ALBUMIN (HUMAN) 12.5 G/50ML
25 SOLUTION INTRAVENOUS 3 TIMES DAILY
Status: COMPLETED | OUTPATIENT
Start: 2022-01-01 | End: 2022-01-01

## 2022-01-01 RX ORDER — POLYETHYLENE GLYCOL 3350 17 G/17G
17 POWDER, FOR SOLUTION ORAL DAILY
Status: DISCONTINUED | OUTPATIENT
Start: 2022-01-01 | End: 2022-01-01 | Stop reason: HOSPADM

## 2022-01-01 RX ORDER — MAGNESIUM SULFATE HEPTAHYDRATE 40 MG/ML
2 INJECTION, SOLUTION INTRAVENOUS AS NEEDED
Status: DISCONTINUED | OUTPATIENT
Start: 2022-01-01 | End: 2022-01-01 | Stop reason: HOSPADM

## 2022-01-01 RX ORDER — LOSARTAN POTASSIUM 25 MG/1
12.5 TABLET ORAL
Status: DISCONTINUED | OUTPATIENT
Start: 2022-01-01 | End: 2022-01-01

## 2022-01-01 RX ORDER — FUROSEMIDE 20 MG/1
20 TABLET ORAL DAILY
Status: DISCONTINUED | OUTPATIENT
Start: 2022-01-01 | End: 2022-01-01

## 2022-01-01 RX ORDER — OXYCODONE HYDROCHLORIDE 5 MG/1
5 TABLET ORAL EVERY 4 HOURS PRN
Status: DISPENSED | OUTPATIENT
Start: 2022-01-01 | End: 2022-01-01

## 2022-01-01 RX ORDER — ASPIRIN 81 MG/1
81 TABLET ORAL DAILY
Status: DISCONTINUED | OUTPATIENT
Start: 2022-01-01 | End: 2022-01-01 | Stop reason: HOSPADM

## 2022-01-01 RX ORDER — FUROSEMIDE 10 MG/ML
40 INJECTION INTRAMUSCULAR; INTRAVENOUS ONCE
Status: COMPLETED | OUTPATIENT
Start: 2022-01-01 | End: 2022-01-01

## 2022-01-01 RX ORDER — POTASSIUM CHLORIDE 1.5 G/1.77G
40 POWDER, FOR SOLUTION ORAL AS NEEDED
Status: DISCONTINUED | OUTPATIENT
Start: 2022-01-01 | End: 2022-01-01 | Stop reason: HOSPADM

## 2022-01-01 RX ORDER — FUROSEMIDE 20 MG/1
20 TABLET ORAL ONCE
Status: COMPLETED | OUTPATIENT
Start: 2022-01-01 | End: 2022-01-01

## 2022-01-01 RX ORDER — ALBUTEROL SULFATE 2.5 MG/3ML
2.5 SOLUTION RESPIRATORY (INHALATION) EVERY 6 HOURS PRN
Status: DISCONTINUED | OUTPATIENT
Start: 2022-01-01 | End: 2022-01-01 | Stop reason: SDUPTHER

## 2022-01-01 RX ORDER — LORAZEPAM 2 MG/ML
1 INJECTION INTRAMUSCULAR EVERY 4 HOURS PRN
Status: DISCONTINUED | OUTPATIENT
Start: 2022-01-01 | End: 2022-01-01 | Stop reason: HOSPADM

## 2022-01-01 RX ORDER — FUROSEMIDE 10 MG/ML
40 INJECTION INTRAMUSCULAR; INTRAVENOUS EVERY 8 HOURS
Status: DISCONTINUED | OUTPATIENT
Start: 2022-01-01 | End: 2022-01-01

## 2022-01-01 RX ORDER — ALBUMIN (HUMAN) 12.5 G/50ML
25 SOLUTION INTRAVENOUS 2 TIMES DAILY
Status: DISCONTINUED | OUTPATIENT
Start: 2022-01-01 | End: 2022-01-01

## 2022-01-01 RX ORDER — FUROSEMIDE 10 MG/ML
60 INJECTION INTRAMUSCULAR; INTRAVENOUS EVERY 8 HOURS
Status: DISCONTINUED | OUTPATIENT
Start: 2022-01-01 | End: 2022-01-01

## 2022-01-01 RX ORDER — IPRATROPIUM BROMIDE AND ALBUTEROL SULFATE 2.5; .5 MG/3ML; MG/3ML
3 SOLUTION RESPIRATORY (INHALATION)
Status: DISCONTINUED | OUTPATIENT
Start: 2022-01-01 | End: 2022-01-01 | Stop reason: HOSPADM

## 2022-01-01 RX ORDER — SODIUM CHLORIDE 9 MG/ML
INJECTION, SOLUTION INTRAVENOUS
Status: COMPLETED
Start: 2022-01-01 | End: 2022-01-01

## 2022-01-01 RX ORDER — POTASSIUM CHLORIDE 750 MG/1
40 CAPSULE, EXTENDED RELEASE ORAL AS NEEDED
Status: DISCONTINUED | OUTPATIENT
Start: 2022-01-01 | End: 2022-01-01 | Stop reason: HOSPADM

## 2022-01-01 RX ORDER — LORAZEPAM 0.5 MG/1
0.5 TABLET ORAL NIGHTLY PRN
Status: DISCONTINUED | OUTPATIENT
Start: 2022-01-01 | End: 2022-01-01 | Stop reason: HOSPADM

## 2022-01-01 RX ORDER — MIDODRINE HYDROCHLORIDE 2.5 MG/1
2.5 TABLET ORAL
Status: DISCONTINUED | OUTPATIENT
Start: 2022-01-01 | End: 2022-01-01

## 2022-01-01 RX ORDER — CARVEDILOL 3.12 MG/1
3.12 TABLET ORAL 2 TIMES DAILY WITH MEALS
Status: DISCONTINUED | OUTPATIENT
Start: 2022-01-01 | End: 2022-01-01

## 2022-01-01 RX ORDER — BISACODYL 10 MG
10 SUPPOSITORY, RECTAL RECTAL DAILY PRN
Status: DISCONTINUED | OUTPATIENT
Start: 2022-01-01 | End: 2022-01-01 | Stop reason: HOSPADM

## 2022-01-01 RX ORDER — MIDODRINE HYDROCHLORIDE 5 MG/1
5 TABLET ORAL
Status: DISCONTINUED | OUTPATIENT
Start: 2022-01-01 | End: 2022-01-01 | Stop reason: HOSPADM

## 2022-01-01 RX ORDER — HEPARIN SODIUM 5000 [USP'U]/ML
40 INJECTION, SOLUTION INTRAVENOUS; SUBCUTANEOUS AS NEEDED
Status: DISCONTINUED | OUTPATIENT
Start: 2022-01-01 | End: 2022-01-01

## 2022-01-01 RX ORDER — CALCIUM CARBONATE 200(500)MG
1 TABLET,CHEWABLE ORAL 2 TIMES DAILY PRN
Status: DISCONTINUED | OUTPATIENT
Start: 2022-01-01 | End: 2022-01-01 | Stop reason: HOSPADM

## 2022-01-01 RX ORDER — FUROSEMIDE 10 MG/ML
40 INJECTION INTRAMUSCULAR; INTRAVENOUS DAILY
Status: DISCONTINUED | OUTPATIENT
Start: 2022-01-01 | End: 2022-01-01

## 2022-01-01 RX ORDER — LANOLIN ALCOHOL/MO/W.PET/CERES
5 CREAM (GRAM) TOPICAL NIGHTLY PRN
Status: DISCONTINUED | OUTPATIENT
Start: 2022-01-01 | End: 2022-01-01 | Stop reason: HOSPADM

## 2022-01-01 RX ADMIN — IPRATROPIUM BROMIDE AND ALBUTEROL SULFATE 3 ML: 2.5; .5 SOLUTION RESPIRATORY (INHALATION) at 11:05

## 2022-01-01 RX ADMIN — IPRATROPIUM BROMIDE AND ALBUTEROL SULFATE 3 ML: 2.5; .5 SOLUTION RESPIRATORY (INHALATION) at 06:46

## 2022-01-01 RX ADMIN — IPRATROPIUM BROMIDE AND ALBUTEROL SULFATE 3 ML: 2.5; .5 SOLUTION RESPIRATORY (INHALATION) at 22:22

## 2022-01-01 RX ADMIN — IPRATROPIUM BROMIDE AND ALBUTEROL SULFATE 3 ML: 2.5; .5 SOLUTION RESPIRATORY (INHALATION) at 19:19

## 2022-01-01 RX ADMIN — MIDODRINE HYDROCHLORIDE 5 MG: 5 TABLET ORAL at 17:32

## 2022-01-01 RX ADMIN — IPRATROPIUM BROMIDE AND ALBUTEROL SULFATE 3 ML: 2.5; .5 SOLUTION RESPIRATORY (INHALATION) at 22:13

## 2022-01-01 RX ADMIN — APIXABAN 2.5 MG: 2.5 TABLET, FILM COATED ORAL at 20:12

## 2022-01-01 RX ADMIN — MELATONIN 5.25 MG: 3 TAB ORAL at 03:26

## 2022-01-01 RX ADMIN — ALBUMIN HUMAN 25 G: 0.25 SOLUTION INTRAVENOUS at 20:31

## 2022-01-01 RX ADMIN — Medication 12.5 MG: at 10:23

## 2022-01-01 RX ADMIN — MIDODRINE HYDROCHLORIDE 5 MG: 5 TABLET ORAL at 17:15

## 2022-01-01 RX ADMIN — FUROSEMIDE 20 MG: 20 TABLET ORAL at 08:58

## 2022-01-01 RX ADMIN — IPRATROPIUM BROMIDE AND ALBUTEROL SULFATE 3 ML: 2.5; .5 SOLUTION RESPIRATORY (INHALATION) at 07:39

## 2022-01-01 RX ADMIN — MIDODRINE HYDROCHLORIDE 5 MG: 5 TABLET ORAL at 17:16

## 2022-01-01 RX ADMIN — APIXABAN 2.5 MG: 2.5 TABLET, FILM COATED ORAL at 20:31

## 2022-01-01 RX ADMIN — IPRATROPIUM BROMIDE AND ALBUTEROL SULFATE 3 ML: 2.5; .5 SOLUTION RESPIRATORY (INHALATION) at 08:07

## 2022-01-01 RX ADMIN — IPRATROPIUM BROMIDE AND ALBUTEROL SULFATE 3 ML: 2.5; .5 SOLUTION RESPIRATORY (INHALATION) at 22:41

## 2022-01-01 RX ADMIN — FUROSEMIDE 40 MG: 10 INJECTION, SOLUTION INTRAVENOUS at 11:54

## 2022-01-01 RX ADMIN — ASPIRIN 81 MG: 81 TABLET, FILM COATED ORAL at 08:33

## 2022-01-01 RX ADMIN — Medication 10 ML: at 12:30

## 2022-01-01 RX ADMIN — Medication 10 ML: at 20:14

## 2022-01-01 RX ADMIN — Medication 10 ML: at 09:22

## 2022-01-01 RX ADMIN — Medication 10 ML: at 21:33

## 2022-01-01 RX ADMIN — BISACODYL 10 MG: 10 SUPPOSITORY RECTAL at 14:58

## 2022-01-01 RX ADMIN — MIDODRINE HYDROCHLORIDE 5 MG: 5 TABLET ORAL at 08:24

## 2022-01-01 RX ADMIN — IPRATROPIUM BROMIDE AND ALBUTEROL SULFATE 3 ML: 2.5; .5 SOLUTION RESPIRATORY (INHALATION) at 10:19

## 2022-01-01 RX ADMIN — APIXABAN 2.5 MG: 2.5 TABLET, FILM COATED ORAL at 08:42

## 2022-01-01 RX ADMIN — IPRATROPIUM BROMIDE AND ALBUTEROL SULFATE 3 ML: 2.5; .5 SOLUTION RESPIRATORY (INHALATION) at 14:52

## 2022-01-01 RX ADMIN — MIDODRINE HYDROCHLORIDE 5 MG: 5 TABLET ORAL at 16:47

## 2022-01-01 RX ADMIN — IPRATROPIUM BROMIDE AND ALBUTEROL SULFATE 3 ML: 2.5; .5 SOLUTION RESPIRATORY (INHALATION) at 11:52

## 2022-01-01 RX ADMIN — POLYETHYLENE GLYCOL 3350 17 G: 17 POWDER, FOR SOLUTION ORAL at 08:24

## 2022-01-01 RX ADMIN — IPRATROPIUM BROMIDE AND ALBUTEROL SULFATE 3 ML: 2.5; .5 SOLUTION RESPIRATORY (INHALATION) at 03:19

## 2022-01-01 RX ADMIN — IPRATROPIUM BROMIDE AND ALBUTEROL SULFATE 3 ML: 2.5; .5 SOLUTION RESPIRATORY (INHALATION) at 14:46

## 2022-01-01 RX ADMIN — ALBUMIN HUMAN 25 G: 0.25 SOLUTION INTRAVENOUS at 15:06

## 2022-01-01 RX ADMIN — ALBUMIN HUMAN 25 G: 0.25 SOLUTION INTRAVENOUS at 08:11

## 2022-01-01 RX ADMIN — Medication 10 ML: at 08:59

## 2022-01-01 RX ADMIN — IPRATROPIUM BROMIDE AND ALBUTEROL SULFATE 3 ML: 2.5; .5 SOLUTION RESPIRATORY (INHALATION) at 14:27

## 2022-01-01 RX ADMIN — IPRATROPIUM BROMIDE AND ALBUTEROL SULFATE 3 ML: 2.5; .5 SOLUTION RESPIRATORY (INHALATION) at 11:02

## 2022-01-01 RX ADMIN — IPRATROPIUM BROMIDE AND ALBUTEROL SULFATE 3 ML: 2.5; .5 SOLUTION RESPIRATORY (INHALATION) at 10:59

## 2022-01-01 RX ADMIN — APIXABAN 2.5 MG: 2.5 TABLET, FILM COATED ORAL at 11:47

## 2022-01-01 RX ADMIN — APIXABAN 2.5 MG: 2.5 TABLET, FILM COATED ORAL at 08:58

## 2022-01-01 RX ADMIN — MELATONIN 5.25 MG: 3 TAB ORAL at 20:13

## 2022-01-01 RX ADMIN — ASPIRIN 81 MG: 81 TABLET, FILM COATED ORAL at 09:20

## 2022-01-01 RX ADMIN — SODIUM ZIRCONIUM CYCLOSILICATE 5 G: 5 POWDER, FOR SUSPENSION ORAL at 08:28

## 2022-01-01 RX ADMIN — CARVEDILOL 3.12 MG: 3.12 TABLET, FILM COATED ORAL at 18:27

## 2022-01-01 RX ADMIN — IPRATROPIUM BROMIDE AND ALBUTEROL SULFATE 3 ML: 2.5; .5 SOLUTION RESPIRATORY (INHALATION) at 18:22

## 2022-01-01 RX ADMIN — IPRATROPIUM BROMIDE AND ALBUTEROL SULFATE 3 ML: 2.5; .5 SOLUTION RESPIRATORY (INHALATION) at 23:34

## 2022-01-01 RX ADMIN — IPRATROPIUM BROMIDE AND ALBUTEROL SULFATE 3 ML: 2.5; .5 SOLUTION RESPIRATORY (INHALATION) at 07:17

## 2022-01-01 RX ADMIN — IPRATROPIUM BROMIDE AND ALBUTEROL SULFATE 3 ML: 2.5; .5 SOLUTION RESPIRATORY (INHALATION) at 14:44

## 2022-01-01 RX ADMIN — FUROSEMIDE 40 MG: 40 TABLET ORAL at 18:27

## 2022-01-01 RX ADMIN — IPRATROPIUM BROMIDE AND ALBUTEROL SULFATE 3 ML: 2.5; .5 SOLUTION RESPIRATORY (INHALATION) at 14:10

## 2022-01-01 RX ADMIN — Medication 10 ML: at 21:18

## 2022-01-01 RX ADMIN — IPRATROPIUM BROMIDE AND ALBUTEROL SULFATE 3 ML: 2.5; .5 SOLUTION RESPIRATORY (INHALATION) at 19:51

## 2022-01-01 RX ADMIN — ALBUMIN HUMAN 25 G: 0.25 SOLUTION INTRAVENOUS at 08:40

## 2022-01-01 RX ADMIN — POLYETHYLENE GLYCOL 3350 17 G: 17 POWDER, FOR SOLUTION ORAL at 09:00

## 2022-01-01 RX ADMIN — MIDODRINE HYDROCHLORIDE 5 MG: 5 TABLET ORAL at 17:02

## 2022-01-01 RX ADMIN — CARVEDILOL 3.12 MG: 3.12 TABLET, FILM COATED ORAL at 17:34

## 2022-01-01 RX ADMIN — ASPIRIN 81 MG: 81 TABLET, FILM COATED ORAL at 10:23

## 2022-01-01 RX ADMIN — MIDODRINE HYDROCHLORIDE 5 MG: 5 TABLET ORAL at 08:30

## 2022-01-01 RX ADMIN — IPRATROPIUM BROMIDE AND ALBUTEROL SULFATE 3 ML: 2.5; .5 SOLUTION RESPIRATORY (INHALATION) at 15:29

## 2022-01-01 RX ADMIN — IPRATROPIUM BROMIDE AND ALBUTEROL SULFATE 3 ML: 2.5; .5 SOLUTION RESPIRATORY (INHALATION) at 19:32

## 2022-01-01 RX ADMIN — ALBUMIN HUMAN 12.5 G: 0.25 SOLUTION INTRAVENOUS at 15:57

## 2022-01-01 RX ADMIN — OXYCODONE 5 MG: 5 TABLET ORAL at 22:25

## 2022-01-01 RX ADMIN — CEFEPIME HYDROCHLORIDE 2 G: 2 INJECTION, POWDER, FOR SOLUTION INTRAVENOUS at 15:38

## 2022-01-01 RX ADMIN — IPRATROPIUM BROMIDE AND ALBUTEROL SULFATE 3 ML: 2.5; .5 SOLUTION RESPIRATORY (INHALATION) at 03:35

## 2022-01-01 RX ADMIN — MIDODRINE HYDROCHLORIDE 5 MG: 5 TABLET ORAL at 14:31

## 2022-01-01 RX ADMIN — HEPARIN SODIUM 3700 UNITS: 5000 INJECTION, SOLUTION INTRAVENOUS; SUBCUTANEOUS at 01:19

## 2022-01-01 RX ADMIN — MIDODRINE HYDROCHLORIDE 5 MG: 5 TABLET ORAL at 17:13

## 2022-01-01 RX ADMIN — HUMAN INSULIN 10 UNITS: 100 INJECTION, SOLUTION SUBCUTANEOUS at 15:34

## 2022-01-01 RX ADMIN — FUROSEMIDE 40 MG: 10 INJECTION INTRAMUSCULAR; INTRAVENOUS at 17:43

## 2022-01-01 RX ADMIN — APIXABAN 2.5 MG: 2.5 TABLET, FILM COATED ORAL at 10:23

## 2022-01-01 RX ADMIN — IPRATROPIUM BROMIDE AND ALBUTEROL SULFATE 3 ML: 2.5; .5 SOLUTION RESPIRATORY (INHALATION) at 03:34

## 2022-01-01 RX ADMIN — IPRATROPIUM BROMIDE AND ALBUTEROL SULFATE 3 ML: 2.5; .5 SOLUTION RESPIRATORY (INHALATION) at 10:47

## 2022-01-01 RX ADMIN — SODIUM CHLORIDE 500 ML: 9 INJECTION, SOLUTION INTRAVENOUS at 20:30

## 2022-01-01 RX ADMIN — CEFEPIME HYDROCHLORIDE 2 G: 2 INJECTION, POWDER, FOR SOLUTION INTRAVENOUS at 16:02

## 2022-01-01 RX ADMIN — MIDODRINE HYDROCHLORIDE 5 MG: 5 TABLET ORAL at 16:54

## 2022-01-01 RX ADMIN — IPRATROPIUM BROMIDE AND ALBUTEROL SULFATE 3 ML: 2.5; .5 SOLUTION RESPIRATORY (INHALATION) at 07:38

## 2022-01-01 RX ADMIN — IPRATROPIUM BROMIDE AND ALBUTEROL SULFATE 3 ML: 2.5; .5 SOLUTION RESPIRATORY (INHALATION) at 23:30

## 2022-01-01 RX ADMIN — Medication 10 ML: at 21:11

## 2022-01-01 RX ADMIN — CARVEDILOL 3.12 MG: 3.12 TABLET, FILM COATED ORAL at 09:31

## 2022-01-01 RX ADMIN — APIXABAN 2.5 MG: 2.5 TABLET, FILM COATED ORAL at 20:14

## 2022-01-01 RX ADMIN — ALBUMIN HUMAN 25 G: 0.25 SOLUTION INTRAVENOUS at 09:16

## 2022-01-01 RX ADMIN — DEXTROSE MONOHYDRATE 50 ML: 25 INJECTION, SOLUTION INTRAVENOUS at 15:07

## 2022-01-01 RX ADMIN — ASPIRIN 81 MG: 81 TABLET, FILM COATED ORAL at 08:42

## 2022-01-01 RX ADMIN — Medication 10 ML: at 13:53

## 2022-01-01 RX ADMIN — CARVEDILOL 3.12 MG: 3.12 TABLET, FILM COATED ORAL at 08:02

## 2022-01-01 RX ADMIN — Medication 10 ML: at 20:31

## 2022-01-01 RX ADMIN — Medication 12.5 MG: at 08:02

## 2022-01-01 RX ADMIN — Medication 10 ML: at 20:27

## 2022-01-01 RX ADMIN — IPRATROPIUM BROMIDE AND ALBUTEROL SULFATE 3 ML: 2.5; .5 SOLUTION RESPIRATORY (INHALATION) at 14:48

## 2022-01-01 RX ADMIN — MIDODRINE HYDROCHLORIDE 5 MG: 5 TABLET ORAL at 06:23

## 2022-01-01 RX ADMIN — SODIUM ZIRCONIUM CYCLOSILICATE 5 G: 5 POWDER, FOR SUSPENSION ORAL at 12:28

## 2022-01-01 RX ADMIN — ASPIRIN 81 MG: 81 TABLET, FILM COATED ORAL at 08:08

## 2022-01-01 RX ADMIN — CARVEDILOL 3.12 MG: 3.12 TABLET, FILM COATED ORAL at 18:11

## 2022-01-01 RX ADMIN — IPRATROPIUM BROMIDE AND ALBUTEROL SULFATE 3 ML: 2.5; .5 SOLUTION RESPIRATORY (INHALATION) at 23:20

## 2022-01-01 RX ADMIN — ASPIRIN 81 MG: 81 TABLET, FILM COATED ORAL at 08:59

## 2022-01-01 RX ADMIN — Medication 10 ML: at 08:24

## 2022-01-01 RX ADMIN — Medication 10 ML: at 17:45

## 2022-01-01 RX ADMIN — IPRATROPIUM BROMIDE AND ALBUTEROL SULFATE 3 ML: 2.5; .5 SOLUTION RESPIRATORY (INHALATION) at 19:06

## 2022-01-01 RX ADMIN — IPRATROPIUM BROMIDE AND ALBUTEROL SULFATE 3 ML: 2.5; .5 SOLUTION RESPIRATORY (INHALATION) at 19:30

## 2022-01-01 RX ADMIN — MIDODRINE HYDROCHLORIDE 2.5 MG: 2.5 TABLET ORAL at 17:55

## 2022-01-01 RX ADMIN — ASPIRIN 81 MG: 81 TABLET, FILM COATED ORAL at 08:02

## 2022-01-01 RX ADMIN — POLYETHYLENE GLYCOL 3350 17 G: 17 POWDER, FOR SOLUTION ORAL at 12:40

## 2022-01-01 RX ADMIN — Medication 10 ML: at 20:30

## 2022-01-01 RX ADMIN — Medication 10 ML: at 20:49

## 2022-01-01 RX ADMIN — Medication 12.5 MG: at 08:33

## 2022-01-01 RX ADMIN — ALBUMIN HUMAN 25 G: 0.25 SOLUTION INTRAVENOUS at 13:52

## 2022-01-01 RX ADMIN — IPRATROPIUM BROMIDE AND ALBUTEROL SULFATE 3 ML: 2.5; .5 SOLUTION RESPIRATORY (INHALATION) at 06:48

## 2022-01-01 RX ADMIN — IPRATROPIUM BROMIDE AND ALBUTEROL SULFATE 3 ML: 2.5; .5 SOLUTION RESPIRATORY (INHALATION) at 03:38

## 2022-01-01 RX ADMIN — SODIUM ZIRCONIUM CYCLOSILICATE 10 G: 10 POWDER, FOR SUSPENSION ORAL at 08:24

## 2022-01-01 RX ADMIN — MIDODRINE HYDROCHLORIDE 5 MG: 5 TABLET ORAL at 08:58

## 2022-01-01 RX ADMIN — IPRATROPIUM BROMIDE AND ALBUTEROL SULFATE 3 ML: 2.5; .5 SOLUTION RESPIRATORY (INHALATION) at 19:16

## 2022-01-01 RX ADMIN — SODIUM CHLORIDE 125 MG: 9 INJECTION, SOLUTION INTRAVENOUS at 16:35

## 2022-01-01 RX ADMIN — IPRATROPIUM BROMIDE AND ALBUTEROL SULFATE 3 ML: 2.5; .5 SOLUTION RESPIRATORY (INHALATION) at 10:53

## 2022-01-01 RX ADMIN — CEFEPIME HYDROCHLORIDE 2 G: 2 INJECTION, POWDER, FOR SOLUTION INTRAVENOUS at 15:11

## 2022-01-01 RX ADMIN — ALBUMIN HUMAN 25 G: 0.25 SOLUTION INTRAVENOUS at 20:13

## 2022-01-01 RX ADMIN — APIXABAN 2.5 MG: 2.5 TABLET, FILM COATED ORAL at 19:36

## 2022-01-01 RX ADMIN — SODIUM ZIRCONIUM CYCLOSILICATE 10 G: 5 POWDER, FOR SUSPENSION ORAL at 13:58

## 2022-01-01 RX ADMIN — APIXABAN 2.5 MG: 2.5 TABLET, FILM COATED ORAL at 20:33

## 2022-01-01 RX ADMIN — SODIUM ZIRCONIUM CYCLOSILICATE 5 G: 5 POWDER, FOR SUSPENSION ORAL at 13:36

## 2022-01-01 RX ADMIN — MELATONIN 5.25 MG: 3 TAB ORAL at 20:30

## 2022-01-01 RX ADMIN — IPRATROPIUM BROMIDE AND ALBUTEROL SULFATE 3 ML: 2.5; .5 SOLUTION RESPIRATORY (INHALATION) at 04:13

## 2022-01-01 RX ADMIN — IPRATROPIUM BROMIDE AND ALBUTEROL SULFATE 3 ML: 2.5; .5 SOLUTION RESPIRATORY (INHALATION) at 07:42

## 2022-01-01 RX ADMIN — CARVEDILOL 3.12 MG: 3.12 TABLET, FILM COATED ORAL at 18:31

## 2022-01-01 RX ADMIN — FUROSEMIDE 20 MG: 20 TABLET ORAL at 15:10

## 2022-01-01 RX ADMIN — FUROSEMIDE 40 MG: 10 INJECTION INTRAMUSCULAR; INTRAVENOUS at 09:30

## 2022-01-01 RX ADMIN — ALBUMIN HUMAN 25 G: 0.25 SOLUTION INTRAVENOUS at 21:00

## 2022-01-01 RX ADMIN — ACETAMINOPHEN 650 MG: 325 TABLET, FILM COATED ORAL at 03:57

## 2022-01-01 RX ADMIN — FUROSEMIDE 40 MG: 40 TABLET ORAL at 08:42

## 2022-01-01 RX ADMIN — Medication 12.5 MG: at 09:32

## 2022-01-01 RX ADMIN — Medication 10 ML: at 08:44

## 2022-01-01 RX ADMIN — IPRATROPIUM BROMIDE AND ALBUTEROL SULFATE 3 ML: 2.5; .5 SOLUTION RESPIRATORY (INHALATION) at 07:09

## 2022-01-01 RX ADMIN — Medication 10 ML: at 08:10

## 2022-01-01 RX ADMIN — DEXTROSE MONOHYDRATE 50 ML: 25 INJECTION, SOLUTION INTRAVENOUS at 13:35

## 2022-01-01 RX ADMIN — IPRATROPIUM BROMIDE AND ALBUTEROL SULFATE 3 ML: 2.5; .5 SOLUTION RESPIRATORY (INHALATION) at 18:24

## 2022-01-01 RX ADMIN — IPRATROPIUM BROMIDE AND ALBUTEROL SULFATE 3 ML: 2.5; .5 SOLUTION RESPIRATORY (INHALATION) at 19:23

## 2022-01-01 RX ADMIN — POLYETHYLENE GLYCOL 3350 17 G: 17 POWDER, FOR SOLUTION ORAL at 08:09

## 2022-01-01 RX ADMIN — APIXABAN 2.5 MG: 2.5 TABLET, FILM COATED ORAL at 09:20

## 2022-01-01 RX ADMIN — HEPARIN SODIUM 3700 UNITS: 5000 INJECTION, SOLUTION INTRAVENOUS; SUBCUTANEOUS at 17:43

## 2022-01-01 RX ADMIN — CARVEDILOL 3.12 MG: 3.12 TABLET, FILM COATED ORAL at 17:43

## 2022-01-01 RX ADMIN — IPRATROPIUM BROMIDE AND ALBUTEROL SULFATE 3 ML: 2.5; .5 SOLUTION RESPIRATORY (INHALATION) at 08:12

## 2022-01-01 RX ADMIN — APIXABAN 2.5 MG: 2.5 TABLET, FILM COATED ORAL at 21:17

## 2022-01-01 RX ADMIN — LORAZEPAM 0.5 MG: 0.5 TABLET ORAL at 21:14

## 2022-01-01 RX ADMIN — IPRATROPIUM BROMIDE AND ALBUTEROL SULFATE 3 ML: 2.5; .5 SOLUTION RESPIRATORY (INHALATION) at 14:49

## 2022-01-01 RX ADMIN — HUMAN INSULIN 10 UNITS: 100 INJECTION, SOLUTION SUBCUTANEOUS at 13:35

## 2022-01-01 RX ADMIN — APIXABAN 2.5 MG: 2.5 TABLET, FILM COATED ORAL at 09:03

## 2022-01-01 RX ADMIN — APIXABAN 2.5 MG: 2.5 TABLET, FILM COATED ORAL at 08:28

## 2022-01-01 RX ADMIN — FUROSEMIDE 40 MG: 40 TABLET ORAL at 08:02

## 2022-01-01 RX ADMIN — APIXABAN 2.5 MG: 2.5 TABLET, FILM COATED ORAL at 21:00

## 2022-01-01 RX ADMIN — IPRATROPIUM BROMIDE AND ALBUTEROL SULFATE 3 ML: 2.5; .5 SOLUTION RESPIRATORY (INHALATION) at 08:10

## 2022-01-01 RX ADMIN — IPRATROPIUM BROMIDE AND ALBUTEROL SULFATE 3 ML: 2.5; .5 SOLUTION RESPIRATORY (INHALATION) at 18:59

## 2022-01-01 RX ADMIN — Medication 10 ML: at 08:34

## 2022-01-01 RX ADMIN — ASPIRIN 81 MG: 81 TABLET, FILM COATED ORAL at 08:58

## 2022-01-01 RX ADMIN — CEFEPIME HYDROCHLORIDE 2 G: 2 INJECTION, POWDER, FOR SOLUTION INTRAVENOUS at 16:53

## 2022-01-01 RX ADMIN — FUROSEMIDE 80 MG: 10 INJECTION, SOLUTION INTRAVENOUS at 06:51

## 2022-01-01 RX ADMIN — ACETAMINOPHEN 650 MG: 325 TABLET, FILM COATED ORAL at 20:31

## 2022-01-01 RX ADMIN — ALBUTEROL SULFATE 2.5 MG: 2.5 SOLUTION RESPIRATORY (INHALATION) at 04:21

## 2022-01-01 RX ADMIN — MIDODRINE HYDROCHLORIDE 5 MG: 5 TABLET ORAL at 08:28

## 2022-01-01 RX ADMIN — APIXABAN 2.5 MG: 2.5 TABLET, FILM COATED ORAL at 08:59

## 2022-01-01 RX ADMIN — IPRATROPIUM BROMIDE AND ALBUTEROL SULFATE 3 ML: 2.5; .5 SOLUTION RESPIRATORY (INHALATION) at 14:24

## 2022-01-01 RX ADMIN — FUROSEMIDE 40 MG: 10 INJECTION, SOLUTION INTRAVENOUS at 00:11

## 2022-01-01 RX ADMIN — IPRATROPIUM BROMIDE AND ALBUTEROL SULFATE 3 ML: 2.5; .5 SOLUTION RESPIRATORY (INHALATION) at 22:55

## 2022-01-01 RX ADMIN — FUROSEMIDE 20 MG: 20 TABLET ORAL at 08:59

## 2022-01-01 RX ADMIN — FUROSEMIDE 40 MG: 40 TABLET ORAL at 09:20

## 2022-01-01 RX ADMIN — IPRATROPIUM BROMIDE AND ALBUTEROL SULFATE 3 ML: 2.5; .5 SOLUTION RESPIRATORY (INHALATION) at 07:27

## 2022-01-01 RX ADMIN — IPRATROPIUM BROMIDE AND ALBUTEROL SULFATE 3 ML: 2.5; .5 SOLUTION RESPIRATORY (INHALATION) at 10:49

## 2022-01-01 RX ADMIN — ACETAMINOPHEN 650 MG: 325 TABLET, FILM COATED ORAL at 09:13

## 2022-01-01 RX ADMIN — SODIUM ZIRCONIUM CYCLOSILICATE 5 G: 5 POWDER, FOR SUSPENSION ORAL at 20:27

## 2022-01-01 RX ADMIN — FUROSEMIDE 40 MG: 10 INJECTION INTRAMUSCULAR; INTRAVENOUS at 18:12

## 2022-01-01 RX ADMIN — MELATONIN 5.25 MG: 3 TAB ORAL at 19:36

## 2022-01-01 RX ADMIN — CEFEPIME HYDROCHLORIDE 2 G: 2 INJECTION, POWDER, FOR SOLUTION INTRAVENOUS at 15:50

## 2022-01-01 RX ADMIN — APIXABAN 2.5 MG: 2.5 TABLET, FILM COATED ORAL at 20:13

## 2022-01-01 RX ADMIN — SODIUM ZIRCONIUM CYCLOSILICATE 10 G: 10 POWDER, FOR SUSPENSION ORAL at 08:08

## 2022-01-01 RX ADMIN — ACETAMINOPHEN 650 MG: 325 TABLET, FILM COATED ORAL at 11:32

## 2022-01-01 RX ADMIN — MIDODRINE HYDROCHLORIDE 5 MG: 5 TABLET ORAL at 09:20

## 2022-01-01 RX ADMIN — POLYETHYLENE GLYCOL 3350 17 G: 17 POWDER, FOR SOLUTION ORAL at 08:42

## 2022-01-01 RX ADMIN — Medication 10 ML: at 09:01

## 2022-01-01 RX ADMIN — HEPARIN SODIUM 18 UNITS/KG/HR: 10000 INJECTION, SOLUTION INTRAVENOUS; SUBCUTANEOUS at 10:23

## 2022-01-01 RX ADMIN — ALBUMIN HUMAN 25 G: 0.25 SOLUTION INTRAVENOUS at 15:09

## 2022-01-01 RX ADMIN — SODIUM CHLORIDE 125 MG: 9 INJECTION, SOLUTION INTRAVENOUS at 18:43

## 2022-01-01 RX ADMIN — MIDODRINE HYDROCHLORIDE 5 MG: 5 TABLET ORAL at 08:08

## 2022-01-01 RX ADMIN — POLYETHYLENE GLYCOL 3350 17 G: 17 POWDER, FOR SOLUTION ORAL at 08:28

## 2022-01-01 RX ADMIN — FUROSEMIDE 40 MG: 10 INJECTION INTRAMUSCULAR; INTRAVENOUS at 22:34

## 2022-01-01 RX ADMIN — Medication 10 ML: at 08:29

## 2022-01-01 RX ADMIN — SODIUM CHLORIDE 125 MG: 9 INJECTION, SOLUTION INTRAVENOUS at 17:00

## 2022-01-01 RX ADMIN — MELATONIN 5.25 MG: 3 TAB ORAL at 21:13

## 2022-01-01 RX ADMIN — FUROSEMIDE 40 MG: 40 TABLET ORAL at 08:33

## 2022-01-01 RX ADMIN — CARVEDILOL 3.12 MG: 3.12 TABLET, FILM COATED ORAL at 10:23

## 2022-01-01 RX ADMIN — CEFEPIME HYDROCHLORIDE 2 G: 2 INJECTION, POWDER, FOR SOLUTION INTRAVENOUS at 17:02

## 2022-01-01 RX ADMIN — MIDODRINE HYDROCHLORIDE 5 MG: 5 TABLET ORAL at 17:46

## 2022-01-01 RX ADMIN — Medication 10 ML: at 21:00

## 2022-01-01 RX ADMIN — APIXABAN 2.5 MG: 2.5 TABLET, FILM COATED ORAL at 09:24

## 2022-01-01 RX ADMIN — Medication 10 ML: at 20:33

## 2022-01-01 RX ADMIN — IPRATROPIUM BROMIDE AND ALBUTEROL SULFATE 3 ML: 2.5; .5 SOLUTION RESPIRATORY (INHALATION) at 11:37

## 2022-01-01 RX ADMIN — IPRATROPIUM BROMIDE AND ALBUTEROL SULFATE 3 ML: 2.5; .5 SOLUTION RESPIRATORY (INHALATION) at 02:55

## 2022-01-01 RX ADMIN — CARVEDILOL 3.12 MG: 3.12 TABLET, FILM COATED ORAL at 08:33

## 2022-01-01 RX ADMIN — Medication 10 ML: at 09:32

## 2022-01-01 RX ADMIN — ASPIRIN 81 MG: 81 TABLET, FILM COATED ORAL at 08:28

## 2022-01-01 RX ADMIN — IPRATROPIUM BROMIDE AND ALBUTEROL SULFATE 3 ML: 2.5; .5 SOLUTION RESPIRATORY (INHALATION) at 07:56

## 2022-01-01 RX ADMIN — Medication 10 ML: at 08:03

## 2022-01-01 RX ADMIN — APIXABAN 2.5 MG: 2.5 TABLET, FILM COATED ORAL at 08:08

## 2022-01-01 RX ADMIN — Medication 1 SPRAY: at 22:01

## 2022-01-01 RX ADMIN — ASPIRIN 81 MG: 81 TABLET, FILM COATED ORAL at 08:24

## 2022-01-01 RX ADMIN — Medication 10 ML: at 20:38

## 2022-01-01 RX ADMIN — APIXABAN 2.5 MG: 2.5 TABLET, FILM COATED ORAL at 17:30

## 2022-01-01 RX ADMIN — IPRATROPIUM BROMIDE AND ALBUTEROL SULFATE 3 ML: 2.5; .5 SOLUTION RESPIRATORY (INHALATION) at 15:49

## 2022-01-01 RX ADMIN — APIXABAN 2.5 MG: 2.5 TABLET, FILM COATED ORAL at 20:27

## 2022-01-01 RX ADMIN — ACETAMINOPHEN 650 MG: 325 TABLET, FILM COATED ORAL at 21:13

## 2022-01-01 RX ADMIN — IPRATROPIUM BROMIDE AND ALBUTEROL SULFATE 3 ML: 2.5; .5 SOLUTION RESPIRATORY (INHALATION) at 19:38

## 2022-01-01 RX ADMIN — ASPIRIN 81 MG: 81 TABLET, FILM COATED ORAL at 09:03

## 2022-01-01 RX ADMIN — MELATONIN 5.25 MG: 3 TAB ORAL at 20:34

## 2022-01-01 RX ADMIN — SODIUM CHLORIDE 500 ML: 9 INJECTION, SOLUTION INTRAVENOUS at 17:52

## 2022-01-01 RX ADMIN — SODIUM CHLORIDE 125 MG: 9 INJECTION, SOLUTION INTRAVENOUS at 17:31

## 2022-01-01 RX ADMIN — IPRATROPIUM BROMIDE AND ALBUTEROL SULFATE 3 ML: 2.5; .5 SOLUTION RESPIRATORY (INHALATION) at 19:20

## 2022-03-07 PROBLEM — N18.4 CKD (CHRONIC KIDNEY DISEASE) STAGE 4, GFR 15-29 ML/MIN (HCC): Status: ACTIVE | Noted: 2022-01-01

## 2022-03-07 PROBLEM — I50.23 ACUTE ON CHRONIC SYSTOLIC CHF (CONGESTIVE HEART FAILURE) (HCC): Chronic | Status: ACTIVE | Noted: 2021-01-01

## 2022-03-07 PROBLEM — N18.4 CKD (CHRONIC KIDNEY DISEASE) STAGE 4, GFR 15-29 ML/MIN (HCC): Chronic | Status: ACTIVE | Noted: 2022-01-01

## 2022-03-07 PROBLEM — J90 BILATERAL PLEURAL EFFUSION: Status: ACTIVE | Noted: 2022-01-01

## 2022-03-07 PROBLEM — J96.21 ACUTE ON CHRONIC RESPIRATORY FAILURE WITH HYPOXIA (HCC): Status: ACTIVE | Noted: 2022-01-01

## 2022-03-07 PROBLEM — J96.21 ACUTE ON CHRONIC RESPIRATORY FAILURE WITH HYPOXIA (HCC): Chronic | Status: ACTIVE | Noted: 2022-01-01

## 2022-03-07 PROBLEM — I48.91 ATRIAL FIBRILLATION (HCC): Status: ACTIVE | Noted: 2022-01-01

## 2022-03-07 PROBLEM — J90 BILATERAL PLEURAL EFFUSION: Chronic | Status: ACTIVE | Noted: 2022-01-01

## 2022-03-07 NOTE — H&P
"    [facility]  HISTORY AND PHYSICAL      Date of Admission: 3/7/2022  Primary Care Physician: Sabino Mobley MD    Subjective     Chief Complaint: Shortness of breath and generalized weakness    History of Present Illness  Patient presents to emergency room extremely dyspneic, and deconditioned.  Patient states she normally wears oxygen as needed at home, but over past 30 days has worn oxygen 24/7 every day.  Patient states shortness of breath gradually worsening with time.  Denies chest pain, but admits to occasional chest discomfort.  Admits to increased weakness, and easy fatigue with minimal exertion.  Admits to increased chest pressure over past several days, and states she feels like \"rock on my chest.\"  Believes shortness of breath has worsened after recent carotid endarterectomy procedure.  She is D-dimer elevated in emergency room, however unable to perform CTA chest due to renal dysfunction.  Lateral pleural effusions right greater than left noted on ER imaging.  Patient also admits to previous lower extremity swelling, orthopnea, and PND worsening over the past month.  Denies fevers, chills, sick contacts, cough, or recent travel.      Review of Systems   Constitutional: Positive for activity change, appetite change and fatigue. Negative for chills and fever.   HENT: Negative for congestion, ear discharge, sinus pressure and sneezing.    Eyes: Negative for discharge and visual disturbance.   Respiratory: Positive for shortness of breath and wheezing. Negative for chest tightness.    Cardiovascular: Negative for chest pain and palpitations.   Gastrointestinal: Negative for abdominal distention, constipation, diarrhea, nausea and vomiting.   Endocrine: Negative for polyphagia and polyuria.   Genitourinary: Positive for difficulty urinating. Negative for dysuria.   Musculoskeletal: Positive for gait problem and myalgias.   Skin: Negative for color " change and wound.   Allergic/Immunologic: Negative for immunocompromised state.   Neurological: Positive for weakness. Negative for dizziness and syncope.   Hematological: Negative for adenopathy.   Psychiatric/Behavioral: Negative for agitation, confusion, hallucinations and suicidal ideas.          Otherwise complete ROS reviewed and negative except as mentioned in the HPI.    Past Medical History:   Past Medical History:   Diagnosis Date   • CAD (coronary artery disease)    • Carotid artery stenosis    • Chronic systolic heart failure (HCC)    • CKD (chronic kidney disease) stage 3, GFR 30-59 ml/min ([facility])    • GERD (gastroesophageal reflux disease)    • Hypercholesterolemia    • Hyperlipidemia    • Hypertension    • Iron deficiency anemia    • Ischemic cardiomyopathy    • MI (myocardial infarction) ([facility])    • Mitral valve disease    • Osteoarthritis    • UTI (urinary tract infection)      Past Surgical History:  Past Surgical History:   Procedure Laterality Date   • CARDIAC CATHETERIZATION     • CAROTID ENDARTERECTOMY     • CORONARY ARTERY BYPASS GRAFT     • TRANSESOPHAGEAL ECHOCARDIOGRAM ([facility]ISSY)       Social History:  reports that she quit smoking about 22 years ago. She has never used smokeless tobacco. She reports that she does not drink alcohol and does not use drugs.    Family History: family history includes Hypertension in her father.       Allergies:  No Known Allergies    Medications:  Prior to Admission medications    Medication Sig Start Date End Date Taking? Authorizing Provider   acetaminophen (TYLENOL) 325 MG tablet Take 2 tablets by mouth Every 4 (Four) Hours As Needed for Mild Pain . 11/18/20   Ryan Dubois APRN   albuterol sulfate  (90 Base) MCG/ACT inhaler Inhale 2 puffs Every 4 (Four) Hours As Needed for Wheezing or Shortness of Air. 7/3/21   Roberto Jaramillo MD   aspirin 81 MG chewable tablet Chew 81 mg Daily.    Provider, MD [place]   atorvastatin (LIPITOR) 20 MG tablet Take 1  "tablet by mouth Every Night. 12/30/21   Ryan Dubois APRN   carvedilol (Coreg) 12.5 MG tablet Take 1 tablet by mouth 2 (Two) Times a Day With Meals. 8/9/21   Kalee Cisneros MD   clopidogrel (PLAVIX) 75 MG tablet Take 1 tablet by mouth Daily. 8/9/21   Kalee Cisneros MD   furosemide (LASIX) 40 MG tablet 40 mg 2 (Two) Times a Week. 8/29/21   Provider, MD [place]   irbesartan (AVAPRO) 75 MG tablet Take 1 tablet by mouth Daily. 8/9/21   Kalee Cisneros MD   sennosides-docusate (senna-docusate sodium) 8.6-50 MG per tablet Take 2 tablets by mouth 2 (Two) Times a Day As Needed for Constipation. 11/18/20   Ryan Dubois APRN     I have utilized all available immediate resources to obtain, update, and review the patient's current medications.    Objective     Vital Signs: /75   Pulse 90   Temp 97.4 °F (36.3 °C) (Oral)   Resp 24   Ht 157.5 cm (62\")   Wt 46.7 kg (103 lb)   SpO2 99%   BMI 18.84 kg/m²   Physical Exam  Constitutional:       Appearance: Normal appearance. She is normal weight.   HENT:      Head: Normocephalic.      Nose: Nose normal.      Mouth/Throat:      Mouth: Mucous membranes are moist.      Pharynx: Oropharynx is clear.   Eyes:      Extraocular Movements: Extraocular movements intact.      Conjunctiva/sclera: Conjunctivae normal.      Pupils: Pupils are equal, round, and reactive to light.   Cardiovascular:      Rate and Rhythm: Normal rate and regular rhythm.      Pulses: Normal pulses.      Heart sounds: Normal heart sounds.   Pulmonary:      Effort: Pulmonary effort is normal.      Breath sounds: Normal breath sounds.   Abdominal:      General: Abdomen is flat. Bowel sounds are normal.      Palpations: Abdomen is soft.   Musculoskeletal:         General: Swelling present.      Cervical back: Normal range of motion and neck supple.      Right lower leg: Edema present.      Left lower leg: Edema present.      Comments: +2 bilateral lower extremity " "edema appreciated pitting in nature   Skin:     General: Skin is warm and dry.      Capillary Refill: Capillary refill takes less than 2 seconds.   Neurological:      General: No focal deficit present.      Mental Status: She is alert and oriented to person, place, and time. Mental status is at baseline.      Sensory: No sensory deficit.      Motor: Weakness present.      Gait: Gait abnormal.   Psychiatric:         Mood and Affect: Mood normal.         Behavior: Behavior normal.         Thought Content: Thought content normal.         Judgment: Judgment normal.              Results Reviewed:  Lab Results (last 24 hours)     Procedure Component Value Units Date/Time    Protime-INR [[id number]]  (Normal) Collected: 03/07/22 0521    Specimen: Blood Updated: 03/07/22 0711     Protime 12.8 Seconds      INR 0.97    Narrative:      Therapeutic range for most indications is 2.0-3.0 INR,  or 2.5-3.5 for mechanical heart valves.    aPTT [[id number]]  (Normal) Collected: 03/07/22 0521    Specimen: Blood Updated: 03/07/22 0711     PTT 30.0 seconds     Narrative:      The recommended Heparin therapeutic range is 68-97 seconds.    Procalcitonin [[id number]]  (Normal) Collected: 03/07/22 0521    Specimen: Blood Updated: 03/07/22 0635     Procalcitonin 0.17 ng/mL     Narrative:      As a Marker for Sepsis (Non-Neonates):    1. <0.5 ng/mL represents a low risk of severe sepsis and/or septic shock.  2. >2 ng/mL represents a high risk of severe sepsis and/or septic shock.    As a Marker for Lower Respiratory Tract Infections that require antibiotic therapy:    PCT on Admission    Antibiotic Therapy       6-12 Hrs later    >0.5                Strongly Recommended  >0.25 - <0.5        Recommended   0.1 - 0.25          Discouraged              Remeasure/reassess PCT  <0.1                Strongly Discouraged     Remeasure/reassess PCT    As 28 day mortality risk marker: \"Change in Procalcitonin Result\" (>80% or <=80%) if Day 0 (or Day 1) and " Day 4 values are available. Refer to http://www.[facility]-pct-calculator.com    Change in PCT <=80%  A decrease of PCT levels below or equal to 80% defines a positive change in PCT test result representing a higher risk for 28-day all-cause mortality of patients diagnosed with severe sepsis for septic shock.    Change in PCT >80%  A decrease of PCT levels of more than 80% defines a negative change in PCT result representing a lower risk for 28-day all-cause mortality of patients diagnosed with severe sepsis or septic shock.       [place] Draw [[id number]] Collected: 03/07/22 0521    Specimen: Blood Updated: 03/07/22 0633    Narrative:      The following orders were created for panel order [place] Draw.  Procedure                               Abnormality         Status                     ---------                               -----------         ------                     Green Top (Gel)[[id number]]                                  Final result               Lavender Top[[id number]]                                     Final result               Gold Top - SST[[id number]]                                   Final result               Light Blue Top[[id number]]                                   Final result                 Please view results for these tests on the individual orders.    Green Top (Gel) [[id number]] Collected: 03/07/22 0521    Specimen: Blood Updated: 03/07/22 0633     Extra Tube Hold for add-ons.     Comment: Auto resulted.       Lavender Top [[id number]] Collected: 03/07/22 0521    Specimen: Blood Updated: 03/07/22 0633     Extra Tube hold for add-on     Comment: Auto resulted       Gold Top - SST [[id number]] Collected: 03/07/22 0521    Specimen: Blood Updated: 03/07/22 0633     Extra Tube Hold for add-ons.     Comment: Auto resulted.       Light Blue Top [[id number]] Collected: 03/07/22 0521    Specimen: Blood Updated: 03/07/22 0633     Extra Tube hold for add-on     Comment: Auto resulted       D-dimer,  Quantitative [[id number]]  (Abnormal) Collected: 03/07/22 0521    Specimen: Blood Updated: 03/07/22 0624     D-Dimer, Quantitative 3,153 ng/mL (FEU)     Narrative:      Dimer values <500 ng/ml FEU are FDA approved as aid in diagnosis of deep venous thrombosis and pulmonary embolism.  This test should not be used in an exclusion strategy with pretest probability alone.    A recent guideline regarding diagnosis for pulmonary thromboembolism recommends an adjusted exclusion criterion of age x 10 ng/ml FEU for patients >50 years of age ([employer] Intern Med 2015; 163: 701-711).      CK [[id number]]  (Normal) Collected: 03/07/22 0521    Specimen: Blood Updated: 03/07/22 0624     Creatine Kinase 36 U/L     Magnesium [[id number]]  (Abnormal) Collected: 03/07/22 0521    Specimen: Blood Updated: 03/07/22 0624     Magnesium 2.5 mg/dL     COVID-19 and FLU A/B PCR - Swab, Nasopharynx [[id number]]  (Normal) Collected: 03/07/22 0545    Specimen: Swab from Nasopharynx Updated: 03/07/22 0611     COVID19 Not Detected     Influenza A PCR Not Detected     Influenza B PCR Not Detected    Narrative:      Fact sheet for providers: https://www.fda.gov/media/614158/download    Fact sheet for patients: https://www.fda.gov/media/552475/download    Test performed by PCR.    Comprehensive Metabolic Panel [[id number]]  (Abnormal) Collected: 03/07/22 0521    Specimen: Blood Updated: 03/07/22 0609     Glucose 93 mg/dL      BUN 32 mg/dL      Creatinine 1.77 mg/dL      Sodium 140 mmol/L      Potassium 5.4 mmol/L      Chloride 98 mmol/L      CO2 35.0 mmol/L      Calcium 9.2 mg/dL      Total Protein 7.2 g/dL      Albumin 3.90 g/dL      ALT (SGPT) 11 U/L      AST (SGOT) 17 U/L      Alkaline Phosphatase 84 U/L      Total Bilirubin 0.4 mg/dL      Globulin 3.3 gm/dL      A/G Ratio 1.2 g/dL      BUN/Creatinine Ratio 18.1     Anion Gap 7.0 mmol/L      eGFR 27.9 mL/min/1.73      Comment: National Kidney Foundation and American Society of Nephrology (ASN) Task Force  recommended calculation based on the Chronic Kidney Disease Epidemiology Collaboration (CKD-EPI) equation refit without adjustment for race.       Narrative:      GFR Normal >60  Chronic Kidney Disease <60  Kidney Failure <15      Troponin [[id number]]  (Normal) Collected: 03/07/22 0521    Specimen: Blood Updated: 03/07/22 0602     Troponin T <0.010 ng/mL     Narrative:      Troponin T Reference Range:  <= 0.03 ng/mL-   Negative for AMI  >0.03 ng/mL-     Abnormal for myocardial necrosis.  Clinicians would have to utilize clinical acumen, EKG, Troponin and serial changes to determine if it is an Acute Myocardial Infarction or myocardial injury due to an underlying chronic condition.       Results may be falsely decreased if patient taking Biotin.      BNP [[id number]]  (Abnormal) Collected: 03/07/22 0521    Specimen: Blood Updated: 03/07/22 0601     proBNP 16,282.0 pg/mL     Narrative:      Among patients with dyspnea, NT-proBNP is highly sensitive for the detection of acute congestive heart failure. In addition NT-proBNP of <300 pg/ml effectively rules out acute congestive heart failure with 99% negative predictive value.    Results may be falsely decreased if patient taking Biotin.      CBC & Differential [[id number]]  (Abnormal) Collected: 03/07/22 0521    Specimen: Blood Updated: 03/07/22 0528    Narrative:      The following orders were created for panel order CBC & Differential.  Procedure                               Abnormality         Status                     ---------                               -----------         ------                     CBC Auto Differential[[id number]]        Abnormal            Final result                 Please view results for these tests on the individual orders.    CBC Auto Differential [[id number]]  (Abnormal) Collected: 03/07/22 0521    Specimen: Blood Updated: 03/07/22 0528     WBC 4.31 10*3/mm3      RBC 3.80 10*6/mm3      Hemoglobin 10.4 g/dL      Hematocrit 34.7 %      MCV  91.3 fL      MCH 27.4 pg      MCHC 30.0 g/dL      RDW 14.6 %      RDW-SD 48.4 fl      MPV 10.7 fL      Platelets 197 10*3/mm3      Neutrophil % 71.3 %      Lymphocyte % 15.5 %      Monocyte % 11.8 %      Eosinophil % 0.0 %      Basophil % 0.9 %      Immature Grans % 0.5 %      Neutrophils, Absolute 3.07 10*3/mm3      Lymphocytes, Absolute 0.67 10*3/mm3      Monocytes, Absolute 0.51 10*3/mm3      Eosinophils, Absolute 0.00 10*3/mm3      Basophils, Absolute 0.04 10*3/mm3      Immature Grans, Absolute 0.02 10*3/mm3      nRBC 0.0 /100 WBC         Imaging Results (Last 24 Hours)     Procedure Component Value Units Date/Time    XR Chest 1 View [[id number]] Collected: 03/07/22 0512     Updated: 03/07/22 0615    Narrative:      EXAM:    XR Chest, 1 View    CLINICAL HISTORY:    The patient is 87 years old and is Female; [facility] Triage Protocol    TECHNIQUE:    Frontal view of the chest.    COMPARISON:    XR Chest dated July 1 2021    FINDINGS:    LUNGS:  Coarsened interstitial markings are present throughout  the lungs with associated bilateral lower lobe infiltrates.    PLEURAL SPACE:  Moderate to large left pleural effusion and  small right pleural effusion are present.  No pneumothorax.    HEART:  The cardiac silhouette is obscured.    MEDIASTINUM:  Unremarkable.    BONES/JOINTS:  There are degenerative changes of the bones.  Median sternotomy is present.    VASCULATURE:  Atherosclerosis of the aorta is noted.    UPPER ABDOMEN:  Unremarkable as visualized.      Impression:        Bilateral pleural effusions, left greater right with likely  associated atelectasis.    Electronically signed by:  Tish Avalos MD  3/7/2022 6:12 AM CST  Workstation: [id number]        I have personally reviewed and interpreted the radiology studies and ECG obtained at time of admission.     Assessment / Plan     Assessment:   Active Hospital Problems    Diagnosis    • Acute on chronic respiratory failure with hypoxia (HCC)      Symptomatic hypoxia  most likely secondary to CHF exacerbation however need to rule out pulmonary embolus.    Plan:     Hypoxia:  -VQ scan in a.m.  Heparin gtt. until VQ scan results available.  IV Lasix, strict I's and O's, fluid restriction 1200 mL/day, daily weights, echocardiogram for empirical CHF exacerbation care.    Deconditioning: PT/OT evaluation ordered at time of admission.  Patient may require short-term rehab at time of hospital discharge.      FEN cardiac with 1200 mL/day fluid restriction, electrolyte replacement protocol  PPX Heparin gtt.      Code Status/Advanced Care Plan: Full    The patient's surrogate decision maker is daughter/son.     I discussed my findings and recommendations with the patient.    Estimated length of stay is 1 to 3 days.     The patient was seen and examined by me on 3/7/2022 at 7:30 AM.    Electronically signed by Randall Cabrera MD, 03/07/22, 08:30 CST.

## 2022-03-07 NOTE — PROGRESS NOTES
Progress Note  Roberto Jaramillo MD  Hospitalist    Date of visit: 3/7/2022     LOS: 0 days   Patient Care Team:  Sabino Mobley MD as PCP - General (Family Medicine)    Chief Complaint: Shortness of breath    Subjective     Interval History:     Patient Complaints: Shortness of breath, minimally better after receiving IV Lasix in the ER.    History taken from: Patient and nursing.    Medication Review:   Current Facility-Administered Medications   Medication Dose Route Frequency Provider Last Rate Last Admin   • acetaminophen (TYLENOL) tablet 650 mg  650 mg Oral Q4H PRN Randall Cabrera MD        Or   • acetaminophen (TYLENOL) 160 MG/5ML solution 650 mg  650 mg Oral Q4H PRN Randall Cabrera MD        Or   • acetaminophen (TYLENOL) suppository 650 mg  650 mg Rectal Q4H PRN Randall Cabrera MD       • calcium carbonate (TUMS) chewable tablet 500 mg (200 mg elemental)  1 tablet Oral BID PRN Randall Cabrera MD       • carvedilol (COREG) tablet 3.125 mg  3.125 mg Oral BID With Meals Roberto Jaramillo MD       • furosemide (LASIX) injection 40 mg  40 mg Intravenous BID Roberto Jaramillo MD       • heparin 99041 units/250 mL (100 units/mL) in 0.9% NaCl infusion  18 Units/kg/hr Intravenous Titrated Randall Cabrera MD 8.4 mL/hr at 03/07/22 1023 18 Units/kg/hr at 03/07/22 1023    And   • heparin (porcine) 5000 UNIT/ML injection 3,700 Units  80 Units/kg Intravenous PRRandall Gomez MD        And   • heparin (porcine) 5000 UNIT/ML injection 1,900 Units  40 Units/kg Intravenous PRRandall Gomez MD       • losartan (COZAAR) half tablet 12.5 mg  12.5 mg Oral Q24H Roberto Jaramillo MD       • Magnesium Sulfate 2 gram Bolus, followed by 8 gram infusion (total Mg dose 10 grams)- Mg less than or equal to 1mg/dL  2 g Intravenous PRN Randall Cabrera MD        Or   • Magnesium Sulfate 2 gram / 50mL Infusion (GIVE X 3 BAGS TO EQUAL 6GM TOTAL DOSE) - Mg 1.1 - 1.5 mg/dl  2 g Intravenous PRN Randall Cabrera MD        Or   • Magnesium Sulfate 4 gram  infusion- Mg 1.6-1.9 mg/dL  4 g Intravenous PRN Randall Cabrera MD       • melatonin tablet 5.25 mg  5.25 mg Oral Nightly PRN Randall Cabrera MD       • ondansetron (ZOFRAN) injection 4 mg  4 mg Intravenous Q6H PRN Randall Cabrera MD       • oxyCODONE (ROXICODONE) immediate release tablet 5 mg  5 mg Oral Q4H PRN Randall Cabrera MD       • potassium chloride (MICRO-K) CR capsule 40 mEq  40 mEq Oral PRN Randall Cabrera MD        Or   • potassium chloride (KLOR-CON) packet 40 mEq  40 mEq Oral PRN Randall Cabrera MD        Or   • potassium chloride 10 mEq in 100 mL IVPB  10 mEq Intravenous Q1H PRN Randall Cabrera MD       • potassium phosphate 45 mmol in sodium chloride 0.9 % 500 mL infusion  45 mmol Intravenous PRN Randall Cabrera MD        Or   • potassium phosphate 30 mmol in sodium chloride 0.9 % 250 mL infusion  30 mmol Intravenous PRN Randall Cabrera MD        Or   • Potassium Phosphates 15 mmol in sodium chloride 0.9 % 100 mL infusion  15 mmol Intravenous PRN Randall Cabrera MD        Or   • sodium phosphates 45 mmol in sodium chloride 0.9 % 500 mL IVPB  45 mmol Intravenous PRN Randall Cabrera MD        Or   • sodium phosphates 30 mmol in sodium chloride 0.9 % 250 mL IVPB  30 mmol Intravenous PRN Randall Cabrera MD        Or   • sodium phosphates 15 mmol in sodium chloride 0.9 % 250 mL IVPB  15 mmol Intravenous PRN Randall Cabrera MD       • sodium chloride 0.9 % flush 10 mL  10 mL Intravenous PRN Randall Cabrera MD       • sodium chloride 0.9 % flush 10 mL  10 mL Intravenous Q12H Randall Cabrera MD       • sodium chloride 0.9 % flush 10 mL  10 mL Intravenous PRN Randall Cabrera MD         Current Outpatient Medications   Medication Sig Dispense Refill   • acetaminophen (TYLENOL) 325 MG tablet Take 2 tablets by mouth Every 4 (Four) Hours As Needed for Mild Pain .     • albuterol sulfate  (90 Base) MCG/ACT inhaler Inhale 2 puffs Every 4 (Four) Hours As Needed for Wheezing or Shortness of Air. 19 g 1   • aspirin 81 MG  chewable tablet Chew 81 mg Daily.     • atorvastatin (LIPITOR) 20 MG tablet Take 1 tablet by mouth Every Night. 10 tablet 0   • carvedilol (Coreg) 12.5 MG tablet Take 1 tablet by mouth 2 (Two) Times a Day With Meals. 180 tablet 3   • clopidogrel (PLAVIX) 75 MG tablet Take 1 tablet by mouth Daily. 90 tablet 3   • furosemide (LASIX) 40 MG tablet 40 mg 2 (Two) Times a Week.     • irbesartan (AVAPRO) 75 MG tablet Take 1 tablet by mouth Daily. 90 tablet 3   • sennosides-docusate (senna-docusate sodium) 8.6-50 MG per tablet Take 2 tablets by mouth 2 (Two) Times a Day As Needed for Constipation.         Review of Systems:   Review of Systems   Constitutional: Positive for fatigue.   Respiratory: Positive for cough and shortness of breath. Negative for wheezing.    Cardiovascular: Positive for leg swelling. Negative for chest pain and palpitations.   Gastrointestinal: Negative for abdominal distention, abdominal pain, blood in stool, diarrhea, nausea and vomiting.   Genitourinary: Negative for dysuria, flank pain, frequency, hematuria and urgency.   Musculoskeletal: Positive for arthralgias. Negative for back pain.   Skin: Negative for color change, pallor and rash.   Neurological: Positive for weakness. Negative for seizures, speech difficulty, light-headedness, numbness and headaches.   Psychiatric/Behavioral: Negative for agitation, behavioral problems and confusion.   All other systems reviewed and are negative.      Objective     Vital Signs  Temp:  [97.4 °F (36.3 °C)-97.6 °F (36.4 °C)] 97.6 °F (36.4 °C)  Heart Rate:  [84-96] 86  Resp:  [20-24] 20  BP: (101-151)/(53-75) 101/53    Physical Exam:  Physical Exam  Vitals reviewed.   Constitutional:       Appearance: She is ill-appearing.   HENT:      Head: Normocephalic and atraumatic.   Eyes:      General: No scleral icterus.     Extraocular Movements: Extraocular movements intact.      Pupils: Pupils are equal, round, and reactive to light.   Cardiovascular:      Rate  and Rhythm: Normal rate. Rhythm irregular.   Pulmonary:      Effort: No respiratory distress.      Breath sounds: Rales present.      Comments: Decreased air entry bilaterally  Abdominal:      General: Abdomen is flat. Bowel sounds are normal. There is no distension.      Palpations: Abdomen is soft. There is no mass.      Tenderness: There is no abdominal tenderness. There is no right CVA tenderness or left CVA tenderness.   Musculoskeletal:         General: No tenderness or deformity. Normal range of motion.      Cervical back: Normal range of motion and neck supple. No rigidity or tenderness.      Right lower leg: Edema present.      Left lower leg: Edema present.   Skin:     General: Skin is warm and dry.      Coloration: Skin is pale. Skin is not jaundiced.      Findings: No bruising or lesion.   Neurological:      General: No focal deficit present.      Mental Status: She is alert and oriented to person, place, and time. Mental status is at baseline.      Cranial Nerves: No cranial nerve deficit.      Sensory: No sensory deficit.      Motor: No weakness.   Psychiatric:         Mood and Affect: Mood normal.         Behavior: Behavior normal.          Results Review:    Lab Results (last 24 hours)     Procedure Component Value Units Date/Time    Basic Metabolic Panel [[id number]]  (Abnormal) Collected: 03/07/22 1150    Specimen: Blood Updated: 03/07/22 1242     Glucose 126 mg/dL      BUN 35 mg/dL      Creatinine 1.80 mg/dL      Sodium 138 mmol/L      Potassium 5.3 mmol/L      Comment: Slight hemolysis detected by analyzer. Results may be affected.        Chloride 99 mmol/L      CO2 28.0 mmol/L      Calcium 9.1 mg/dL      BUN/Creatinine Ratio 19.4     Anion Gap 11.0 mmol/L      eGFR 27.3 mL/min/1.73      Comment: National Kidney Foundation and American Society of Nephrology (ASN) Task Force recommended calculation based on the Chronic Kidney Disease Epidemiology Collaboration (CKD-EPI) equation refit without  adjustment for race.       Narrative:      GFR Normal >60  Chronic Kidney Disease <60  Kidney Failure <15      Phosphorus [[id number]]  (Abnormal) Collected: 03/07/22 1150    Specimen: Blood Updated: 03/07/22 1238     Phosphorus 5.1 mg/dL     Troponin [[id number]]  (Normal) Collected: 03/07/22 1150    Specimen: Blood Updated: 03/07/22 1237     Troponin T <0.010 ng/mL     Narrative:      Troponin T Reference Range:  <= 0.03 ng/mL-   Negative for AMI  >0.03 ng/mL-     Abnormal for myocardial necrosis.  Clinicians would have to utilize clinical acumen, EKG, Troponin and serial changes to determine if it is an Acute Myocardial Infarction or myocardial injury due to an underlying chronic condition.       Results may be falsely decreased if patient taking Biotin.      Lipid Panel [[id number]] Collected: 03/07/22 0521    Specimen: Blood Updated: 03/07/22 1146     Total Cholesterol 164 mg/dL      Triglycerides 99 mg/dL      HDL Cholesterol 53 mg/dL      LDL Cholesterol  93 mg/dL      VLDL Cholesterol 18 mg/dL      LDL/HDL Ratio 1.72    Narrative:      Cholesterol Reference Ranges  (U.S. Department of Health and Human Services ATP III Classifications)    Desirable          <200 mg/dL  Borderline High    200-239 mg/dL  High Risk          >240 mg/dL      Triglyceride Reference Ranges  (U.S. Department of Health and Human Services ATP III Classifications)    Normal           <150 mg/dL  Borderline High  150-199 mg/dL  High             200-499 mg/dL  Very High        >500 mg/dL    HDL Reference Ranges  (U.S. Department of Health and Human Services ATP III Classifications)    Low     <40 mg/dl (major risk factor for CHD)  High    >60 mg/dl ('negative' risk factor for CHD)        LDL Reference Ranges  (U.S. Department of Health and Human Services ATP III Classifications)    Optimal          <100 mg/dL  Near Optimal     100-129 mg/dL  Borderline High  130-159 mg/dL  High             160-189 mg/dL  Very High        >189 mg/dL     "Protime-INR [[id number]]  (Normal) Collected: 03/07/22 0521    Specimen: Blood Updated: 03/07/22 0711     Protime 12.8 Seconds      INR 0.97    Narrative:      Therapeutic range for most indications is 2.0-3.0 INR,  or 2.5-3.5 for mechanical heart valves.    aPTT [[id number]]  (Normal) Collected: 03/07/22 0521    Specimen: Blood Updated: 03/07/22 0711     PTT 30.0 seconds     Narrative:      The recommended Heparin therapeutic range is 68-97 seconds.    Procalcitonin [[id number]]  (Normal) Collected: 03/07/22 0521    Specimen: Blood Updated: 03/07/22 0635     Procalcitonin 0.17 ng/mL     Narrative:      As a Marker for Sepsis (Non-Neonates):    1. <0.5 ng/mL represents a low risk of severe sepsis and/or septic shock.  2. >2 ng/mL represents a high risk of severe sepsis and/or septic shock.    As a Marker for Lower Respiratory Tract Infections that require antibiotic therapy:    PCT on Admission    Antibiotic Therapy       6-12 Hrs later    >0.5                Strongly Recommended  >0.25 - <0.5        Recommended   0.1 - 0.25          Discouraged              Remeasure/reassess PCT  <0.1                Strongly Discouraged     Remeasure/reassess PCT    As 28 day mortality risk marker: \"Change in Procalcitonin Result\" (>80% or <=80%) if Day 0 (or Day 1) and Day 4 values are available. Refer to http://www.[employer]-pct-calculator.com    Change in PCT <=80%  A decrease of PCT levels below or equal to 80% defines a positive change in PCT test result representing a higher risk for 28-day all-cause mortality of patients diagnosed with severe sepsis for septic shock.    Change in PCT >80%  A decrease of PCT levels of more than 80% defines a negative change in PCT result representing a lower risk for 28-day all-cause mortality of patients diagnosed with severe sepsis or septic shock.       [facility] Draw [[id number]] Collected: 03/07/22 0521    Specimen: Blood Updated: 03/07/22 0633    Narrative:      The following orders were " created for panel order [facility] Draw.  Procedure                               Abnormality         Status                     ---------                               -----------         ------                     Green Top (Gel)[[id number]]                                  Final result               Lavender Top[[id number]]                                     Final result               Gold Top - SST[[id number]]                                   Final result               Light Blue Top[[id number]]                                   Final result                 Please view results for these tests on the individual orders.    Green Top (Gel) [[id number]] Collected: 03/07/22 0521    Specimen: Blood Updated: 03/07/22 0633     Extra Tube Hold for add-ons.     Comment: Auto resulted.       Lavender Top [[id number]] Collected: 03/07/22 0521    Specimen: Blood Updated: 03/07/22 0633     Extra Tube hold for add-on     Comment: Auto resulted       Gold Top - SST [[id number]] Collected: 03/07/22 0521    Specimen: Blood Updated: 03/07/22 0633     Extra Tube Hold for add-ons.     Comment: Auto resulted.       Light Blue Top [[id number]] Collected: 03/07/22 0521    Specimen: Blood Updated: 03/07/22 0633     Extra Tube hold for add-on     Comment: Auto resulted       D-dimer, Quantitative [[id number]]  (Abnormal) Collected: 03/07/22 0521    Specimen: Blood Updated: 03/07/22 0624     D-Dimer, Quantitative 3,153 ng/mL (FEU)     Narrative:      Dimer values <500 ng/ml FEU are FDA approved as aid in diagnosis of deep venous thrombosis and pulmonary embolism.  This test should not be used in an exclusion strategy with pretest probability alone.    A recent guideline regarding diagnosis for pulmonary thromboembolism recommends an adjusted exclusion criterion of age x 10 ng/ml FEU for patients >50 years of age ([employer] Intern Med 2015; 163: 701-711).      CK [[id number]]  (Normal) Collected: 03/07/22 0521    Specimen: Blood Updated: 03/07/22 0624      Creatine Kinase 36 U/L     Magnesium [[id number]]  (Abnormal) Collected: 03/07/22 0521    Specimen: Blood Updated: 03/07/22 0624     Magnesium 2.5 mg/dL     COVID-19 and FLU A/B PCR - Swab, Nasopharynx [[id number]]  (Normal) Collected: 03/07/22 0545    Specimen: Swab from Nasopharynx Updated: 03/07/22 0611     COVID19 Not Detected     Influenza A PCR Not Detected     Influenza B PCR Not Detected    Narrative:      Fact sheet for providers: https://www.fda.gov/media/622269/download    Fact sheet for patients: https://www.fda.gov/media/923659/download    Test performed by PCR.    Comprehensive Metabolic Panel [[id number]]  (Abnormal) Collected: 03/07/22 0521    Specimen: Blood Updated: 03/07/22 0609     Glucose 93 mg/dL      BUN 32 mg/dL      Creatinine 1.77 mg/dL      Sodium 140 mmol/L      Potassium 5.4 mmol/L      Chloride 98 mmol/L      CO2 35.0 mmol/L      Calcium 9.2 mg/dL      Total Protein 7.2 g/dL      Albumin 3.90 g/dL      ALT (SGPT) 11 U/L      AST (SGOT) 17 U/L      Alkaline Phosphatase 84 U/L      Total Bilirubin 0.4 mg/dL      Globulin 3.3 gm/dL      A/G Ratio 1.2 g/dL      BUN/Creatinine Ratio 18.1     Anion Gap 7.0 mmol/L      eGFR 27.9 mL/min/1.73      Comment: National Kidney Foundation and American Society of Nephrology (ASN) Task Force recommended calculation based on the Chronic Kidney Disease Epidemiology Collaboration (CKD-EPI) equation refit without adjustment for race.       Narrative:      GFR Normal >60  Chronic Kidney Disease <60  Kidney Failure <15      Troponin [[id number]]  (Normal) Collected: 03/07/22 0521    Specimen: Blood Updated: 03/07/22 0602     Troponin T <0.010 ng/mL     Narrative:      Troponin T Reference Range:  <= 0.03 ng/mL-   Negative for AMI  >0.03 ng/mL-     Abnormal for myocardial necrosis.  Clinicians would have to utilize clinical acumen, EKG, Troponin and serial changes to determine if it is an Acute Myocardial Infarction or myocardial injury due to an underlying  chronic condition.       Results may be falsely decreased if patient taking Biotin.      BNP [[id number]]  (Abnormal) Collected: 03/07/22 0521    Specimen: Blood Updated: 03/07/22 0601     proBNP 16,282.0 pg/mL     Narrative:      Among patients with dyspnea, NT-proBNP is highly sensitive for the detection of acute congestive heart failure. In addition NT-proBNP of <300 pg/ml effectively rules out acute congestive heart failure with 99% negative predictive value.    Results may be falsely decreased if patient taking Biotin.      CBC & Differential [[id number]]  (Abnormal) Collected: 03/07/22 0521    Specimen: Blood Updated: 03/07/22 0528    Narrative:      The following orders were created for panel order CBC & Differential.  Procedure                               Abnormality         Status                     ---------                               -----------         ------                     CBC Auto Differential[[id number]]        Abnormal            Final result                 Please view results for these tests on the individual orders.    CBC Auto Differential [[id number]]  (Abnormal) Collected: 03/07/22 0521    Specimen: Blood Updated: 03/07/22 0528     WBC 4.31 10*3/mm3      RBC 3.80 10*6/mm3      Hemoglobin 10.4 g/dL      Hematocrit 34.7 %      MCV 91.3 fL      MCH 27.4 pg      MCHC 30.0 g/dL      RDW 14.6 %      RDW-SD 48.4 fl      MPV 10.7 fL      Platelets 197 10*3/mm3      Neutrophil % 71.3 %      Lymphocyte % 15.5 %      Monocyte % 11.8 %      Eosinophil % 0.0 %      Basophil % 0.9 %      Immature Grans % 0.5 %      Neutrophils, Absolute 3.07 10*3/mm3      Lymphocytes, Absolute 0.67 10*3/mm3      Monocytes, Absolute 0.51 10*3/mm3      Eosinophils, Absolute 0.00 10*3/mm3      Basophils, Absolute 0.04 10*3/mm3      Immature Grans, Absolute 0.02 10*3/mm3      nRBC 0.0 /100 WBC           Imaging Results (Last 24 Hours)     Procedure Component Value Units Date/Time    XR Chest 1 View [[id number]] Collected:  03/07/22 0512     Updated: 03/07/22 0615    Narrative:      EXAM:    XR Chest, 1 View    CLINICAL HISTORY:    The patient is 87 years old and is Female; [facility] Triage Protocol    TECHNIQUE:    Frontal view of the chest.    COMPARISON:    XR Chest dated July 1 2021    FINDINGS:    LUNGS:  Coarsened interstitial markings are present throughout  the lungs with associated bilateral lower lobe infiltrates.    PLEURAL SPACE:  Moderate to large left pleural effusion and  small right pleural effusion are present.  No pneumothorax.    HEART:  The cardiac silhouette is obscured.    MEDIASTINUM:  Unremarkable.    BONES/JOINTS:  There are degenerative changes of the bones.  Median sternotomy is present.    VASCULATURE:  Atherosclerosis of the aorta is noted.    UPPER ABDOMEN:  Unremarkable as visualized.      Impression:        Bilateral pleural effusions, left greater right with likely  associated atelectasis.    Electronically signed by:  Tish Avalos MD  3/7/2022 6:12 AM CST  Workstation: [id number]          Assessment/Plan       Acute on chronic respiratory failure with hypoxia (HCC)    Acute on chronic systolic CHF (congestive heart failure) (HCC)    Bilateral pleural effusion    Atrial fibrillation (HCC)    CKD (chronic kidney disease) stage 4, GFR 15-29 ml/min (HCC)    Continue with supportive care, supplemental oxygen, IV Lasix. She is started on IV heparin given an elevated D-dimer obtained on admission (considering age and renal function).  She will obtain a VQ scan later on today.    An older transthoracic echo obtained last summer shows a depressed LVEF of 35% with LVH.    I confirmed that the patient's Advance Care Plan is present, code status is documented, or surrogate decision maker is listed in the patient's medical record.      Roberto Jaramillo MD  03/07/22  12:43 CST

## 2022-03-07 NOTE — ED PROVIDER NOTES
"Subjective   Patient presents to the emergency department with shortness of breath.  She states that she normally wears 2 L of oxygen at home via nasal cannula nightly.  For the past month she has been requiring oxygen 24 hours a day.  She believes that her shortness of breath has worsened since her carotid endarterectomies were performed.  She quit smoking 30 years ago.  She currently feels like she has a \"rock on my chest\" and states that she does not have any strength left to even walk.          Shortness of Breath  Severity:  Moderate  Onset quality:  Gradual  Timing:  Constant  Progression:  Worsening  Chronicity:  Recurrent  Relieved by:  Oxygen  Worsened by:  Exertion and movement  Associated symptoms: chest pain and cough    Associated symptoms: no abdominal pain, no diaphoresis, no ear pain, no fever, no headaches, no neck pain, no rash, no sore throat, no vomiting and no wheezing    Risk factors: no tobacco use        Review of Systems   Constitutional: Positive for activity change and fatigue. Negative for appetite change, chills, diaphoresis and fever.   HENT: Negative for congestion, ear discharge, ear pain, nosebleeds, rhinorrhea, sinus pressure, sore throat and trouble swallowing.    Eyes: Negative for discharge and redness.   Respiratory: Positive for cough and shortness of breath. Negative for apnea, chest tightness and wheezing.    Cardiovascular: Positive for chest pain and leg swelling.   Gastrointestinal: Negative for abdominal pain, diarrhea, nausea and vomiting.   Endocrine: Negative for polyuria.   Genitourinary: Negative for dysuria, frequency and urgency.   Musculoskeletal: Negative for myalgias and neck pain.   Skin: Negative for color change and rash.   Allergic/Immunologic: Negative for immunocompromised state.   Neurological: Positive for weakness. Negative for dizziness, seizures, syncope, light-headedness and headaches.   Hematological: Negative for adenopathy. Does not bruise/bleed " easily.   Psychiatric/Behavioral: Negative for behavioral problems and confusion.   All other systems reviewed and are negative.      Past Medical History:   Diagnosis Date   • CAD (coronary artery disease)    • Carotid artery stenosis    • Chronic systolic heart failure (HCC)    • CKD (chronic kidney disease) stage 3, GFR 30-59 ml/min (HCC)    • GERD (gastroesophageal reflux disease)    • Hypercholesterolemia    • Hyperlipidemia    • Hypertension    • Iron deficiency anemia    • Ischemic cardiomyopathy    • MI (myocardial infarction) ([facility])    • Mitral valve disease    • Osteoarthritis    • UTI (urinary tract infection)        No Known Allergies    Past Surgical History:   Procedure Laterality Date   • CARDIAC CATHETERIZATION     • CAROTID ENDARTERECTOMY     • CORONARY ARTERY BYPASS GRAFT     • TRANSESOPHAGEAL ECHOCARDIOGRAM (MISSY)         Family History   Problem Relation Age of Onset   • Hypertension Father        Social History     Socioeconomic History   • Marital status:    Tobacco Use   • Smoking status: Former Smoker     Quit date:      Years since quittin.1   • Smokeless tobacco: Never Used   Vaping Use   • Vaping Use: Never used   Substance and Sexual Activity   • Alcohol use: No   • Drug use: No   • Sexual activity: Not Currently           Objective   Physical Exam  Vitals and nursing note reviewed.   Constitutional:       Appearance: She is well-developed.   HENT:      Head: Normocephalic and atraumatic.      Nose: Nose normal.   Eyes:      General: No scleral icterus.        Right eye: No discharge.         Left eye: No discharge.      Conjunctiva/sclera: Conjunctivae normal.      Pupils: Pupils are equal, round, and reactive to light.   Neck:      Trachea: No tracheal deviation.   Cardiovascular:      Rate and Rhythm: Normal rate. Rhythm irregularly irregular.      Heart sounds: Normal heart sounds. No murmur heard.  Pulmonary:      Effort: Tachypnea, accessory muscle usage and  respiratory distress present.      Breath sounds: No stridor. Examination of the left-upper field reveals decreased breath sounds. Examination of the right-middle field reveals rales. Examination of the left-middle field reveals decreased breath sounds. Examination of the right-lower field reveals rales. Examination of the left-lower field reveals decreased breath sounds. Decreased breath sounds and rales present. No wheezing.   Abdominal:      General: Bowel sounds are normal. There is no distension.      Palpations: Abdomen is soft. There is no mass.      Tenderness: There is no abdominal tenderness. There is no guarding or rebound.   Musculoskeletal:      Cervical back: Normal range of motion and neck supple.      Right lower leg: Edema present.   Skin:     General: Skin is warm and dry.      Findings: No erythema or rash.   Neurological:      Mental Status: She is alert and oriented to person, place, and time.      Coordination: Coordination normal.   Psychiatric:         Behavior: Behavior normal.         Thought Content: Thought content normal.         ECG 12 Lead      Date/Time: 3/7/2022 6:53 AM  Performed by: Thai Fritz MD  Authorized by: Thai Fritz MD   Rhythm: atrial fibrillation  BPM: 84  ST Segments: ST segments normal                   ED Course              Labs Reviewed   COMPREHENSIVE METABOLIC PANEL - Abnormal; Notable for the following components:       Result Value    BUN 32 (*)     Creatinine 1.77 (*)     Potassium 5.4 (*)     CO2 35.0 (*)     eGFR 27.9 (*)     All other components within normal limits    Narrative:     GFR Normal >60  Chronic Kidney Disease <60  Kidney Failure <15     BNP (IN-HOUSE) - Abnormal; Notable for the following components:    proBNP 16,282.0 (*)     All other components within normal limits    Narrative:     Among patients with dyspnea, NT-proBNP is highly sensitive for the detection of acute congestive heart failure. In addition NT-proBNP of <300  pg/ml effectively rules out acute congestive heart failure with 99% negative predictive value.    Results may be falsely decreased if patient taking Biotin.     CBC WITH AUTO DIFFERENTIAL - Abnormal; Notable for the following components:    Hemoglobin 10.4 (*)     MCHC 30.0 (*)     Lymphocyte % 15.5 (*)     Eosinophil % 0.0 (*)     Lymphocytes, Absolute 0.67 (*)     All other components within normal limits   D-DIMER, QUANTITATIVE - Abnormal; Notable for the following components:    D-Dimer, Quantitative 3,153 (*)     All other components within normal limits    Narrative:     Dimer values <500 ng/ml FEU are FDA approved as aid in diagnosis of deep venous thrombosis and pulmonary embolism.  This test should not be used in an exclusion strategy with pretest probability alone.    A recent guideline regarding diagnosis for pulmonary thromboembolism recommends an adjusted exclusion criterion of age x 10 ng/ml FEU for patients >50 years of age ([place] Intern Med 2015; 163: 701-711).     MAGNESIUM - Abnormal; Notable for the following components:    Magnesium 2.5 (*)     All other components within normal limits   COVID-19 AND FLU A/B, NP SWAB IN TRANSPORT MEDIA 8-12 HR TAT - Normal    Narrative:     Fact sheet for providers: https://www.fda.gov/media/945377/download    Fact sheet for patients: https://www.fda.gov/media/435490/download    Test performed by PCR.   TROPONIN (IN-HOUSE) - Normal    Narrative:     Troponin T Reference Range:  <= 0.03 ng/mL-   Negative for AMI  >0.03 ng/mL-     Abnormal for myocardial necrosis.  Clinicians would have to utilize clinical acumen, EKG, Troponin and serial changes to determine if it is an Acute Myocardial Infarction or myocardial injury due to an underlying chronic condition.       Results may be falsely decreased if patient taking Biotin.     CK - Normal   PROCALCITONIN - Normal    Narrative:     As a Marker for Sepsis (Non-Neonates):    1. <0.5 ng/mL represents a low risk of severe  "sepsis and/or septic shock.  2. >2 ng/mL represents a high risk of severe sepsis and/or septic shock.    As a Marker for Lower Respiratory Tract Infections that require antibiotic therapy:    PCT on Admission    Antibiotic Therapy       6-12 Hrs later    >0.5                Strongly Recommended  >0.25 - <0.5        Recommended   0.1 - 0.25          Discouraged              Remeasure/reassess PCT  <0.1                Strongly Discouraged     Remeasure/reassess PCT    As 28 day mortality risk marker: \"Change in Procalcitonin Result\" (>80% or <=80%) if Day 0 (or Day 1) and Day 4 values are available. Refer to http://www.[facility]pct-calculator.com    Change in PCT <=80%  A decrease of PCT levels below or equal to 80% defines a positive change in PCT test result representing a higher risk for 28-day all-cause mortality of patients diagnosed with severe sepsis for septic shock.    Change in PCT >80%  A decrease of PCT levels of more than 80% defines a negative change in PCT result representing a lower risk for 28-day all-cause mortality of patients diagnosed with severe sepsis or septic shock.      RAINBOW DRAW    Narrative:     The following orders were created for panel order [place] Draw.  Procedure                               Abnormality         Status                     ---------                               -----------         ------                     Green Top (Gel)[[id number]]                                  Final result               Lavender Top[[id number]]                                     Final result               Gold Top - SST[[id number]]                                   Final result               Light Blue Top[[id number]]                                   Final result                 Please view results for these tests on the individual orders.   PROTIME-INR   APTT   CBC AND DIFFERENTIAL    Narrative:     The following orders were created for panel order CBC & Differential.  Procedure                 "               Abnormality         Status                     ---------                               -----------         ------                     CBC Auto Differential[[id number]]        Abnormal            Final result                 Please view results for these tests on the individual orders.   GREEN TOP   LAVENDER TOP   GOLD TOP - SST   LIGHT BLUE TOP       XR Chest 1 View   Final Result     Bilateral pleural effusions, left greater right with likely   associated atelectasis.      Electronically signed by:  Tish Avalos MD  3/7/2022 6:12 AM [facility]   Workstation: [id number]                                                  [facility]    Final diagnoses:   Acute on chronic respiratory failure with hypoxia (HCC)   Pleural effusion   Renal failure, unspecified chronicity   Acute on chronic congestive heart failure, unspecified heart failure type (HCC)       ED Disposition  ED Disposition     ED Disposition   Decision to Admit    Condition   --    Comment   Level of Care: Med/Surg [1]   Diagnosis: Acute on chronic respiratory failure with hypoxia (HCC) [2779330]   Admitting Physician: MARTY REEVES [[id number]]   Attending Physician: MARTY REEVES [[id number]]               No follow-up provider specified.       Medication List      No changes were made to your prescriptions during this visit.          Thai Frizt MD  03/07/22 0657       Thai Fritz MD  03/07/22 0657

## 2022-03-08 NOTE — PLAN OF CARE
Goal Outcome Evaluation:  Plan of Care Reviewed With: patient           Outcome Evaluation: Initial PT evaluation complete, co-evaluation with OT.  Patient is alert and cooperative.  She transfers supine to sit with SPV, very good effort.  Once sitting EOB patient became hypoxic, pale, returned to supine.  SpO2 had dropped to 82% on 6 LPM. BP 95/65 in supine prior to sitting, attempted to assess in seated but unsuccessful; 93/52 once returned to supine.  RN present to assess patient.  Patient has necessary DME, may need SNF placement for rehab, will at least need HHPT if able to discharge to her detention, pending progress with I/P PT.  Goals established, continue skilled I/P PT.

## 2022-03-08 NOTE — THERAPY EVALUATION
Patient Name: Naomi Duong Son  : 1935    MRN: [id number]                              Today's Date: 3/8/2022       Admit Date: 3/7/2022    Visit Dx:     ICD-10-CM ICD-9-CM   1. Acute on chronic respiratory failure with hypoxia (HCC)  J96.21 518.84     799.02   2. Pleural effusion  J90 511.9   3. Renal failure, unspecified chronicity  N19 586   4. Acute on chronic congestive heart failure, unspecified heart failure type (HCC)  I50.9 428.0   5. Impaired functional mobility, balance, gait, and endurance  Z74.09 V49.89     Patient Active Problem List   Diagnosis   • Ischemic cardiomyopathy   • S/P CABG (coronary artery bypass graft)   • Essential hypertension   • Mixed hyperlipidemia   • H/O CHF   • Carotid artery stenosis   • CKD (chronic kidney disease) stage 3, GFR 30-59 ml/min (HCC)   • Bilateral carotid artery disease (HCC)   • Carotid stenosis, asymptomatic, bilateral   • Surgical aftercare, circulatory system   • Carotid stenosis, asymptomatic, left   • Acute on chronic systolic CHF (congestive heart failure) (HCC)   • CKD (chronic kidney disease) stage 3, GFR 30-59 ml/min (HCC)   • Severe malnutrition (HCC)   • Acute respiratory failure with hypoxia (HCC)   • Acute on chronic respiratory failure with hypoxia (HCC)   • Bilateral pleural effusion   • CKD (chronic kidney disease) stage 4, GFR 15-29 ml/min (HCC)   • Atrial fibrillation (HCC)     Past Medical History:   Diagnosis Date   • CAD (coronary artery disease)    • Carotid artery stenosis    • Chronic systolic heart failure (HCC)    • CKD (chronic kidney disease) stage 3, GFR 30-59 ml/min (HCC)    • GERD (gastroesophageal reflux disease)    • Hypercholesterolemia    • Hyperlipidemia    • Hypertension    • Iron deficiency anemia    • Ischemic cardiomyopathy    • MI (myocardial infarction) (HCC)    • Mitral valve disease    • Osteoarthritis    • UTI (urinary tract infection)      Past Surgical History:   Procedure Laterality Date   • CARDIAC CATHETERIZATION      • CAROTID ENDARTERECTOMY     • CORONARY ARTERY BYPASS GRAFT     • TRANSESOPHAGEAL ECHOCARDIOGRAM (MISSY)        General Information     Row Name 03/08/22 0900          Physical Therapy Time and Intention    Document Type evaluation  -CZ     Mode of Treatment co-treatment;occupational therapy;physical therapy  -CZ     Row Name 03/08/22 0900          General Information    Patient Profile Reviewed yes  -CZ     Prior Level of Function independent:;all household mobility  -CZ     Existing Precautions/Restrictions oxygen therapy device and L/min;fall  -CZ     Row Name 03/08/22 0900          Living Environment    People in Home facility resident  -CZ     Row Name 03/08/22 0900          Home Main Entrance    Number of Stairs, Main Entrance none  -CZ     Row Name 03/08/22 0900          Stairs Within Home, Primary    Stairs, Within Home, Primary Just moved to [facility], home O2 at 2 LPM, ambulates with 4WW.  -CZ     Number of Stairs, Within Home, Primary none  -CZ     Row Name 03/08/22 0900          Cognition    Orientation Status (Cognition) oriented x 4  -CZ     Row Name 03/08/22 0900          Safety Issues, Functional Mobility    Impairments Affecting Function (Mobility) strength;endurance/activity tolerance;shortness of breath;balance;pain  -CZ           User Key  (r) = Recorded By, (t) = Taken By, (c) = Cosigned By    Initials Name Provider Type    CZ Martin Alvarez, PT Physical Therapist               Mobility     Row Name 03/08/22 0900          Bed Mobility    Bed Mobility supine-sit;sit-supine  -CZ     Supine-Sit [place] (Bed Mobility) supervision  -CZ     Sit-Supine [place] (Bed Mobility) supervision  -CZ     Assistive Device (Bed Mobility) head of bed elevated;bed rails  -CZ     Comment, (Bed Mobility) Very good ability with bed mobility, but patient less responsive and hypoxic, returned to supine.  -CZ           User Key  (r) = Recorded By, (t) = Taken By, (c) = Cosigned By    Initials Name Provider Type     CZ Martin Alvarez, PT Physical Therapist               Obj/Interventions     Row Name 03/08/22 0900          Range of Motion Comprehensive    General Range of Motion bilateral lower extremity ROM WFL  -CZ     Row Name 03/08/22 0900          Strength Comprehensive (MMT)    General Manual Muscle Testing (MMT) Assessment other (see comments)  -CZ     Comment, General Manual Muscle Testing (MMT) Assessment BLEs: 3/5 grossly.  -CZ     Row Name 03/08/22 0900          Sensory Assessment (Somatosensory)    Sensory Assessment (Somatosensory) LE sensation intact  -CZ           User Key  (r) = Recorded By, (t) = Taken By, (c) = Cosigned By    Initials Name Provider Type    CZ Martin Alvarez, PT Physical Therapist               Goals/Plan     Row Name 03/08/22 0900          Bed Mobility Goal 1 (PT)    Activity/Assistive Device (Bed Mobility Goal 1, PT) sit to supine/supine to sit  -CZ     [place] Level/Cues Needed (Bed Mobility Goal 1, PT) modified independence  -CZ     Time Frame (Bed Mobility Goal 1, PT) by discharge  -CZ     Strategies/Barriers (Bed Mobility Goal 1, PT) Maintain SpO2 >90%.  -CZ     Row Name 03/08/22 0900          Transfer Goal 1 (PT)    Activity/Assistive Device (Transfer Goal 1, PT) sit-to-stand/stand-to-sit;bed-to-chair/chair-to-bed;walker, rolling  -CZ     [place] Level/Cues Needed (Transfer Goal 1, PT) modified independence  -CZ     Time Frame (Transfer Goal 1, PT) by discharge  -CZ     Strategies/Barriers (Transfers Goal 1, PT) Maintain SpO2 >90%.  -CZ     Progress/Outcome (Transfer Goal 1, PT) goal not met  -CZ     Row Name 03/08/22 0900          Gait Training Goal 1 (PT)    Activity/Assistive Device (Gait Training Goal 1, PT) walker, rolling  -CZ     [place] Level (Gait Training Goal 1, PT) modified independence  -CZ     Distance (Gait Training Goal 1, PT) 75'x2.  -CZ     Time Frame (Gait Training Goal 1, PT) by discharge  -CZ     Strategies/Barriers (Gait Training Goal 1, PT)  Maintain SpO2 >90%.  -CZ     Progress/Outcome (Gait Training Goal 1, PT) goal not met  -CZ           User Key  (r) = Recorded By, (t) = Taken By, (c) = Cosigned By    Initials Name Provider Type    CZ Martin Alvarez, PT Physical Therapist               Clinical Impression     Row Name 03/08/22 0900          Pain    Pretreatment Pain Rating 7/10  -CZ     Posttreatment Pain Rating 7/10  -CZ     Pain Location - back;buttock  -CZ     Pain Intervention(s) Repositioned;Ambulation/increased activity;Distraction  -CZ     Row Name 03/08/22 0900          Plan of Care Review    Plan of Care Reviewed With patient  -CZ     Outcome Evaluation Initial PT evaluation complete, co-evaluation with OT.  Patient is alert and cooperative.  She transfers supine to sit with SPV, very good effort.  Once sitting EOB patient became hypoxic, pale, returned to supine.  SpO2 had dropped to 82% on 6 LPM. BP 95/65 in supine prior to sitting, attempted to assess in seated but unsuccessful; 93/52 once returned to supine.  RN present to assess patient.  Patient has necessary DME, may need SNF placement for rehab, will at least need HHPT if able to discharge to her halfway, pending progress with I/P PT.  Goals established, continue skilled I/P PT.  -CZ     Row Name 03/08/22 0900          Therapy Assessment/Plan (PT)    Criteria for Skilled Interventions Met (PT) yes;skilled treatment is necessary  -CZ     Row Name 03/08/22 0900          Vital Signs    Pre Systolic BP Rehab 95  -CZ     Pre Treatment Diastolic BP 65  -CZ     Post Systolic BP Rehab 93  -CZ     Post Treatment Diastolic BP 52  -CZ     Pretreatment Heart Rate (beats/min) 102  -CZ     Pre SpO2 (%) 93  -CZ     O2 Delivery Pre Treatment nasal cannula  -CZ     Intra SpO2 (%) 82  -CZ     O2 Delivery Intra Treatment nasal cannula  Nurse notified and is assessing patient.  -CZ     Post SpO2 (%) 88  -CZ     O2 Delivery Post Treatment nasal cannula  -CZ     Pre Patient Position Supine  -CZ     Intra  Patient Position Sitting  -CZ     Post Patient Position Supine  -CZ     Row Name 03/08/22 0900          Positioning and Restraints    Pre-Treatment Position in bed  -CZ     Post Treatment Position bed  -CZ     In Bed side lying left;call light within reach;encouraged to call for assist;exit alarm on;with nsg;with family/caregiver  -           User Key  (r) = Recorded By, (t) = Taken By, (c) = Cosigned By    Initials Name Provider Type    Martin Watts, PT Physical Therapist               Outcome Measures     Row Name 03/08/22 0900          How much help from another person do you currently need...    Turning from your back to your side while in flat bed without using bedrails? 3  -CZ     Moving from lying on back to sitting on the side of a flat bed without bedrails? 3  -CZ     Moving to and from a bed to a chair (including a wheelchair)? 3  -CZ     Standing up from a chair using your arms (e.g., wheelchair, bedside chair)? 3  -CZ     Climbing 3-5 steps with a railing? 2  -CZ     To walk in hospital room? 3  -CZ     AM-PAC 6 Clicks Score (PT) 17  -CZ     Row Name 03/08/22 0900          Functional Assessment    Outcome Measure Options AM-PAC 6 Clicks Basic Mobility (PT)  -           User Key  (r) = Recorded By, (t) = Taken By, (c) = Cosigned By    Initials Name Provider Type    Martin Watts, PT Physical Therapist                             Physical Therapy Education                 Title: PT OT SLP Therapies (In Progress)     Topic: Physical Therapy (In Progress)     Point: Mobility training (Done)     Learning Progress Summary           Patient Acceptance, E, VU,NR by LENORE at 3/8/2022 0937    Comment: PT POC, breathing technique, transfer technique.                   Point: Home exercise program (Not Started)     Learner Progress:  Not documented in this visit.          Point: Body mechanics (Not Started)     Learner Progress:  Not documented in this visit.          Point: Precautions (Not Started)      Learner Progress:  Not documented in this visit.                      User Key     Initials Effective Dates Name Provider Type Discipline     06/16/21 -  Martin Alvarez, PT Physical Therapist PT              PT Recommendation and Plan  Planned Therapy Interventions (PT): balance training, bed mobility training, gait training, patient/family education, transfer training, strengthening, stretching  Plan of Care Reviewed With: patient  Outcome Evaluation: Initial PT evaluation complete, co-evaluation with OT.  Patient is alert and cooperative.  She transfers supine to sit with SPV, very good effort.  Once sitting EOB patient became hypoxic, pale, returned to supine.  SpO2 had dropped to 82% on 6 LPM. BP 95/65 in supine prior to sitting, attempted to assess in seated but unsuccessful; 93/52 once returned to supine.  RN present to assess patient.  Patient has necessary DME, may need SNF placement for rehab, will at least need HHPT if able to discharge to her [place], pending progress with I/P PT.  Goals established, continue skilled I/P PT.     Time Calculation:    PT Charges     Row Name 03/08/22 0946             Time Calculation    Start Time 0901  -CZ      Stop Time 0930  -CZ      Time Calculation (min) 29 min  -CZ      PT Received On 03/08/22  -CZ      PT Goal Re-Cert Due Date 03/21/22  -CZ              Untimed Charges    PT Eval/Re-eval Minutes 29  -CZ              Total Minutes    Untimed Charges Total Minutes 29  -CZ       Total Minutes 29  -CZ            User Key  (r) = Recorded By, (t) = Taken By, (c) = Cosigned By    Initials Name Provider Type    CZ Martin Alvarez, PT Physical Therapist              Therapy Charges for Today     Code Description Service Date Service Provider Modifiers Qty    [id number]  PT EVAL MOD COMPLEXITY 2 3/8/2022 Martin Alvarez, PT GP 1          PT G-Codes  Outcome Measure Options: AM-PAC 6 Clicks Basic Mobility (PT)  AM-PAC 6 Clicks Score (PT): 17    Martin Alvarez  PT  3/8/2022

## 2022-03-08 NOTE — PROGRESS NOTES
Progress Note  Roberto Jaramillo MD  Hospitalist    Date of visit: 3/8/2022     LOS: 0 days   Patient Care Team:  Sabino Mobley MD as PCP - General (Family Medicine)    Chief Complaint: Shortness of breath    Subjective     Interval History:     Patient Complaints: Shortness of breath, minimally better. Still requiring supplemental Oxygen at 8 L/min.    History taken from: Patient and nursing.    Medication Review:   Current Facility-Administered Medications   Medication Dose Route Frequency Provider Last Rate Last Admin   • acetaminophen (TYLENOL) tablet 650 mg  650 mg Oral Q4H PRN Randall Cabrera MD        Or   • acetaminophen (TYLENOL) 160 MG/5ML solution 650 mg  650 mg Oral Q4H PRN Randall Cabrera MD        Or   • acetaminophen (TYLENOL) suppository 650 mg  650 mg Rectal Q4H PRN Randall Cabrera MD       • apixaban (ELIQUIS) tablet 2.5 mg  2.5 mg Oral Q12H Roberto Jaramillo MD       • aspirin EC tablet 81 mg  81 mg Oral Daily Roberto Jaramillo MD       • calcium carbonate (TUMS) chewable tablet 500 mg (200 mg elemental)  1 tablet Oral BID PRN Randall Cabrera MD       • carvedilol (COREG) tablet 3.125 mg  3.125 mg Oral BID With Meals Roberto Jaramillo MD   3.125 mg at 03/08/22 0931   • furosemide (LASIX) injection 40 mg  40 mg Intravenous BID Roberto Jaramillo MD   40 mg at 03/08/22 0930   • ipratropium-albuterol (DUO-NEB) nebulizer solution 3 mL  3 mL Nebulization 4x Daily - RT Roger Jordan MD   3 mL at 03/08/22 1137   • losartan (COZAAR) half tablet 12.5 mg  12.5 mg Oral Q24H Roberto Jaramillo MD   12.5 mg at 03/08/22 0932   • Magnesium Sulfate 2 gram Bolus, followed by 8 gram infusion (total Mg dose 10 grams)- Mg less than or equal to 1mg/dL  2 g Intravenous PRN Randall Cabrera MD        Or   • Magnesium Sulfate 2 gram / 50mL Infusion (GIVE X 3 BAGS TO EQUAL 6GM TOTAL DOSE) - Mg 1.1 - 1.5 mg/dl  2 g Intravenous PRN Randall Cabrera MD        Or   • Magnesium Sulfate 4 gram infusion- Mg 1.6-1.9 mg/dL  4 g Intravenous  Randall Muñoz MD       • melatonin tablet 5.25 mg  5.25 mg Oral Nightly Randall Muñoz MD       • ondansetron (ZOFRAN) injection 4 mg  4 mg Intravenous Q6H PRRandall Gomez MD       • oxyCODONE (ROXICODONE) immediate release tablet 5 mg  5 mg Oral Q4H PRRandall Gomez MD       • potassium chloride (MICRO-K) CR capsule 40 mEq  40 mEq Oral PRN Randall Cabrera MD        Or   • potassium chloride (KLOR-CON) packet 40 mEq  40 mEq Oral PRN Randall Cabrera MD        Or   • potassium chloride 10 mEq in 100 mL IVPB  10 mEq Intravenous Q1H PRN Randall Cabrera MD       • potassium phosphate 45 mmol in sodium chloride 0.9 % 500 mL infusion  45 mmol Intravenous PRN Randall Cabrera MD        Or   • potassium phosphate 30 mmol in sodium chloride 0.9 % 250 mL infusion  30 mmol Intravenous PRN Randall Cabrera MD        Or   • Potassium Phosphates 15 mmol in sodium chloride 0.9 % 100 mL infusion  15 mmol Intravenous PRN Randall Cabrera MD        Or   • sodium phosphates 45 mmol in sodium chloride 0.9 % 500 mL IVPB  45 mmol Intravenous PRN Randall Cabrera MD        Or   • sodium phosphates 30 mmol in sodium chloride 0.9 % 250 mL IVPB  30 mmol Intravenous PRN Randall Cabrera MD        Or   • sodium phosphates 15 mmol in sodium chloride 0.9 % 250 mL IVPB  15 mmol Intravenous PRN Randall Cabrera MD       • sodium chloride 0.9 % flush 10 mL  10 mL Intravenous PRN Randall Cabrera MD       • sodium chloride 0.9 % flush 10 mL  10 mL Intravenous Q12H Randall Cabrera MD   10 mL at 03/08/22 0932   • sodium chloride 0.9 % flush 10 mL  10 mL Intravenous PRN Randall Cabrera MD           Review of Systems:   Review of Systems   Constitutional: Positive for fatigue.   Respiratory: Positive for cough and shortness of breath. Negative for wheezing.    Cardiovascular: Positive for leg swelling. Negative for chest pain and palpitations.   Gastrointestinal: Negative for abdominal distention, abdominal pain, blood in stool, diarrhea, nausea and vomiting.    Genitourinary: Negative for dysuria, flank pain, frequency, hematuria and urgency.   Musculoskeletal: Positive for arthralgias. Negative for back pain.   Skin: Negative for color change, pallor and rash.   Neurological: Positive for weakness. Negative for seizures, speech difficulty, light-headedness, numbness and headaches.   Psychiatric/Behavioral: Negative for agitation, behavioral problems and confusion.   All other systems reviewed and are negative.      Objective     Vital Signs  Temp:  [97 °F (36.1 °C)-99 °F (37.2 °C)] 99 °F (37.2 °C)  Heart Rate:  [] 103  Resp:  [20-22] 20  BP: (106-135)/(53-75) 106/53    Physical Exam:  Physical Exam  Vitals reviewed.   Constitutional:       Appearance: She is ill-appearing.   HENT:      Head: Normocephalic and atraumatic.   Eyes:      General: No scleral icterus.     Extraocular Movements: Extraocular movements intact.      Pupils: Pupils are equal, round, and reactive to light.   Cardiovascular:      Rate and Rhythm: Normal rate. Rhythm irregular.   Pulmonary:      Effort: No respiratory distress.      Breath sounds: Rales present.      Comments: Decreased air entry bilaterally  Abdominal:      General: Abdomen is flat. Bowel sounds are normal. There is no distension.      Palpations: Abdomen is soft. There is no mass.      Tenderness: There is no abdominal tenderness. There is no right CVA tenderness or left CVA tenderness.   Musculoskeletal:         General: No tenderness or deformity. Normal range of motion.      Cervical back: Normal range of motion and neck supple. No rigidity or tenderness.      Right lower leg: No edema.      Left lower leg: No edema.   Skin:     General: Skin is warm and dry.      Coloration: Skin is pale. Skin is not jaundiced.      Findings: No bruising or lesion.   Neurological:      General: No focal deficit present.      Mental Status: She is alert and oriented to person, place, and time. Mental status is at baseline.      Cranial  Nerves: No cranial nerve deficit.      Sensory: No sensory deficit.      Motor: No weakness.   Psychiatric:         Mood and Affect: Mood normal.         Behavior: Behavior normal.          Results Review:    Lab Results (last 24 hours)     Procedure Component Value Units Date/Time    aPTT [[id number]]  (Abnormal) Collected: 03/08/22 0851    Specimen: Blood Updated: 03/08/22 0951     PTT >240.0 seconds      Comment: THIS IS THE RECOLLECT.        Narrative:      The recommended Heparin therapeutic range is 68-97 seconds.    Basic Metabolic Panel [[id number]]  (Abnormal) Collected: 03/08/22 0721    Specimen: Blood Updated: 03/08/22 0757     Glucose 98 mg/dL      BUN 38 mg/dL      Creatinine 1.73 mg/dL      Sodium 139 mmol/L      Potassium 4.9 mmol/L      Chloride 97 mmol/L      CO2 36.0 mmol/L      Calcium 8.8 mg/dL      BUN/Creatinine Ratio 22.0     Anion Gap 6.0 mmol/L      eGFR 28.7 mL/min/1.73      Comment: National Kidney Foundation and American Society of Nephrology (ASN) Task Force recommended calculation based on the Chronic Kidney Disease Epidemiology Collaboration (CKD-EPI) equation refit without adjustment for race.       Narrative:      GFR Normal >60  Chronic Kidney Disease <60  Kidney Failure <15      Phosphorus [[id number]]  (Abnormal) Collected: 03/08/22 0721    Specimen: Blood Updated: 03/08/22 0757     Phosphorus 4.9 mg/dL     Magnesium [[id number]]  (Normal) Collected: 03/08/22 0721    Specimen: Blood Updated: 03/08/22 0757     Magnesium 2.2 mg/dL     CBC & Differential [[id number]]  (Abnormal) Collected: 03/08/22 0721    Specimen: Blood Updated: 03/08/22 0728    Narrative:      The following orders were created for panel order CBC & Differential.  Procedure                               Abnormality         Status                     ---------                               -----------         ------                     CBC Auto Differential[[id number]]        Abnormal            Final result                  Please view results for these tests on the individual orders.    CBC Auto Differential [[id number]]  (Abnormal) Collected: 03/08/22 0721    Specimen: Blood Updated: 03/08/22 0728     WBC 5.81 10*3/mm3      RBC 3.31 10*6/mm3      Hemoglobin 9.2 g/dL      Hematocrit 31.1 %      MCV 94.0 fL      MCH 27.8 pg      MCHC 29.6 g/dL      RDW 14.4 %      RDW-SD 49.4 fl      MPV 10.7 fL      Platelets 194 10*3/mm3      Neutrophil % 76.8 %      Lymphocyte % 9.3 %      Monocyte % 9.6 %      Eosinophil % 3.3 %      Basophil % 0.7 %      Immature Grans % 0.3 %      Neutrophils, Absolute 4.46 10*3/mm3      Lymphocytes, Absolute 0.54 10*3/mm3      Monocytes, Absolute 0.56 10*3/mm3      Eosinophils, Absolute 0.19 10*3/mm3      Basophils, Absolute 0.04 10*3/mm3      Immature Grans, Absolute 0.02 10*3/mm3      nRBC 0.0 /100 WBC     aPTT [[id number]]  (Normal) Collected: 03/08/22 0000    Specimen: Blood Updated: 03/08/22 0033     PTT 32.6 seconds     Narrative:      The recommended Heparin therapeutic range is 68-97 seconds.    CBC & Differential [[id number]]  (Abnormal) Collected: 03/08/22 0000    Specimen: Blood Updated: 03/08/22 0013    Narrative:      The following orders were created for panel order CBC & Differential.  Procedure                               Abnormality         Status                     ---------                               -----------         ------                     CBC Auto Differential[[id number]]        Abnormal            Final result                 Please view results for these tests on the individual orders.    CBC Auto Differential [[id number]]  (Abnormal) Collected: 03/08/22 0000    Specimen: Blood Updated: 03/08/22 0013     WBC 5.91 10*3/mm3      RBC 3.40 10*6/mm3      Hemoglobin 9.6 g/dL      Hematocrit 32.0 %      MCV 94.1 fL      MCH 28.2 pg      MCHC 30.0 g/dL      RDW 14.4 %      RDW-SD 49.0 fl      MPV 10.6 fL      Platelets 191 10*3/mm3      Neutrophil % 73.5 %      Lymphocyte % 10.8 %       Monocyte % 14.2 %      Eosinophil % 0.2 %      Basophil % 0.8 %      Immature Grans % 0.5 %      Neutrophils, Absolute 4.34 10*3/mm3      Lymphocytes, Absolute 0.64 10*3/mm3      Monocytes, Absolute 0.84 10*3/mm3      Eosinophils, Absolute 0.01 10*3/mm3      Basophils, Absolute 0.05 10*3/mm3      Immature Grans, Absolute 0.03 10*3/mm3      nRBC 0.0 /100 WBC     aPTT [[id number]]  (Normal) Collected: 03/07/22 1641    Specimen: Blood Updated: 03/07/22 1731     PTT 30.6 seconds     Narrative:      The recommended Heparin therapeutic range is 68-97 seconds.    POC Glucose Once [[id number]]  (Normal) Collected: 03/07/22 1439    Specimen: Blood Updated: 03/07/22 1454     Glucose 100 mg/dL      Comment: RN NotifiedOperator: [id number] CHETNA REBAMeter ID: [id number]             Imaging Results (Last 24 Hours)     Procedure Component Value Units Date/Time    XR Chest 1 View [[id number]] Collected: 03/08/22 0736     Updated: 03/08/22 0801    Narrative:      Upright portable chest March 8, 2022    INDICATION: Increased oxygen requirement    FINDINGS:  Large left pleural effusion with small to moderate right  effusion.  Significant basilar volume loss/consolidation resulting left  greater than right.  Improved fluid status compared with yesterday. Some persistent  pulmonary vascular prominence present.  No pneumothorax.      Impression:      Some improvement and fluid status compared with yesterday.  Significant residual pleural effusions and basilar volume  loss/consolidation left greater than right.  No new abnormality.    Electronically signed by:  Gael Caballero MD  3/8/2022 7:59 AM  CST Workstation: [id number]          Assessment/Plan       Acute on chronic respiratory failure with hypoxia (HCC)    Acute on chronic systolic CHF (congestive heart failure) (HCC)    Bilateral pleural effusion    Atrial fibrillation (HCC)    CKD (chronic kidney disease) stage 4, GFR 15-29 ml/min (HCC)    Continue with supportive care,  supplemental oxygen, IV Lasix, Cozaar, Coreg. Stop the IV Heparin, start Eliquis at 2.5 mg BID.    A recent transthoracic Echo obtained now shows a depressed LVEF of 30 to 35%, mitral valve regurgitation and moderate pulmonary hypertension.    I confirmed that the patient's Advance Care Plan is present, code status is documented, or surrogate decision maker is listed in the patient's medical record.      Roberto Jaramillo MD  03/08/22  12:59 CST

## 2022-03-08 NOTE — PLAN OF CARE
Goal Outcome Evaluation:  Plan of Care Reviewed With: patient           Outcome Evaluation: initial OT evaluation complete. co-eval with PT. pt was pleasant and cooperative throughout. complaining of back and buttocks pain, and wanting to move. BUE ROM WFL grossly. BUE strength was grossly 3/5 with formal UE MMT assessment, but was observed to be grossly 4-/5 with bed mobility. pt was supervision for bed mobility. once sitting EOB pt became hypoxic and pale, less responsive, and returned to supine at that time. O2 dropped to 82% on 6 L at EOB. no further mobility assessed at that time. RN in room at end of session to assess pt. recommend further skilled OT services to address functional mobility and ADL deficits, as well as balance, strength, and endurance. pt may require SNF placement for further rehab at discharge, depending on progress. will need home health OT if she is to return to assisted living. goals established.

## 2022-03-08 NOTE — NURSING NOTE
Patient is unable to complete VQ scan because she cannot tolerate laying flat for a long time per day shift RN

## 2022-03-08 NOTE — NURSING NOTE
Dr Jaramillo made aware of patient's aptt value and order received to stop heparin gtt.  Will continue to monitor patient.

## 2022-03-08 NOTE — PLAN OF CARE
Goal Outcome Evaluation:           Progress: no change  Outcome Evaluation: no complaint of pain;still has dyspnea at rest;remain on oxygen at 5LPM via NC;on heparin infusion protocol as ordered;patient is alert

## 2022-03-08 NOTE — THERAPY EVALUATION
Patient Name: Naomi Duong Son  : 1935    MRN: [id number]                              Today's Date: 3/8/2022       Admit Date: 3/7/2022    Visit Dx:     ICD-10-CM ICD-9-CM   1. Acute on chronic respiratory failure with hypoxia (HCC)  J96.21 518.84     799.02   2. Pleural effusion  J90 511.9   3. Renal failure, unspecified chronicity  N19 586   4. Acute on chronic congestive heart failure, unspecified heart failure type (HCC)  I50.9 428.0   5. Impaired functional mobility, balance, gait, and endurance  Z74.09 V49.89   6. Impaired mobility and ADLs  Z74.09 V49.89    Z78.9      Patient Active Problem List   Diagnosis   • Ischemic cardiomyopathy   • S/P CABG (coronary artery bypass graft)   • Essential hypertension   • Mixed hyperlipidemia   • H/O CHF   • Carotid artery stenosis   • CKD (chronic kidney disease) stage 3, GFR 30-59 ml/min ([facility])   • Bilateral carotid artery disease (HCC)   • Carotid stenosis, asymptomatic, bilateral   • Surgical aftercare, circulatory system   • Carotid stenosis, asymptomatic, left   • Acute on chronic systolic CHF (congestive heart failure) ([facility])   • CKD (chronic kidney disease) stage 3, GFR 30-59 ml/min (HCC)   • Severe malnutrition (HCC)   • Acute respiratory failure with hypoxia (HCC)   • Acute on chronic respiratory failure with hypoxia ([facility])   • Bilateral pleural effusion   • CKD (chronic kidney disease) stage 4, GFR 15-29 ml/min (HCC)   • Atrial fibrillation (HCC)     Past Medical History:   Diagnosis Date   • CAD (coronary artery disease)    • Carotid artery stenosis    • Chronic systolic heart failure (HCC)    • CKD (chronic kidney disease) stage 3, GFR 30-59 ml/min (HCC)    • GERD (gastroesophageal reflux disease)    • Hypercholesterolemia    • Hyperlipidemia    • Hypertension    • Iron deficiency anemia    • Ischemic cardiomyopathy    • MI (myocardial infarction) (HCC)    • Mitral valve disease    • Osteoarthritis    • UTI (urinary tract infection)      Past Surgical History:    Procedure Laterality Date   • CARDIAC CATHETERIZATION     • CAROTID ENDARTERECTOMY     • CORONARY ARTERY BYPASS GRAFT     • TRANSESOPHAGEAL ECHOCARDIOGRAM ([facility])        General Information     Row Name 03/08/22 0902          OT Time and Intention    Document Type evaluation  -ME     Mode of Treatment co-treatment;occupational therapy;physical therapy  -ME     Row Name 03/08/22 0902          General Information    Patient Profile Reviewed yes  -ME     Prior Level of Function independent:;all household mobility;ADL's  requires increased time for completion of bathing and dressing  -ME     Existing Precautions/Restrictions oxygen therapy device and L/min;fall  -ME     Row Name 03/08/22 0902          Living Environment    People in Home facility resident  -ME     Row Name 03/08/22 0902          Stairs Within Home, Primary    Stairs, Within Home, Primary lives at assisted living; has only been there for approx 1 week; wears O2 at home; up until COVID started pt was walking at the mall every Tuesday and Thursday; ambulates with a rollator; walk in shower  -ME     Row Name 03/08/22 0902          Cognition    Orientation Status (Cognition) oriented x 4  -ME     Row Name 03/08/22 0902          Safety Issues, Functional Mobility    Safety Issues Affecting Function (Mobility) awareness of need for assistance;safety precaution awareness;safety precautions follow-through/compliance  -ME     Impairments Affecting Function (Mobility) balance;endurance/activity tolerance;strength;pain;shortness of breath  -ME           User Key  (r) = Recorded By, (t) = Taken By, (c) = Cosigned By    Initials Name Provider Type    ME Johnny Rodriguez OTR/L Occupational Therapist                 Mobility/ADL's     Row Name 03/08/22 0902          Bed Mobility    Bed Mobility supine-sit;sit-supine  -ME     Supine-Sit [place] (Bed Mobility) supervision  -ME     Sit-Supine [place] (Bed Mobility) supervision  -ME     Assistive Device (Bed  Mobility) bed rails;head of bed elevated  -ME     Comment, (Bed Mobility) very good ability with bed mobility, less responsive at EOB and hypoxic, returned to supine at that time and no OOB assessed  -[facility] Name 03/08/22 0902          Transfers    Comment, (Transfers) see bed mobility comment  -ME           User Key  (r) = Recorded By, (t) = Taken By, (c) = Cosigned By    Initials Name Provider Type    Johnny Tierney OTR/L Occupational Therapist               Obj/Interventions     [facility] Name 03/08/22 0902          Sensory Assessment (Somatosensory)    Sensory Assessment (Somatosensory) UE sensation intact  -ME     Row Name 03/08/22 0902          Range of Motion Comprehensive    General Range of Motion no range of motion deficits identified;bilateral upper extremity ROM WFL  -ME     Comment, General Range of Motion significant swelling in the LUE; RN notified/aware  -ME     Row Name 03/08/22 0902          Strength Comprehensive (MMT)    General Manual Muscle Testing (MMT) Assessment other (see comments)  -ME     Comment, General Manual Muscle Testing (MMT) Assessment BUE strength was grossly 3/5 with UB MMT, but was observed to be grossly 4-/5 with bed mobility; pt is right handed  -ME           User Key  (r) = Recorded By, (t) = Taken By, (c) = Cosigned By    Initials Name Provider Type    Johnny Tierney OTR/L Occupational Therapist               Goals/Plan     Row Name 03/08/22 0902          Transfer Goal 1 (OT)    Activity/Assistive Device (Transfer Goal 1, OT) toilet  -ME     [place] Level/Cues Needed (Transfer Goal 1, OT) supervision required  -ME     Time Frame (Transfer Goal 1, OT) long term goal (LTG);by discharge  -ME     Progress/Outcome (Transfer Goal 1, OT) goal not met  -ME     Row Name 03/08/22 0902          Bathing Goal 1 (OT)    Activity/Device (Bathing Goal 1, OT) lower body bathing  AD As needed  -ME     [place] Level/Cues Needed (Bathing Goal 1, OT) minimum assist (75% or more  patient effort)  -ME     Time Frame (Bathing Goal 1, OT) long term goal (LTG);by discharge  -ME     Progress/Outcomes (Bathing Goal 1, OT) goal not met  -[facility] Name 03/08/22 0902          Dressing Goal 1 (OT)    Activity/Device (Dressing Goal 1, OT) lower body dressing  AD as needed  -ME     [place]/Cues Needed (Dressing Goal 1, OT) minimum assist (75% or more patient effort)  -ME     Time Frame (Dressing Goal 1, OT) long term goal (LTG);by discharge  -ME     Progress/Outcome (Dressing Goal 1, OT) goal not met  -[facility] Name 03/08/22 0902          Therapy Assessment/Plan (OT)    Planned Therapy Interventions (OT) activity tolerance training;adaptive equipment training;BADL retraining;functional balance retraining;IADL retraining;occupation/activity based interventions;patient/caregiver education/training;strengthening exercise;ROM/therapeutic exercise;transfer/mobility retraining  -ME           User Key  (r) = Recorded By, (t) = Taken By, (c) = Cosigned By    Initials Name Provider Type    ME Johnny Rodriguez, OTR/L Occupational Therapist               Clinical Impression     Row Name 03/08/22 0902          Pain Assessment    Pretreatment Pain Rating 7/10  -ME     Posttreatment Pain Rating 7/10  -ME     Pain Location - back;buttock  -ME     Pain Intervention(s) Repositioned;Ambulation/increased activity;Distraction  -ME     Row Name 03/08/22 0902          Plan of Care Review    Plan of Care Reviewed With patient  -ME     Row Name 03/08/22 0902          Therapy Assessment/Plan (OT)    Patient/Family Therapy Goal Statement (OT) return to assisted living  -ME     Rehab Potential (OT) good, to achieve stated therapy goals  -ME     Criteria for Skilled Therapeutic Interventions Met (OT) yes;meets criteria;skilled treatment is necessary  -ME     Therapy Frequency (OT) other (see comments)  3-7 days per week  -ME     Predicted Duration of Therapy Intervention (OT) until d/c or all goals met  -ME     Row Name  03/08/22 0902          Therapy Plan Review/Discharge Plan (OT)    Anticipated Discharge Disposition (OT) assisted living;home with home health;skilled nursing facility  either return to assisted living with home health or SNF placement for further rehab depending on pt progress  -ME     Row Name 03/08/22 0902          Vital Signs    Pre Systolic BP Rehab 95  -ME     Pre Treatment Diastolic BP 65  -ME     Post Systolic BP Rehab 93  -ME     Post Treatment Diastolic BP 52  -ME     Pretreatment Heart Rate (beats/min) 102  -ME     Posttreatment Heart Rate (beats/min) 92  -ME     Pre SpO2 (%) 93  on 6 L  -ME     O2 Delivery Pre Treatment nasal cannula  -ME     Intra SpO2 (%) 82  -ME     O2 Delivery Intra Treatment nasal cannula  -ME     Post SpO2 (%) 88  -ME     O2 Delivery Post Treatment nasal cannula  -ME     Pre Patient Position Supine  -ME     Intra Patient Position Sitting  -ME     Post Patient Position Supine  -ME     Recovery Time nurse notified of decrease in O2 sats, in room and assessing pt at end of session  -ME     Row Name 03/08/22 0902          Positioning and Restraints    Pre-Treatment Position in bed  -ME     Post Treatment Position bed  -ME     In Bed side lying left;call light within reach;encouraged to call for assist;exit alarm on;with family/caregiver;with nsg  -ME           User Key  (r) = Recorded By, (t) = Taken By, (c) = Cosigned By    Initials Name Provider Type    ME Johnny Rodriguez OTR/L Occupational Therapist               Outcome Measures     Row Name 03/08/22 0902          How much help from another is currently needed...    Putting on and taking off regular lower body clothing? 2  -ME     Bathing (including washing, rinsing, and drying) 2  -ME     Toileting (which includes using toilet bed pan or urinal) 2  -ME     Putting on and taking off regular upper body clothing 3  -ME     Taking care of personal grooming (such as brushing teeth) 3  -ME     Eating meals 4  -ME     AM-PAC 6 Clicks  Score (OT) 16  -ME     Row Name 03/08/22 0900          How much help from another person do you currently need...    Turning from your back to your side while in flat bed without using bedrails? 3  -CZ     Moving from lying on back to sitting on the side of a flat bed without bedrails? 3  -CZ     Moving to and from a bed to a chair (including a wheelchair)? 3  -CZ     Standing up from a chair using your arms (e.g., wheelchair, bedside chair)? 3  -CZ     Climbing 3-5 steps with a railing? 2  -CZ     To walk in hospital room? 3  -CZ     AM-PAC 6 Clicks Score (PT) 17  -CZ     Row Name 03/08/22 0902 03/08/22 0900       Functional Assessment    Outcome Measure Options AM-PAC 6 Clicks Daily Activity (OT)  -ME AM-PAC 6 Clicks Basic Mobility (PT)  -          User Key  (r) = Recorded By, (t) = Taken By, (c) = Cosigned By    Initials Name Provider Type    CZ Martin Alvarez, PT Physical Therapist    ME Johnny Rodriguez, OTR/L Occupational Therapist                Occupational Therapy Education                 Title: PT OT SLP Therapies (In Progress)     Topic: Occupational Therapy (In Progress)     Point: ADL training (Not Started)     Description:   Instruct learner(s) on proper safety adaptation and remediation techniques during self care or transfers.   Instruct in proper use of assistive devices.              Learner Progress:  Not documented in this visit.          Point: Home exercise program (Not Started)     Description:   Instruct learner(s) on appropriate technique for monitoring, assisting and/or progressing therapeutic exercises/activities.              Learner Progress:  Not documented in this visit.          Point: Precautions (Done)     Description:   Instruct learner(s) on prescribed precautions during self-care and functional transfers.              Learning Progress Summary           Patient Acceptance, E, VU,NR by ME at 3/8/2022 1004    Comment: Educated on OT and POC. Educated to call for assistance.  Educated on safety precautions. Educated on PLB technique.                   Point: Body mechanics (Done)     Description:   Instruct learner(s) on proper positioning and spine alignment during self-care, functional mobility activities and/or exercises.              Learning Progress Summary           Patient Acceptance, E, VU,NR by ME at 3/8/2022 1004    Comment: Educated on OT and POC. Educated to call for assistance. Educated on safety precautions. Educated on PLB technique.                               User Key     Initials Effective Dates Name Provider Type Discipline    ME 06/16/21 -  Johnny Rodriguez, OTR/L Occupational Therapist OT              OT Recommendation and Plan  Planned Therapy Interventions (OT): activity tolerance training, adaptive equipment training, BADL retraining, functional balance retraining, IADL retraining, occupation/activity based interventions, patient/caregiver education/training, strengthening exercise, ROM/therapeutic exercise, transfer/mobility retraining  Therapy Frequency (OT): other (see comments) (3-7 days per week)  Plan of Care Review  Plan of Care Reviewed With: patient  Outcome Evaluation: initial OT evaluation complete. co-eval with PT. pt was pleasant and cooperative throughout. complaining of back and buttocks pain, and wanting to move. BUE ROM WFL grossly. BUE strength was grossly 3/5 with formal UE MMT assessment, but was observed to be grossly 4-/5 with bed mobility. pt was supervision for bed mobility. once sitting EOB pt became hypoxic and pale, less responsive, and returned to supine at that time. O2 dropped to 82% on 6 L at EOB. no further mobility assessed at that time. RN in room at end of session to assess pt. recommend further skilled OT services to address functional mobility and ADL deficits, as well as balance, strength, and endurance. pt may require SNF placement for further rehab at discharge, depending on progress. will need home health OT if she is to  return to assisted living. goals established.     Time Calculation:    Time Calculation- OT     Row Name 03/08/22 1008             Time Calculation- OT    OT Start Time 0902  -ME      OT Stop Time 0930  -ME      OT Time Calculation (min) 28 min  -ME      OT Received On 03/08/22  -ME      OT Goal Re-Cert Due Date 03/21/22  -ME              Untimed Charges    OT Eval/Re-eval Minutes 28  -ME              Total Minutes    Untimed Charges Total Minutes 28  -ME       Total Minutes 28  -ME            User Key  (r) = Recorded By, (t) = Taken By, (c) = Cosigned By    Initials Name Provider Type    ME Johnny Rodriguez, OTR/L Occupational Therapist              Therapy Charges for Today     Code Description Service Date Service Provider Modifiers Qty    [id number] HC OT EVAL MOD COMPLEXITY 2 3/8/2022 Johnny Rodriguez OTR/L GO 1               Johnny Rodriguez OTR/L  3/8/2022

## 2022-03-09 PROBLEM — D64.9 CHRONIC ANEMIA: Status: ACTIVE | Noted: 2022-01-01

## 2022-03-09 PROBLEM — D64.9 CHRONIC ANEMIA: Chronic | Status: ACTIVE | Noted: 2022-01-01

## 2022-03-09 NOTE — THERAPY TREATMENT NOTE
Acute Care - Physical Therapy Treatment Note  [facility]     Patient Name: Naomi Duong Son  : 1935  MRN: [id number]  Today's Date: 3/9/2022      Visit Dx:     ICD-10-CM ICD-9-CM   1. Acute on chronic respiratory failure with hypoxia (HCC)  J96.21 518.84     799.02   2. Pleural effusion  J90 511.9   3. Renal failure, unspecified chronicity  N19 586   4. Acute on chronic congestive heart failure, unspecified heart failure type ([facility])  I50.9 428.0   5. Impaired functional mobility, balance, gait, and endurance  Z74.09 V49.89   6. Impaired mobility and ADLs  Z74.09 V49.89    Z78.9      Patient Active Problem List   Diagnosis   • Ischemic cardiomyopathy   • S/P CABG (coronary artery bypass graft)   • Essential hypertension   • Mixed hyperlipidemia   • H/O CHF   • Carotid artery stenosis   • CKD (chronic kidney disease) stage 3, GFR 30-59 ml/min ([facility])   • Bilateral carotid artery disease ([facility])   • Carotid stenosis, asymptomatic, bilateral   • Surgical aftercare, circulatory system   • Carotid stenosis, asymptomatic, left   • Acute on chronic systolic CHF (congestive heart failure) ([facility])   • CKD (chronic kidney disease) stage 3, GFR 30-59 ml/min ([facility])   • Severe malnutrition ([facility])   • Acute respiratory failure with hypoxia ([facility])   • Acute on chronic respiratory failure with hypoxia ([facility])   • Bilateral pleural effusion   • CKD (chronic kidney disease) stage 4, GFR 15-29 ml/min (HCC)   • Atrial fibrillation ([facility])   • Chronic anemia     Past Medical History:   Diagnosis Date   • CAD (coronary artery disease)    • Carotid artery stenosis    • Chronic systolic heart failure (HCC)    • CKD (chronic kidney disease) stage 3, GFR 30-59 ml/min ([facility])    • GERD (gastroesophageal reflux disease)    • Hypercholesterolemia    • Hyperlipidemia    • Hypertension    • Iron deficiency anemia    • Ischemic cardiomyopathy    • MI (myocardial infarction) ([facility])    • Mitral valve disease    • Osteoarthritis    • UTI (urinary tract  infection)      Past Surgical History:   Procedure Laterality Date   • CARDIAC CATHETERIZATION     • CAROTID ENDARTERECTOMY     • CORONARY ARTERY BYPASS GRAFT     • TRANSESOPHAGEAL ECHOCARDIOGRAM ([facility])       PT Assessment (last 12 hours)     PT Evaluation and Treatment     Row Name 03/09/22 1453          Physical Therapy Time and Intention    Document Type therapy note (daily note)  -     Mode of Treatment physical therapy;individual therapy  -     Patient Effort poor  due to lethargy  -     Comment pt lethargic, BP low. notified nsg who states to let pt rest.  -     Row Name 03/09/22 1453          General Information    Patient Profile Reviewed yes  -     Existing Precautions/Restrictions oxygen therapy device and L/min;fall  -     Row Name 03/09/22 1453          Cognition    Affect/Mental Status (Cognition) unable/difficult to assess  -     Row Name 03/09/22 1453          Bed Mobility    Bed Mobility supine-sit;sit-supine  -     Supine-Sit [place] (Bed Mobility) supervision  -     Sit-Supine [place] (Bed Mobility) supervision  -     Assistive Device (Bed Mobility) head of bed elevated;bed rails  -     Row Name 03/09/22 1453          Safety Issues, Functional Mobility    Impairments Affecting Function (Mobility) strength;endurance/activity tolerance;shortness of breath;balance;pain  -     Row Name 03/09/22 1453          Respiratory WDL    Respiratory WDL X;breath sounds;cough  -     Rhythm/Pattern, Respiratory no shortness of breath reported  -     Cough Frequency infrequent  -     Cough Type dry  -     Row Name 03/09/22 1453          Breath Sounds    Breath Sounds All Fields  -     All Lung Fields Breath Sounds Anterior:;crackles, coarse  -     EMIL Breath Sounds Anterior:;Posterior:;coarse;crackles, fine;diminished  -     LLL Breath Sounds coarse;crackles, fine;diminished  -     RUL Breath Sounds coarse;diminished;crackles, fine  -     RLL Breath Sounds  coarse;diminished;crackles, fine  -[facility] Name 03/09/22 1453          Skin WDL    Skin WDL WDL  redness to buttocks,elbows and scratches to left shoulder  -[facility] Name 03/09/22 1453          Coping    Observed Emotional State calm;cooperative  -     Verbalized Emotional State acceptance  -     Involvement in Care not present at bedside  -     Row Name 03/09/22 1453          Vital Signs    Pre Systolic BP Rehab 88   low dynamap and manually.  -     Pre Treatment Diastolic BP 52   -     Pre SpO2 (%) 98  -     O2 Delivery Pre Treatment supplemental O2  -DANIEL     Pre Patient Position Supine  -DANIEL     Post Patient Position Supine  -     Row Name 03/09/22 1453          Positioning and Restraints    Pre-Treatment Position in bed  -DANIEL     Post Treatment Position bed  -     In Bed fowlers;call light within reach;encouraged to call for assist;exit alarm on  -[facility] Name 03/09/22 1453          Therapy Assessment/Plan (PT)    Criteria for Skilled Interventions Met (PT) yes;skilled treatment is necessary  -[facility] Name 03/09/22 1453          Bed Mobility Goal 1 (PT)    Activity/Assistive Device (Bed Mobility Goal 1, PT) sit to supine/supine to sit  -     [place] Level/Cues Needed (Bed Mobility Goal 1, PT) modified independence  -DANIEL     Time Frame (Bed Mobility Goal 1, PT) by discharge  -DANIEL     Strategies/Barriers (Bed Mobility Goal 1, PT) Maintain SpO2 >90%.  -[facility] Name 03/09/22 1453          Transfer Goal 1 (PT)    Activity/Assistive Device (Transfer Goal 1, PT) sit-to-stand/stand-to-sit;bed-to-chair/chair-to-bed;walker, rolling  -     [place] Level/Cues Needed (Transfer Goal 1, PT) modified independence  -DANIEL     Time Frame (Transfer Goal 1, PT) by discharge  -DANIEL     Strategies/Barriers (Transfers Goal 1, PT) Maintain SpO2 >90%.  -     Progress/Outcome (Transfer Goal 1, PT) goal not met  -[facility] Name 03/09/22 1453          Gait Training Goal 1 (PT)    Activity/Assistive  Device (Gait Training Goal 1, PT) walker, rolling  -DANIEL     [place] Level (Gait Training Goal 1, PT) modified independence  -DANIEL     Distance (Gait Training Goal 1, PT) 75'x2.  -DANIEL     Time Frame (Gait Training Goal 1, PT) by discharge  -DANIEL     Strategies/Barriers (Gait Training Goal 1, PT) Maintain SpO2 >90%.  -DANIEL     Progress/Outcome (Gait Training Goal 1, PT) goal not met  -DANIEL           User Key  (r) = Recorded By, (t) = Taken By, (c) = Cosigned By    Initials Name Provider Type    DANIEL Dc Thakur, PTA Physical Therapy Assistant                Physical Therapy Education                 Title: PT OT SLP Therapies (In Progress)     Topic: Physical Therapy (In Progress)     Point: Mobility training (In Progress)     Learning Progress Summary           Patient Acceptance, E, NR by DANIEL at 3/9/2022 1614    Acceptance, E, VU,NR by LENORE at 3/8/2022 0937    Comment: PT POC, breathing technique, transfer technique.                   Point: Home exercise program (Not Started)     Learner Progress:  Not documented in this visit.          Point: Body mechanics (Not Started)     Learner Progress:  Not documented in this visit.          Point: Precautions (Not Started)     Learner Progress:  Not documented in this visit.                      User Key     Initials Effective Dates Name Provider Type Discipline    DANIEL 06/16/21 -  Dc Thakur PTA Physical Therapy Assistant PT    CZ 06/16/21 -  Martin Alvarez, PT Physical Therapist PT              PT Recommendation and Plan  Anticipated Discharge Disposition (PT): assisted living  Therapy Frequency (PT): other (see comments) (3-7 days/week)  Plan of Care Reviewed With: patient  Progress: improving  Outcome Evaluation: pt is lethargic with manual BP of 88/52. nsg notified who advised no further tx at this time.   Outcome Measures     Row Name 03/09/22 145             How much help from another person do you currently need...    Turning from your back to your side while in  flat bed without using bedrails? 3  -DANIEL      Moving from lying on back to sitting on the side of a flat bed without bedrails? 3  -DANIEL      Moving to and from a bed to a chair (including a wheelchair)? 3  -DANIEL      Standing up from a chair using your arms (e.g., wheelchair, bedside chair)? 3  -DANIEL      Climbing 3-5 steps with a railing? 2  -DANIEL      To walk in hospital room? 3  -DANIEL      AM-PAC 6 Clicks Score (PT) 17  -DANIEL              Functional Assessment    Outcome Measure Options AM-PAC 6 Clicks Basic Mobility (PT)  -DANIEL            User Key  (r) = Recorded By, (t) = Taken By, (c) = Cosigned By    Initials Name Provider Type    Dc Iverson PTA Physical Therapy Assistant                 Time Calculation:    PT Charges     Row Name 03/09/22 1615             Time Calculation    Start Time 1453  -DANIEL      Stop Time 1506  -DANIEL      Time Calculation (min) 13 min  -DANIEL              Time Calculation- PT    Total Timed Code Minutes- PT 13 minute(s)  -DANIEL              Timed Charges    37379 - PT Therapeutic Activity Minutes 13  -DANIEL              Total Minutes    Timed Charges Total Minutes 13  -DANIEL       Total Minutes 13  -DANIEL            User Key  (r) = Recorded By, (t) = Taken By, (c) = Cosigned By    Initials Name Provider Type    Dc Iverson PTA Physical Therapy Assistant              Therapy Charges for Today     Code Description Service Date Service Provider Modifiers Qty    [id number]  PT THERAPEUTIC ACT EA 15 MIN 3/9/2022 Dc Thakur PTA GP 1          PT G-Codes  Outcome Measure Options: AM-PAC 6 Clicks Basic Mobility (PT)  AM-PAC 6 Clicks Score (PT): 17  AM-PAC 6 Clicks Score (OT): 16    Dc Thakur PTA  3/9/2022

## 2022-03-09 NOTE — PROGRESS NOTES
Adult Nutrition  Assessment    Patient Name:  Naomi Duong Son  YOB: 1935  MRN: [id number]  Admit Date:  3/7/2022    Assessment Date:  3/9/2022    Comments:  Advanced age Pt admitted with A/C Resp Failure likely due to CHF.   Poor oral intake at this time and per her son her oral intake Pta is limited.  She is a resident at the [facility].  She denies any Gi distress or eating difficulties.  She is currently on fluid restrictions so adding supplements is a challenge.  Will add Magic cups to all trays and monitor po.  She meets criteria for Severe Chronic malnutrition based on NFPE revealing significant fat loss and muscle wasting present, please see MSA for details.  Will continue to monitor POC         Anthropometrics     Row Name 03/09/22 0430          Anthropometrics    Weight 49.9 kg (110 lb)                    Estimated/Assessed Needs - Anthropometrics     Row Name 03/09/22 0430          Anthropometrics    Weight 49.9 kg (110 lb)                         Electronically signed by:  Ruby Soliman RD  03/09/22 08:21 CST

## 2022-03-09 NOTE — PROGRESS NOTES
Progress Note  Roberto Jaramillo MD  Hospitalist    Date of visit: 3/9/2022     LOS: 1 day   Patient Care Team:  Sabino Mobley MD as PCP - General (Family Medicine)    Chief Complaint: Shortness of breath    Subjective     Interval History:     Patient Complaints: Shortness of breath, minimally better. Still requiring supplemental Oxygen - up to 15 L/min now    History taken from: Patient and nursing.    Medication Review:   Current Facility-Administered Medications   Medication Dose Route Frequency Provider Last Rate Last Admin   • acetaminophen (TYLENOL) tablet 650 mg  650 mg Oral Q4H PRN Randall Cabrera MD        Or   • acetaminophen (TYLENOL) 160 MG/5ML solution 650 mg  650 mg Oral Q4H PRN Randall Cabrera MD        Or   • acetaminophen (TYLENOL) suppository 650 mg  650 mg Rectal Q4H PRN Randall Cabrera MD       • apixaban (ELIQUIS) tablet 2.5 mg  2.5 mg Oral Q12H Roberto Jaramillo MD   2.5 mg at 03/09/22 1023   • aspirin EC tablet 81 mg  81 mg Oral Daily Roberto Jaramillo MD   81 mg at 03/09/22 1023   • calcium carbonate (TUMS) chewable tablet 500 mg (200 mg elemental)  1 tablet Oral BID PRN Randall Cabrera MD       • carvedilol (COREG) tablet 3.125 mg  3.125 mg Oral BID With Meals Roberto Jaramillo MD   3.125 mg at 03/09/22 1023   • furosemide (LASIX) injection 60 mg  60 mg Intravenous BID Roberto Jaramillo MD       • ipratropium-albuterol (DUO-NEB) nebulizer solution 3 mL  3 mL Nebulization Q4H - RT Roberto Jaramillo MD       • losartan (COZAAR) half tablet 12.5 mg  12.5 mg Oral Q24H Roberto Jaramillo MD   12.5 mg at 03/09/22 1023   • Magnesium Sulfate 2 gram Bolus, followed by 8 gram infusion (total Mg dose 10 grams)- Mg less than or equal to 1mg/dL  2 g Intravenous PRN Randall Cabrera MD        Or   • Magnesium Sulfate 2 gram / 50mL Infusion (GIVE X 3 BAGS TO EQUAL 6GM TOTAL DOSE) - Mg 1.1 - 1.5 mg/dl  2 g Intravenous PRN Randall Cabrera MD        Or   • Magnesium Sulfate 4 gram infusion- Mg 1.6-1.9 mg/dL  4 g Intravenous  Randall Muñoz MD       • melatonin tablet 5.25 mg  5.25 mg Oral Nightly Randall Muñoz MD       • ondansetron (ZOFRAN) injection 4 mg  4 mg Intravenous Q6H PRRandall Gomez MD       • oxyCODONE (ROXICODONE) immediate release tablet 5 mg  5 mg Oral Q4H PRRandall Gomez MD       • potassium chloride (MICRO-K) CR capsule 40 mEq  40 mEq Oral PRN Randall Cabrera MD        Or   • potassium chloride (KLOR-CON) packet 40 mEq  40 mEq Oral PRN Randall Cabrera MD        Or   • potassium chloride 10 mEq in 100 mL IVPB  10 mEq Intravenous Q1H PRN Randall Cabrera MD       • potassium phosphate 45 mmol in sodium chloride 0.9 % 500 mL infusion  45 mmol Intravenous PRN Randall Cabrera MD        Or   • potassium phosphate 30 mmol in sodium chloride 0.9 % 250 mL infusion  30 mmol Intravenous PRN Randall Cabrera MD        Or   • Potassium Phosphates 15 mmol in sodium chloride 0.9 % 100 mL infusion  15 mmol Intravenous PRN Randall Cabrera MD        Or   • sodium phosphates 45 mmol in sodium chloride 0.9 % 500 mL IVPB  45 mmol Intravenous PRN Randall Cabrera MD        Or   • sodium phosphates 30 mmol in sodium chloride 0.9 % 250 mL IVPB  30 mmol Intravenous PRN Randall Cabrera MD        Or   • sodium phosphates 15 mmol in sodium chloride 0.9 % 250 mL IVPB  15 mmol Intravenous PRN Randall Cabrera MD       • sodium chloride 0.9 % flush 10 mL  10 mL Intravenous PRN Randall Cabrera MD   10 mL at 03/08/22 2118   • sodium chloride 0.9 % flush 10 mL  10 mL Intravenous Q12H Randall Cabrera MD   10 mL at 03/08/22 2118   • sodium chloride 0.9 % flush 10 mL  10 mL Intravenous PRN Randall Cabrera MD           Review of Systems:   Review of Systems   Constitutional: Positive for fatigue.   Respiratory: Positive for cough and shortness of breath. Negative for wheezing.    Cardiovascular: Positive for leg swelling. Negative for chest pain and palpitations.   Gastrointestinal: Negative for abdominal distention, abdominal pain, blood in stool, diarrhea,  nausea and vomiting.   Genitourinary: Negative for dysuria, flank pain, frequency, hematuria and urgency.   Musculoskeletal: Positive for arthralgias. Negative for back pain.   Skin: Negative for color change, pallor and rash.   Neurological: Positive for weakness. Negative for seizures, speech difficulty, light-headedness, numbness and headaches.   Psychiatric/Behavioral: Negative for agitation, behavioral problems and confusion.   All other systems reviewed and are negative.      Objective     Vital Signs  Temp:  [96.4 °F (35.8 °C)-98.2 °F (36.8 °C)] 96.4 °F (35.8 °C)  Heart Rate:  [] 88  Resp:  [20-24] 20  BP: ()/(46-53) 99/52    Physical Exam:  Physical Exam  Vitals reviewed.   Constitutional:       Appearance: She is ill-appearing.   HENT:      Head: Normocephalic and atraumatic.   Eyes:      General: No scleral icterus.     Extraocular Movements: Extraocular movements intact.      Pupils: Pupils are equal, round, and reactive to light.   Cardiovascular:      Rate and Rhythm: Normal rate. Rhythm irregular.   Pulmonary:      Effort: No respiratory distress.      Breath sounds: Rales present.      Comments: Decreased air entry bilaterally  Abdominal:      General: Abdomen is flat. Bowel sounds are normal. There is no distension.      Palpations: Abdomen is soft. There is no mass.      Tenderness: There is no abdominal tenderness. There is no right CVA tenderness or left CVA tenderness.   Musculoskeletal:         General: No tenderness or deformity. Normal range of motion.      Cervical back: Normal range of motion and neck supple. No rigidity or tenderness.      Right lower leg: No edema.      Left lower leg: No edema.   Skin:     General: Skin is warm and dry.      Coloration: Skin is pale. Skin is not jaundiced.      Findings: No bruising or lesion.   Neurological:      General: No focal deficit present.      Mental Status: She is alert and oriented to person, place, and time. Mental status is at  baseline.      Cranial Nerves: No cranial nerve deficit.      Sensory: No sensory deficit.      Motor: No weakness.   Psychiatric:         Mood and Affect: Mood normal.         Behavior: Behavior normal.          Results Review:    Lab Results (last 24 hours)     Procedure Component Value Units Date/Time    Basic Metabolic Panel [[id number]]  (Abnormal) Collected: 03/09/22 0602    Specimen: Blood Updated: 03/09/22 0636     Glucose 94 mg/dL      BUN 43 mg/dL      Creatinine 1.86 mg/dL      Sodium 138 mmol/L      Potassium 4.8 mmol/L      Chloride 95 mmol/L      CO2 37.0 mmol/L      Calcium 8.5 mg/dL      BUN/Creatinine Ratio 23.1     Anion Gap 6.0 mmol/L      eGFR 26.3 mL/min/1.73      Comment: National Kidney Foundation and American Society of Nephrology (ASN) Task Force recommended calculation based on the Chronic Kidney Disease Epidemiology Collaboration (CKD-EPI) equation refit without adjustment for race.       Narrative:      GFR Normal >60  Chronic Kidney Disease <60  Kidney Failure <15      Phosphorus [[id number]]  (Abnormal) Collected: 03/09/22 0602    Specimen: Blood Updated: 03/09/22 0636     Phosphorus 4.6 mg/dL     Magnesium [[id number]]  (Normal) Collected: 03/09/22 0602    Specimen: Blood Updated: 03/09/22 0636     Magnesium 2.1 mg/dL     CBC & Differential [[id number]]  (Abnormal) Collected: 03/09/22 0602    Specimen: Blood Updated: 03/09/22 0618    Narrative:      The following orders were created for panel order CBC & Differential.  Procedure                               Abnormality         Status                     ---------                               -----------         ------                     CBC Auto Differential[[id number]]        Abnormal            Final result                 Please view results for these tests on the individual orders.    CBC Auto Differential [[id number]]  (Abnormal) Collected: 03/09/22 0602    Specimen: Blood Updated: 03/09/22 0618     WBC 4.12 10*3/mm3      RBC 2.71  10*6/mm3      Hemoglobin 7.6 g/dL      Hematocrit 24.8 %      MCV 91.5 fL      MCH 28.0 pg      MCHC 30.6 g/dL      RDW 14.4 %      RDW-SD 47.9 fl      MPV 10.7 fL      Platelets 157 10*3/mm3      Neutrophil % 72.9 %      Lymphocyte % 12.6 %      Monocyte % 13.6 %      Eosinophil % 0.0 %      Basophil % 0.7 %      Immature Grans % 0.2 %      Neutrophils, Absolute 3.00 10*3/mm3      Lymphocytes, Absolute 0.52 10*3/mm3      Monocytes, Absolute 0.56 10*3/mm3      Eosinophils, Absolute 0.00 10*3/mm3      Basophils, Absolute 0.03 10*3/mm3      Immature Grans, Absolute 0.01 10*3/mm3      nRBC 0.0 /100 WBC           Imaging Results (Last 24 Hours)     ** No results found for the last 24 hours. **          Assessment/Plan       Acute on chronic respiratory failure with hypoxia (HCC)    Acute on chronic systolic CHF (congestive heart failure) ([facility])    Bilateral pleural effusion    Atrial fibrillation (HCC)    CKD (chronic kidney disease) stage 4, GFR 15-29 ml/min (HCC)    Chronic anemia    Continue with supportive care, supplemental oxygen, diuresis, Cozaar, Coreg, Eliquis at 2.5 mg BID.  Increase the dose of Lasix to 60 mg, add nebulized treatments every 4 hours.    A recent transthoracic Echo obtained now shows a depressed LVEF of 30 to 35%, mitral valve regurgitation and moderate pulmonary hypertension.    I confirmed that the patient's Advance Care Plan is present, code status is documented, or surrogate decision maker is listed in the patient's medical record.      Roberto Jaramillo MD  03/09/22  11:40 CST

## 2022-03-09 NOTE — PLAN OF CARE
Goal Outcome Evaluation:           Progress: improving  Outcome Evaluation: Patient is able to sleep well tonight;still with dyspnea on exertion with activity;likes to lay on her left side;turned to sides;no s/s of respiratory distress;been peeing well;no complaints of pain

## 2022-03-09 NOTE — PLAN OF CARE
Goal Outcome Evaluation:  Plan of Care Reviewed With: patient        Progress: improving  Outcome Evaluation: pt is lethargic with manual BP of 88/52. nsg notified who advised no further tx at this time.

## 2022-03-09 NOTE — CONSULTS
[facility] [place] [facility] [facility] [place]    Referring Provider: Thai Fritz MD      Reason for Consultation: AF, CHF    Patient Care Team:  Sabino Mobley MD as PCP - General (Family Medicine)    Chief complaint   Chief Complaint   Patient presents with    Shortness of Breath       Subjective .     History of present illness:     Ms. Abdalla is a 87 year old patient who presents to [facility] ER with dyspnea. She was found to be in AF with RVR. This is new. She has bilateral effusions. She is only mildly volume overloaded intravascularly. She has stable CKD. She has had a 2 point drop in hemoglobin from 9.6 to 7.6 following eliquis. She has had two doses. Last dose was 1023 3/9/22.   She will likely benefit from a thoracentesis for her worsening dyspnea and increasing o2 requirement.  Concerning her AF, will hold anticoagulation for procedure and worsening anemia and rate control her.   Troponin are negative. BNP eleavated, not much over baseline with her CKD.     Review of Systems  Review of Systems   Constitutional: Positive for fatigue. Negative for diaphoresis, fever and unexpected weight change.   Respiratory: Positive for cough and shortness of breath. Negative for chest tightness and wheezing.    Cardiovascular: Negative for chest pain, palpitations and leg swelling.   Gastrointestinal: Negative for abdominal distention and blood in stool.   Genitourinary: Negative for hematuria.   Musculoskeletal: Negative for arthralgias and myalgias.   Skin: Negative for pallor and rash.   Neurological: Positive for weakness. Negative for dizziness and syncope.   Hematological: Does not bruise/bleed easily.   Psychiatric/Behavioral: Negative for confusion. The patient is not nervous/anxious.        History  Past Medical History:   Diagnosis Date    CAD (coronary artery disease)     Carotid artery stenosis     Chronic systolic heart failure (HCC)     CKD (chronic kidney disease) stage 3, GFR 30-59 ml/min  (HCC)     GERD (gastroesophageal reflux disease)     Hypercholesterolemia     Hyperlipidemia     Hypertension     Iron deficiency anemia     Ischemic cardiomyopathy     MI (myocardial infarction) (HCC)     Mitral valve disease     Osteoarthritis     UTI (urinary tract infection)    ,   Past Surgical History:   Procedure Laterality Date    CARDIAC CATHETERIZATION      CAROTID ENDARTERECTOMY      CORONARY ARTERY BYPASS GRAFT      TRANSESOPHAGEAL ECHOCARDIOGRAM (MISSY)     ,   Family History   Problem Relation Age of Onset    Hypertension Father    ,   Social History     Tobacco Use    Smoking status: Former Smoker     Quit date:      Years since quittin.2    Smokeless tobacco: Never Used   Vaping Use    Vaping Use: Never used   Substance Use Topics    Alcohol use: No    Drug use: No   ,   Medications Prior to Admission   Medication Sig Dispense Refill Last Dose    acetaminophen (TYLENOL) 325 MG tablet Take 2 tablets by mouth Every 4 (Four) Hours As Needed for Mild Pain .   Past Week at Unknown time    albuterol sulfate  (90 Base) MCG/ACT inhaler Inhale 2 puffs Every 4 (Four) Hours As Needed for Wheezing or Shortness of Air. 19 g 1 Past Week at Unknown time    aspirin 81 MG chewable tablet Chew 81 mg Daily.   3/6/2022 at Unknown time    atorvastatin (LIPITOR) 20 MG tablet Take 1 tablet by mouth Every Night. 10 tablet 0 3/6/2022 at Unknown time    carvedilol (Coreg) 12.5 MG tablet Take 1 tablet by mouth 2 (Two) Times a Day With Meals. 180 tablet 3 3/6/2022 at Unknown time    clopidogrel (PLAVIX) 75 MG tablet Take 1 tablet by mouth Daily. 90 tablet 3 3/6/2022 at Unknown time    furosemide (LASIX) 40 MG tablet 40 mg 2 (Two) Times a Week.   3/6/2022 at Unknown time    irbesartan (AVAPRO) 75 MG tablet Take 1 tablet by mouth Daily. 90 tablet 3 3/6/2022 at Unknown time    sennosides-docusate (senna-docusate sodium) 8.6-50 MG per tablet Take 2 tablets by mouth 2 (Two) Times a Day As Needed for Constipation.    "3/6/2022 at Unknown time      ALLERGIES  Patient has no known allergies.    The following portions of the patient's history were reviewed and updated as appropriate: allergies, current medications, past family history, past medical history, past social history, past surgical history and problem list.     Old and outside records reviewed (if available) and pertinent information is included in the objective data.     Objective     Vital Sign Min/Max for last 24 hours  Temp  Min: 96.4 °F (35.8 °C)  Max: 98.2 °F (36.8 °C)   BP  Min: 91/53  Max: 109/53   Pulse  Min: 81  Max: 96   Resp  Min: 16  Max: 24   SpO2  Min: 83 %  Max: 100 %   Flow (L/min)  Min: 6  Max: 15   Weight  Min: 49.9 kg (110 lb)  Max: 49.9 kg (110 lb)     Flowsheet Rows      Flowsheet Row First Filed Value   Admission Height 157.5 cm (62\") Documented at 03/07/2022 0504   Admission Weight 46.7 kg (103 lb) Documented at 03/07/2022 0504               Physical Exam:  Physical Exam  Vitals and nursing note reviewed.   Constitutional:       General: She is not in acute distress.     Appearance: She is well-developed. She is not diaphoretic.   HENT:      Head: Normocephalic.   Eyes:      Conjunctiva/sclera: Conjunctivae normal.   Neck:      Vascular: No JVD.   Cardiovascular:      Rate and Rhythm: Normal rate and regular rhythm.      Heart sounds: Normal heart sounds, S1 normal and S2 normal. No murmur heard.    No friction rub. No gallop.   Pulmonary:      Effort: Pulmonary effort is normal. No respiratory distress.      Breath sounds: Normal breath sounds. No wheezing or rales.   Abdominal:      General: Bowel sounds are normal. There is no distension.      Palpations: Abdomen is soft.   Musculoskeletal:         General: Normal range of motion.   Skin:     General: Skin is warm and dry.      Findings: No erythema.   Neurological:      Mental Status: She is alert and oriented to person, place, and time.   Psychiatric:         Behavior: Behavior normal.         " Thought Content: Thought content normal.         Judgment: Judgment normal.            Results Reviewed by myself:     Results from last 7 days   Lab Units 03/09/22  0602 03/08/22  0721 03/07/22  1150 03/07/22  0521   SODIUM mmol/L 138 139 138 140   POTASSIUM mmol/L 4.8 4.9 5.3* 5.4*   CHLORIDE mmol/L 95* 97* 99 98   CO2 mmol/L 37.0* 36.0* 28.0 35.0*   BUN mg/dL 43* 38* 35* 32*   CREATININE mg/dL 1.86* 1.73* 1.80* 1.77*   CALCIUM mg/dL 8.5* 8.8 9.1 9.2   BILIRUBIN mg/dL  --   --   --  0.4   ALK PHOS U/L  --   --   --  84   ALT (SGPT) U/L  --   --   --  11   AST (SGOT) U/L  --   --   --  17   GLUCOSE mg/dL 94 98 126* 93       Estimated Creatinine Clearance: 17.4 mL/min (A) (by C-G formula based on SCr of 1.86 mg/dL (H)).    Results from last 7 days   Lab Units 03/09/22  0602 03/08/22  0721 03/07/22  1150 03/07/22  0521   MAGNESIUM mg/dL 2.1 2.2  --  2.5*   PHOSPHORUS mg/dL 4.6* 4.9* 5.1*  --        Results from last 7 days   Lab Units 03/09/22  0602 03/08/22  0721 03/08/22  0000 03/07/22  0521   WBC 10*3/mm3 4.12 5.81 5.91 4.31   HEMOGLOBIN g/dL 7.6* 9.2* 9.6* 10.4*   PLATELETS 10*3/mm3 157 194 191 197       Results from last 7 days   Lab Units 03/07/22  0521   INR  0.97       Lab Results   Component Value Date    CKTOTAL 36 03/07/2022    TROPONINT <0.010 03/07/2022     Lab Results   Component Value Date    PROBNP 16,282.0 (H) 03/07/2022       I/O last 3 completed shifts:  In: 250 [P.O.:250]  Out: 550 [Urine:550]    Cardiographics  ECG/EMG Results (last 24 hours)       ** No results found for the last 24 hours. **            Results for orders placed during the hospital encounter of 03/07/22    Adult Transthoracic Echo Complete w/ Color, Spectral and Contrast if necessary per protocol    Interpretation Summary  · Estimated left ventricular EF = 35% Left ventricular ejection fraction appears to be 31 - 35%. Left ventricular systolic function is moderately decreased. The left ventricular cavity is borderline dilated.  Left ventricular wall thickness is consistent with mild concentric hypertrophy. There is left ventricular global hypokinesis noted. Left ventricular diastolic function is consistent with (grade II w/high LAP) pseudonormalization.  · The left atrial cavity is moderately dilated.  · Moderate mitral valve regurgitation is present with an eccentric jet noted.  · Moderate tricuspid valve regurgitation is present.  · Estimated right ventricular systolic pressure from tricuspid regurgitation is moderately elevated (45-55 mmHg).  · Borderline dilation of the aortic root is present (3.8 cms)      XR Chest 1 View    Result Date: 3/8/2022  Some improvement and fluid status compared with yesterday. Significant residual pleural effusions and basilar volume loss/consolidation left greater than right. No new abnormality. Electronically signed by:  Gael Caballero MD  3/8/2022 7:59 AM CST Workstation: [id number]    XR Chest 1 View    Result Date: 3/7/2022    Bilateral pleural effusions, left greater right with likely associated atelectasis. Electronically signed by:  Tish Avalos MD  3/7/2022 6:12 AM CST Workstation: [id number]      Intake/Output         03/08/22 0700 - 03/09/22 0659 03/09/22 0700 - 03/10/22 0659    Intake (ml) 50 240    Output (ml) 400 500    Net (ml) -350 -260    Last Weight 49.9 kg (110 lb) --              Assessment/Plan       Acute on chronic respiratory failure with hypoxia (HCC)    Acute on chronic systolic CHF (congestive heart failure) (HCC)    Bilateral pleural effusion    CKD (chronic kidney disease) stage 4, GFR 15-29 ml/min (HCC)    Atrial fibrillation (HCC)    Chronic anemia    - At this point, her CHF is chronic and she has third spaced into her pleural cavities. She is not grossly volume overloaded. Her effusions are not new findings, they have been present for years.     -She will benefit from a thoracentesis. IR MD will be consulted. Eliquis held.     She was on twice weekly Lasix for  maintenance. Her new onset AF likely worsened her volume/breathing issue quickly.  She appears to have bilateral infiltrates.   - Tolerating IV lasix now, but she will dry out quickly. Monitor labs.     She has CKD IV. Nephrology will be on board to assist with diuretics.   She is rate controlled. Blood pressure is borderline.     She has had a worsening of anemia, drop of 2 points and increase in o2 requirement. Heparin was started and changed to eliquis.      Disposition: per findings.     I discussed the patient's findings and my recommendations with patient and Dr. Cisneros            This document has been electronically signed by CHANA Lopez on March 9, 2022 13:14 CST      Electronically signed by CHANA Lopez, 03/09/22, 1:14 PM CST.    History and events reviewed in detail.  Patient examined by me at 12:40 PM today.    Ada Son is an 87-year-old female with coronary artery disease with ischemic cardiomyopathy, status post CABG in 2014, hypertension, hyperlipidemia, CKD, peripheral vascular disease status post bilateral TCARs, GERD.   She presented with increasing dyspnea on exertion and oxygen requirement during the past several weeks.  She apparently moved to [place] recently.  She now has to wear oxygen 24/7.  She has felt fatigued weak.  She has had some degree of cough.  No chest pain was reported.  Her mobility has been restricted.      She was noted to have atrial fibrillation with rapid ventricular response the exact duration of which was unclear and bilateral pleural effusions left greater than right.  She has longstanding CKD and hemoglobin has dropped to 7.6 from 10.4 on admission.  Enzymes have been negative for myocardial injury.  She indicates that she is not responding to Lasix.      On examination: Patient frail but in no acute distress.      Pulse rate was 84 bpm, irregular.  Blood pressure 105/55 mmHg  There was pallor with no icterus or cyanosis  Neck exam showed no jugular  venous distention.  Previous carotid endarterectomy scar seen.  Exam of the heart showed varying intensity of the first heart sound with no S3 or S4.  No heart murmurs  Lung exam showed decreased breath sounds in both lung bases, left greater than right.  No definite rales audible  Abdomen was soft with no mass or tenderness.  Extremities showed 1+ edema.  No focal neurological deficit.    X-ray of the chest, labs reviewed in detail.  BUN 43 and creatinine 1.86.  Hemoglobin 7.6 and platelet count 157.  Echocardiogram report as discussed above with an EF of 31 to 35% with global LV dysfunction.  There is mild LVH.  Moderate mitral regurgitation, moderate tricuspid valve regurgitation, RVSP 45 to 55 mmHg    I discussed her case with  the plan would be to hold Eliquis for now.  The patient will benefit from thoracentesis on the left side and I understand that interventional radiology will be consulted.  Patient has been seen by nephrology and diuretic dose being adjusted by them.  Dose of Lasix increased to 40 mg daily.  Patient may benefit from packed RBC transfusion.  Patient is currently rate controlled with underlying atrial fibrillation.    Electronically signed by Kalee Cisneros MD, 03/09/22, 6:52 PM CST.

## 2022-03-10 NOTE — THERAPY TREATMENT NOTE
Acute Care - Physical Therapy Treatment Note  [facility]     Patient Name: Naomi Duong Son  : 1935  MRN: [id number]  Today's Date: 3/10/2022      Visit Dx:     ICD-10-CM ICD-9-CM   1. Acute on chronic respiratory failure with hypoxia (HCC)  J96.21 518.84     799.02   2. Pleural effusion  J90 511.9   3. Renal failure, unspecified chronicity  N19 586   4. Acute on chronic congestive heart failure, unspecified heart failure type ([facility])  I50.9 428.0   5. Impaired functional mobility, balance, gait, and endurance  Z74.09 V49.89   6. Impaired mobility and ADLs  Z74.09 V49.89    Z78.9      Patient Active Problem List   Diagnosis   • Ischemic cardiomyopathy   • S/P CABG (coronary artery bypass graft)   • Essential hypertension   • Mixed hyperlipidemia   • H/O CHF   • Carotid artery stenosis   • CKD (chronic kidney disease) stage 3, GFR 30-59 ml/min ([facility])   • Bilateral carotid artery disease ([facility])   • Carotid stenosis, asymptomatic, bilateral   • Surgical aftercare, circulatory system   • Carotid stenosis, asymptomatic, left   • Acute on chronic systolic CHF (congestive heart failure) ([facility])   • CKD (chronic kidney disease) stage 3, GFR 30-59 ml/min ([facility])   • Severe malnutrition ([facility])   • Acute respiratory failure with hypoxia ([facility])   • Acute on chronic respiratory failure with hypoxia ([facility])   • Bilateral pleural effusion   • CKD (chronic kidney disease) stage 4, GFR 15-29 ml/min (HCC)   • Atrial fibrillation ([facility])   • Chronic anemia     Past Medical History:   Diagnosis Date   • CAD (coronary artery disease)    • Carotid artery stenosis    • Chronic systolic heart failure (HCC)    • CKD (chronic kidney disease) stage 3, GFR 30-59 ml/min ([facility])    • GERD (gastroesophageal reflux disease)    • Hypercholesterolemia    • Hyperlipidemia    • Hypertension    • Iron deficiency anemia    • Ischemic cardiomyopathy    • MI (myocardial infarction) ([facility])    • Mitral valve disease    • Osteoarthritis    • UTI (urinary tract  infection)      Past Surgical History:   Procedure Laterality Date   • CARDIAC CATHETERIZATION     • CAROTID ENDARTERECTOMY     • CORONARY ARTERY BYPASS GRAFT     • TRANSESOPHAGEAL ECHOCARDIOGRAM ([facility])       PT Assessment (last 12 hours)     PT Evaluation and Treatment     Row Name 03/10/22 1128          Physical Therapy Time and Intention    Document Type therapy note (daily note)  -     Mode of Treatment physical therapy;individual therapy  -     Patient Effort good  due to lethagy  -     Comment start time: 1127  -     Row Name 03/10/22 1128          General Information    Patient Profile Reviewed yes  -     Existing Precautions/Restrictions oxygen therapy device and L/min;fall  -     Row Name 03/10/22 1128          Cognition    Affect/Mental Status (Cognition) --  -     Row Name 03/10/22 1128          Bed Mobility    Bed Mobility supine-sit;sit-supine  -     Supine-Sit [place] (Bed Mobility) minimum assist (75% patient effort)  -     Sit-Supine [place] (Bed Mobility) minimum assist (75% patient effort)  -     Assistive Device (Bed Mobility) head of bed elevated;bed rails  -[facility] Name 03/10/22 1128          Transfers    Transfers stand-sit transfer;sit-stand transfer  -     Comment, (Transfers) pt static stood while BP taken.  -     Sit-Stand [place] (Transfers) contact guard  -     Stand-Sit [place] (Transfers) contact guard  -     Row Name 03/10/22 1128          Sit-Stand Transfer    Assistive Device (Sit-Stand Transfers) walker, front-wheeled  -     Row Name 03/10/22 1128          Stand-Sit Transfer    Assistive Device (Stand-Sit Transfers) walker, front-wheeled  -     Row Name 03/10/22 1128          Gait/Stairs (Locomotion)    Gait/Stairs Locomotion gait/ambulation independence;gait/ambulation assistive device;distance ambulated;gait pattern;gait deviations;maintains weight-bearing status;stairs negotiation  -     [place]arnes Level (Gait) contact  guard  -     Assistive Device (Gait) walker, front-wheeled  -     Distance in Feet (Gait) 6 ft, 8 ft  -DANIEL     Pattern (Gait) 3-point  -     Deviations/Abnormal Patterns (Gait) bobby decreased;stride length decreased;gait speed decreased  -     Row Name 03/10/22 1128          Safety Issues, Functional Mobility    Impairments Affecting Function (Mobility) strength;endurance/activity tolerance;shortness of breath;balance;pain  -[facility] Name 03/10/22 1128          Motor Skills    Therapeutic Exercise --  AP, LAQ, hip flexion, hip Ab/Ad- 10x1 ana  -[facility] Name 03/10/22 1128          Respiratory WDL    Respiratory WDL X;breath sounds;cough  -     Rhythm/Pattern, Respiratory no shortness of breath reported  -     Cough Frequency infrequent  -     Cough Type dry  -[facility] Name 03/10/22 1128          Breath Sounds    Breath Sounds All Fields  -     All Lung Fields Breath Sounds Anterior:;crackles, coarse  -     EMIL Breath Sounds Anterior:;Posterior:;coarse;crackles, fine;diminished  -     LLL Breath Sounds coarse;crackles, fine;diminished  -     RUL Breath Sounds coarse;diminished;crackles, fine  -     RLL Breath Sounds coarse;diminished;crackles, fine  -[facility] Name 03/10/22 1128          Skin WDL    Skin WDL WDL  redness to buttocks,elbows and scratches to left shoulder  -[facility] Name 03/10/22 1128          Coping    Observed Emotional State calm;cooperative  -     Verbalized Emotional State acceptance  -     Involvement in Care not present at bedside  -     Row Name 03/10/22 1128          Vital Signs    Pre Systolic BP Rehab 119  -DANIEL     Pre Treatment Diastolic BP 70  -DANIEL     Intra Systolic BP Rehab --  134/78 sitting EOB, 119/93 standing.  -DANIEL     Post Systolic BP Rehab 120  -DANIEL     Post Treatment Diastolic BP 63  -DANIEL     Pretreatment Heart Rate (beats/min) 91  -DANIEL     Intratreatment Heart Rate (beats/min) 96  -DANIEL     Posttreatment Heart Rate (beats/min) 93  -DANIEL     Pre  SpO2 (%) 93  -     O2 Delivery Pre Treatment supplemental O2  -DANIEL     Intra SpO2 (%) 87  w/ standing.  -DANIEL     O2 Delivery Intra Treatment supplemental O2  -DANIEL     Post SpO2 (%) 92  -DANIEL     O2 Delivery Post Treatment supplemental O2  -DANIEL     Pre Patient Position Supine  -DANIEL     Post Patient Position Supine  -     Row Name 03/10/22 1128          Positioning and Restraints    Pre-Treatment Position in bed  -DANIEL     Post Treatment Position bed  -DANIEL     In Bed fowlers;call light within reach;encouraged to call for assist;exit alarm on  -     Row Name 03/10/22 1128          Therapy Assessment/Plan (PT)    Criteria for Skilled Interventions Met (PT) yes;skilled treatment is necessary  -     Row Name 03/10/22 1128          Bed Mobility Goal 1 (PT)    Activity/Assistive Device (Bed Mobility Goal 1, PT) sit to supine/supine to sit  -DANIEL     [place] Level/Cues Needed (Bed Mobility Goal 1, PT) modified independence  -DANIEL     Time Frame (Bed Mobility Goal 1, PT) by discharge  -DANIEL     Strategies/Barriers (Bed Mobility Goal 1, PT) Maintain SpO2 >90%.  -     Row Name 03/10/22 1128          Transfer Goal 1 (PT)    Activity/Assistive Device (Transfer Goal 1, PT) sit-to-stand/stand-to-sit;bed-to-chair/chair-to-bed;walker, rolling  -DANIEL     [place] Level/Cues Needed (Transfer Goal 1, PT) modified independence  -DANIEL     Time Frame (Transfer Goal 1, PT) by discharge  -DANIEL     Strategies/Barriers (Transfers Goal 1, PT) Maintain SpO2 >90%.  -DANIEL     Progress/Outcome (Transfer Goal 1, PT) goal not met  -     Row Name 03/10/22 1128          Gait Training Goal 1 (PT)    Activity/Assistive Device (Gait Training Goal 1, PT) walker, rolling  -DANIEL     [place] Level (Gait Training Goal 1, PT) modified independence  -DANIEL     Distance (Gait Training Goal 1, PT) 75'x2.  -DANIEL     Time Frame (Gait Training Goal 1, PT) by discharge  -DANIEL     Strategies/Barriers (Gait Training Goal 1, PT) Maintain SpO2 >90%.  -DANIEL     Progress/Outcome  (Gait Training Goal 1, PT) goal not met  -           User Key  (r) = Recorded By, (t) = Taken By, (c) = Cosigned By    Initials Name Provider Type    Dc Iverson PTA Physical Therapy Assistant                Physical Therapy Education                 Title: PT OT SLP Therapies (In Progress)     Topic: Physical Therapy (In Progress)     Point: Mobility training (In Progress)     Learning Progress Summary           Patient Acceptance, E, NR by  at 3/10/2022 1318    Acceptance, E, NR by  at 3/9/2022 1614    Acceptance, E, VU,NR by  at 3/8/2022 0937    Comment: PT POC, breathing technique, transfer technique.                   Point: Home exercise program (Not Started)     Learner Progress:  Not documented in this visit.          Point: Body mechanics (Not Started)     Learner Progress:  Not documented in this visit.          Point: Precautions (Not Started)     Learner Progress:  Not documented in this visit.                      User Key     Initials Effective Dates Name Provider Type Discipline    DANIEL 06/16/21 -  Dc Thakur PTA Physical Therapy Assistant PT    LENORE 06/16/21 -  Martin Alvarez, PT Physical Therapist PT              PT Recommendation and Plan  Anticipated Discharge Disposition (PT): assisted living  Therapy Frequency (PT): other (see comments) (3-7 days/week)  Plan of Care Reviewed With: patient  Progress: improving  Outcome Evaluation: pt much more alert this tx. vitals WFL. pt agreeable to PT w/ encouragment. pt requires min A for bed mobility and CGA for sit-stand-ssit. BP taken in different positons and remaind WNL. pt participated in gait, 6, 8 ft CGA w/ RW. SpO2 decreased to 87% but quickly improved. pt participated in seated LE ther ex. no new goal met at this time. pt would continue to benefit from PT services.   Outcome Measures     Row Name 03/10/22 1128 03/09/22 7817          How much help from another person do you currently need...    Turning from your back to your  side while in flat bed without using bedrails? 3  -DANIEL 3  -DANIEL     Moving from lying on back to sitting on the side of a flat bed without bedrails? 3  -DANIEL 3  -DANIEL     Moving to and from a bed to a chair (including a wheelchair)? 3  -DANIEL 3  -DANIEL     Standing up from a chair using your arms (e.g., wheelchair, bedside chair)? 3  -DANIEL 3  -DANIEL     Climbing 3-5 steps with a railing? 2  -DANIEL 2  -DANIEL     To walk in hospital room? 3  -DANIEL 3  -DANIEL     AM-PAC 6 Clicks Score (PT) 17  -DANIEL 17  -DANIEL            Functional Assessment    Outcome Measure Options AM-PAC 6 Clicks Basic Mobility (PT)  -DANIEL AM-PAC 6 Clicks Basic Mobility (PT)  -DANIEL           User Key  (r) = Recorded By, (t) = Taken By, (c) = Cosigned By    Initials Name Provider Type    DANIEL Dc Thakur PTA Physical Therapy Assistant                 Time Calculation:    PT Charges     Row Name 03/10/22 1321             Time Calculation    Start Time 1127  -      Stop Time 1205  -      Time Calculation (min) 38 min  -DANIEL              Time Calculation- PT    Total Timed Code Minutes- PT 38 minute(s)  -DANIEL              Timed Charges    75221 - PT Therapeutic Exercise Minutes 15  -      11466 - PT Therapeutic Activity Minutes 23  -DANIEL              Total Minutes    Timed Charges Total Minutes 38  -DANIEL       Total Minutes 38  -DANIEL            User Key  (r) = Recorded By, (t) = Taken By, (c) = Cosigned By    Initials Name Provider Type     Dc Thakur PTA Physical Therapy Assistant              Therapy Charges for Today     Code Description Service Date Service Provider Modifiers Qty    92159213260 HC PT THERAPEUTIC ACT EA 15 MIN 3/9/2022 Dc Thakur PTA GP 1    52312492306 HC PT THER PROC EA 15 MIN 3/10/2022 Dc Thakur PTA GP 1    34165894120 HC PT THERAPEUTIC ACT EA 15 MIN 3/10/2022 Dc Thakru PTA GP 2          PT G-Codes  Outcome Measure Options: AM-PAC 6 Clicks Basic Mobility (PT)  AM-PAC 6 Clicks Score (PT): 17  AM-PAC 6 Clicks Score (OT): 16    Dc R  Blaze, PTA  3/10/2022

## 2022-03-10 NOTE — PROGRESS NOTES
"[facility]  [address] [place] [address]. [address]  T - [phone]  F - [phone]     Progress Note          PATIENT  DEMOGRAPHICS   PATIENT NAME: Naomi Duong Son                      PHYSICIAN: Stevenson Robertson MD  : 1935  MRN: [id number]   LOS: 2 days    Patient Care Team:  Sabino Mobley MD as PCP - General (Family Medicine)  Subjective   SUBJECTIVE   Still soa,        Objective   OBJECTIVE   Vital Signs  Temp:  [96.2 °F (35.7 °C)-98.9 °F (37.2 °C)] 97.2 °F (36.2 °C)  Heart Rate:  [] 78  Resp:  [16-20] 20  BP: ()/(52-57) 114/54    Flowsheet Rows    Flowsheet Row First Filed Value   Admission Height 157.5 cm (62\") Documented at 2022 0504   Admission Weight 46.7 kg (103 lb) Documented at 2022 0504           I/O last 3 completed shifts:  In: 510 [P.O.:510]  Out: 1300 [Urine:1300]    PHYSICAL EXAM    Physical Exam  Constitutional:       Appearance: She is well-developed.   HENT:      Head: Normocephalic.   Eyes:      Pupils: Pupils are equal, round, and reactive to light.   Cardiovascular:      Rate and Rhythm: Normal rate and regular rhythm.      Heart sounds: Normal heart sounds.   Pulmonary:      Effort: Pulmonary effort is normal.      Breath sounds: Normal breath sounds.   Abdominal:      General: Bowel sounds are normal.      Palpations: Abdomen is soft.   Musculoskeletal:         General: Swelling present.   Skin:     Coloration: Skin is not jaundiced.   Neurological:      General: No focal deficit present.      Mental Status: She is alert and oriented to person, place, and time.         RESULTS   Results Review:    Results from last 7 days   Lab Units 03/10/22  0637 22  0602 22  0721 22  1150 22  0521   SODIUM mmol/L 138 138 139   < > 140   POTASSIUM mmol/L 4.6 4.8 4.9   < > 5.4*   CHLORIDE mmol/L 96* 95* 97*   < > 98   CO2 mmol/L 35.0* 37.0* 36.0*   < > 35.0*   BUN mg/dL 43* 43* 38*   < > 32*   CREATININE mg/dL 1.91* 1.86* " 1.73*   < > 1.77*   CALCIUM mg/dL 8.5* 8.5* 8.8   < > 9.2   BILIRUBIN mg/dL  --   --   --   --  0.4   ALK PHOS U/L  --   --   --   --  84   ALT (SGPT) U/L  --   --   --   --  11   AST (SGOT) U/L  --   --   --   --  17   GLUCOSE mg/dL 92 94 98   < > 93    < > = values in this interval not displayed.       Estimated Creatinine Clearance: 17.7 mL/min (A) (by C-G formula based on SCr of 1.91 mg/dL (H)).    Results from last 7 days   Lab Units 03/10/22  0637 03/09/22  0602 03/08/22  0721 03/07/22  1150   MAGNESIUM mg/dL 2.1 2.1 2.2  --    PHOSPHORUS mg/dL  --  4.6* 4.9* 5.1*             Results from last 7 days   Lab Units 03/10/22  0637 03/09/22  0602 03/08/22  0721 03/08/22  0000 03/07/22  0521   WBC 10*3/mm3 3.85 4.12 5.81 5.91 4.31   HEMOGLOBIN g/dL 7.1* 7.6* 9.2* 9.6* 10.4*   PLATELETS 10*3/mm3 158 157 194 191 197       Results from last 7 days   Lab Units 03/07/22  0521   INR  0.97         Imaging Results (Last 24 Hours)     ** No results found for the last 24 hours. **           MEDICATIONS    aspirin, 81 mg, Oral, Daily  carvedilol, 3.125 mg, Oral, BID With Meals  furosemide, 40 mg, Oral, Daily  ipratropium-albuterol, 3 mL, Nebulization, Q4H - RT  losartan, 12.5 mg, Oral, Q24H  sodium chloride, 10 mL, Intravenous, Q12H           Assessment/Plan   ASSESSMENT / PLAN      Acute on chronic respiratory failure with hypoxia (HCC)    Acute on chronic systolic CHF (congestive heart failure) (HCC)    Bilateral pleural effusion    CKD (chronic kidney disease) stage 4, GFR 15-29 ml/min (HCC)    Atrial fibrillation (HCC)    Chronic anemia       1.  CKD 4- Baseline creatinine is around 1.7-1.8, creatinine currently stable at baseline.  CO2 is elevated with underlying resp acidosis.  No gross volume overload.  We will keep Lasix to 40 mg p.o. daily.     2.  Acute on chronic CHF- at this point relatively euvolemic, plan for thoracentesis     3.  Acute on chronic respiratory failure- secondary to underlying CHF as well as new  onset atrial fibrillation and chronic pleural effusions.  Managed per primary team.     4.  Atrial fibrillation- managed per cardiology and primary team     5.  Bilateral pleural effusions- plan for thoracentesis from cardiology     6.  Anemia- worsening, fe low and will add iv fe     7.  History of secondary hyperparathyroidism     8.  History of CAD                   This document has been electronically signed by Stevenson Robertson MD on March 10, 2022 14:51 CST

## 2022-03-10 NOTE — PROGRESS NOTES
Progress Note  Roberto Jaramillo MD  Hospitalist    Date of visit: 3/10/2022     LOS: 2 days   Patient Care Team:  Sabino Mobley MD as PCP - General (Family Medicine)    Chief Complaint: Shortness of breath    Subjective     Interval History:     Patient Complaints: Shortness of breath, minimally better. Still requiring supplemental Oxygen - down to 4 L / min    History taken from: Patient and nursing.    Medication Review:   Current Facility-Administered Medications   Medication Dose Route Frequency Provider Last Rate Last Admin   • acetaminophen (TYLENOL) tablet 650 mg  650 mg Oral Q4H PRN Randall Cabrera MD        Or   • acetaminophen (TYLENOL) 160 MG/5ML solution 650 mg  650 mg Oral Q4H PRN Randall Cabrera MD        Or   • acetaminophen (TYLENOL) suppository 650 mg  650 mg Rectal Q4H PRN Randall Cabrera MD       • aspirin EC tablet 81 mg  81 mg Oral Daily Roberto Jaramillo MD   81 mg at 03/10/22 0802   • calcium carbonate (TUMS) chewable tablet 500 mg (200 mg elemental)  1 tablet Oral BID PRN Randall Cabrera MD       • carvedilol (COREG) tablet 3.125 mg  3.125 mg Oral BID With Meals Roberto Jaramillo MD   3.125 mg at 03/10/22 1734   • ferric gluconate (FERRLECIT) 125 mg in sodium chloride 0.9 % 110 mL IVPB  125 mg Intravenous Daily Stevenson Robertson MD       • furosemide (LASIX) tablet 40 mg  40 mg Oral Daily Carlin Lechuga APRN   40 mg at 03/10/22 0802   • Hold medication  1 each Does not apply Continuous PRN Roberto Jaramillo MD       • ipratropium-albuterol (DUO-NEB) nebulizer solution 3 mL  3 mL Nebulization Q4H - RT Roberto Jaramillo MD   3 mL at 03/10/22 1424   • losartan (COZAAR) half tablet 12.5 mg  12.5 mg Oral Q24H Roberto Jaramillo MD   12.5 mg at 03/10/22 0802   • Magnesium Sulfate 2 gram Bolus, followed by 8 gram infusion (total Mg dose 10 grams)- Mg less than or equal to 1mg/dL  2 g Intravenous PRN Randall Cabrera MD        Or   • Magnesium Sulfate 2 gram / 50mL Infusion (GIVE X 3 BAGS TO EQUAL 6GM TOTAL DOSE) - Mg  1.1 - 1.5 mg/dl  2 g Intravenous PRN Randall Cabrera MD        Or   • Magnesium Sulfate 4 gram infusion- Mg 1.6-1.9 mg/dL  4 g Intravenous PRN Randall Cabrera MD       • melatonin tablet 5.25 mg  5.25 mg Oral Nightly Randall Muñoz MD       • ondansetron (ZOFRAN) injection 4 mg  4 mg Intravenous Q6H PRN Randall Cabrera MD       • oxyCODONE (ROXICODONE) immediate release tablet 5 mg  5 mg Oral Q4H PRN Randall Cabrera MD       • potassium chloride (MICRO-K) CR capsule 40 mEq  40 mEq Oral PRN Randall Cabrera MD        Or   • potassium chloride (KLOR-CON) packet 40 mEq  40 mEq Oral PRN Randall Cabrera MD        Or   • potassium chloride 10 mEq in 100 mL IVPB  10 mEq Intravenous Q1H PRN Randall Cabrera MD       • potassium phosphate 45 mmol in sodium chloride 0.9 % 500 mL infusion  45 mmol Intravenous PRN Randall Cabrera MD        Or   • potassium phosphate 30 mmol in sodium chloride 0.9 % 250 mL infusion  30 mmol Intravenous PRN Randall Cabrera MD        Or   • Potassium Phosphates 15 mmol in sodium chloride 0.9 % 100 mL infusion  15 mmol Intravenous PRN Randall Cabrera MD        Or   • sodium phosphates 45 mmol in sodium chloride 0.9 % 500 mL IVPB  45 mmol Intravenous PRN Randall Cabrera MD        Or   • sodium phosphates 30 mmol in sodium chloride 0.9 % 250 mL IVPB  30 mmol Intravenous PRN Randall Cabrera MD        Or   • sodium phosphates 15 mmol in sodium chloride 0.9 % 250 mL IVPB  15 mmol Intravenous PRN Randall Cabrera MD       • sodium chloride 0.9 % flush 10 mL  10 mL Intravenous PRN Randall Cabrera MD   10 mL at 03/08/22 2118   • sodium chloride 0.9 % flush 10 mL  10 mL Intravenous Q12H Randall Cabrera MD   10 mL at 03/10/22 0803   • sodium chloride 0.9 % flush 10 mL  10 mL Intravenous PRN Randall Cabrera MD           Review of Systems:   Review of Systems   Constitutional: Positive for fatigue.   Respiratory: Positive for cough and shortness of breath. Negative for wheezing.    Cardiovascular: Positive for leg swelling.  Negative for chest pain and palpitations.   Gastrointestinal: Negative for abdominal distention, abdominal pain, blood in stool, diarrhea, nausea and vomiting.   Genitourinary: Negative for dysuria, flank pain, frequency, hematuria and urgency.   Musculoskeletal: Positive for arthralgias. Negative for back pain.   Skin: Negative for color change, pallor and rash.   Neurological: Positive for weakness. Negative for seizures, speech difficulty, light-headedness, numbness and headaches.   Psychiatric/Behavioral: Negative for agitation, behavioral problems and confusion.   All other systems reviewed and are negative.      Objective     Vital Signs  Temp:  [96.1 °F (35.6 °C)-98.9 °F (37.2 °C)] 96.8 °F (36 °C)  Heart Rate:  [] 87  Resp:  [16-20] 20  BP: ()/(46-57) 93/54    Physical Exam:  Physical Exam  Vitals reviewed.   Constitutional:       Appearance: She is ill-appearing.   HENT:      Head: Normocephalic and atraumatic.   Eyes:      General: No scleral icterus.     Extraocular Movements: Extraocular movements intact.      Pupils: Pupils are equal, round, and reactive to light.   Cardiovascular:      Rate and Rhythm: Normal rate. Rhythm irregular.   Pulmonary:      Effort: No respiratory distress.      Breath sounds: Rales present.      Comments: Decreased air entry bilaterally  Abdominal:      General: Abdomen is flat. Bowel sounds are normal. There is no distension.      Palpations: Abdomen is soft. There is no mass.      Tenderness: There is no abdominal tenderness. There is no right CVA tenderness or left CVA tenderness.   Musculoskeletal:         General: No tenderness or deformity. Normal range of motion.      Cervical back: Normal range of motion and neck supple. No rigidity or tenderness.      Right lower leg: No edema.      Left lower leg: No edema.   Skin:     General: Skin is warm and dry.      Coloration: Skin is pale. Skin is not jaundiced.      Findings: No bruising or lesion.   Neurological:       General: No focal deficit present.      Mental Status: She is alert and oriented to person, place, and time. Mental status is at baseline.      Cranial Nerves: No cranial nerve deficit.      Sensory: No sensory deficit.      Motor: No weakness.   Psychiatric:         Mood and Affect: Mood normal.         Behavior: Behavior normal.          Results Review:    Lab Results (last 24 hours)     Procedure Component Value Units Date/Time    POC Glucose Once [[id number]]  (Normal) Collected: 03/10/22 1621    Specimen: Blood Updated: 03/10/22 1643     Glucose 124 mg/dL      Comment: RN NotifiedOperator: [id number] FAUSTO GREWALMeter ID: [id number]       Extra Tubes [[id number]] Collected: 03/10/22 0637    Specimen: Blood, Venous Line Updated: 03/10/22 0748    Narrative:      The following orders were created for panel order Extra Tubes.  Procedure                               Abnormality         Status                     ---------                               -----------         ------                     Gold Top - SST[[id number]]                                   Final result                 Please view results for these tests on the individual orders.    Gold Top - SST [[id number]] Collected: 03/10/22 0637    Specimen: Blood Updated: 03/10/22 0748     Extra Tube Hold for add-ons.     Comment: Auto resulted.       Basic Metabolic Panel [[id number]]  (Abnormal) Collected: 03/10/22 0637    Specimen: Blood Updated: 03/10/22 0730     Glucose 92 mg/dL      BUN 43 mg/dL      Creatinine 1.91 mg/dL      Sodium 138 mmol/L      Potassium 4.6 mmol/L      Chloride 96 mmol/L      CO2 35.0 mmol/L      Calcium 8.5 mg/dL      BUN/Creatinine Ratio 22.5     Anion Gap 7.0 mmol/L      eGFR 25.4 mL/min/1.73      Comment: National Kidney Foundation and American Society of Nephrology (ASN) Task Force recommended calculation based on the Chronic Kidney Disease Epidemiology Collaboration (CKD-EPI) equation refit without adjustment for  race.       Narrative:      GFR Normal >60  Chronic Kidney Disease <60  Kidney Failure <15      Magnesium [[id number]]  (Normal) Collected: 03/10/22 0637    Specimen: Blood Updated: 03/10/22 0730     Magnesium 2.1 mg/dL     CBC & Differential [[id number]]  (Abnormal) Collected: 03/10/22 0637    Specimen: Blood Updated: 03/10/22 0720    Narrative:      The following orders were created for panel order CBC & Differential.  Procedure                               Abnormality         Status                     ---------                               -----------         ------                     CBC Auto Differential[[id number]]        Abnormal            Final result                 Please view results for these tests on the individual orders.    CBC Auto Differential [[id number]]  (Abnormal) Collected: 03/10/22 0637    Specimen: Blood Updated: 03/10/22 0720     WBC 3.85 10*3/mm3      RBC 2.53 10*6/mm3      Hemoglobin 7.1 g/dL      Hematocrit 23.0 %      MCV 90.9 fL      MCH 28.1 pg      MCHC 30.9 g/dL      RDW 14.3 %      RDW-SD 47.4 fl      MPV 11.1 fL      Platelets 158 10*3/mm3      Neutrophil % 71.1 %      Lymphocyte % 13.2 %      Monocyte % 15.1 %      Eosinophil % 0.0 %      Basophil % 0.3 %      Immature Grans % 0.3 %      Neutrophils, Absolute 2.74 10*3/mm3      Lymphocytes, Absolute 0.51 10*3/mm3      Monocytes, Absolute 0.58 10*3/mm3      Eosinophils, Absolute 0.00 10*3/mm3      Basophils, Absolute 0.01 10*3/mm3      Immature Grans, Absolute 0.01 10*3/mm3      nRBC 0.0 /100 WBC           Imaging Results (Last 24 Hours)     ** No results found for the last 24 hours. **          Assessment/Plan       Acute on chronic respiratory failure with hypoxia (HCC)    Acute on chronic systolic CHF (congestive heart failure) (HCC)    Bilateral pleural effusion    Atrial fibrillation (HCC)    CKD (chronic kidney disease) stage 4, GFR 15-29 ml/min (HCC)    Chronic anemia    Continue with supportive care, supplemental oxygen,  diuresis, Cozaar, Coreg, Lasix 40 mg daily, nebulized treatments every 4 hours. L sided thoracentesis is scheduled for tomorrow.    A recent transthoracic Echo obtained now shows a depressed LVEF of 30 to 35%, mitral valve regurgitation and moderate pulmonary hypertension.    Cardiology and Nephrology consults appreciated.    I confirmed that the patient's Advance Care Plan is present, code status is documented, or surrogate decision maker is listed in the patient's medical record.      Roberto Jaramillo MD  03/10/22  18:04 CST

## 2022-03-10 NOTE — PROGRESS NOTES
"  [facility] Cardiology Progress Note   LOS: 2 days   Patient Care Team:  Sabino Mobley MD as PCP - General (Family Medicine)    Chief Complaint   Patient presents with    Shortness of Breath         Subjective     Interval History:   Patient Denies: chest pain  Patient Complaints: shortness of air and weakness  History taken from: patient    She is still resting. She prefers her left side. Eliquis has been held now for 24 hours. Still anticipating a thoracentesis to assist with her hypoxia and dyspnea. She has improved in o2 requirement from yesterday, now on 6L NC.   Worsening anemia, anticipate PRBC transfusion.      Objective     Vital Sign Min/Max for last 24 hours  Temp  Min: 96.2 °F (35.7 °C)  Max: 98.9 °F (37.2 °C)   BP  Min: 88/52  Max: 116/54   Pulse  Min: 78  Max: 101   Resp  Min: 16  Max: 20   SpO2  Min: 91 %  Max: 100 %   Flow (L/min)  Min: 6  Max: 15   Weight  Min: 52.2 kg (115 lb)  Max: 52.2 kg (115 lb)     Flowsheet Rows      Flowsheet Row First Filed Value   Admission Height 157.5 cm (62\") Documented at 03/07/2022 0504   Admission Weight 46.7 kg (103 lb) Documented at 03/07/2022 0504              03/08/22  0500 03/09/22  0430 03/10/22  0600   Weight: 48.1 kg (106 lb) 49.9 kg (110 lb) 52.2 kg (115 lb)       Physical Exam:  Physical Exam  Constitutional:       Appearance: She is ill-appearing.   HENT:      Mouth/Throat:      Mouth: Mucous membranes are dry.   Cardiovascular:      Rate and Rhythm: Normal rate. Rhythm irregular.      Pulses: Normal pulses.   Pulmonary:      Effort: Pulmonary effort is normal.      Breath sounds: Examination of the right-middle field reveals decreased breath sounds. Examination of the left-middle field reveals decreased breath sounds. Examination of the right-lower field reveals decreased breath sounds. Examination of the left-lower field reveals decreased breath sounds. Decreased breath sounds present.   Abdominal:      General: Abdomen is flat.      Palpations: Abdomen " is soft.   Musculoskeletal:         General: No swelling.      Right lower leg: No edema.      Left lower leg: No edema.   Skin:     General: Skin is warm.      Coloration: Skin is pale.   Neurological:      General: No focal deficit present.   Psychiatric:         Mood and Affect: Mood normal.          Results Reviewed by myself:     Results from last 7 days   Lab Units 03/10/22  0637 03/09/22  0602 03/08/22  0721 03/07/22  1150 03/07/22  0521   SODIUM mmol/L 138 138 139   < > 140   POTASSIUM mmol/L 4.6 4.8 4.9   < > 5.4*   CHLORIDE mmol/L 96* 95* 97*   < > 98   CO2 mmol/L 35.0* 37.0* 36.0*   < > 35.0*   BUN mg/dL 43* 43* 38*   < > 32*   CREATININE mg/dL 1.91* 1.86* 1.73*   < > 1.77*   CALCIUM mg/dL 8.5* 8.5* 8.8   < > 9.2   BILIRUBIN mg/dL  --   --   --   --  0.4   ALK PHOS U/L  --   --   --   --  84   ALT (SGPT) U/L  --   --   --   --  11   AST (SGOT) U/L  --   --   --   --  17   GLUCOSE mg/dL 92 94 98   < > 93    < > = values in this interval not displayed.       Estimated Creatinine Clearance: 17.7 mL/min (A) (by C-G formula based on SCr of 1.91 mg/dL (H)).    Results from last 7 days   Lab Units 03/10/22  0637 03/09/22  0602 03/08/22  0721 03/07/22  1150   MAGNESIUM mg/dL 2.1 2.1 2.2  --    PHOSPHORUS mg/dL  --  4.6* 4.9* 5.1*             Results from last 7 days   Lab Units 03/10/22  0637 03/09/22  0602 03/08/22 0721 03/08/22  0000 03/07/22  0521   WBC 10*3/mm3 3.85 4.12 5.81 5.91 4.31   HEMOGLOBIN g/dL 7.1* 7.6* 9.2* 9.6* 10.4*   PLATELETS 10*3/mm3 158 157 194 191 197       Results from last 7 days   Lab Units 03/07/22  0521   INR  0.97     Lab Results   Component Value Date    PROBNP 16,282.0 (H) 03/07/2022       I/O last 3 completed shifts:  In: 510 [P.O.:510]  Out: 1300 [Urine:1300]    Cardiographics:  ECG/EMG Results (last 24 hours)       Procedure Component Value Units Date/Time    SCANNED - TELEMETRY   [[id number]] Resulted: 03/07/22     Updated: 03/09/22 1839    SCANNED - TELEMETRY   [[id number]]  Resulted: 03/07/22     Updated: 03/09/22 1857    SCANNED - TELEMETRY   [[id number]] Resulted: 03/07/22     Updated: 03/10/22 1016            Results for orders placed during the hospital encounter of 03/07/22    Adult Transthoracic Echo Complete w/ Color, Spectral and Contrast if necessary per protocol    Interpretation Summary  · Estimated left ventricular EF = 35% Left ventricular ejection fraction appears to be 31 - 35%. Left ventricular systolic function is moderately decreased. The left ventricular cavity is borderline dilated. Left ventricular wall thickness is consistent with mild concentric hypertrophy. There is left ventricular global hypokinesis noted. Left ventricular diastolic function is consistent with (grade II w/high LAP) pseudonormalization.  · The left atrial cavity is moderately dilated.  · Moderate mitral valve regurgitation is present with an eccentric jet noted.  · Moderate tricuspid valve regurgitation is present.  · Estimated right ventricular systolic pressure from tricuspid regurgitation is moderately elevated (45-55 mmHg).  · Borderline dilation of the aortic root is present (3.8 cms)      XR Chest 1 View    Result Date: 3/8/2022  Some improvement and fluid status compared with yesterday. Significant residual pleural effusions and basilar volume loss/consolidation left greater than right. No new abnormality. Electronically signed by:  Gael Caballero MD  3/8/2022 7:59 AM CST Workstation: [id number]    XR Chest 1 View    Result Date: 3/7/2022    Bilateral pleural effusions, left greater right with likely associated atelectasis. Electronically signed by:  Tish Avalos MD  3/7/2022 6:12 AM CST Workstation: [id number]      Medication Review:     Current Facility-Administered Medications:     acetaminophen (TYLENOL) tablet 650 mg, 650 mg, Oral, Q4H PRN **OR** acetaminophen (TYLENOL) 160 MG/5ML solution 650 mg, 650 mg, Oral, Q4H PRN **OR** acetaminophen (TYLENOL) suppository 650 mg, 650 mg,  Rectal, Q4H PRN, Randall Cabrera MD    aspirin EC tablet 81 mg, 81 mg, Oral, Daily, Roberto Jaramillo MD, 81 mg at 03/10/22 0802    calcium carbonate (TUMS) chewable tablet 500 mg (200 mg elemental), 1 tablet, Oral, BID PRN, Randall Cabrera MD    carvedilol (COREG) tablet 3.125 mg, 3.125 mg, Oral, BID With Meals, Roberto Jaramillo MD, 3.125 mg at 03/10/22 0802    furosemide (LASIX) tablet 40 mg, 40 mg, Oral, Daily, Carlin Lechuga, APRN, 40 mg at 03/10/22 0802    ipratropium-albuterol (DUO-NEB) nebulizer solution 3 mL, 3 mL, Nebulization, Q4H - RT, Roberto Jaramillo MD, 3 mL at 03/10/22 0807    losartan (COZAAR) half tablet 12.5 mg, 12.5 mg, Oral, Q24H, Roberto Jaramillo MD, 12.5 mg at 03/10/22 0802    Magnesium Sulfate 2 gram Bolus, followed by 8 gram infusion (total Mg dose 10 grams)- Mg less than or equal to 1mg/dL, 2 g, Intravenous, PRN **OR** Magnesium Sulfate 2 gram / 50mL Infusion (GIVE X 3 BAGS TO EQUAL 6GM TOTAL DOSE) - Mg 1.1 - 1.5 mg/dl, 2 g, Intravenous, PRN **OR** Magnesium Sulfate 4 gram infusion- Mg 1.6-1.9 mg/dL, 4 g, Intravenous, PRN, Randall Cabrera MD    melatonin tablet 5.25 mg, 5.25 mg, Oral, Nightly PRNRick Enoch K, MD    ondansetron (ZOFRAN) injection 4 mg, 4 mg, Intravenous, Q6H PRNRick Enoch K, MD    oxyCODONE (ROXICODONE) immediate release tablet 5 mg, 5 mg, Oral, Q4H PRN, Randall Cabrera MD    potassium chloride (MICRO-K) CR capsule 40 mEq, 40 mEq, Oral, PRN **OR** potassium chloride (KLOR-CON) packet 40 mEq, 40 mEq, Oral, PRN **OR** potassium chloride 10 mEq in 100 mL IVPB, 10 mEq, Intravenous, Q1H PRN, Randall Cabrera MD    potassium phosphate 45 mmol in sodium chloride 0.9 % 500 mL infusion, 45 mmol, Intravenous, PRN **OR** potassium phosphate 30 mmol in sodium chloride 0.9 % 250 mL infusion, 30 mmol, Intravenous, PRN **OR** Potassium Phosphates 15 mmol in sodium chloride 0.9 % 100 mL infusion, 15 mmol, Intravenous, PRN **OR** sodium phosphates 45 mmol in sodium chloride 0.9 % 500 mL IVPB, 45  mmol, Intravenous, PRN **OR** sodium phosphates 30 mmol in sodium chloride 0.9 % 250 mL IVPB, 30 mmol, Intravenous, PRN **OR** sodium phosphates 15 mmol in sodium chloride 0.9 % 250 mL IVPB, 15 mmol, Intravenous, PRN, Randall Cabrera MD    sodium chloride 0.9 % flush 10 mL, 10 mL, Intravenous, PRN, Randall Cabrera MD, 10 mL at 03/08/22 2118    sodium chloride 0.9 % flush 10 mL, 10 mL, Intravenous, Q12H, Randall Cabrera MD, 10 mL at 03/10/22 0803    sodium chloride 0.9 % flush 10 mL, 10 mL, Intravenous, PRN, Randall Cabrera MD    Patient's recent labs and results as included in the subjective data were reviewed by me. Any pertinent outside data will be included.        Assessment/Plan       Acute on chronic respiratory failure with hypoxia (HCC)    Acute on chronic systolic CHF (congestive heart failure) (HCC)    Bilateral pleural effusion    CKD (chronic kidney disease) stage 4, GFR 15-29 ml/min (HCC)    Atrial fibrillation (HCC)    Chronic anemia       - At this point, her HFrEF is chronic and she has third spaced into her pleural cavities. She is not grossly volume overloaded. Her effusions are not new findings, they have been present for years.   EF 35%, ICM.  She was on twice weekly Lasix for maintenance. Her new onset AF likely worsened her volume/breathing issue quickly.  She appears to have bilateral infiltrates.   - she is on 40mg PO lasix now. Euvolemic.         Pleural effusions:  -She will benefit from a thoracentesis. IR MD will be consulted per hospitalist. Eliquis held for past 24hours       She has CKD IV.   -Nephrology will be on board to assist with diuretics.       New onset AF:  She is rate controlled on 3.125mg BID of COreg.   - Blood pressure is borderline.  - Anticoagulation held due to anemia and anticipated procedure.      Anemia  She has had a worsening of anemia, drop of 2 points and increase in o2 requirement. Heparin was started and changed to eliquis.These have both been stopped now.   -  anticipate a PRBC transfusion due to weakness, anemia, AF, and CHF.   - Risks and benefits of a blood transfusion were discussed with Ms. Son. She is in agreement to proceed with 1 unit. Will recheck H&H after that.   - occult stool    Plan for disposition:Where: Continue current location When:  today            This document has been electronically signed by CHANA Lopez on March 10, 2022 10:39 CST      Electronically signed by CHANA Lopez, 03/10/22, 10:39 AM CST.    Patient examined and chart reviewed.  Patient seen by me at 11:45 AM today     Ada Son is an 87-year-old female with coronary artery disease with ischemic cardiomyopathy, status post CABG in 2014, hypertension, hyperlipidemia, CKD, peripheral vascular disease status post bilateral TCARs, GERD.   She presented with increasing dyspnea on exertion and oxygen requirement during the past several weeks.  She apparently moved to [place] recently.  She now has to wear oxygen 24/7.  She has felt fatigued/ weak.  She has had some degree of cough.  No chest pain was reported.  Her mobility has been restricted.       She was noted to have atrial fibrillation with rapid ventricular response the exact duration of which was unclear and bilateral pleural effusions left greater than right.  She has longstanding CKD and hemoglobin has dropped to 7.1 from 10.4 on admission.  Enzymes have been negative for myocardial injury.         Patient denied any chest pain today.  Dyspnea on exertion.  No palpitation or dizziness.  Hemoglobin 7.1 g% today.  Electrolytes showed sodium of 138, potassium 4.6, chloride 96 and CO2 35.  BUN 43 and creatinine 1.91.    On examination: Patient frail but in no acute distress.       Pulse rate was 80 bpm, irregular.  Blood pressure 108/52 mmHg  There was pallor with no icterus or cyanosis  Neck exam showed no jugular venous distention.  Previous carotid endarterectomy scar seen.  Exam of the heart showed varying intensity of  the first heart sound with no S3 or S4.  No heart murmurs  Lung exam showed decreased breath sounds in both lung bases, left greater than right.  No definite rales audible  Abdomen was soft with no mass or tenderness.  Extremities showed no edema.  No focal neurological deficit.    Discussed the patient's condition with her daughter in detail.  Patient evaluated by nephrology and I note that she has been started on IV iron.  Packed RBC transfusion under consideration.  Anticoagulation on hold in anticipation of left thoracentesis by IR.  Rate controlled with carvedilol and p.o. Lasix has been continued.  On very low-dose losartan.  Questions from the patient and daughter answered to their satisfaction.    Electronically signed by Kalee Cisneros MD, 03/10/22, 3:55 PM CST.

## 2022-03-10 NOTE — SIGNIFICANT NOTE
03/10/22 0938   OTHER   Discipline occupational therapy assistant   Rehab Time/Intention   Session Not Performed patient/family declined, not feeling well

## 2022-03-10 NOTE — PLAN OF CARE
Goal Outcome Evaluation:  Plan of Care Reviewed With: patient        Progress: improving  Outcome Evaluation: Unit of blood given.  Pt tolerated well.  No new issues this shift.  Will continue to monitor.

## 2022-03-10 NOTE — PLAN OF CARE
Goal Outcome Evaluation:  Plan of Care Reviewed With: patient        Progress: improving  Outcome Evaluation: pt much more alert this tx. vitals WFL. pt agreeable to PT w/ encouragment. pt requires min A for bed mobility and CGA for sit-stand-ssit. BP taken in different positons and remaind WNL. pt participated in gait, 6, 8 ft CGA w/ RW. SpO2 decreased to 87% but quickly improved. pt participated in seated LE ther ex. no new goal met at this time. pt would continue to benefit from PT services.

## 2022-03-11 NOTE — PLAN OF CARE
Goal Outcome Evaluation:  Plan of Care Reviewed With: patient     Problem: Adult Inpatient Plan of Care  Goal: Plan of Care Review  Outcome: Ongoing, Progressing  Flowsheets (Taken 3/11/2022 3009)  Progress: improving  Plan of Care Reviewed With: patient  Outcome Evaluation: pt appears fatigued but is agreeable to PT tx. pt requires mod A for sup-sit and CGA for sit-stand-sit. pt particpiated in gait training, 8 ft min A w/ RW. pt declines further gait. pt particpated in seated LE ther ex while sitting EOB. VSS throughout tx. pt declines OOB in recliner at this time. no new goals met at this time. pt would continue to benefit from PT services.

## 2022-03-11 NOTE — THERAPY TREATMENT NOTE
Acute Care - Physical Therapy Treatment Note  [facility]     Patient Name: Naomi Duong Son  : 1935  MRN: [id number]  Today's Date: 3/11/2022      Visit Dx:     ICD-10-CM ICD-9-CM   1. Acute on chronic respiratory failure with hypoxia (HCC)  J96.21 518.84     799.02   2. Pleural effusion  J90 511.9   3. Renal failure, unspecified chronicity  N19 586   4. Acute on chronic congestive heart failure, unspecified heart failure type ([facility])  I50.9 428.0   5. Impaired functional mobility, balance, gait, and endurance  Z74.09 V49.89   6. Impaired mobility and ADLs  Z74.09 V49.89    Z78.9      Patient Active Problem List   Diagnosis   • Ischemic cardiomyopathy   • S/P CABG (coronary artery bypass graft)   • Essential hypertension   • Mixed hyperlipidemia   • H/O CHF   • Carotid artery stenosis   • CKD (chronic kidney disease) stage 3, GFR 30-59 ml/min ([facility])   • Bilateral carotid artery disease ([facility])   • Carotid stenosis, asymptomatic, bilateral   • Surgical aftercare, circulatory system   • Carotid stenosis, asymptomatic, left   • Acute on chronic systolic CHF (congestive heart failure) ([facility])   • CKD (chronic kidney disease) stage 3, GFR 30-59 ml/min ([facility])   • Severe malnutrition ([facility])   • Acute respiratory failure with hypoxia ([facility])   • Acute on chronic respiratory failure with hypoxia ([facility])   • Bilateral pleural effusion   • CKD (chronic kidney disease) stage 4, GFR 15-29 ml/min (HCC)   • Atrial fibrillation ([facility])   • Chronic anemia     Past Medical History:   Diagnosis Date   • CAD (coronary artery disease)    • Carotid artery stenosis    • Chronic systolic heart failure (HCC)    • CKD (chronic kidney disease) stage 3, GFR 30-59 ml/min ([facility])    • GERD (gastroesophageal reflux disease)    • Hypercholesterolemia    • Hyperlipidemia    • Hypertension    • Iron deficiency anemia    • Ischemic cardiomyopathy    • MI (myocardial infarction) ([facility])    • Mitral valve disease    • Osteoarthritis    • UTI (urinary tract  infection)      Past Surgical History:   Procedure Laterality Date   • CARDIAC CATHETERIZATION     • CAROTID ENDARTERECTOMY     • CORONARY ARTERY BYPASS GRAFT     • TRANSESOPHAGEAL ECHOCARDIOGRAM (MISSY)       PT Assessment (last 12 hours)     PT Evaluation and Treatment     Row Name 03/11/22 1419          Physical Therapy Time and Intention    Document Type therapy note (daily note)  -     Mode of Treatment physical therapy;individual therapy  -     Patient Effort good  due to lethagy  -     Comment pt states that she is fatigued from thoracentesis.  -[facility] Name 03/11/22 1419          General Information    Patient Profile Reviewed yes  -     Existing Precautions/Restrictions oxygen therapy device and L/min;fall  -     Row Name 03/11/22 1419          Pain    Pretreatment Pain Rating 4/10  -     Posttreatment Pain Rating 4/10  -     Pain Location generalized  -     Pain Location - --  pt unable to sa location.  -     Pain Intervention(s) Repositioned  -[facility] Name 03/11/22 1419          Bed Mobility    Bed Mobility supine-sit;sit-supine  -     Supine-Sit [place] (Bed Mobility) moderate assist (50% patient effort)  -     Sit-Supine [place] (Bed Mobility) moderate assist (50% patient effort)  -     Assistive Device (Bed Mobility) head of bed elevated;bed rails  -[facility] Name 03/11/22 1419          Transfers    Transfers stand-sit transfer;sit-stand transfer  -     Sit-Stand [place] (Transfers) contact guard  -     Stand-Sit [place] (Transfers) contact guard  -[facility] Name 03/11/22 1419          Sit-Stand Transfer    Assistive Device (Sit-Stand Transfers) walker, front-wheeled  -[facility] Name 03/11/22 1419          Stand-Sit Transfer    Assistive Device (Stand-Sit Transfers) walker, front-wheeled  -[facility] Name 03/11/22 1419          Gait/Stairs (Locomotion)    Gait/Stairs Locomotion gait/ambulation independence;gait/ambulation assistive device;distance  ambulated;gait pattern;gait deviations;maintains weight-bearing status;stairs negotiation  -     [place] Level (Gait) minimum assist (75% patient effort);verbal cues  -     Assistive Device (Gait) walker, front-wheeled  -     Distance in Feet (Gait) 8 ft  -     Pattern (Gait) 3-point  -     Deviations/Abnormal Patterns (Gait) bobby decreased;stride length decreased;gait speed decreased  -     Bilateral Gait Deviations --  pt requires assist to navigate RW.  -[facility] Name 03/11/22 1419          Safety Issues, Functional Mobility    Impairments Affecting Function (Mobility) strength;endurance/activity tolerance;shortness of breath;balance;pain  -[facility] Name 03/11/22 1419          Motor Skills    Therapeutic Exercise --  AP, LAQ, hip flexion- 15x1 ana  -[facility] Name 03/11/22 1419          Vital Signs    Pre Systolic BP Rehab 112  -DANIEL     Pre Treatment Diastolic BP 54  -DANIEL     Post Systolic BP Rehab 105  -DANIEL     Post Treatment Diastolic BP 50  -DANIEL     Pretreatment Heart Rate (beats/min) 86  -DANILE     Intratreatment Heart Rate (beats/min) 79  -DANIEL     Posttreatment Heart Rate (beats/min) 85  -DANIEL     Pre SpO2 (%) 95  -DANIEL     O2 Delivery Pre Treatment supplemental O2  -     Intra SpO2 (%) 97  -DANIEL     O2 Delivery Intra Treatment supplemental O2  -DANIEL     Post SpO2 (%) 91  -DANIEL     O2 Delivery Post Treatment supplemental O2  -DANIEL     Pre Patient Position Supine  -     Post Patient Position Supine  -[facility] Name 03/11/22 1419          Positioning and Restraints    Pre-Treatment Position in bed  -     Post Treatment Position bed  -     In Bed fowlers;call light within reach;exit alarm on;encouraged to call for assist  -[facility] Name 03/11/22 1419          Therapy Assessment/Plan (PT)    Criteria for Skilled Interventions Met (PT) yes;skilled treatment is necessary  -[facility] Name 03/11/22 1419          Bed Mobility Goal 1 (PT)    Activity/Assistive Device (Bed Mobility Goal 1, PT) sit to  supine/supine to sit  -DANIEL     [place] Level/Cues Needed (Bed Mobility Goal 1, PT) modified independence  -DANIEL     Time Frame (Bed Mobility Goal 1, PT) by discharge  -DANIEL     Strategies/Barriers (Bed Mobility Goal 1, PT) Maintain SpO2 >90%.  -DANIEL     Row Name 03/11/22 1419          Transfer Goal 1 (PT)    Activity/Assistive Device (Transfer Goal 1, PT) sit-to-stand/stand-to-sit;bed-to-chair/chair-to-bed;walker, rolling  -DANIEL     [place] Level/Cues Needed (Transfer Goal 1, PT) modified independence  -DANIEL     Time Frame (Transfer Goal 1, PT) by discharge  -DANIEL     Strategies/Barriers (Transfers Goal 1, PT) Maintain SpO2 >90%.  -DANIEL     Progress/Outcome (Transfer Goal 1, PT) goal not met  -DANIEL     Row Name 03/11/22 1419          Gait Training Goal 1 (PT)    Activity/Assistive Device (Gait Training Goal 1, PT) walker, rolling  -DANIEL     [place] Level (Gait Training Goal 1, PT) modified independence  -DANIEL     Distance (Gait Training Goal 1, PT) 75'x2.  -DANIEL     Time Frame (Gait Training Goal 1, PT) by discharge  -DANIEL     Strategies/Barriers (Gait Training Goal 1, PT) Maintain SpO2 >90%.  -DANIEL     Progress/Outcome (Gait Training Goal 1, PT) goal not met  -DANIEL           User Key  (r) = Recorded By, (t) = Taken By, (c) = Cosigned By    Initials Name Provider Type    Dc Iverson, PTA Physical Therapy Assistant                Physical Therapy Education                 Title: PT OT SLP Therapies (In Progress)     Topic: Physical Therapy (In Progress)     Point: Mobility training (In Progress)     Learning Progress Summary           Patient Acceptance, E, NR by DANIEL at 3/11/2022 1438    Acceptance, E, NR by DANIEL at 3/10/2022 1318    Acceptance, E, NR by DANIEL at 3/9/2022 1614    Acceptance, E, VU,NR by  at 3/8/2022 0937    Comment: PT POC, breathing technique, transfer technique.                   Point: Home exercise program (Not Started)     Learner Progress:  Not documented in this visit.          Point: Body mechanics  (Not Started)     Learner Progress:  Not documented in this visit.          Point: Precautions (Not Started)     Learner Progress:  Not documented in this visit.                      User Key     Initials Effective Dates Name Provider Type Discipline     06/16/21 -  Dc Thakur, PTA Physical Therapy Assistant PT    CZ 06/16/21 -  Martin Alvarez, PT Physical Therapist PT              PT Recommendation and Plan  Anticipated Discharge Disposition (PT): assisted living  Therapy Frequency (PT): other (see comments) (3-7 days/week)  Plan of Care Reviewed With: patient  Progress: improving  Outcome Evaluation: pt appears fatigued but is agreeable to PT tx. pt requires md A for sup-sit and CGA for sit-stand-sit. pt particpiated in gait training, 8 ft min A w/ RW. pt declines further gait. pt particpated in seated LE ther ex while sitting EOB. VSS throughout tx. pt declines OOB in recliner at this time. no new goals met at this time. pt would continue to benefit from PT services.   Outcome Measures     Row Name 03/11/22 1419 03/10/22 1128 03/09/22 1453       How much help from another person do you currently need...    Turning from your back to your side while in flat bed without using bedrails? 3  -DANIEL 3  -DANIEL 3  -DANIEL    Moving from lying on back to sitting on the side of a flat bed without bedrails? 3  -DANIEL 3  -DANIEL 3  -DANIEL    Moving to and from a bed to a chair (including a wheelchair)? 3  -DANIEL 3  -DANIEL 3  -DANIEL    Standing up from a chair using your arms (e.g., wheelchair, bedside chair)? 3  -DANIEL 3  -DANIEL 3  -DANIEL    Climbing 3-5 steps with a railing? 2  -DANIEL 2  -DANIEL 2  -DANIEL    To walk in hospital room? 3  -DANIEL 3  -DANIEL 3  -DANIEL    AM-PAC 6 Clicks Score (PT) 17  -DANIEL 17  -DANIEL 17  -DANIEL       Functional Assessment    Outcome Measure Options AM-PAC 6 Clicks Basic Mobility (PT)  -DANIEL AM-PAC 6 Clicks Basic Mobility (PT)  -DANIEL AM-PAC 6 Clicks Basic Mobility (PT)  -DANIEL          User Key  (r) = Recorded By, (t) = Taken By, (c) = Cosigned By     Initials Name Provider Type    Dc Iverson PTA Physical Therapy Assistant                 Time Calculation:    PT Charges     Row Name 03/11/22 1445             Time Calculation    Start Time 1419  -DANIEL      Stop Time 1444  -      Time Calculation (min) 25 min  -DANIEL              Time Calculation- PT    Total Timed Code Minutes- PT 25 minute(s)  -DANIEL              Timed Charges    41301 - PT Therapeutic Exercise Minutes 10  -DANIEL      08270 - PT Therapeutic Activity Minutes 15  -DANIEL              Total Minutes    Timed Charges Total Minutes 25  -DANIEL       Total Minutes 25  -DANIEL            User Key  (r) = Recorded By, (t) = Taken By, (c) = Cosigned By    Initials Name Provider Type    Dc Iverson PTA Physical Therapy Assistant              Therapy Charges for Today     Code Description Service Date Service Provider Modifiers Qty    12861858852 HC PT THER PROC EA 15 MIN 3/10/2022 Dc Thakur PTA GP 1    85174829678 HC PT THERAPEUTIC ACT EA 15 MIN 3/10/2022 Dc Thakur PTA GP 2    77242698707 HC PT THER PROC EA 15 MIN 3/11/2022 Dc Thakur PTA GP 1    99321423628 HC PT THERAPEUTIC ACT EA 15 MIN 3/11/2022 Dc Thakur PTA GP 1          PT G-Codes  Outcome Measure Options: AM-PAC 6 Clicks Basic Mobility (PT)  AM-PAC 6 Clicks Score (PT): 17  AM-PAC 6 Clicks Score (OT): 16    Dc Thakur PTA  3/11/2022

## 2022-03-11 NOTE — PROGRESS NOTES
"  [facility] [place] [facility] [place]   LOS: 3 days   Patient Care Team:  Sabino Mobley MD as PCP - General (Family Medicine)    Chief Complaint   Patient presents with    Shortness of Breath         Subjective     Interval History:   Patient Denies: chest pain  Patient Complaints: shortness of air and weakness  History taken from: patient    Downstairs for thoracentesis.   S/p 1 units PRBC with improvement in H&H to 8.8  HR remains controlled.   6L NC in place.     Objective     Vital Sign Min/Max for last 24 hours  Temp  Min: 96.1 °F (35.6 °C)  Max: 97.7 °F (36.5 °C)   BP  Min: 93/54  Max: 161/72   Pulse  Min: 55  Max: 107   Resp  Min: 16  Max: 24   SpO2  Min: 71 %  Max: 100 %   Flow (L/min)  Min: 4  Max: 6   Weight  Min: 56.2 kg (123 lb 14.4 oz)  Max: 56.2 kg (123 lb 14.4 oz)     Flowsheet Rows      Flowsheet Row First Filed Value   Admission Height 157.5 cm (62\") Documented at 03/07/2022 0504   Admission Weight 46.7 kg (103 lb) Documented at 03/07/2022 0504              03/09/22  0430 03/10/22  0600 03/11/22  0425   Weight: 49.9 kg (110 lb) 52.2 kg (115 lb) 56.2 kg (123 lb 14.4 oz)       Physical Exam:  Physical Exam  Constitutional:       Appearance: She is ill-appearing.   HENT:      Mouth/Throat:      Mouth: Mucous membranes are dry.   Cardiovascular:      Rate and Rhythm: Normal rate. Rhythm irregular.      Pulses: Normal pulses.   Pulmonary:      Effort: Pulmonary effort is normal. Tachypnea present.      Breath sounds: Examination of the right-middle field reveals decreased breath sounds. Examination of the left-middle field reveals decreased breath sounds. Examination of the right-lower field reveals decreased breath sounds. Examination of the left-lower field reveals decreased breath sounds. Decreased breath sounds present.   Abdominal:      General: Abdomen is flat.      Palpations: Abdomen is soft.   Musculoskeletal:         General: No swelling.      Right lower leg: No edema.      Left lower leg: No " edema.   Skin:     General: Skin is warm.      Coloration: Skin is pale.   Neurological:      General: No focal deficit present.   Psychiatric:         Mood and Affect: Mood normal.          Results Reviewed by myself:     Results from last 7 days   Lab Units 03/11/22  0613 03/10/22  0637 03/09/22  0602 03/07/22  1150 03/07/22  0521   SODIUM mmol/L 137 138 138   < > 140   POTASSIUM mmol/L 4.5 4.6 4.8   < > 5.4*   CHLORIDE mmol/L 95* 96* 95*   < > 98   CO2 mmol/L 39.0* 35.0* 37.0*   < > 35.0*   BUN mg/dL 47* 43* 43*   < > 32*   CREATININE mg/dL 2.07* 1.91* 1.86*   < > 1.77*   CALCIUM mg/dL 8.9 8.5* 8.5*   < > 9.2   BILIRUBIN mg/dL  --   --   --   --  0.4   ALK PHOS U/L  --   --   --   --  84   ALT (SGPT) U/L  --   --   --   --  11   AST (SGOT) U/L  --   --   --   --  17   GLUCOSE mg/dL 106* 92 94   < > 93    < > = values in this interval not displayed.       Estimated Creatinine Clearance: 17.6 mL/min (A) (by C-G formula based on SCr of 2.07 mg/dL (H)).    Results from last 7 days   Lab Units 03/10/22  0637 03/09/22  0602 03/08/22  0721 03/07/22  1150   MAGNESIUM mg/dL 2.1 2.1 2.2  --    PHOSPHORUS mg/dL  --  4.6* 4.9* 5.1*             Results from last 7 days   Lab Units 03/11/22  0613 03/10/22  0637 03/09/22  0602 03/08/22  0721 03/08/22  0000   WBC 10*3/mm3 4.86 3.85 4.12 5.81 5.91   HEMOGLOBIN g/dL 8.8* 7.1* 7.6* 9.2* 9.6*   PLATELETS 10*3/mm3 159 158 157 194 191       Results from last 7 days   Lab Units 03/11/22  0903 03/07/22  0521   INR  1.08 0.97     Lab Results   Component Value Date    PROBNP 16,282.0 (H) 03/07/2022       I/O last 3 completed shifts:  In: 1110 [P.O.:1060; Blood:50]  Out: 1550 [Urine:1550]    Cardiographics:  ECG/EMG Results (last 24 hours)       Procedure Component Value Units Date/Time    SCANNED - TELEMETRY   [[id number]] Resulted: 03/07/22     Updated: 03/09/22 1839    SCANNED - TELEMETRY   [[id number]] Resulted: 03/07/22     Updated: 03/09/22 1857    SCANNED - TELEMETRY   [[id number]]  Resulted: 03/07/22     Updated: 03/10/22 1016            Results for orders placed during the hospital encounter of 03/07/22    Adult Transthoracic Echo Complete w/ Color, Spectral and Contrast if necessary per protocol    Interpretation Summary  · Estimated left ventricular EF = 35% Left ventricular ejection fraction appears to be 31 - 35%. Left ventricular systolic function is moderately decreased. The left ventricular cavity is borderline dilated. Left ventricular wall thickness is consistent with mild concentric hypertrophy. There is left ventricular global hypokinesis noted. Left ventricular diastolic function is consistent with (grade II w/high LAP) pseudonormalization.  · The left atrial cavity is moderately dilated.  · Moderate mitral valve regurgitation is present with an eccentric jet noted.  · Moderate tricuspid valve regurgitation is present.  · Estimated right ventricular systolic pressure from tricuspid regurgitation is moderately elevated (45-55 mmHg).  · Borderline dilation of the aortic root is present (3.8 cms)      XR Chest 1 View    Result Date: 3/11/2022  Interval left thoracentesis. Small residual left pleural effusion. Persistent moderate right pleural effusion without significant change. No pneumothorax. Electronically signed by:  Surendra Carter MD  3/11/2022 10:46 AM CST Workstation: [id number]    XR Chest 1 View    Result Date: 3/8/2022  Some improvement and fluid status compared with yesterday. Significant residual pleural effusions and basilar volume loss/consolidation left greater than right. No new abnormality. Electronically signed by:  Gael Caballero MD  3/8/2022 7:59 AM CST Workstation: [id number]    XR Chest 1 View    Result Date: 3/7/2022    Bilateral pleural effusions, left greater right with likely associated atelectasis. Electronically signed by:  Tish Avalos MD  3/7/2022 6:12 AM CST Workstation: [id number]    US Thoracentesis    Result Date: 3/11/2022  CONCLUSION:  Successful ultrasound-guided thoracentesis with removal of 1200 mL of serous fluid, as described above. Electronically signed by:  Surendra Carter MD  3/11/2022 10:44 AM [facility] Workstation: [id number]      Medication Review:     Current Facility-Administered Medications:     acetaminophen (TYLENOL) tablet 650 mg, 650 mg, Oral, Q4H PRN **OR** acetaminophen (TYLENOL) 160 MG/5ML solution 650 mg, 650 mg, Oral, Q4H PRN **OR** acetaminophen (TYLENOL) suppository 650 mg, 650 mg, Rectal, Q4H PRN, Randall Cabrera MD    aspirin EC tablet 81 mg, 81 mg, Oral, Daily, Roberto Jaramillo MD, 81 mg at 03/11/22 0833    calcium carbonate (TUMS) chewable tablet 500 mg (200 mg elemental), 1 tablet, Oral, BID PRN, Randall Cabrera MD    carvedilol (COREG) tablet 3.125 mg, 3.125 mg, Oral, BID With Meals, Roberto Jaramillo MD, 3.125 mg at 03/11/22 0833    ferric gluconate (FERRLECIT) 125 mg in sodium chloride 0.9 % 110 mL IVPB, 125 mg, Intravenous, Daily, Stevenson Robertson MD, Last Rate: 110 mL/hr at 03/10/22 1843, 125 mg at 03/10/22 1843    furosemide (LASIX) tablet 40 mg, 40 mg, Oral, Daily, Carlin Lechuga, APRN, 40 mg at 03/11/22 0833    Hold medication, 1 each, Does not apply, Continuous PRN, Roberto Jaramillo MD    ipratropium-albuterol (DUO-NEB) nebulizer solution 3 mL, 3 mL, Nebulization, Q4H - RT, Roberto Jaramillo MD, 3 mL at 03/11/22 1053    losartan (COZAAR) half tablet 12.5 mg, 12.5 mg, Oral, Q24H, Roberto Jaramillo MD, 12.5 mg at 03/11/22 0833    Magnesium Sulfate 2 gram Bolus, followed by 8 gram infusion (total Mg dose 10 grams)- Mg less than or equal to 1mg/dL, 2 g, Intravenous, PRN **OR** Magnesium Sulfate 2 gram / 50mL Infusion (GIVE X 3 BAGS TO EQUAL 6GM TOTAL DOSE) - Mg 1.1 - 1.5 mg/dl, 2 g, Intravenous, PRN **OR** Magnesium Sulfate 4 gram infusion- Mg 1.6-1.9 mg/dL, 4 g, Intravenous, PRN, Randall Cabrera MD    melatonin tablet 5.25 mg, 5.25 mg, Oral, Nightly PRN, Randall Cabrera MD    ondansetron (ZOFRAN) injection 4 mg, 4 mg, Intravenous,  Q6H PRN, Randall Cabrera MD    oxyCODONE (ROXICODONE) immediate release tablet 5 mg, 5 mg, Oral, Q4H PRN, Randall Cabrera MD, 5 mg at 03/10/22 2225    polyethylene glycol (MIRALAX) packet 17 g, 17 g, Oral, Daily, Roberto Jaramillo MD    potassium chloride (MICRO-K) CR capsule 40 mEq, 40 mEq, Oral, PRN **OR** potassium chloride (KLOR-CON) packet 40 mEq, 40 mEq, Oral, PRN **OR** potassium chloride 10 mEq in 100 mL IVPB, 10 mEq, Intravenous, Q1H PRNRick Enoch K, MD    potassium phosphate 45 mmol in sodium chloride 0.9 % 500 mL infusion, 45 mmol, Intravenous, PRN **OR** potassium phosphate 30 mmol in sodium chloride 0.9 % 250 mL infusion, 30 mmol, Intravenous, PRN **OR** Potassium Phosphates 15 mmol in sodium chloride 0.9 % 100 mL infusion, 15 mmol, Intravenous, PRN **OR** sodium phosphates 45 mmol in sodium chloride 0.9 % 500 mL IVPB, 45 mmol, Intravenous, PRN **OR** sodium phosphates 30 mmol in sodium chloride 0.9 % 250 mL IVPB, 30 mmol, Intravenous, PRN **OR** sodium phosphates 15 mmol in sodium chloride 0.9 % 250 mL IVPB, 15 mmol, Intravenous, PRNRick Enoch K, MD    sodium chloride 0.9 % flush 10 mL, 10 mL, Intravenous, PRN, Randall Cabrera MD, 10 mL at 03/08/22 2118    sodium chloride 0.9 % flush 10 mL, 10 mL, Intravenous, Q12H, Randall Cabrera MD, 10 mL at 03/11/22 0834    sodium chloride 0.9 % flush 10 mL, 10 mL, Intravenous, PRNRick Enoch K, MD    Patient's recent labs and results as included in the subjective data were reviewed by me. Any pertinent outside data will be included.        Assessment/Plan       Acute on chronic respiratory failure with hypoxia (HCC)    Acute on chronic systolic CHF (congestive heart failure) (HCC)    Bilateral pleural effusion    CKD (chronic kidney disease) stage 4, GFR 15-29 ml/min (HCC)    Atrial fibrillation (HCC)    Chronic anemia       - At this point, her HFrEF is chronic and she has third spaced into her pleural cavities. She is not grossly volume overloaded. S/P  thoracentesis- 1200ml.   EF 35%, ICM.  She was on twice weekly Lasix for maintenance. Her new onset AF likely worsened her volume/breathing issue quickly.  She appears to have bilateral infiltrates.   - she is on 40mg PO lasix now. Euvolemic.         Pleural effusions:  -1200ml removed from left lung but IR.        She has CKD IV.   -Nephrology will be on board to assist with diuretics.       New onset AF:  She is rate controlled on 3.125mg BID of COreg.   - Blood pressure is borderline.  - Anticoagulation held d/t Thoracentesis. Will restart tomorrow.       Anemia  She has had a worsening of anemia, drop of 2 points and increase in o2 requirement. Heparin was started and changed to eliquis.These have both been stopped now.   - s/p 1 unit PRBC  - occult stool    Plan for disposition:Where: Continue current location When:  today            This document has been electronically signed by CHANA Lopez on March 11, 2022 11:03 CST      Electronically signed by CHANA Lopez, 03/11/22, 11:08 AM CST.    Patient seen by me at 2 PM today.  Agree with plan as outlined by CHANA Brice.    Ada Son is an 87-year-old female with coronary artery disease with ischemic cardiomyopathy, status post CABG in 2014, hypertension, hyperlipidemia, CKD, peripheral vascular disease status post bilateral TCARs, GERD.   She presented with increasing dyspnea on exertion and oxygen requirement during the past several weeks.  She apparently moved to [place] recently.  She now has to wear oxygen 24/7.  She has felt fatigued/ weak.  She has had some degree of cough.  No chest pain was reported.  Her mobility has been restricted.       She was noted to have atrial fibrillation with rapid ventricular response the exact duration of which was unclear and bilateral pleural effusions left greater than right.  She has longstanding CKD and hemoglobin has dropped to 7.1 from 10.4 on admission.  Enzymes have been negative for myocardial  injury.      Patient underwent 1 unit of packed RBC transfusion yesterday and on intravenous iron.  Hemoglobin 8.8 today and platelet count 159.  Labs showed a BUN of 47 and creatinine 2.07.  Patient underwent thoracentesis on 1200 cc of straw-colored fluid aspirated.    She believes that her dyspnea is slightly better.  No chest pain or palpitation.  Eliquis has been restarted.  Continue rate control with carvedilol 3.125 mg twice a day and diuretic therapy with Lasix 40 mg daily as monitored by nephrology.  Low-dose losartan will be continued.    Dr. Conti covering for me this weekend.    Electronically signed by Kalee Cisneros MD, 03/11/22, 7:13 PM CST.

## 2022-03-11 NOTE — PLAN OF CARE
Goal Outcome Evaluation:  Plan of Care Reviewed With: patient        Progress: improving  Outcome Evaluation: Pt agreeable to bed ex only. Pt liv ther ex to BUE's in all planes w/ 1lb HW to increase strength required for t/f's and ADL. No goals met this date. Cont OT POC.

## 2022-03-11 NOTE — POST-PROCEDURE NOTE
PROCEDURE: Ultrasound guided thoracentesis    HISTORY:  Left pleural effusion    COMPARISON:  Chest x-ray dated 3/8/2022.    TECHNIQUE:    Informed consent was obtained following a discussion of the procedure with the patient, with benefits, alternatives, and with risks explained as possible bleeding, infection, damage to adjacent organs and pneumothorax which would require a chest tube.    An appropriate site was marked in the posterolateral left hemithorax. Following initial imaging for localization of an optimal site of intervention, the skin was prepped and draped in the usual sterile fashion.  10 mL of lidocaine was used for local anesthesia. A 19-gauge [employer] needle and catheter was inserted into the pleural space under continuous ultrasound guidance. The needle was removed and fluid was collected into a vacuum bottle.    FINDINGS:  Approximately 1200 mL of serous fluid was collected and submitted for laboratory evaluation, as requested.    Blood loss: None.    The patient tolerated the procedure well and left the department in stable condition. No immediate post procedure complications.    CONCLUSION:   Successful ultrasound-guided thoracentesis with removal of 1200 mL of serous fluid, as described above.

## 2022-03-11 NOTE — PROGRESS NOTES
Adult Nutrition  Assessment    Patient Name:  Naomi Duong Son  YOB: 1935  MRN: [id number]  Admit Date:  3/7/2022    Assessment Date:  3/11/2022    Comments:  Pt with improved oral intake, ~63% average. However she continues to report a decreased appetite/decreased oral intake.  Poor acceptance of the magic cups. Will discontinue and add ice cream instead.  Once again reminded her that snacks are available in the pantry prn. She continues to be managed for A/C Resp failure due to CHF + AFib + Crhonic Pleural effusions. S/p Thoracentesis today.  She remains on lasix.       Reason for Assessment     Row Name 03/11/22 1515          Reason for Assessment    Reason For Assessment follow-up protocol                Nutrition/Diet History     Row Name 03/11/22 1515          Nutrition/Diet History    Typical Intake (Food/Fluid/EN/PN) Pt reports that she might be eating better.  She does not like the magic cup cups. Will eat ice cream.  She needs assistance with opening up condiments                  Labs/Tests/Procedures/Meds     Row Name 03/11/22 1517          Labs/Procedures/Meds    Lab Results Reviewed reviewed, pertinent     Lab Results Comments Bun 47; Creat 2.07; Phos 4.6            Diagnostic Tests/Procedures    Diagnostic Test/Procedure Reviewed reviewed, pertinent     Diagnostic Test/Procedures Comments Lasix; s/p Thoracentesis            Medications    Pertinent Medications Reviewed reviewed, pertinent     Pertinent Medications Comments coreg; Ferric Gluconate; Lasix; Miralax;                Physical Findings     Row Name 03/11/22 1520          Physical Findings    Overall Physical Appearance Supplemental 02                  Nutrition Prescription Ordered     Row Name 03/11/22 1521          Nutrition Prescription PO    Current PO Diet Regular     Common Modifiers Cardiac;Fluid Restriction     Fluid Restriction mL per Tray 250 mL     Fluid Restriction mL per Day Other (comment)  1200cc                 Evaluation of Received Nutrient/Fluid Intake     Row Name 03/11/22 1529          PO Evaluation    Number of Days PO Intake Evaluated 2 days     Number of Meals 4     % PO Intake 63%                     Electronically signed by:  Ruby Soliman RD  03/11/22 15:42 CST

## 2022-03-11 NOTE — THERAPY TREATMENT NOTE
Patient Name: Naomi Duong Son  : 1935    MRN: [id number]                              Today's Date: 3/11/2022       Admit Date: 3/7/2022    Visit Dx:     ICD-10-CM ICD-9-CM   1. Acute on chronic respiratory failure with hypoxia (HCC)  J96.21 518.84     799.02   2. Pleural effusion  J90 511.9   3. Renal failure, unspecified chronicity  N19 586   4. Acute on chronic congestive heart failure, unspecified heart failure type (HCC)  I50.9 428.0   5. Impaired functional mobility, balance, gait, and endurance  Z74.09 V49.89   6. Impaired mobility and ADLs  Z74.09 V49.89    Z78.9      Patient Active Problem List   Diagnosis   • Ischemic cardiomyopathy   • S/P CABG (coronary artery bypass graft)   • Essential hypertension   • Mixed hyperlipidemia   • H/O CHF   • Carotid artery stenosis   • CKD (chronic kidney disease) stage 3, GFR 30-59 ml/min ([facility])   • Bilateral carotid artery disease ([facility])   • Carotid stenosis, asymptomatic, bilateral   • Surgical aftercare, circulatory system   • Carotid stenosis, asymptomatic, left   • Acute on chronic systolic CHF (congestive heart failure) ([facility])   • CKD (chronic kidney disease) stage 3, GFR 30-59 ml/min ([facility])   • Severe malnutrition ([facility])   • Acute respiratory failure with hypoxia ([facility])   • Acute on chronic respiratory failure with hypoxia ([facility])   • Bilateral pleural effusion   • CKD (chronic kidney disease) stage 4, GFR 15-29 ml/min ([facility])   • Atrial fibrillation ([facility])   • Chronic anemia     Past Medical History:   Diagnosis Date   • CAD (coronary artery disease)    • Carotid artery stenosis    • Chronic systolic heart failure (HCC)    • CKD (chronic kidney disease) stage 3, GFR 30-59 ml/min ([facility])    • GERD (gastroesophageal reflux disease)    • Hypercholesterolemia    • Hyperlipidemia    • Hypertension    • Iron deficiency anemia    • Ischemic cardiomyopathy    • MI (myocardial infarction) ([facility])    • Mitral valve disease    • Osteoarthritis    • UTI (urinary tract infection)       Past Surgical History:   Procedure Laterality Date   • CARDIAC CATHETERIZATION     • CAROTID ENDARTERECTOMY     • CORONARY ARTERY BYPASS GRAFT     • TRANSESOPHAGEAL ECHOCARDIOGRAM ([facility])        General Information     Row Name 03/11/22 1255          OT Time and Intention    Document Type therapy note (daily note)  -     Mode of Treatment individual therapy;occupational therapy  -[facility] Name 03/11/22 1255          General Information    Patient Profile Reviewed yes  -     Existing Precautions/Restrictions oxygen therapy device and L/min;fall  -[facility] Name 03/11/22 1255          Cognition    Orientation Status (Cognition) oriented x 4  -[facility] Name 03/11/22 1255          Safety Issues, Functional Mobility    Impairments Affecting Function (Mobility) strength;endurance/activity tolerance;shortness of breath;balance;pain  -           User Key  (r) = Recorded By, (t) = Taken By, (c) = Cosigned By    Initials Name Provider Type     Desirae Serrano COTA Occupational Therapy Assistant                 Mobility/ADL's    No documentation.                Obj/Interventions     [facility] Name 03/11/22 1255          Shoulder (Therapeutic Exercise)    Shoulder (Therapeutic Exercise) AROM (active range of motion)  -     Shoulder AROM (Therapeutic Exercise) bilateral;flexion;extension;aBduction;aDduction;external rotation;internal rotation;horizontal aBduction/aDduction  -[facility] Name 03/11/22 1255          Elbow/Forearm (Therapeutic Exercise)    Elbow/Forearm (Therapeutic Exercise) AROM (active range of motion)  -     Elbow/Forearm AROM (Therapeutic Exercise) bilateral;flexion;extension;supination;pronation  -[facility] Name 03/11/22 1255          Wrist (Therapeutic Exercise)    Wrist (Therapeutic Exercise) AROM (active range of motion)  -     Wrist AROM (Therapeutic Exercise) bilateral;flexion;extension  -[facility] Name 03/11/22 1255          Hand (Therapeutic Exercise)    Hand (Therapeutic Exercise)  AROM (active range of motion)  -KD     Hand AROM/AAROM (Therapeutic Exercise) bilateral;finger flexion;finger extension  -KD     Row Name 03/11/22 1255          Motor Skills    Therapeutic Exercise shoulder;elbow/forearm;wrist;hand  -KD           User Key  (r) = Recorded By, (t) = Taken By, (c) = Cosigned By    Initials Name Provider Type    Desirae Dumas COTA Occupational Therapy Assistant               Goals/Plan     Row Name 03/11/22 1255          Transfer Goal 1 (OT)    Activity/Assistive Device (Transfer Goal 1, OT) toilet  -KD     [place] Level/Cues Needed (Transfer Goal 1, OT) supervision required  -KD     Time Frame (Transfer Goal 1, OT) long term goal (LTG);by discharge  -KD     Progress/Outcome (Transfer Goal 1, OT) goal not met  -KD     Row Name 03/11/22 1255          Bathing Goal 1 (OT)    Activity/Device (Bathing Goal 1, OT) lower body bathing  AD As needed  -KD     [place] Level/Cues Needed (Bathing Goal 1, OT) minimum assist (75% or more patient effort)  -KD     Time Frame (Bathing Goal 1, OT) long term goal (LTG);by discharge  -KD     Progress/Outcomes (Bathing Goal 1, OT) goal not met  -KD     Row Name 03/11/22 1255          Dressing Goal 1 (OT)    Activity/Device (Dressing Goal 1, OT) lower body dressing  AD as needed  -KD     [place]ett/Cues Needed (Dressing Goal 1, OT) minimum assist (75% or more patient effort)  -KD     Time Frame (Dressing Goal 1, OT) long term goal (LTG);by discharge  -KD     Progress/Outcome (Dressing Goal 1, OT) goal not met  -KD           User Key  (r) = Recorded By, (t) = Taken By, (c) = Cosigned By    Initials Name Provider Type    Desirae Dumas COTA Occupational Therapy Assistant               Clinical Impression     Row Name 03/11/22 1255          Pain Assessment    Pretreatment Pain Rating 4/10  -KD     Posttreatment Pain Rating 4/10  -KD     Pain Location generalized  -KD     Row Name 03/11/22 1255          Plan of Care Review    Plan of Care  Reviewed With patient  -KD     Progress improving  -KD     Outcome Evaluation Pt agreeable to bed ex only. Pt liv ther ex to BUE's in all planes w/ 1lb HW to increase strength required for t/f's and ADL. No goals met this date. Cont OT POC.  -KD     Row Name 03/11/22 1255          Therapy Assessment/Plan (OT)    Therapy Frequency (OT) other (see comments)  3-7 days a week  -KD     Row Name 03/11/22 1255          Therapy Plan Review/Discharge Plan (OT)    Anticipated Discharge Disposition (OT) assisted living;home with home health;skilled nursing facility  -KD     Row Name 03/11/22 1255          Vital Signs    Pre Systolic BP Rehab 152  -KD     Pre Treatment Diastolic BP 71  -KD     Pretreatment Heart Rate (beats/min) 93  -KD     Pre SpO2 (%) 93  -KD     O2 Delivery Pre Treatment supplemental O2  -KD     Pre Patient Position Sitting  -KD     Intra Patient Position Sitting  -KD     Post Patient Position Sitting  -KD     Row Name 03/11/22 1259          Positioning and Restraints    Pre-Treatment Position in bed  -KD     Post Treatment Position bed  -KD     In Bed fowlers;call light within reach;encouraged to call for assist;exit alarm on;with family/caregiver  -KD           User Key  (r) = Recorded By, (t) = Taken By, (c) = Cosigned By    Initials Name Provider Type    KD Desirae Serrano COTA Occupational Therapy Assistant               Outcome Measures     Row Name 03/11/22 1250          How much help from another is currently needed...    Putting on and taking off regular lower body clothing? 2  -KD     Bathing (including washing, rinsing, and drying) 2  -KD     Toileting (which includes using toilet bed pan or urinal) 2  -KD     Putting on and taking off regular upper body clothing 3  -KD     Taking care of personal grooming (such as brushing teeth) 3  -KD     Eating meals 4  -KD     AM-PAC 6 Clicks Score (OT) 16  -KD     Row Name 03/11/22 3452          How much help from another person do you currently need...     Turning from your back to your side while in flat bed without using bedrails? 3  -DANIEL     Moving from lying on back to sitting on the side of a flat bed without bedrails? 3  -DANIEL     Moving to and from a bed to a chair (including a wheelchair)? 3  -DANIEL     Standing up from a chair using your arms (e.g., wheelchair, bedside chair)? 3  -DANIEL     Climbing 3-5 steps with a railing? 2  -DANIEL     To walk in hospital room? 3  -DANIEL     AM-PAC 6 Clicks Score (PT) 17  -DANIEL     Row Name 03/11/22 1419          Functional Assessment    Outcome Measure Options AM-PAC 6 Clicks Basic Mobility (PT)  -DANIEL           User Key  (r) = Recorded By, (t) = Taken By, (c) = Cosigned By    Initials Name Provider Type    DANIEL Dc Thakur, HALINA Physical Therapy Assistant    Desirae Dumas COTA Occupational Therapy Assistant                Occupational Therapy Education                 Title: PT OT SLP Therapies (In Progress)     Topic: Occupational Therapy (In Progress)     Point: ADL training (Not Started)     Description:   Instruct learner(s) on proper safety adaptation and remediation techniques during self care or transfers.   Instruct in proper use of assistive devices.              Learner Progress:  Not documented in this visit.          Point: Home exercise program (Not Started)     Description:   Instruct learner(s) on appropriate technique for monitoring, assisting and/or progressing therapeutic exercises/activities.              Learner Progress:  Not documented in this visit.          Point: Precautions (Done)     Description:   Instruct learner(s) on prescribed precautions during self-care and functional transfers.              Learning Progress Summary           Patient Acceptance, E, VU,NR by ME at 3/8/2022 1004    Comment: Educated on OT and POC. Educated to call for assistance. Educated on safety precautions. Educated on PLB technique.                   Point: Body mechanics (Done)     Description:   Instruct learner(s) on proper  positioning and spine alignment during self-care, functional mobility activities and/or exercises.              Learning Progress Summary           Patient Acceptance, E, VU,NR by ME at 3/8/2022 1004    Comment: Educated on OT and POC. Educated to call for assistance. Educated on safety precautions. Educated on PLB technique.                               User Key     Initials Effective Dates Name Provider Type Discipline    ME 06/16/21 -  Johnny Rodriguez, OTR/L Occupational Therapist OT              OT Recommendation and Plan  Therapy Frequency (OT): other (see comments) (3-7 days a week)  Plan of Care Review  Plan of Care Reviewed With: patient  Progress: improving  Outcome Evaluation: Pt agreeable to bed ex only. Pt liv ther ex to BUE's in all planes w/ 1lb HW to increase strength required for t/f's and ADL. No goals met this date. Cont OT POC.     Time Calculation:              RICCI Beard  3/11/2022

## 2022-03-11 NOTE — PLAN OF CARE
Problem: Fall Injury Risk  Goal: Absence of Fall and Fall-Related Injury  Outcome: Ongoing, Progressing  Intervention: Identify and Manage Contributors  Recent Flowsheet Documentation  Taken 3/10/2022 1900 by Evelyn Merrill RN  Medication Review/Management: medications reviewed  Intervention: Promote Injury-Free Environment  Recent Flowsheet Documentation  Taken 3/11/2022 0100 by Evelyn Merrill RN  Safety Promotion/Fall Prevention: safety round/check completed  Taken 3/10/2022 2300 by Evelyn Merrill RN  Safety Promotion/Fall Prevention:   safety round/check completed   assistive device/personal items within reach  Taken 3/10/2022 2100 by Evelyn Merrill RN  Safety Promotion/Fall Prevention:   safety round/check completed   assistive device/personal items within reach  Taken 3/10/2022 1900 by Evelyn Merrill RN  Safety Promotion/Fall Prevention:   safety round/check completed   assistive device/personal items within reach   Goal Outcome Evaluation:  Plan of Care Reviewed With: patient        Progress: improving  Outcome Evaluation: pt complains of discofort in her chest. vitals stable and rhythym is sinus. PRN pain medication given. recieved relief from the medications. Resting comfortably. stable at this time.

## 2022-03-11 NOTE — PLAN OF CARE
Goal Outcome Evaluation:  Plan of Care Reviewed With: caregiver, patient        Progress: improving  Outcome Evaluation: Improved oral intake although pt reports a decreased appetite.  CKD and Acute Resp failure being manged with Lasix and pt is s/p thoracentesis. Will adjust supplements. Poor acceptance of Magic cup--will add ice cream instead.  Monitor

## 2022-03-11 NOTE — PROGRESS NOTES
"[facility]  [address] [place] [address]. [address]  T - [phone]  F - [phone]     Progress Note          PATIENT  DEMOGRAPHICS   PATIENT NAME: Naomi Duong Son                      PHYSICIAN: Stevenson Robertson MD  : 1935  MRN: [id number]   LOS: 3 days    Patient Care Team:  Sabino Mobley MD as PCP - General (Family Medicine)  Subjective   SUBJECTIVE   S/p thoracentesis. Breathing better       Objective   OBJECTIVE   Vital Signs  Temp:  [96.1 °F (35.6 °C)-97.7 °F (36.5 °C)] 97.7 °F (36.5 °C)  Heart Rate:  [] 85  Resp:  [16-24] 24  BP: ()/(46-72) 152/71    Flowsheet Rows    Flowsheet Row First Filed Value   Admission Height 157.5 cm (62\") Documented at 2022 0504   Admission Weight 46.7 kg (103 lb) Documented at 2022 0504           I/O last 3 completed shifts:  In: 1110 [P.O.:1060; Blood:50]  Out: 1550 [Urine:1550]    PHYSICAL EXAM    Physical Exam  Constitutional:       Appearance: She is well-developed.   HENT:      Head: Normocephalic.   Eyes:      Pupils: Pupils are equal, round, and reactive to light.   Cardiovascular:      Rate and Rhythm: Normal rate and regular rhythm.      Heart sounds: Normal heart sounds.   Pulmonary:      Effort: Pulmonary effort is normal.      Breath sounds: Normal breath sounds.   Abdominal:      General: Bowel sounds are normal.      Palpations: Abdomen is soft.   Musculoskeletal:         General: Swelling present.   Skin:     Coloration: Skin is not jaundiced.   Neurological:      General: No focal deficit present.      Mental Status: She is alert and oriented to person, place, and time.         RESULTS   Results Review:    Results from last 7 days   Lab Units 22  0613 03/10/22  0637 22  0602 22  1150 22  0521   SODIUM mmol/L 137 138 138   < > 140   POTASSIUM mmol/L 4.5 4.6 4.8   < > 5.4*   CHLORIDE mmol/L 95* 96* 95*   < > 98   CO2 mmol/L 39.0* 35.0* 37.0*   < > 35.0*   BUN mg/dL 47* 43* 43*   < > " 32*   CREATININE mg/dL 2.07* 1.91* 1.86*   < > 1.77*   CALCIUM mg/dL 8.9 8.5* 8.5*   < > 9.2   BILIRUBIN mg/dL  --   --   --   --  0.4   ALK PHOS U/L  --   --   --   --  84   ALT (SGPT) U/L  --   --   --   --  11   AST (SGOT) U/L  --   --   --   --  17   GLUCOSE mg/dL 106* 92 94   < > 93    < > = values in this interval not displayed.       Estimated Creatinine Clearance: 17.6 mL/min (A) (by C-G formula based on SCr of 2.07 mg/dL (H)).    Results from last 7 days   Lab Units 03/10/22  0637 03/09/22  0602 03/08/22  0721 03/07/22  1150   MAGNESIUM mg/dL 2.1 2.1 2.2  --    PHOSPHORUS mg/dL  --  4.6* 4.9* 5.1*             Results from last 7 days   Lab Units 03/11/22  0613 03/10/22  0637 03/09/22  0602 03/08/22  0721 03/08/22  0000   WBC 10*3/mm3 4.86 3.85 4.12 5.81 5.91   HEMOGLOBIN g/dL 8.8* 7.1* 7.6* 9.2* 9.6*   PLATELETS 10*3/mm3 159 158 157 194 191       Results from last 7 days   Lab Units 03/11/22  0903 03/07/22  0521   INR  1.08 0.97         Imaging Results (Last 24 Hours)     Procedure Component Value Units Date/Time    XR Chest 1 View [[id number]] Collected: 03/11/22 1027     Updated: 03/11/22 1048    Narrative:      PROCEDURE: Portable chest x-ray    TECHNIQUE: Single frontal view of the chest    COMPARISON: Chest x-ray 3/8/2022.    HISTORY: post thora, J96.21 Acute and chronic respiratory failure  with hypoxia J90 Pleural effusion, not elsewhere classified N19  Unspecified kidney failure I50.9 Heart failure, unspecified  Z74.09 Other reduced mobility Z74.09 Other reduced mobility Z78.9  Other specified health status    FINDINGS:   Interval removal of 1.2 L of pleural fluid from the left chest.  No pneumothorax. There is a small residual left pleural effusion.  There is a small to moderate right pleural effusion which appears  unchanged. There is mild pulmonary vascular congestion and coarse  interstitial markings similar to the previous exam. Heart, hilar,  and mediastinal structures appear stable. Suture  wires appear  stable.      Impression:      Interval left thoracentesis. Small residual left pleural  effusion. Persistent moderate right pleural effusion without  significant change.  No pneumothorax.    Electronically signed by:  Surendra Carter MD  3/11/2022 10:46 AM  CST Workstation: [id number]    US Thoracentesis [[id number]] Collected: 03/11/22 1002    Specimen: Body Fluid Updated: 03/11/22 1046    Narrative:      PROCEDURE: Ultrasound guided thoracentesis    HISTORY:  Left pleural effusion    COMPARISON:  Chest x-ray dated 3/8/2022.    TECHNIQUE:    Informed consent was obtained following a discussion of the  procedure with the patient, with benefits, alternatives, and with  risks explained as possible bleeding, infection, damage to  adjacent organs and pneumothorax which would require a chest  tube.    An appropriate site was marked in the posterolateral left  hemithorax. Following initial imaging for localization of an  optimal site of intervention, the skin was prepped and draped in  the usual sterile fashion.  10 mL of lidocaine was used for local  anesthesia. A 19-gauge memory lane syndicationseh needle and catheter was inserted into  the pleural space under continuous ultrasound guidance. The  needle was removed and fluid was collected into a vacuum bottle.    FINDINGS:  Approximately 1200 mL of serous fluid was collected and submitted  for laboratory evaluation, as requested.    Blood loss: None.    The patient tolerated the procedure well and left the department  in stable condition. No immediate post procedure complications.      Impression:      CONCLUSION:   Successful ultrasound-guided thoracentesis with removal of 1200  mL of serous fluid, as described above.    Electronically signed by:  Surendra Carter MD  3/11/2022 10:44 AM  CST Workstation: [id number]           MEDICATIONS    aspirin, 81 mg, Oral, Daily  carvedilol, 3.125 mg, Oral, BID With Meals  ferric gluconate (FERRLECIT) IVPB, 125 mg, Intravenous,  Daily  furosemide, 40 mg, Oral, Daily  ipratropium-albuterol, 3 mL, Nebulization, Q4H - RT  losartan, 12.5 mg, Oral, Q24H  polyethylene glycol, 17 g, Oral, Daily  sodium chloride, 10 mL, Intravenous, Q12H      hold, 1 each        Assessment/Plan   ASSESSMENT / PLAN      Acute on chronic respiratory failure with hypoxia (HCC)    Acute on chronic systolic CHF (congestive heart failure) (HCC)    Bilateral pleural effusion    CKD (chronic kidney disease) stage 4, GFR 15-29 ml/min (HCC)    Atrial fibrillation (HCC)    Chronic anemia       1.  CKD 4- Baseline creatinine is around 1.7-1.8, creatinine currently slightly above the  baseline.  CO2 is elevated with underlying resp acidosis.  No gross volume overload.  We will keep Lasix to 40 mg p.o. daily.     2.  Acute on chronic CHF / Bilateral pleural effusions- s/p thoracentesis     3.  Acute on chronic respiratory failure- secondary to underlying CHF as well as new onset atrial fibrillation and chronic pleural effusions.  Managed per primary team.     4.  Atrial fibrillation- managed per cardiology and primary team     6.  Anemia- worsening, fe low and on iv fe     7.  History of secondary hyperparathyroidism     8.  History of CAD                   This document has been electronically signed by Stevenson Robertson MD on March 11, 2022 13:43 CST

## 2022-03-11 NOTE — PROGRESS NOTES
Progress Note  Roberto Jaramillo MD  Hospitalist    Date of visit: 3/11/2022     LOS: 3 days   Patient Care Team:  Sabino Mobley MD as PCP - General (Family Medicine)    Chief Complaint: Shortness of breath    Subjective     Interval History:     Patient Complaints: Shortness of breath, minimally better. Still requiring supplemental Oxygen - minimally better after the thoracentesis.    History taken from: Patient and nursing.    Medication Review:   Current Facility-Administered Medications   Medication Dose Route Frequency Provider Last Rate Last Admin   • acetaminophen (TYLENOL) tablet 650 mg  650 mg Oral Q4H PRN Randall Cabrera MD        Or   • acetaminophen (TYLENOL) 160 MG/5ML solution 650 mg  650 mg Oral Q4H PRN Randall Cabrera MD        Or   • acetaminophen (TYLENOL) suppository 650 mg  650 mg Rectal Q4H PRN Randall Cabrera MD       • apixaban (ELIQUIS) tablet 2.5 mg  2.5 mg Oral Q12H Roberto Jaramillo MD       • aspirin EC tablet 81 mg  81 mg Oral Daily Roberto Jaramillo MD   81 mg at 03/11/22 0833   • calcium carbonate (TUMS) chewable tablet 500 mg (200 mg elemental)  1 tablet Oral BID PRN Randall Cabrera MD       • carvedilol (COREG) tablet 3.125 mg  3.125 mg Oral BID With Meals Roberto Jaramillo MD   3.125 mg at 03/11/22 0833   • ferric gluconate (FERRLECIT) 125 mg in sodium chloride 0.9 % 110 mL IVPB  125 mg Intravenous Daily Stevenson Robertson  mL/hr at 03/10/22 1843 125 mg at 03/10/22 1843   • furosemide (LASIX) tablet 40 mg  40 mg Oral Daily Carlin Lechuga APRN   40 mg at 03/11/22 0833   • Hold medication  1 each Does not apply Continuous PRN Roberto Jaramillo MD       • ipratropium-albuterol (DUO-NEB) nebulizer solution 3 mL  3 mL Nebulization Q4H - RT Roberto Jaramillo MD   3 mL at 03/11/22 1053   • losartan (COZAAR) half tablet 12.5 mg  12.5 mg Oral Q24H Roberto Jaramillo MD   12.5 mg at 03/11/22 5724   • Magnesium Sulfate 2 gram Bolus, followed by 8 gram infusion (total Mg dose 10 grams)- Mg less than or equal  to 1mg/dL  2 g Intravenous PRN Randall Cabrera MD        Or   • Magnesium Sulfate 2 gram / 50mL Infusion (GIVE X 3 BAGS TO EQUAL 6GM TOTAL DOSE) - Mg 1.1 - 1.5 mg/dl  2 g Intravenous PRN Randall Cabrera MD        Or   • Magnesium Sulfate 4 gram infusion- Mg 1.6-1.9 mg/dL  4 g Intravenous PRN Randall Cabrera MD       • melatonin tablet 5.25 mg  5.25 mg Oral Nightly PRN Randall Cabrera MD       • ondansetron (ZOFRAN) injection 4 mg  4 mg Intravenous Q6H PRN Randall Cabrera MD       • oxyCODONE (ROXICODONE) immediate release tablet 5 mg  5 mg Oral Q4H PRN Randall Cabrera MD   5 mg at 03/10/22 2225   • polyethylene glycol (MIRALAX) packet 17 g  17 g Oral Daily Roberto Jaramillo MD   17 g at 03/11/22 1240   • potassium chloride (MICRO-K) CR capsule 40 mEq  40 mEq Oral PRN Randall Cabrera MD        Or   • potassium chloride (KLOR-CON) packet 40 mEq  40 mEq Oral PRN Randall Cabrera MD        Or   • potassium chloride 10 mEq in 100 mL IVPB  10 mEq Intravenous Q1H Randall Muñoz MD       • potassium phosphate 45 mmol in sodium chloride 0.9 % 500 mL infusion  45 mmol Intravenous PRN Randall Cabrera MD        Or   • potassium phosphate 30 mmol in sodium chloride 0.9 % 250 mL infusion  30 mmol Intravenous PRRandall Gomez MD        Or   • Potassium Phosphates 15 mmol in sodium chloride 0.9 % 100 mL infusion  15 mmol Intravenous PRN Randall Cabrera MD        Or   • sodium phosphates 45 mmol in sodium chloride 0.9 % 500 mL IVPB  45 mmol Intravenous PRN Randall Cabrera MD        Or   • sodium phosphates 30 mmol in sodium chloride 0.9 % 250 mL IVPB  30 mmol Intravenous PRN Randall Cabrera MD        Or   • sodium phosphates 15 mmol in sodium chloride 0.9 % 250 mL IVPB  15 mmol Intravenous PRRandall Gomez MD       • sodium chloride 0.9 % flush 10 mL  10 mL Intravenous Randall Muñoz MD   10 mL at 03/08/22 2118   • sodium chloride 0.9 % flush 10 mL  10 mL Intravenous Q12H Randall Cabrera MD   10 mL at 03/11/22 0871   • sodium chloride  0.9 % flush 10 mL  10 mL Intravenous PRN Randall Cabrera MD           Review of Systems:   Review of Systems   Constitutional: Positive for fatigue.   Respiratory: Positive for cough and shortness of breath. Negative for wheezing.    Cardiovascular: Positive for leg swelling. Negative for chest pain and palpitations.   Gastrointestinal: Negative for abdominal distention, abdominal pain, blood in stool, diarrhea, nausea and vomiting.   Genitourinary: Negative for dysuria, flank pain, frequency, hematuria and urgency.   Musculoskeletal: Positive for arthralgias. Negative for back pain.   Skin: Negative for color change, pallor and rash.   Neurological: Positive for weakness. Negative for seizures, speech difficulty, light-headedness, numbness and headaches.   Psychiatric/Behavioral: Negative for agitation, behavioral problems and confusion.   All other systems reviewed and are negative.      Objective     Vital Signs  Temp:  [96.1 °F (35.6 °C)-97.7 °F (36.5 °C)] 97.7 °F (36.5 °C)  Heart Rate:  [] 85  Resp:  [16-24] 24  BP: ()/(46-72) 152/71    Physical Exam:  Physical Exam  Vitals reviewed.   Constitutional:       Appearance: She is ill-appearing.   HENT:      Head: Normocephalic and atraumatic.   Eyes:      General: No scleral icterus.     Extraocular Movements: Extraocular movements intact.      Pupils: Pupils are equal, round, and reactive to light.   Cardiovascular:      Rate and Rhythm: Normal rate. Rhythm irregular.   Pulmonary:      Effort: No respiratory distress.      Breath sounds: Rales present.      Comments: Decreased air entry bilaterally  Abdominal:      General: Abdomen is flat. Bowel sounds are normal. There is no distension.      Palpations: Abdomen is soft. There is no mass.      Tenderness: There is no abdominal tenderness. There is no right CVA tenderness or left CVA tenderness.   Musculoskeletal:         General: No tenderness or deformity. Normal range of motion.      Cervical back:  Normal range of motion and neck supple. No rigidity or tenderness.      Right lower leg: No edema.      Left lower leg: No edema.   Skin:     General: Skin is warm and dry.      Coloration: Skin is pale. Skin is not jaundiced.      Findings: No bruising or lesion.   Neurological:      General: No focal deficit present.      Mental Status: She is alert and oriented to person, place, and time. Mental status is at baseline.      Cranial Nerves: No cranial nerve deficit.      Sensory: No sensory deficit.      Motor: No weakness.   Psychiatric:         Mood and Affect: Mood normal.         Behavior: Behavior normal.          Results Review:    Lab Results (last 24 hours)     Procedure Component Value Units Date/Time    Body Fluid Culture - Body Fluid, Pleural Cavity [[id number]] Collected: 03/11/22 1000    Specimen: Body Fluid from Pleural Cavity Updated: 03/11/22 1137     Gram Stain Rare (1+) WBCs seen      No organisms seen    Body Fluid Cell Count With Differential - Pleural Fluid, Pleural Cavity [[id number]]  (Abnormal) Collected: 03/11/22 1000    Specimen: Pleural Fluid from Pleural Cavity Updated: 03/11/22 1121    Narrative:      The following orders were created for panel order Body Fluid Cell Count With Differential - Pleural Fluid, Pleural Cavity.  Procedure                               Abnormality         Status                     ---------                               -----------         ------                     Body fluid cell count - ...[[id number]]  Abnormal            Final result               Body fluid differential ...[[id number]]                      Final result                 Please view results for these tests on the individual orders.    Body fluid differential - Pleural Fluid, Pleural Cavity [[id number]] Collected: 03/11/22 1000    Specimen: Pleural Fluid from Pleural Cavity Updated: 03/11/22 1121     Neutrophils, Fluid 14 %      Mononuclear, Fluid 86 %     Narrative:      Reference intervals are  unavailable for this body fluid differential. Comparison of the result with concentration in the blood, serum or plasma is recommended.    Body fluid cell count - Pleural Fluid, Pleural Cavity [[id number]]  (Abnormal) Collected: 03/11/22 1000    Specimen: Pleural Fluid from Pleural Cavity Updated: 03/11/22 1121     WBC, Fluid 402 /mm3      RBC, Fluid 3,000 /mm3      Color, Fluid Yellow     Appearance, Fluid Hazy     Volume, Fluid 1,200.0 mL     Protein, Body Fluid - Pleural Fluid, Pleural Cavity [[id number]] Collected: 03/11/22 1000    Specimen: Pleural Fluid from Pleural Cavity Updated: 03/11/22 1022    Lactate Dehydrogenase, Body Fluid - Pleural Fluid, Pleural Cavity [[id number]] Collected: 03/11/22 1000    Specimen: Pleural Fluid from Pleural Cavity Updated: 03/11/22 1022    Albumin, Fluid - Pleural Fluid, Pleural Cavity [[id number]] Collected: 03/11/22 1000    Specimen: Pleural Fluid from Pleural Cavity Updated: 03/11/22 1022    Anaerobic Culture - Pleural Fluid, Pleural Cavity [[id number]] Collected: 03/11/22 1000    Specimen: Pleural Fluid from Pleural Cavity Updated: 03/11/22 1022    aPTT [[id number]]  (Normal) Collected: 03/11/22 0903    Specimen: Blood Updated: 03/11/22 0932     PTT 37.1 seconds     Narrative:      The recommended Heparin therapeutic range is 68-97 seconds.    Protime-INR [[id number]]  (Normal) Collected: 03/11/22 0903    Specimen: Blood Updated: 03/11/22 0931     Protime 13.9 Seconds      INR 1.08    Narrative:      Therapeutic range for most indications is 2.0-3.0 INR,  or 2.5-3.5 for mechanical heart valves.    Basic Metabolic Panel [[id number]]  (Abnormal) Collected: 03/11/22 0613    Specimen: Blood Updated: 03/11/22 0639     Glucose 106 mg/dL      BUN 47 mg/dL      Creatinine 2.07 mg/dL      Sodium 137 mmol/L      Potassium 4.5 mmol/L      Chloride 95 mmol/L      CO2 39.0 mmol/L      Calcium 8.9 mg/dL      BUN/Creatinine Ratio 22.7     Anion Gap 3.0 mmol/L      eGFR 23.1 mL/min/1.73       Comment: National Kidney Foundation and American Society of Nephrology (ASN) Task Force recommended calculation based on the Chronic Kidney Disease Epidemiology Collaboration (CKD-EPI) equation refit without adjustment for race.       Narrative:      GFR Normal >60  Chronic Kidney Disease <60  Kidney Failure <15      CBC & Differential [[id number]]  (Abnormal) Collected: 03/11/22 0613    Specimen: Blood Updated: 03/11/22 0624    Narrative:      The following orders were created for panel order CBC & Differential.  Procedure                               Abnormality         Status                     ---------                               -----------         ------                     CBC Auto Differential[[id number]]        Abnormal            Final result                 Please view results for these tests on the individual orders.    CBC Auto Differential [[id number]]  (Abnormal) Collected: 03/11/22 0613    Specimen: Blood Updated: 03/11/22 0624     WBC 4.86 10*3/mm3      RBC 3.16 10*6/mm3      Hemoglobin 8.8 g/dL      Hematocrit 28.3 %      MCV 89.6 fL      MCH 27.8 pg      MCHC 31.1 g/dL      RDW 14.6 %      RDW-SD 47.7 fl      MPV 10.7 fL      Platelets 159 10*3/mm3      Neutrophil % 71.9 %      Lymphocyte % 11.7 %      Monocyte % 15.6 %      Eosinophil % 0.0 %      Basophil % 0.6 %      Immature Grans % 0.2 %      Neutrophils, Absolute 3.49 10*3/mm3      Lymphocytes, Absolute 0.57 10*3/mm3      Monocytes, Absolute 0.76 10*3/mm3      Eosinophils, Absolute 0.00 10*3/mm3      Basophils, Absolute 0.03 10*3/mm3      Immature Grans, Absolute 0.01 10*3/mm3      nRBC 0.0 /100 WBC     POC Glucose Once [[id number]]  (Normal) Collected: 03/10/22 1621    Specimen: Blood Updated: 03/10/22 1643     Glucose 124 mg/dL      Comment: RN NotifiedOperator: [id number] FAUSTO Nina ID: [id number]             Imaging Results (Last 24 Hours)     Procedure Component Value Units Date/Time    XR Chest 1 View [[id number]]  Collected: 03/11/22 1027     Updated: 03/11/22 1048    Narrative:      PROCEDURE: Portable chest x-ray    TECHNIQUE: Single frontal view of the chest    COMPARISON: Chest x-ray 3/8/2022.    HISTORY: post thora, J96.21 Acute and chronic respiratory failure  with hypoxia J90 Pleural effusion, not elsewhere classified N19  Unspecified kidney failure I50.9 Heart failure, unspecified  Z74.09 Other reduced mobility Z74.09 Other reduced mobility Z78.9  Other specified health status    FINDINGS:   Interval removal of 1.2 L of pleural fluid from the left chest.  No pneumothorax. There is a small residual left pleural effusion.  There is a small to moderate right pleural effusion which appears  unchanged. There is mild pulmonary vascular congestion and coarse  interstitial markings similar to the previous exam. Heart, hilar,  and mediastinal structures appear stable. Suture wires appear  stable.      Impression:      Interval left thoracentesis. Small residual left pleural  effusion. Persistent moderate right pleural effusion without  significant change.  No pneumothorax.    Electronically signed by:  Surendra Carter MD  3/11/2022 10:46 AM  CST Workstation: [id number]    US Thoracentesis [[id number]] Collected: 03/11/22 1002    Specimen: Body Fluid Updated: 03/11/22 1046    Narrative:      PROCEDURE: Ultrasound guided thoracentesis    HISTORY:  Left pleural effusion    COMPARISON:  Chest x-ray dated 3/8/2022.    TECHNIQUE:    Informed consent was obtained following a discussion of the  procedure with the patient, with benefits, alternatives, and with  risks explained as possible bleeding, infection, damage to  adjacent organs and pneumothorax which would require a chest  tube.    An appropriate site was marked in the posterolateral left  hemithorax. Following initial imaging for localization of an  optimal site of intervention, the skin was prepped and draped in  the usual sterile fashion.  10 mL of lidocaine was used for  local  anesthesia. A 19-gauge Yueh needle and catheter was inserted into  the pleural space under continuous ultrasound guidance. The  needle was removed and fluid was collected into a vacuum bottle.    FINDINGS:  Approximately 1200 mL of serous fluid was collected and submitted  for laboratory evaluation, as requested.    Blood loss: None.    The patient tolerated the procedure well and left the department  in stable condition. No immediate post procedure complications.      Impression:      CONCLUSION:   Successful ultrasound-guided thoracentesis with removal of 1200  mL of serous fluid, as described above.    Electronically signed by:  Surendra Carter MD  3/11/2022 10:44 AM  CST Workstation: [id number]          Assessment/Plan       Acute on chronic respiratory failure with hypoxia (HCC)    Acute on chronic systolic CHF (congestive heart failure) (HCC)    Bilateral pleural effusion    Atrial fibrillation (HCC)    CKD (chronic kidney disease) stage 4, GFR 15-29 ml/min (HCC)    Chronic anemia    Continue with supportive care, supplemental oxygen, diuresis, Cozaar, Coreg, Lasix 40 mg daily, nebulized treatments. A recent transthoracic Echo obtained now shows a depressed LVEF of 30 to 35%, mitral valve regurgitation and moderate pulmonary hypertension. Repeat CBC / BMP.    Cardiology and Nephrology consults appreciated.    I confirmed that the patient's Advance Care Plan is present, code status is documented, or surrogate decision maker is listed in the patient's medical record.      Roberto Jaramillo MD  03/11/22  13:56 CST

## 2022-03-12 NOTE — THERAPY TREATMENT NOTE
Acute Care - Physical Therapy Treatment Note  [facility] [place]     Patient Name: Naomi Duong Son  : 1935  MRN: [id number]  Today's Date: 3/12/2022      Visit Dx:     ICD-10-CM ICD-9-CM   1. Acute on chronic respiratory failure with hypoxia (HCC)  J96.21 518.84     799.02   2. Pleural effusion  J90 511.9   3. Renal failure, unspecified chronicity  N19 586   4. Acute on chronic congestive heart failure, unspecified heart failure type ([facility])  I50.9 428.0   5. Impaired functional mobility, balance, gait, and endurance  Z74.09 V49.89   6. Impaired mobility and ADLs  Z74.09 V49.89    Z78.9      Patient Active Problem List   Diagnosis   • Ischemic cardiomyopathy   • S/P CABG (coronary artery bypass graft)   • Essential hypertension   • Mixed hyperlipidemia   • H/O CHF   • Carotid artery stenosis   • CKD (chronic kidney disease) stage 3, GFR 30-59 ml/min ([facility])   • Bilateral carotid artery disease ([facility])   • Carotid stenosis, asymptomatic, bilateral   • Surgical aftercare, circulatory system   • Carotid stenosis, asymptomatic, left   • Acute on chronic systolic CHF (congestive heart failure) ([facility])   • CKD (chronic kidney disease) stage 3, GFR 30-59 ml/min ([facility])   • Severe malnutrition ([facility])   • Acute respiratory failure with hypoxia ([facility])   • Acute on chronic respiratory failure with hypoxia ([facility])   • Bilateral pleural effusion   • CKD (chronic kidney disease) stage 4, GFR 15-29 ml/min (HCC)   • Atrial fibrillation ([facility])   • Chronic anemia     Past Medical History:   Diagnosis Date   • CAD (coronary artery disease)    • Carotid artery stenosis    • Chronic systolic heart failure (HCC)    • CKD (chronic kidney disease) stage 3, GFR 30-59 ml/min ([facility])    • GERD (gastroesophageal reflux disease)    • Hypercholesterolemia    • Hyperlipidemia    • Hypertension    • Iron deficiency anemia    • Ischemic cardiomyopathy    • MI (myocardial infarction) ([facility])    • Mitral valve disease    • Osteoarthritis    • UTI (urinary tract  infection)      Past Surgical History:   Procedure Laterality Date   • CARDIAC CATHETERIZATION     • CAROTID ENDARTERECTOMY     • CORONARY ARTERY BYPASS GRAFT     • TRANSESOPHAGEAL ECHOCARDIOGRAM (MISSY)       PT Assessment (last 12 hours)     PT Evaluation and Treatment     Row Name 03/12/22 1324          Physical Therapy Time and Intention    Subjective Information no complaints  -TA     Document Type therapy note (daily note)  -TA     Mode of Treatment physical therapy;individual therapy  -TA     Patient Effort good  due to lethagy  -TA     Comment pt defered EOB/OOB  -TA     Row Name 03/12/22 1324          General Information    Patient Profile Reviewed yes  -TA     Existing Precautions/Restrictions oxygen therapy device and L/min;fall  -TA     Row Name 03/12/22 1324          Pain    Pretreatment Pain Rating 0/10 - no pain  -TA     Posttreatment Pain Rating 0/10 - no pain  -TA     Row Name 03/12/22 1324          Cognition    Orientation Status (Cognition) oriented x 4  -TA     Row Name 03/12/22 1324          Bed Mobility    Bed Mobility supine-sit;sit-supine  -TA     Supine-Sit [place] (Bed Mobility) not tested  -TA     Sit-Supine [place] (Bed Mobility) not tested  -TA     Assistive Device (Bed Mobility) head of bed elevated;bed rails  -TA     Row Name 03/12/22 1324          Transfers    Transfers stand-sit transfer;sit-stand transfer  -TA     Sit-Stand [place] (Transfers) not tested  -TA     Stand-Sit [place] (Transfers) not tested  -TA     Row Name 03/12/22 1324          Safety Issues, Functional Mobility    Impairments Affecting Function (Mobility) strength;endurance/activity tolerance;shortness of breath;balance;pain  -TA     Row Name 03/12/22 1324          Hip (Therapeutic Exercise)    Hip (Therapeutic Exercise) AROM (active range of motion);isometric exercises  -TA     Hip AROM (Therapeutic Exercise) bilateral;aBduction;aDduction;supine;10 repetitions  -TA     Hip Isometrics (Therapeutic  Exercise) bilateral;gluteal sets;supine;10 repetitions  -TA     Row Name 03/12/22 1324          Knee (Therapeutic Exercise)    Knee (Therapeutic Exercise) AROM (active range of motion);isometric exercises  -TA     Knee AROM (Therapeutic Exercise) bilateral;heel slides;SAQ (short arc quad);supine;10 repetitions  -TA     Knee Isometrics (Therapeutic Exercise) bilateral;supine;quad sets;10 repetitions  -TA     Row Name 03/12/22 1324          Ankle (Therapeutic Exercise)    Ankle (Therapeutic Exercise) AROM (active range of motion)  -TA     Ankle AROM (Therapeutic Exercise) bilateral;dorsiflexion;plantarflexion;supine;15 repititions  -TA     Row Name 03/12/22 1324          Plan of Care Review    Plan of Care Reviewed With patient  -TA     Outcome Evaluation pt defered EOB/OOB, pt performed AROM of B LEs in supine. pt would benefit from 24/7 care & continued PT services  -TA     Row Name 03/12/22 1324          Vital Signs    Pre Systolic BP Rehab 132  -TA     Pre Treatment Diastolic BP 61  -TA     Post Systolic BP Rehab 119  -TA     Post Treatment Diastolic BP 53  -TA     Pretreatment Heart Rate (beats/min) 97  -TA     Intratreatment Heart Rate (beats/min) 101  -TA     Posttreatment Heart Rate (beats/min) 103  -TA     Pre SpO2 (%) 95  -TA     O2 Delivery Pre Treatment supplemental O2  -TA     Intra SpO2 (%) 83  nurse aware, pt performed PLB & O2 by NC increased until O2 stats returned>90%  -TA     O2 Delivery Intra Treatment supplemental O2  -TA     Post SpO2 (%) --  92  -TA     O2 Delivery Post Treatment supplemental O2  -TA     Pre Patient Position Supine  -TA     Intra Patient Position Supine  -TA     Post Patient Position Supine  -TA     Row Name 03/12/22 1324          Positioning and Restraints    Pre-Treatment Position in bed  -TA     Post Treatment Position bed  -TA     In Bed supine;call light within reach;exit alarm on  -TA     Row Name 03/12/22 1324          Therapy Assessment/Plan (PT)    Criteria for Skilled  Interventions Met (PT) yes;skilled treatment is necessary  -TA     Comment, Therapy Assessment/Plan (PT) continue  -TA     Row Name 03/12/22 1324          Bed Mobility Goal 1 (PT)    Activity/Assistive Device (Bed Mobility Goal 1, PT) sit to supine/supine to sit  -TA     [place] Level/Cues Needed (Bed Mobility Goal 1, PT) modified independence  -TA     Time Frame (Bed Mobility Goal 1, PT) by discharge  -TA     Strategies/Barriers (Bed Mobility Goal 1, PT) Maintain SpO2 >90%.  -TA     Row Name 03/12/22 1324          Transfer Goal 1 (PT)    Activity/Assistive Device (Transfer Goal 1, PT) sit-to-stand/stand-to-sit;bed-to-chair/chair-to-bed;walker, rolling  -TA     [place] Level/Cues Needed (Transfer Goal 1, PT) modified independence  -TA     Time Frame (Transfer Goal 1, PT) by discharge  -TA     Strategies/Barriers (Transfers Goal 1, PT) Maintain SpO2 >90%.  -TA     Progress/Outcome (Transfer Goal 1, PT) goal not met  -TA     Row Name 03/12/22 1324          Gait Training Goal 1 (PT)    Activity/Assistive Device (Gait Training Goal 1, PT) walker, rolling  -TA     [place] Level (Gait Training Goal 1, PT) modified independence  -TA     Distance (Gait Training Goal 1, PT) 75'x2.  -TA     Time Frame (Gait Training Goal 1, PT) by discharge  -TA     Strategies/Barriers (Gait Training Goal 1, PT) Maintain SpO2 >90%.  -TA     Progress/Outcome (Gait Training Goal 1, PT) goal not met  -TA           User Key  (r) = Recorded By, (t) = Taken By, (c) = Cosigned By    Initials Name Provider Type    Anabelle Lopez PTA Physical Therapy Assistant                Physical Therapy Education                 Title: PT OT SLP Therapies (In Progress)     Topic: Physical Therapy (In Progress)     Point: Mobility training (In Progress)     Learning Progress Summary           Patient Acceptance, E, NR by DANIEL at 3/11/2022 1438    Acceptance, E, NR by DANIEL at 3/10/2022 1318    Acceptance, E, NR by DANIEL at 3/9/2022 1614    Acceptance,  HILARIO, SHANNAN,NR by  at 3/8/2022 0937    Comment: PT POC, breathing technique, transfer technique.                   Point: Home exercise program (Not Started)     Learner Progress:  Not documented in this visit.          Point: Body mechanics (Not Started)     Learner Progress:  Not documented in this visit.          Point: Precautions (Not Started)     Learner Progress:  Not documented in this visit.                      User Key     Initials Effective Dates Name Provider Type Discipline     06/16/21 -  Dc Thakur, PTA Physical Therapy Assistant PT     06/16/21 -  Martin Alvarez, PT Physical Therapist PT              PT Recommendation and Plan  Anticipated Discharge Disposition (PT): assisted living  Therapy Frequency (PT): other (see comments) (3-7 days/week)  Plan of Care Reviewed With: patient  Outcome Evaluation: pt defered EOB/OOB, pt performed AROM of B LEs in supine. pt would benefit from 24/7 care & continued PT services   Outcome Measures     Row Name 03/12/22 1500 03/11/22 1419 03/10/22 1128       How much help from another person do you currently need...    Turning from your back to your side while in flat bed without using bedrails? 3  -TA 3  -DANIEL 3  -DANIEL    Moving from lying on back to sitting on the side of a flat bed without bedrails? 3  -TA 3  -DANIEL 3  -DANIEL    Moving to and from a bed to a chair (including a wheelchair)? 3  -TA 3  -DANIEL 3  -DANIEL    Standing up from a chair using your arms (e.g., wheelchair, bedside chair)? 3  -TA 3  -DANIEL 3  -DANIEL    Climbing 3-5 steps with a railing? 2  -TA 2  -DANIEL 2  -DANIEL    To walk in hospital room? 3  -TA 3  -DANIEL 3  -DANIEL    AM-PAC 6 Clicks Score (PT) 17  -TA 17  -DANIEL 17  -DANIEL       Functional Assessment    Outcome Measure Options AM-PAC 6 Clicks Basic Mobility (PT)  -TA AM-PAC 6 Clicks Basic Mobility (PT)  -DANIEL AM-PAC 6 Clicks Basic Mobility (PT)  -DANIEL          User Key  (r) = Recorded By, (t) = Taken By, (c) = Cosigned By    Initials Name Provider Type    TA Anabelle Lawson,  HALINA Physical Therapy Assistant    Dc Iverson PTA Physical Therapy Assistant                 Time Calculation:    PT Charges     Row Name 03/12/22 1548             Time Calculation    Start Time 1324  -TA      Stop Time 1352  -TA      Time Calculation (min) 28 min  -TA      PT Received On 03/12/22  -TA              Time Calculation- PT    Total Timed Code Minutes- PT 28 minute(s)  -TA              Timed Charges    86214 - PT Therapeutic Exercise Minutes 28  -TA              Total Minutes    Timed Charges Total Minutes 28  -TA       Total Minutes 28  -TA            User Key  (r) = Recorded By, (t) = Taken By, (c) = Cosigned By    Initials Name Provider Type    Anabelle Lopez PTA Physical Therapy Assistant              Therapy Charges for Today     Code Description Service Date Service Provider Modifiers Qty    06154921556 HC PT THER PROC EA 15 MIN 3/12/2022 Anabelle Lawson PTA GP 2          PT G-Codes  Outcome Measure Options: AM-PAC 6 Clicks Basic Mobility (PT)  AM-PAC 6 Clicks Score (PT): 17  AM-PAC 6 Clicks Score (OT): 16    Anabelle Lawson PTA  3/12/2022

## 2022-03-12 NOTE — PLAN OF CARE
Goal Outcome Evaluation:               Pt rested well this shift, no distress noted. Compliant with plan of care. Cooperative and friendly with staff.

## 2022-03-12 NOTE — PLAN OF CARE
Goal Outcome Evaluation:  Plan of Care Reviewed With: patient           Outcome Evaluation: pt defered EOB/OOB, pt performed AROM of B LEs in supine. pt would benefit from 24/7 care & continued PT services

## 2022-03-12 NOTE — PROGRESS NOTES
Progress Note  Roberto Jaramillo MD  Hospitalist    Date of visit: 3/12/2022     LOS: 4 days   Patient Care Team:  Sabino Mobley MD as PCP - General (Family Medicine)    Chief Complaint: Shortness of breath    Subjective     Interval History:     Patient Complaints: Shortness of breath, weak. Still requiring supplemental Oxygen - minimally better after the thoracentesis.    History taken from: Patient and nursing.    Medication Review:   Current Facility-Administered Medications   Medication Dose Route Frequency Provider Last Rate Last Admin   • acetaminophen (TYLENOL) tablet 650 mg  650 mg Oral Q4H PRN Randall Cabrera MD        Or   • acetaminophen (TYLENOL) 160 MG/5ML solution 650 mg  650 mg Oral Q4H PRN Randall Cabrera MD        Or   • acetaminophen (TYLENOL) suppository 650 mg  650 mg Rectal Q4H PRN Randall Cabrera MD       • apixaban (ELIQUIS) tablet 2.5 mg  2.5 mg Oral BID Roberto Jaramillo MD   2.5 mg at 03/12/22 0903   • aspirin EC tablet 81 mg  81 mg Oral Daily Roberto Jaramillo MD   81 mg at 03/12/22 0903   • calcium carbonate (TUMS) chewable tablet 500 mg (200 mg elemental)  1 tablet Oral BID PRN Randall Cabrera MD       • carvedilol (COREG) tablet 3.125 mg  3.125 mg Oral BID With Meals Roberto Jaramillo MD   3.125 mg at 03/11/22 1831   • dextrose (D50W) (25 g/50 mL) IV injection 50 mL  50 mL Intravenous Once Stevenson Robertson MD       • ferric gluconate (FERRLECIT) 125 mg in sodium chloride 0.9 % 110 mL IVPB  125 mg Intravenous Daily Stevenson Robertson  mL/hr at 03/11/22 1731 125 mg at 03/11/22 1731   • [START ON 3/13/2022] furosemide (LASIX) tablet 20 mg  20 mg Oral Daily Roberto Jaramillo MD       • Hold medication  1 each Does not apply Continuous PRN Roberto Jaramillo MD       • insulin regular (humuLIN R,novoLIN R) injection 10 Units  10 Units Intravenous Once Stevenson Robertson MD       • ipratropium-albuterol (DUO-NEB) nebulizer solution 3 mL  3 mL Nebulization Q4H - RT Roberto Jaramillo MD   3 mL at 03/12/22 1839    • losartan (COZAAR) half tablet 12.5 mg  12.5 mg Oral Q24H Roberto Jaramillo MD   12.5 mg at 03/11/22 0833   • Magnesium Sulfate 2 gram Bolus, followed by 8 gram infusion (total Mg dose 10 grams)- Mg less than or equal to 1mg/dL  2 g Intravenous PRN Randall Cabrera MD        Or   • Magnesium Sulfate 2 gram / 50mL Infusion (GIVE X 3 BAGS TO EQUAL 6GM TOTAL DOSE) - Mg 1.1 - 1.5 mg/dl  2 g Intravenous PRN Randall Cabrera MD        Or   • Magnesium Sulfate 4 gram infusion- Mg 1.6-1.9 mg/dL  4 g Intravenous PRN Randall Cabrera MD       • melatonin tablet 5.25 mg  5.25 mg Oral Nightly PRRandall Gomez MD       • midodrine (PROAMATINE) tablet 2.5 mg  2.5 mg Oral BID AC Stevenson Robertson MD       • ondansetron (ZOFRAN) injection 4 mg  4 mg Intravenous Q6H PRN Randall Cabrera MD       • oxyCODONE (ROXICODONE) immediate release tablet 5 mg  5 mg Oral Q4H PRN Randall Cabrera MD   5 mg at 03/10/22 2225   • polyethylene glycol (MIRALAX) packet 17 g  17 g Oral Daily Roberto Jaramillo MD   17 g at 03/12/22 0824   • potassium chloride (MICRO-K) CR capsule 40 mEq  40 mEq Oral PRRandall Gomez MD        Or   • potassium chloride (KLOR-CON) packet 40 mEq  40 mEq Oral PRN Randall Cabrera MD        Or   • potassium chloride 10 mEq in 100 mL IVPB  10 mEq Intravenous Q1H Randall Muñoz MD       • potassium phosphate 45 mmol in sodium chloride 0.9 % 500 mL infusion  45 mmol Intravenous PRRandall Gomez MD        Or   • potassium phosphate 30 mmol in sodium chloride 0.9 % 250 mL infusion  30 mmol Intravenous PRRandall Gomez MD        Or   • Potassium Phosphates 15 mmol in sodium chloride 0.9 % 100 mL infusion  15 mmol Intravenous PRN Randall Cabrera MD        Or   • sodium phosphates 45 mmol in sodium chloride 0.9 % 500 mL IVPB  45 mmol Intravenous PRN Randall Cabrera MD        Or   • sodium phosphates 30 mmol in sodium chloride 0.9 % 250 mL IVPB  30 mmol Intravenous PRN Randall Cabrera MD        Or   • sodium phosphates 15 mmol in sodium  chloride 0.9 % 250 mL IVPB  15 mmol Intravenous PRN Randall Cabrera MD       • sodium chloride 0.9 % flush 10 mL  10 mL Intravenous PRN Randall Cabrera MD   10 mL at 03/08/22 2118   • sodium chloride 0.9 % flush 10 mL  10 mL Intravenous Q12H Randall Cabrera MD   10 mL at 03/12/22 0824   • sodium chloride 0.9 % flush 10 mL  10 mL Intravenous PRN Randall Cabrera MD       • sodium zirconium cyclosilicate (LOKELMA) pack 5 g  5 g Oral Once Stevenson Robertson MD           Review of Systems:   Review of Systems   Constitutional: Positive for fatigue.   Respiratory: Positive for shortness of breath. Negative for cough and wheezing.    Cardiovascular: Negative for chest pain, palpitations and leg swelling.   Gastrointestinal: Negative for abdominal distention, abdominal pain, blood in stool, diarrhea, nausea and vomiting.   Genitourinary: Negative for dysuria, flank pain, frequency, hematuria and urgency.   Musculoskeletal: Positive for arthralgias. Negative for back pain.   Skin: Negative for color change, pallor and rash.   Neurological: Positive for weakness. Negative for seizures, speech difficulty, light-headedness, numbness and headaches.   Psychiatric/Behavioral: Negative for agitation, behavioral problems and confusion.   All other systems reviewed and are negative.      Objective     Vital Signs  Temp:  [97.3 °F (36.3 °C)-98.6 °F (37 °C)] 97.7 °F (36.5 °C)  Heart Rate:  [] 102  Resp:  [16-24] 18  BP: ()/(49-76) 90/54    Physical Exam:  Physical Exam  Vitals reviewed.   Constitutional:       Appearance: She is ill-appearing.   HENT:      Head: Normocephalic and atraumatic.   Eyes:      General: No scleral icterus.     Extraocular Movements: Extraocular movements intact.      Pupils: Pupils are equal, round, and reactive to light.   Cardiovascular:      Rate and Rhythm: Normal rate. Rhythm irregular.   Pulmonary:      Effort: No respiratory distress.      Breath sounds: Rales present. No wheezing or rhonchi.       Comments: Decreased air entry bilaterally  Abdominal:      General: Abdomen is flat. Bowel sounds are normal. There is no distension.      Palpations: Abdomen is soft. There is no mass.      Tenderness: There is no abdominal tenderness. There is no right CVA tenderness or left CVA tenderness.   Musculoskeletal:         General: No tenderness or deformity. Normal range of motion.      Cervical back: Normal range of motion and neck supple. No rigidity or tenderness.      Right lower leg: No edema.      Left lower leg: No edema.   Skin:     General: Skin is warm and dry.      Coloration: Skin is pale. Skin is not jaundiced.      Findings: No bruising or lesion.   Neurological:      General: No focal deficit present.      Mental Status: She is alert and oriented to person, place, and time. Mental status is at baseline.      Cranial Nerves: No cranial nerve deficit.      Sensory: No sensory deficit.      Motor: No weakness.   Psychiatric:         Mood and Affect: Mood normal.         Behavior: Behavior normal.          Results Review:    Lab Results (last 24 hours)     Procedure Component Value Units Date/Time    Body Fluid Culture - Body Fluid, Pleural Cavity [[id number]] Collected: 03/11/22 1000    Specimen: Body Fluid from Pleural Cavity Updated: 03/12/22 1030     Body Fluid Culture No growth at 24 hours     Gram Stain Rare (1+) WBCs seen      No organisms seen    Basic Metabolic Panel [[id number]]  (Abnormal) Collected: 03/12/22 0555    Specimen: Blood Updated: 03/12/22 0622     Glucose 98 mg/dL      BUN 44 mg/dL      Creatinine 1.72 mg/dL      Sodium 140 mmol/L      Potassium 5.3 mmol/L      Chloride 96 mmol/L      CO2 41.0 mmol/L      Calcium 9.1 mg/dL      BUN/Creatinine Ratio 25.6     Anion Gap 3.0 mmol/L      eGFR 28.9 mL/min/1.73      Comment: National Kidney Foundation and American Society of Nephrology (ASN) Task Force recommended calculation based on the Chronic Kidney Disease Epidemiology Collaboration  (CKD-EPI) equation refit without adjustment for race.       Narrative:      GFR Normal >60  Chronic Kidney Disease <60  Kidney Failure <15      CBC & Differential [[id number]]  (Abnormal) Collected: 03/12/22 0452    Specimen: Blood Updated: 03/12/22 0603    Narrative:      The following orders were created for panel order CBC & Differential.  Procedure                               Abnormality         Status                     ---------                               -----------         ------                     CBC Auto Differential[[id number]]        Abnormal            Final result               Scan Slide[[id number]]                                                                    Please view results for these tests on the individual orders.    CBC Auto Differential [[id number]]  (Abnormal) Collected: 03/12/22 0555    Specimen: Blood Updated: 03/12/22 0603     WBC 5.13 10*3/mm3      RBC 3.28 10*6/mm3      Hemoglobin 9.4 g/dL      Hematocrit 30.7 %      MCV 93.6 fL      MCH 28.7 pg      MCHC 30.6 g/dL      RDW 14.5 %      RDW-SD 49.5 fl      MPV 10.6 fL      Platelets 168 10*3/mm3      Neutrophil % 75.6 %      Lymphocyte % 10.7 %      Monocyte % 13.1 %      Eosinophil % 0.0 %      Basophil % 0.2 %      Immature Grans % 0.4 %      Neutrophils, Absolute 3.88 10*3/mm3      Lymphocytes, Absolute 0.55 10*3/mm3      Monocytes, Absolute 0.67 10*3/mm3      Eosinophils, Absolute 0.00 10*3/mm3      Basophils, Absolute 0.01 10*3/mm3      Immature Grans, Absolute 0.02 10*3/mm3      nRBC 0.0 /100 WBC     Lactate Dehydrogenase, Body Fluid - Pleural Fluid, Pleural Cavity [[id number]] Collected: 03/11/22 1000    Specimen: Pleural Fluid from Pleural Cavity Updated: 03/11/22 2021     Lactate Dehydrogenase (LD), Fluid 65 U/L     Narrative:      No Reference Ranges Established.    Serous fluid LDH greater than 60 percent of the serum LDH or serous fluid LDH two-thirds of the upper limit of normal for serum LDH suggests the fluid is an  exudate.     1. Pleural TP/Serum TP >0.5  2. Pleural LD/Serum LD >0.6  3. Pleural LD >2/3 of the upper limit of normal for serum LDH    This test was developed, it performance characteristics determined and judged suitable for clinical purposes by [facility].  It has not been cleared or approved by the FDA.  The laboratory is regulated under CLIA as qualified to perform high-complexity testing.     Protein, Body Fluid - Pleural Fluid, Pleural Cavity [[id number]] Collected: 03/11/22 1000    Specimen: Pleural Fluid from Pleural Cavity Updated: 03/11/22 2021     Protein, Total, Fluid 2.0 g/dL     Narrative:      No Reference Ranges Established.    A serous fluid total fluid (TP) greater than 50 percent of the serum TP suggests the fluid is an exudate.      1. Pleural TP/Serum TP >0.5  2. Pleural LD/Serum LD >0.6  3. Pleural LD >2/3 of the upper limit of normal for serum LDH    This test was developed, it performance characteristics determined and judged suitable for clinical purposes by [facility].  It has not been cleared or approved by the FDA.  The laboratory is regulated under CLIA as qualified to perform high-complexity testing.     Albumin, Fluid - Pleural Fluid, Pleural Cavity [[id number]] Collected: 03/11/22 1000    Specimen: Pleural Fluid from Pleural Cavity Updated: 03/11/22 2021     Albumin, Fluid 1.20 g/dL     Narrative:      No Reference Ranges Established.    A Serous fluid albumin gradient (serum albumin-fluid) <1.1 g/dL suggests the fluid is an exudate.  Cirrhosis usually results in an ascites fluid albumin gradient >1.1 g/dL.    This test was developed, its performance characteristics determined and judged suitable for clinical purposes by [facility].  It has not been cleared or approved by the FDA.  The laboratory is regulated under CLIA as qualified to perfom high-complexity testing.            Imaging Results (Last 24 Hours)      ** No results found for the last 24 hours. **          Assessment/Plan       Acute on chronic respiratory failure with hypoxia (HCC)    Acute on chronic systolic CHF (congestive heart failure) (HCC)    Bilateral pleural effusion    Atrial fibrillation (HCC)    CKD (chronic kidney disease) stage 4, GFR 15-29 ml/min (HCC)    Chronic anemia    Continue with supportive care, supplemental oxygen, diuresis, Cozaar, Coreg, Lasix 20 mg daily, nebulized treatments. A recent transthoracic Echo obtained now shows a depressed LVEF of 30 to 35%, mitral valve regurgitation and moderate pulmonary hypertension.    Cardiology and Nephrology consults appreciated.    I confirmed that the patient's Advance Care Plan is present, code status is documented, or surrogate decision maker is listed in the patient's medical record.      Roberto Jaramillo MD  03/12/22  13:07 CST

## 2022-03-12 NOTE — PROGRESS NOTES
"  [facility] Note   LOS: 4 days   Patient Care Team:  Sabino Mobley MD as PCP - General (Family Medicine)    Chief Complaint   Patient presents with   • Shortness of Breath         Subjective     Interval History:   Patient Denies: chest pain  Patient Complaints: shortness of air and weakness  History taken from: patient    Resting comfortably. Spoke to family. They have questions about her discharge placement (SNF vs [facility]). Spoke to PT/OT who will continue to evaluate her strength. She is not able to reposition herself currently.   I do believe she is close to end of life. Asked her family to discuss with Ms. Son and think about a palliative or hospice discharge pending what her wishes are.     Objective     Vital Sign Min/Max for last 24 hours  Temp  Min: 97.3 °F (36.3 °C)  Max: 98.6 °F (37 °C)   BP  Min: 86/49  Max: 124/61   Pulse  Min: 81  Max: 102   Resp  Min: 16  Max: 24   SpO2  Min: 88 %  Max: 99 %   Flow (L/min)  Min: 4  Max: 6   Weight  Min: 55.3 kg (122 lb)  Max: 55.3 kg (122 lb)     Flowsheet Rows      Flowsheet Row First Filed Value   Admission Height 157.5 cm (62\") Documented at 03/07/2022 0504   Admission Weight 46.7 kg (103 lb) Documented at 03/07/2022 0504              03/10/22  0600 03/11/22  0425 03/12/22  0500   Weight: 52.2 kg (115 lb) 56.2 kg (123 lb 14.4 oz) 55.3 kg (122 lb)       Physical Exam:  Physical Exam  Constitutional:       Appearance: She is ill-appearing.   HENT:      Mouth/Throat:      Mouth: Mucous membranes are dry.   Cardiovascular:      Rate and Rhythm: Normal rate. Rhythm irregular.      Pulses: Normal pulses.   Pulmonary:      Effort: Pulmonary effort is normal. Tachypnea present.      Breath sounds: Examination of the right-middle field reveals decreased breath sounds. Examination of the left-middle field reveals decreased breath sounds. Examination of the right-lower field reveals decreased breath sounds. Examination of the left-lower field reveals " decreased breath sounds. Decreased breath sounds present.   Abdominal:      General: Abdomen is flat.      Palpations: Abdomen is soft.   Musculoskeletal:         General: No swelling.      Right lower leg: No edema.      Left lower leg: No edema.   Skin:     General: Skin is warm.      Coloration: Skin is pale.   Neurological:      General: No focal deficit present.   Psychiatric:         Mood and Affect: Mood normal.          Results Reviewed by myself:     Results from last 7 days   Lab Units 03/12/22  0555 03/11/22  0613 03/10/22  0637 03/07/22  1150 03/07/22  0521   SODIUM mmol/L 140 137 138   < > 140   POTASSIUM mmol/L 5.3* 4.5 4.6   < > 5.4*   CHLORIDE mmol/L 96* 95* 96*   < > 98   CO2 mmol/L 41.0* 39.0* 35.0*   < > 35.0*   BUN mg/dL 44* 47* 43*   < > 32*   CREATININE mg/dL 1.72* 2.07* 1.91*   < > 1.77*   CALCIUM mg/dL 9.1 8.9 8.5*   < > 9.2   BILIRUBIN mg/dL  --   --   --   --  0.4   ALK PHOS U/L  --   --   --   --  84   ALT (SGPT) U/L  --   --   --   --  11   AST (SGOT) U/L  --   --   --   --  17   GLUCOSE mg/dL 98 106* 92   < > 93    < > = values in this interval not displayed.       Estimated Creatinine Clearance: 20.9 mL/min (A) (by C-G formula based on SCr of 1.72 mg/dL (H)).    Results from last 7 days   Lab Units 03/10/22  0637 03/09/22  0602 03/08/22  0721 03/07/22  1150   MAGNESIUM mg/dL 2.1 2.1 2.2  --    PHOSPHORUS mg/dL  --  4.6* 4.9* 5.1*             Results from last 7 days   Lab Units 03/12/22  0555 03/11/22  0613 03/10/22  0637 03/09/22  0602 03/08/22  0721   WBC 10*3/mm3 5.13 4.86 3.85 4.12 5.81   HEMOGLOBIN g/dL 9.4* 8.8* 7.1* 7.6* 9.2*   PLATELETS 10*3/mm3 168 159 158 157 194       Results from last 7 days   Lab Units 03/11/22  0903 03/07/22  0521   INR  1.08 0.97     Lab Results   Component Value Date    PROBNP 16,282.0 (H) 03/07/2022       I/O last 3 completed shifts:  In: 840 [P.O.:840]  Out: 3350 [Urine:2150; Chest Tube:1200]    Cardiographics:  ECG/EMG Results (last 24 hours)        Procedure Component Value Units Date/Time    SCANNED - TELEMETRY   [[id number]] Resulted: 03/07/22     Updated: 03/09/22 1839    SCANNED - TELEMETRY   [[id number]] Resulted: 03/07/22     Updated: 03/09/22 1857    SCANNED - TELEMETRY   [[id number]] Resulted: 03/07/22     Updated: 03/10/22 1016            Results for orders placed during the hospital encounter of 03/07/22    Adult Transthoracic Echo Complete w/ Color, Spectral and Contrast if necessary per protocol    Interpretation Summary  · Estimated left ventricular EF = 35% Left ventricular ejection fraction appears to be 31 - 35%. Left ventricular systolic function is moderately decreased. The left ventricular cavity is borderline dilated. Left ventricular wall thickness is consistent with mild concentric hypertrophy. There is left ventricular global hypokinesis noted. Left ventricular diastolic function is consistent with (grade II w/high LAP) pseudonormalization.  · The left atrial cavity is moderately dilated.  · Moderate mitral valve regurgitation is present with an eccentric jet noted.  · Moderate tricuspid valve regurgitation is present.  · Estimated right ventricular systolic pressure from tricuspid regurgitation is moderately elevated (45-55 mmHg).  · Borderline dilation of the aortic root is present (3.8 cms)      XR Chest 1 View    Result Date: 3/11/2022  Interval left thoracentesis. Small residual left pleural effusion. Persistent moderate right pleural effusion without significant change. No pneumothorax. Electronically signed by:  Surendra Carter MD  3/11/2022 10:46 AM CST Workstation: [id number]    XR Chest 1 View    Result Date: 3/8/2022  Some improvement and fluid status compared with yesterday. Significant residual pleural effusions and basilar volume loss/consolidation left greater than right. No new abnormality. Electronically signed by:  Gael Caballero MD  3/8/2022 7:59 AM CST Workstation: [id number]    XR Chest 1 View    Result Date:  3/7/2022    Bilateral pleural effusions, left greater right with likely associated atelectasis. Electronically signed by:  Tish Avalos MD  3/7/2022 6:12 AM CST Workstation: [id number]    US Thoracentesis    Result Date: 3/11/2022  CONCLUSION: Successful ultrasound-guided thoracentesis with removal of 1200 mL of serous fluid, as described above. Electronically signed by:  Surendra Carter MD  3/11/2022 10:44 AM CST Workstation: [id number]      Medication Review:     Current Facility-Administered Medications:   •  acetaminophen (TYLENOL) tablet 650 mg, 650 mg, Oral, Q4H PRN **OR** acetaminophen (TYLENOL) 160 MG/5ML solution 650 mg, 650 mg, Oral, Q4H PRN **OR** acetaminophen (TYLENOL) suppository 650 mg, 650 mg, Rectal, Q4H PRN, Randall Cabrera MD  •  apixaban (ELIQUIS) tablet 2.5 mg, 2.5 mg, Oral, BID, Roberto Jaramillo MD, 2.5 mg at 03/12/22 0903  •  aspirin EC tablet 81 mg, 81 mg, Oral, Daily, Roberto Jaramillo MD, 81 mg at 03/12/22 0903  •  calcium carbonate (TUMS) chewable tablet 500 mg (200 mg elemental), 1 tablet, Oral, BID PRN, Randall Cabrera MD  •  carvedilol (COREG) tablet 3.125 mg, 3.125 mg, Oral, BID With Meals, Roberto Jaramillo MD, 3.125 mg at 03/11/22 1831  •  ferric gluconate (FERRLECIT) 125 mg in sodium chloride 0.9 % 110 mL IVPB, 125 mg, Intravenous, Daily, Stevenson Robertson MD, Last Rate: 110 mL/hr at 03/11/22 1731, 125 mg at 03/11/22 1731  •  furosemide (LASIX) tablet 40 mg, 40 mg, Oral, Daily, Carlin Lechuga APRN, 40 mg at 03/11/22 0833  •  Hold medication, 1 each, Does not apply, Continuous PRN, Roberto Jaramillo MD  •  ipratropium-albuterol (DUO-NEB) nebulizer solution 3 mL, 3 mL, Nebulization, Q4H - RT, Roberto Jaramillo MD, 3 mL at 03/12/22 0338  •  losartan (COZAAR) half tablet 12.5 mg, 12.5 mg, Oral, Q24H, Roberto Jaramillo MD, 12.5 mg at 03/11/22 0854  •  Magnesium Sulfate 2 gram Bolus, followed by 8 gram infusion (total Mg dose 10 grams)- Mg less than or equal to 1mg/dL, 2 g, Intravenous, PRN **OR**  Magnesium Sulfate 2 gram / 50mL Infusion (GIVE X 3 BAGS TO EQUAL 6GM TOTAL DOSE) - Mg 1.1 - 1.5 mg/dl, 2 g, Intravenous, PRN **OR** Magnesium Sulfate 4 gram infusion- Mg 1.6-1.9 mg/dL, 4 g, Intravenous, PRNRick Enoch K, MD  •  melatonin tablet 5.25 mg, 5.25 mg, Oral, Nightly PRNRick Enoch K, MD  •  ondansetron (ZOFRAN) injection 4 mg, 4 mg, Intravenous, Q6H PRN, Randall Cabrera MD  •  oxyCODONE (ROXICODONE) immediate release tablet 5 mg, 5 mg, Oral, Q4H PRN, Randall Cabrera MD, 5 mg at 03/10/22 2225  •  polyethylene glycol (MIRALAX) packet 17 g, 17 g, Oral, Daily, Roberto Jaramillo MD, 17 g at 03/12/22 0824  •  potassium chloride (MICRO-K) CR capsule 40 mEq, 40 mEq, Oral, PRN **OR** potassium chloride (KLOR-CON) packet 40 mEq, 40 mEq, Oral, PRN **OR** potassium chloride 10 mEq in 100 mL IVPB, 10 mEq, Intravenous, Q1H PRN, Randall Cabrera MD  •  potassium phosphate 45 mmol in sodium chloride 0.9 % 500 mL infusion, 45 mmol, Intravenous, PRN **OR** potassium phosphate 30 mmol in sodium chloride 0.9 % 250 mL infusion, 30 mmol, Intravenous, PRN **OR** Potassium Phosphates 15 mmol in sodium chloride 0.9 % 100 mL infusion, 15 mmol, Intravenous, PRN **OR** sodium phosphates 45 mmol in sodium chloride 0.9 % 500 mL IVPB, 45 mmol, Intravenous, PRN **OR** sodium phosphates 30 mmol in sodium chloride 0.9 % 250 mL IVPB, 30 mmol, Intravenous, PRN **OR** sodium phosphates 15 mmol in sodium chloride 0.9 % 250 mL IVPB, 15 mmol, Intravenous, PRNRick Enoch K, MD  •  sodium chloride 0.9 % flush 10 mL, 10 mL, Intravenous, PRNRick Enoch K, MD, 10 mL at 03/08/22 2118  •  sodium chloride 0.9 % flush 10 mL, 10 mL, Intravenous, Q12H, Randall Cabrera MD, 10 mL at 03/12/22 0824  •  sodium chloride 0.9 % flush 10 mL, 10 mL, Intravenous, PRN, Randall Cabrera MD    Patient's recent labs and results as included in the subjective data were reviewed by me. Any pertinent outside data will be included.        Assessment/Plan       Acute on chronic  respiratory failure with hypoxia (HCC)    Acute on chronic systolic CHF (congestive heart failure) (HCC)    Bilateral pleural effusion    CKD (chronic kidney disease) stage 4, GFR 15-29 ml/min (HCC)    Atrial fibrillation (HCC)    Chronic anemia       - At this point, her HFrEF is chronic and she has third spaced into her pleural cavities. She is not grossly volume overloaded. S/P thoracentesis- 1200ml.   EF 35%, ICM.  She was on twice weekly Lasix for maintenance. Her new onset AF likely worsened her volume/breathing issue quickly.  She appears to have bilateral infiltrates.   - she is on 40mg PO lasix now. Euvolemic.         Pleural effusions:  -1200ml removed from left lung by IR.        She has CKD IV.   -Nephrology will be on board to assist with diuretics.       New onset AF:  She is rate controlled on 3.125mg BID of Coreg.   - Blood pressure is borderline.  - Back on Eliquis 2.5mg BID. If she has additional drop in hemoglobin tomorrow I would stop this until GI evaluation.       Anemia  - s/p 1 unit PRBC  - occult stool  - monitor H&H. If drop tomorrow following 1 day of eliquis, would recommend DC    Plan for disposition:Where: Continue current location When:  today            This document has been electronically signed by CHANA Lopez on March 12, 2022 10:33 CST      Electronically signed by CHANA Lopez, 03/12/22, 10:33 AM CST.

## 2022-03-12 NOTE — PROGRESS NOTES
"[facility]  [facility] [place]. [address]  T - [phone]  F - [phone]     Progress Note          PATIENT  DEMOGRAPHICS   PATIENT NAME: Naomi Duong Son                      PHYSICIAN: Stevenson Robertson MD  : 1935  MRN: [id number]   LOS: 4 days    Patient Care Team:  Sabino Mobley MD as PCP - General (Family Medicine)  Subjective   SUBJECTIVE      Still some soa. Poor appetite.     Objective   OBJECTIVE   Vital Signs  Temp:  [97.3 °F (36.3 °C)-98.6 °F (37 °C)] 97.7 °F (36.5 °C)  Heart Rate:  [] 102  Resp:  [16-24] 18  BP: ()/(49-76) 90/54    Flowsheet Rows    Flowsheet Row First Filed Value   Admission Height 157.5 cm (62\") Documented at 2022 0504   Admission Weight 46.7 kg (103 lb) Documented at 2022 0504           I/O last 3 completed shifts:  In: 840 [P.O.:840]  Out: 3350 [Urine:2150; Chest Tube:1200]    PHYSICAL EXAM    Physical Exam  Constitutional:       Appearance: She is well-developed.   HENT:      Head: Normocephalic.   Eyes:      Pupils: Pupils are equal, round, and reactive to light.   Cardiovascular:      Rate and Rhythm: Normal rate and regular rhythm.      Heart sounds: Normal heart sounds.   Pulmonary:      Effort: Pulmonary effort is normal.      Breath sounds: Normal breath sounds.   Abdominal:      General: Bowel sounds are normal.      Palpations: Abdomen is soft.   Musculoskeletal:         General: Swelling present.   Skin:     Coloration: Skin is not jaundiced.   Neurological:      General: No focal deficit present.      Mental Status: She is alert and oriented to person, place, and time.         RESULTS   Results Review:    Results from last 7 days   Lab Units 22  0555 22  0613 03/10/22  0637 22  1150 22  0521   SODIUM mmol/L 140 137 138   < > 140   POTASSIUM mmol/L 5.3* 4.5 4.6   < > 5.4*   CHLORIDE mmol/L 96* 95* 96*   < > 98   CO2 mmol/L 41.0* 39.0* 35.0*   < > 35.0*   BUN mg/dL 44* 47* 43*   < > " 32*   CREATININE mg/dL 1.72* 2.07* 1.91*   < > 1.77*   CALCIUM mg/dL 9.1 8.9 8.5*   < > 9.2   BILIRUBIN mg/dL  --   --   --   --  0.4   ALK PHOS U/L  --   --   --   --  84   ALT (SGPT) U/L  --   --   --   --  11   AST (SGOT) U/L  --   --   --   --  17   GLUCOSE mg/dL 98 106* 92   < > 93    < > = values in this interval not displayed.       Estimated Creatinine Clearance: 20.9 mL/min (A) (by C-G formula based on SCr of 1.72 mg/dL (H)).    Results from last 7 days   Lab Units 03/10/22  0637 03/09/22  0602 03/08/22  0721 03/07/22  1150   MAGNESIUM mg/dL 2.1 2.1 2.2  --    PHOSPHORUS mg/dL  --  4.6* 4.9* 5.1*             Results from last 7 days   Lab Units 03/12/22  0555 03/11/22  0613 03/10/22  0637 03/09/22  0602 03/08/22  0721   WBC 10*3/mm3 5.13 4.86 3.85 4.12 5.81   HEMOGLOBIN g/dL 9.4* 8.8* 7.1* 7.6* 9.2*   PLATELETS 10*3/mm3 168 159 158 157 194       Results from last 7 days   Lab Units 03/11/22  0903 03/07/22  0521   INR  1.08 0.97         Imaging Results (Last 24 Hours)     ** No results found for the last 24 hours. **           MEDICATIONS    apixaban, 2.5 mg, Oral, BID  aspirin, 81 mg, Oral, Daily  carvedilol, 3.125 mg, Oral, BID With Meals  ferric gluconate (FERRLECIT) IVPB, 125 mg, Intravenous, Daily  furosemide, 40 mg, Oral, Daily  ipratropium-albuterol, 3 mL, Nebulization, Q4H - RT  losartan, 12.5 mg, Oral, Q24H  polyethylene glycol, 17 g, Oral, Daily  sodium chloride, 10 mL, Intravenous, Q12H      hold, 1 each        Assessment/Plan   ASSESSMENT / PLAN      Acute on chronic respiratory failure with hypoxia (HCC)    Acute on chronic systolic CHF (congestive heart failure) (HCC)    Bilateral pleural effusion    CKD (chronic kidney disease) stage 4, GFR 15-29 ml/min (HCC)    Atrial fibrillation (HCC)    Chronic anemia       1.  CKD 4- Baseline creatinine is around 1.7-1.8, creatinine peak at 2.07. now some better close to baseline.     CO2 is elevated with underlying resp acidosis.  No gross volume  overload.  We will keep Lasix to 40 mg p.o. daily.     2.  Acute on chronic CHF / Bilateral pleural effusions- s/p thoracentesis     3.  Acute on chronic respiratory failure- secondary to underlying CHF as well as new onset atrial fibrillation and chronic pleural effusions.  Managed per primary team.     4.  Atrial fibrillation- managed per cardiology and primary team     6.  Anemia- worsening, fe low and on iv fe. hgb better     7.  History of secondary hyperparathyroidism     8.  History of CAD    9. Hyperkalemia add lokelma                    This document has been electronically signed by Stevenson Robertson MD on March 12, 2022 12:20 CST

## 2022-03-13 NOTE — PROGRESS NOTES
Progress Note  Roberto Jaramillo MD  Hospitalist    Date of visit: 3/13/2022     LOS: 5 days   Patient Care Team:  Sabino Mobley MD as PCP - General (Family Medicine)    Chief Complaint: Shortness of breath    Subjective     Interval History:     Patient Complaints: Shortness of breath, weak. Still requiring supplemental Oxygen at 7 or 8 L/min    History taken from: Patient and nursing.    Medication Review:   Current Facility-Administered Medications   Medication Dose Route Frequency Provider Last Rate Last Admin   • acetaminophen (TYLENOL) tablet 650 mg  650 mg Oral Q4H PRN Randall Cabrera MD   650 mg at 03/13/22 0913    Or   • acetaminophen (TYLENOL) 160 MG/5ML solution 650 mg  650 mg Oral Q4H PRN Randall Cabrera MD        Or   • acetaminophen (TYLENOL) suppository 650 mg  650 mg Rectal Q4H PRN Randall Cabrera MD       • apixaban (ELIQUIS) tablet 2.5 mg  2.5 mg Oral BID Roberto Jaramillo MD   2.5 mg at 03/13/22 0859   • aspirin EC tablet 81 mg  81 mg Oral Daily Roberto Jaramillo MD   81 mg at 03/13/22 0859   • bisacodyl (DULCOLAX) suppository 10 mg  10 mg Rectal Daily PRN Roberto Jaramillo MD       • calcium carbonate (TUMS) chewable tablet 500 mg (200 mg elemental)  1 tablet Oral BID PRN Randall Cabrera MD       • ferric gluconate (FERRLECIT) 125 mg in sodium chloride 0.9 % 110 mL IVPB  125 mg Intravenous Daily Stevenson Robertson  mL/hr at 03/12/22 1635 125 mg at 03/12/22 1635   • furosemide (LASIX) tablet 20 mg  20 mg Oral Daily Roberto Jaramillo MD   20 mg at 03/13/22 0859   • Hold medication  1 each Does not apply Continuous PRN Roberto Jaramillo MD       • ipratropium-albuterol (DUO-NEB) nebulizer solution 3 mL  3 mL Nebulization Q4H - RT Roberto Jaramillo MD   3 mL at 03/13/22 1019   • Magnesium Sulfate 2 gram Bolus, followed by 8 gram infusion (total Mg dose 10 grams)- Mg less than or equal to 1mg/dL  2 g Intravenous PRN Randall Cabrera MD        Or   • Magnesium Sulfate 2 gram / 50mL Infusion (GIVE X 3 BAGS TO EQUAL  6GM TOTAL DOSE) - Mg 1.1 - 1.5 mg/dl  2 g Intravenous PRN Randall Cabrera MD        Or   • Magnesium Sulfate 4 gram infusion- Mg 1.6-1.9 mg/dL  4 g Intravenous PRN Randall Cabrera MD       • melatonin tablet 5.25 mg  5.25 mg Oral Nightly Randall Muñoz MD       • midodrine (PROAMATINE) tablet 5 mg  5 mg Oral BID AC Roberto Jaramillo MD   5 mg at 03/13/22 0830   • ondansetron (ZOFRAN) injection 4 mg  4 mg Intravenous Q6H PRN Randall Cabrera MD       • oxyCODONE (ROXICODONE) immediate release tablet 5 mg  5 mg Oral Q4H PRN Randall Cabrera MD   5 mg at 03/10/22 2225   • polyethylene glycol (MIRALAX) packet 17 g  17 g Oral Daily Roberto Jaramillo MD   17 g at 03/13/22 0900   • potassium chloride (MICRO-K) CR capsule 40 mEq  40 mEq Oral PRN Randall Cabrera MD        Or   • potassium chloride (KLOR-CON) packet 40 mEq  40 mEq Oral PRN Randall Cabrera MD        Or   • potassium chloride 10 mEq in 100 mL IVPB  10 mEq Intravenous Q1H Randall Muñoz MD       • potassium phosphate 45 mmol in sodium chloride 0.9 % 500 mL infusion  45 mmol Intravenous PRN Randall Cabrera MD        Or   • potassium phosphate 30 mmol in sodium chloride 0.9 % 250 mL infusion  30 mmol Intravenous PRN Randall Cabrera MD        Or   • Potassium Phosphates 15 mmol in sodium chloride 0.9 % 100 mL infusion  15 mmol Intravenous PRN Randall Cabrera MD        Or   • sodium phosphates 45 mmol in sodium chloride 0.9 % 500 mL IVPB  45 mmol Intravenous PRN Randall Cabrera MD        Or   • sodium phosphates 30 mmol in sodium chloride 0.9 % 250 mL IVPB  30 mmol Intravenous PRN Randall Cabrera MD        Or   • sodium phosphates 15 mmol in sodium chloride 0.9 % 250 mL IVPB  15 mmol Intravenous PRN Randall Cabrera MD       • sodium chloride 0.9 % flush 10 mL  10 mL Intravenous Randall Muñoz MD   10 mL at 03/08/22 2118   • sodium chloride 0.9 % flush 10 mL  10 mL Intravenous Q12H Randall Cabrera MD   10 mL at 03/13/22 0901   • sodium chloride 0.9 % flush 10 mL  10 mL  Intravenous PRN Randall Cabrera MD       • sodium zirconium cyclosilicate (LOKELMA) pack 5 g  5 g Oral Once Stevenson Robertson MD           Review of Systems:   Review of Systems   Constitutional: Positive for fatigue.   Respiratory: Positive for shortness of breath. Negative for cough and wheezing.    Cardiovascular: Negative for chest pain, palpitations and leg swelling.   Gastrointestinal: Negative for abdominal distention, abdominal pain, blood in stool, diarrhea, nausea and vomiting.   Genitourinary: Negative for dysuria, flank pain, frequency, hematuria and urgency.   Musculoskeletal: Positive for arthralgias. Negative for back pain.   Skin: Negative for color change, pallor and rash.   Neurological: Positive for weakness. Negative for seizures, speech difficulty, light-headedness, numbness and headaches.   Psychiatric/Behavioral: Negative for agitation, behavioral problems and confusion.   All other systems reviewed and are negative.      Objective     Vital Signs  Temp:  [97.6 °F (36.4 °C)-98.2 °F (36.8 °C)] 98 °F (36.7 °C)  Heart Rate:  [] 92  Resp:  [18-22] 18  BP: ()/(44-90) 136/90    Physical Exam:  Physical Exam  Vitals reviewed.   Constitutional:       Appearance: She is ill-appearing.   HENT:      Head: Normocephalic and atraumatic.   Eyes:      General: No scleral icterus.     Extraocular Movements: Extraocular movements intact.      Pupils: Pupils are equal, round, and reactive to light.   Cardiovascular:      Rate and Rhythm: Normal rate. Rhythm irregular.   Pulmonary:      Effort: No respiratory distress.      Breath sounds: Rales present. No wheezing or rhonchi.      Comments: Decreased air entry bilaterally  Abdominal:      General: Abdomen is flat. Bowel sounds are normal. There is no distension.      Palpations: Abdomen is soft. There is no mass.      Tenderness: There is no abdominal tenderness. There is no right CVA tenderness or left CVA tenderness.   Musculoskeletal:          General: No tenderness or deformity. Normal range of motion.      Cervical back: Normal range of motion and neck supple. No rigidity or tenderness.      Right lower leg: No edema.      Left lower leg: No edema.   Skin:     General: Skin is warm and dry.      Coloration: Skin is pale. Skin is not jaundiced.      Findings: No bruising or lesion.   Neurological:      General: No focal deficit present.      Mental Status: She is alert and oriented to person, place, and time. Mental status is at baseline.      Cranial Nerves: No cranial nerve deficit.      Sensory: No sensory deficit.      Motor: No weakness.   Psychiatric:         Mood and Affect: Mood normal.         Behavior: Behavior normal.          Results Review:    Lab Results (last 24 hours)     Procedure Component Value Units Date/Time    Body Fluid Culture - Body Fluid, Pleural Cavity [[id number]] Collected: 03/11/22 1000    Specimen: Body Fluid from Pleural Cavity Updated: 03/13/22 1130     Body Fluid Culture No growth at 2 days     Gram Stain Rare (1+) WBCs seen      No organisms seen    CBC (No Diff) [[id number]]  (Abnormal) Collected: 03/13/22 1051    Specimen: Blood Updated: 03/13/22 1122     WBC 4.83 10*3/mm3      RBC 3.07 10*6/mm3      Hemoglobin 8.8 g/dL      Hematocrit 28.3 %      MCV 92.2 fL      MCH 28.7 pg      MCHC 31.1 g/dL      RDW 14.6 %      RDW-SD 49.5 fl      MPV 11.0 fL      Platelets 187 10*3/mm3     Comprehensive Metabolic Panel [[id number]] Collected: 03/13/22 1051    Specimen: Blood Updated: 03/13/22 1115          Imaging Results (Last 24 Hours)     ** No results found for the last 24 hours. **          Assessment/Plan       Acute on chronic respiratory failure with hypoxia (HCC)    Acute on chronic systolic CHF (congestive heart failure) (HCC)    Bilateral pleural effusion    Atrial fibrillation (HCC)    CKD (chronic kidney disease) stage 4, GFR 15-29 ml/min (HCC)    Chronic anemia    Continue with supportive care, supplemental oxygen,  diuresis, Cozaar, Coreg, Lasix daily, nebulized treatments. A recent transthoracic Echo obtained now shows a depressed LVEF of 30 to 35%, mitral valve regurgitation and moderate pulmonary hypertension.    Cardiology and Nephrology consults appreciated. Try PT/OT, out of bed if possible.    I confirmed that the patient's Advance Care Plan is present, code status is documented, or surrogate decision maker is listed in the patient's medical record.      Roberto Jaramillo MD  03/13/22  11:36 CDT

## 2022-03-13 NOTE — THERAPY TREATMENT NOTE
Patient Name: Naomi Duong Son  : 1935    MRN: [id number]                              Today's Date: 3/13/2022       Admit Date: 3/7/2022    Visit Dx:     ICD-10-CM ICD-9-CM   1. Acute on chronic respiratory failure with hypoxia (HCC)  J96.21 518.84     799.02   2. Pleural effusion  J90 511.9   3. Renal failure, unspecified chronicity  N19 586   4. Acute on chronic congestive heart failure, unspecified heart failure type (HCC)  I50.9 428.0   5. Impaired functional mobility, balance, gait, and endurance  Z74.09 V49.89   6. Impaired mobility and ADLs  Z74.09 V49.89    Z78.9      Patient Active Problem List   Diagnosis   • Ischemic cardiomyopathy   • S/P CABG (coronary artery bypass graft)   • Essential hypertension   • Mixed hyperlipidemia   • H/O CHF   • Carotid artery stenosis   • CKD (chronic kidney disease) stage 3, GFR 30-59 ml/min ([facility])   • Bilateral carotid artery disease ([facility])   • Carotid stenosis, asymptomatic, bilateral   • Surgical aftercare, circulatory system   • Carotid stenosis, asymptomatic, left   • Acute on chronic systolic CHF (congestive heart failure) ([facility])   • CKD (chronic kidney disease) stage 3, GFR 30-59 ml/min ([facility])   • Severe malnutrition ([facility])   • Acute respiratory failure with hypoxia ([facility])   • Acute on chronic respiratory failure with hypoxia ([facility])   • Bilateral pleural effusion   • CKD (chronic kidney disease) stage 4, GFR 15-29 ml/min ([facility])   • Atrial fibrillation ([facility])   • Chronic anemia     Past Medical History:   Diagnosis Date   • CAD (coronary artery disease)    • Carotid artery stenosis    • Chronic systolic heart failure (HCC)    • CKD (chronic kidney disease) stage 3, GFR 30-59 ml/min ([facility])    • GERD (gastroesophageal reflux disease)    • Hypercholesterolemia    • Hyperlipidemia    • Hypertension    • Iron deficiency anemia    • Ischemic cardiomyopathy    • MI (myocardial infarction) ([facility])    • Mitral valve disease    • Osteoarthritis    • UTI (urinary tract infection)       Past Surgical History:   Procedure Laterality Date   • CARDIAC CATHETERIZATION     • CAROTID ENDARTERECTOMY     • CORONARY ARTERY BYPASS GRAFT     • TRANSESOPHAGEAL ECHOCARDIOGRAM (MISSY)        General Information     Row Name 03/13/22 1629          OT Time and Intention    Document Type therapy note (daily note)  -LM     Mode of Treatment individual therapy  -LM           User Key  (r) = Recorded By, (t) = Taken By, (c) = Cosigned By    Initials Name Provider Type    Meliza Bryson COTA Occupational Therapy Assistant                 Mobility/ADL's     Row Name 03/13/22 1629          Bed Mobility    Bed Mobility bed mobility (all) activities  -LM     All Activities, [place] (Bed Mobility) contact guard  -LM     Supine-Sit [place] (Bed Mobility) contact guard  -LM     Sit-Supine [place] (Bed Mobility) contact guard  -LM     Row Name 03/13/22 1635 03/13/22 1629       Transfers    Transfers toilet transfer  -LM sit-stand transfer;stand-sit transfer  -LM    Stand-Sit [place] (Transfers) -- contact guard  -LM    [place] Level (Toilet Transfer) contact guard  -LM --    Row Name 03/13/22 1635          Toilet Transfer    Type (Toilet Transfer) sit-stand;stand-sit  -LM     Row Name 03/13/22 1633          Activities of Daily Living    BADL Assessment/Intervention toileting  -LM     Row Name 03/13/22 1629          Stand-Sit Transfer    Assistive Device (Stand-Sit Transfers) walker, front-wheeled  -LM     Row Name 03/13/22 1629          Bathing Assessment/Intervention    [place] Level (Bathing) bathing skills;lower body;upper body;upper extremities;chest/trunk;perineal area  -LM     Row Name 03/13/22 1633          Toileting Assessment/Training    [place] Level (Toileting) toileting skills;adjust/manage clothing;perform perineal hygiene  -LM           User Key  (r) = Recorded By, (t) = Taken By, (c) = Cosigned By    Initials Name Provider Type    Meliza Bryson COTA  Occupational Therapy Assistant               Obj/Interventions     Row Name 03/13/22 1636          Balance    Balance Assessment sitting static balance;sitting dynamic balance;sit to stand dynamic balance;standing static balance;standing dynamic balance  -LM     Balance Interventions sitting;standing;sit to stand;supported;static;dynamic;minimal challenge  -LM     Comment, Balance sit to stadn with cga and wt shifting balance tasks  -LM           User Key  (r) = Recorded By, (t) = Taken By, (c) = Cosigned By    Initials Name Provider Type    Meliza Bryson COTA Occupational Therapy Assistant               Goals/Plan     Row Name 03/13/22 1638          Transfer Goal 1 (OT)    Activity/Assistive Device (Transfer Goal 1, OT) toilet  -LM     [place] Level/Cues Needed (Transfer Goal 1, OT) supervision required  -LM     Time Frame (Transfer Goal 1, OT) long term goal (LTG);by discharge  -LM     Progress/Outcome (Transfer Goal 1, OT) goal not met  -LM     Row Name 03/13/22 1638          Bathing Goal 1 (OT)    Activity/Device (Bathing Goal 1, OT) lower body bathing  AD As needed  -LM     [place] Level/Cues Needed (Bathing Goal 1, OT) minimum assist (75% or more patient effort)  -LM     Time Frame (Bathing Goal 1, OT) long term goal (LTG);by discharge  -LM     Progress/Outcomes (Bathing Goal 1, OT) goal not met  -LM     Row Name 03/13/22 1638          Dressing Goal 1 (OT)    Activity/Device (Dressing Goal 1, OT) lower body dressing  AD as needed  -LM     [place]/Cues Needed (Dressing Goal 1, OT) minimum assist (75% or more patient effort)  -LM     Time Frame (Dressing Goal 1, OT) long term goal (LTG);by discharge  -LM     Progress/Outcome (Dressing Goal 1, OT) goal not met  -LM           User Key  (r) = Recorded By, (t) = Taken By, (c) = Cosigned By    Initials Name Provider Type    Meliza Bryson COTA Occupational Therapy Assistant               Clinical Impression     Row Name 03/13/22 5167           Pain Assessment    Pretreatment Pain Rating 0/10 - no pain  -LM     Posttreatment Pain Rating 0/10 - no pain  -LM           User Key  (r) = Recorded By, (t) = Taken By, (c) = Cosigned By    Initials Name Provider Type    Meliza Bryson COTA Occupational Therapy Assistant               Outcome Measures     Row Name 03/13/22 1500          How much help from another person do you currently need...    Turning from your back to your side while in flat bed without using bedrails? 3  -TA     Moving from lying on back to sitting on the side of a flat bed without bedrails? 3  -TA     Moving to and from a bed to a chair (including a wheelchair)? 3  -TA     Standing up from a chair using your arms (e.g., wheelchair, bedside chair)? 3  -TA     Climbing 3-5 steps with a railing? 2  -TA     To walk in hospital room? 3  -TA     AM-PAC 6 Clicks Score (PT) 17  -TA     Row Name 03/13/22 1500          Functional Assessment    Outcome Measure Options AM-PAC 6 Clicks Basic Mobility (PT)  -TA           User Key  (r) = Recorded By, (t) = Taken By, (c) = Cosigned By    Initials Name Provider Type    Anabelle Lopez PTA Physical Therapy Assistant                Occupational Therapy Education                 Title: PT OT SLP Therapies (In Progress)     Topic: Occupational Therapy (In Progress)     Point: ADL training (Not Started)     Description:   Instruct learner(s) on proper safety adaptation and remediation techniques during self care or transfers.   Instruct in proper use of assistive devices.              Learner Progress:  Not documented in this visit.          Point: Home exercise program (Not Started)     Description:   Instruct learner(s) on appropriate technique for monitoring, assisting and/or progressing therapeutic exercises/activities.              Learner Progress:  Not documented in this visit.          Point: Precautions (Done)     Description:   Instruct learner(s) on prescribed precautions during  self-care and functional transfers.              Learning Progress Summary           Patient Acceptance, E, VU,NR by ME at 3/8/2022 1004    Comment: Educated on OT and POC. Educated to call for assistance. Educated on safety precautions. Educated on PLB technique.                   Point: Body mechanics (Done)     Description:   Instruct learner(s) on proper positioning and spine alignment during self-care, functional mobility activities and/or exercises.              Learning Progress Summary           Patient Acceptance, E, VU,NR by ME at 3/8/2022 1004    Comment: Educated on OT and POC. Educated to call for assistance. Educated on safety precautions. Educated on PLB technique.                               User Key     Initials Effective Dates Name Provider Type Discipline    ME 06/16/21 -  Johnny Rodriguez OTR/L Occupational Therapist OT              OT Recommendation and Plan     Plan of Care Review  Plan of Care Reviewed With: patient  Progress: improving  Outcome Evaluation: pt perf well with with OT  bed mob with sba-mod I  sitting balance eob with ind  sit to stand with cga and balance act in stadnign with min a cga funct tf to toilet with min a   toileting unsuccessful pt received meds at this time and tf back to eob with min a     Time Calculation:    Time Calculation- OT     Row Name 03/13/22 1622             Time Calculation- OT    OT Start Time 1415  -LM      OT Stop Time 1455  -LM      OT Time Calculation (min) 40 min  -LM      Total Timed Code Minutes- OT 40 minute(s)  -LM      OT Received On 03/13/22  -LM              Timed Charges    89123 - OT Therapeutic Activity Minutes 25  -LM      02658 - OT Self Care/Mgmt Minutes 15  -LM              Total Minutes    Timed Charges Total Minutes 40  -LM       Total Minutes 40  -LM            User Key  (r) = Recorded By, (t) = Taken By, (c) = Cosigned By    Initials Name Provider Type    LM Meliza Hicks COTA Occupational Therapy Assistant               Therapy Charges for Today     Code Description Service Date Service Provider Modifiers Qty    04909740807 HC OT SELF CARE/MGMT/TRAIN EA 15 MIN 3/13/2022 Meliza Hicks, RICCI AVILA 1    77878704519 HC OT THERAPEUTIC ACT EA 15 MIN 3/13/2022 Meliza Hicks COTA GO 2               RICCI Napoles  3/13/2022

## 2022-03-13 NOTE — PLAN OF CARE
Goal Outcome Evaluation:              Outcome Evaluation: No acute changes this shift. BP improved. Pt cooperative with staff.

## 2022-03-13 NOTE — PLAN OF CARE
Goal Outcome Evaluation:  Plan of Care Reviewed With: patient           Outcome Evaluation: pt sitting on EOB, pt sit<>stand with CGA, pt took 3 steps >HOB with RW & CGA. pt would benefit from continued PT services

## 2022-03-13 NOTE — PLAN OF CARE
Goal Outcome Evaluation:  Plan of Care Reviewed With: patient        Progress: improving  Outcome Evaluation: pt perf well with with OT  bed mob with sba-mod I  sitting balance eob with ind  sit to stand with cga and balance act in stadnign with min a cga funct tf to toilet with min a   toileting unsuccessful pt received meds at this time and tf back to eob with min a   PTA entering room at this time

## 2022-03-13 NOTE — THERAPY TREATMENT NOTE
Acute Care - Physical Therapy Treatment Note  [facility]     Patient Name: Naomi Duong Son  : 1935  MRN: [id number]  Today's Date: 3/13/2022      Visit Dx:     ICD-10-CM ICD-9-CM   1. Acute on chronic respiratory failure with hypoxia (HCC)  J96.21 518.84     799.02   2. Pleural effusion  J90 511.9   3. Renal failure, unspecified chronicity  N19 586   4. Acute on chronic congestive heart failure, unspecified heart failure type ([facility])  I50.9 428.0   5. Impaired functional mobility, balance, gait, and endurance  Z74.09 V49.89   6. Impaired mobility and ADLs  Z74.09 V49.89    Z78.9      Patient Active Problem List   Diagnosis   • Ischemic cardiomyopathy   • S/P CABG (coronary artery bypass graft)   • Essential hypertension   • Mixed hyperlipidemia   • H/O CHF   • Carotid artery stenosis   • CKD (chronic kidney disease) stage 3, GFR 30-59 ml/min ([facility])   • Bilateral carotid artery disease ([facility])   • Carotid stenosis, asymptomatic, bilateral   • Surgical aftercare, circulatory system   • Carotid stenosis, asymptomatic, left   • Acute on chronic systolic CHF (congestive heart failure) ([facility])   • CKD (chronic kidney disease) stage 3, GFR 30-59 ml/min ([facility])   • Severe malnutrition ([facility])   • Acute respiratory failure with hypoxia ([facility])   • Acute on chronic respiratory failure with hypoxia ([facility])   • Bilateral pleural effusion   • CKD (chronic kidney disease) stage 4, GFR 15-29 ml/min (HCC)   • Atrial fibrillation ([facility])   • Chronic anemia     Past Medical History:   Diagnosis Date   • CAD (coronary artery disease)    • Carotid artery stenosis    • Chronic systolic heart failure (HCC)    • CKD (chronic kidney disease) stage 3, GFR 30-59 ml/min ([facility])    • GERD (gastroesophageal reflux disease)    • Hypercholesterolemia    • Hyperlipidemia    • Hypertension    • Iron deficiency anemia    • Ischemic cardiomyopathy    • MI (myocardial infarction) ([facility])    • Mitral valve disease    • Osteoarthritis    • UTI (urinary tract  infection)      Past Surgical History:   Procedure Laterality Date   • CARDIAC CATHETERIZATION     • CAROTID ENDARTERECTOMY     • CORONARY ARTERY BYPASS GRAFT     • TRANSESOPHAGEAL ECHOCARDIOGRAM ([facility]IS[facility])       PT Assessment (last 12 hours)     PT Evaluation and Treatment     Row Name 03/13/22 1459          Physical Therapy Time and Intention    Subjective Information no complaints  -TA     Document Type therapy note (daily note)  -TA     Mode of Treatment physical therapy;individual therapy  -TA     Patient Effort good  due to lethagy  -TA     Row Name 03/13/22 1459          General Information    Patient Profile Reviewed yes  -TA     Existing Precautions/Restrictions oxygen therapy device and L/min;fall  -TA     Row Name 03/13/22 1459          Pain    Pretreatment Pain Rating 0/10 - no pain  -TA     Posttreatment Pain Rating 0/10 - no pain  -TA     Row Name 03/13/22 1459          Cognition    Orientation Status (Cognition) oriented x 4  -TA     Personal Safety Interventions fall prevention program maintained;gait belt;muscle strengthening facilitated;nonskid shoes/slippers when out of bed;supervised activity  -TA     Row Name 03/13/22 1459          Bed Mobility    Bed Mobility supine-sit;sit-supine  -TA     Supine-Sit [place] (Bed Mobility) not tested  -TA     Sit-Supine [place] (Bed Mobility) not tested  -TA     Assistive Device (Bed Mobility) head of bed elevated;bed rails  -TA     Row Name 03/13/22 1459          Transfers    Transfers stand-sit transfer;sit-stand transfer  -TA     Comment, (Transfers) pt performed sit<>stand x 3  -TA     Sit-Stand [place] (Transfers) contact guard  -TA     Stand-Sit [place] (Transfers) contact guard  -TA     Row Name 03/13/22 1459          Sit-Stand Transfer    Assistive Device (Sit-Stand Transfers) walker, front-wheeled  -TA     Row Name 03/13/22 1459          Stand-Sit Transfer    Assistive Device (Stand-Sit Transfers) walker, front-wheeled  -TA     Row Name  03/13/22 1459          Gait/Stairs (Locomotion)    [place] Level (Gait) contact guard  -TA     Assistive Device (Gait) walker, front-wheeled  -TA     Distance in Feet (Gait) 3 steps>HOB  -TA     Row Name 03/13/22 1459          Safety Issues, Functional Mobility    Impairments Affecting Function (Mobility) strength;endurance/activity tolerance;shortness of breath;balance;pain  -TA     Row Name 03/13/22 1459          Hip (Therapeutic Exercise)    Hip (Therapeutic Exercise) AROM (active range of motion)  -TA     Hip AROM (Therapeutic Exercise) bilateral;flexion;aBduction;aDduction;sitting;20 repititions  -TA     Row Name 03/13/22 1459          Knee (Therapeutic Exercise)    Knee (Therapeutic Exercise) AROM (active range of motion)  -TA     Knee AROM (Therapeutic Exercise) bilateral;LAQ (long arc quad);sitting;20 repititions  -TA     Row Name 03/13/22 1459          Ankle (Therapeutic Exercise)    Ankle (Therapeutic Exercise) AROM (active range of motion)  -TA     Ankle AROM (Therapeutic Exercise) bilateral;dorsiflexion;plantarflexion;20 repititions  -TA     Row Name 03/13/22 1459          Plan of Care Review    Plan of Care Reviewed With patient  -TA     Outcome Evaluation pt sitting on EOB, pt sit<>stand with CGA, pt took 3 steps >HOB with RW & CGA. pt would benefit from continued PT services  -     Row Name 03/13/22 1459          Vital Signs    Pre Systolic BP Rehab 92  -TA     Pre Treatment Diastolic BP 52  -TA     Post Systolic BP Rehab 103  -TA     Post Treatment Diastolic BP 59  -TA     Pretreatment Heart Rate (beats/min) 102  -TA     Posttreatment Heart Rate (beats/min) 98  -TA     Pre SpO2 (%) 97  -TA     O2 Delivery Pre Treatment supplemental O2  -TA     Post SpO2 (%) 94  -TA     O2 Delivery Post Treatment supplemental O2  -TA     Pre Patient Position Sitting  -TA     Post Patient Position Sitting  -TA     Row Name 03/13/22 1459          Positioning and Restraints    Pre-Treatment Position in bed  -TA      Post Treatment Position bed  -TA     In Bed sitting EOB;call light within reach;exit alarm on  -TA     Row Name 03/13/22 1459          Therapy Assessment/Plan (PT)    Criteria for Skilled Interventions Met (PT) yes;skilled treatment is necessary  -TA     Comment, Therapy Assessment/Plan (PT) continue  -TA     Row Name 03/13/22 1459          Bed Mobility Goal 1 (PT)    Activity/Assistive Device (Bed Mobility Goal 1, PT) sit to supine/supine to sit  -TA     [place] Level/Cues Needed (Bed Mobility Goal 1, PT) modified independence  -TA     Time Frame (Bed Mobility Goal 1, PT) by discharge  -TA     Strategies/Barriers (Bed Mobility Goal 1, PT) Maintain SpO2 >90%.  -TA     Row Name 03/13/22 1459          Transfer Goal 1 (PT)    Activity/Assistive Device (Transfer Goal 1, PT) sit-to-stand/stand-to-sit;bed-to-chair/chair-to-bed;walker, rolling  -TA     [place] Level/Cues Needed (Transfer Goal 1, PT) modified independence  -TA     Time Frame (Transfer Goal 1, PT) by discharge  -TA     Strategies/Barriers (Transfers Goal 1, PT) Maintain SpO2 >90%.  -TA     Progress/Outcome (Transfer Goal 1, PT) goal not met  -TA     Row Name 03/13/22 1459          Gait Training Goal 1 (PT)    Activity/Assistive Device (Gait Training Goal 1, PT) walker, rolling  -TA     [place] Level (Gait Training Goal 1, PT) modified independence  -TA     Distance (Gait Training Goal 1, PT) 75'x2.  -TA     Time Frame (Gait Training Goal 1, PT) by discharge  -TA     Strategies/Barriers (Gait Training Goal 1, PT) Maintain SpO2 >90%.  -TA     Progress/Outcome (Gait Training Goal 1, PT) goal not met  -TA           User Key  (r) = Recorded By, (t) = Taken By, (c) = Cosigned By    Initials Name Provider Type    Anabelle Lopez PTA Physical Therapy Assistant                Physical Therapy Education                 Title: PT OT SLP Therapies (In Progress)     Topic: Physical Therapy (In Progress)     Point: Mobility training (In Progress)      Learning Progress Summary           Patient Acceptance, E, NR by DANIEL at 3/11/2022 1438    Acceptance, E, NR by DANIEL at 3/10/2022 1318    Acceptance, E, NR by DANIEL at 3/9/2022 1614    Acceptance, E, VU,NR by  at 3/8/2022 0937    Comment: PT POC, breathing technique, transfer technique.                   Point: Home exercise program (Not Started)     Learner Progress:  Not documented in this visit.          Point: Body mechanics (Not Started)     Learner Progress:  Not documented in this visit.          Point: Precautions (Not Started)     Learner Progress:  Not documented in this visit.                      User Key     Initials Effective Dates Name Provider Type Discipline    DANIEL 06/16/21 -  Dc Thakur, PTA Physical Therapy Assistant PT    LENORE 06/16/21 -  Martin Alvarez PT Physical Therapist PT              PT Recommendation and Plan  Anticipated Discharge Disposition (PT): assisted living  Therapy Frequency (PT): other (see comments) (3-7 days/week)  Plan of Care Reviewed With: patient  Outcome Evaluation: pt sitting on EOB, pt sit<>stand with CGA, pt took 3 steps >HOB with RW & CGA. pt would benefit from continued PT services   Outcome Measures     Row Name 03/13/22 1500 03/12/22 1500 03/11/22 1419       How much help from another person do you currently need...    Turning from your back to your side while in flat bed without using bedrails? 3  -TA 3  -TA 3  -DANIEL    Moving from lying on back to sitting on the side of a flat bed without bedrails? 3  -TA 3  -TA 3  -DANIEL    Moving to and from a bed to a chair (including a wheelchair)? 3  -TA 3  -TA 3  -DANIEL    Standing up from a chair using your arms (e.g., wheelchair, bedside chair)? 3  -TA 3  -TA 3  -DANIEL    Climbing 3-5 steps with a railing? 2  -TA 2  -TA 2  -DANIEL    To walk in hospital room? 3  -TA 3  -TA 3  -DANIEL    AM-PAC 6 Clicks Score (PT) 17  -TA 17  -TA 17  -DANIEL       Functional Assessment    Outcome Measure Options AM-PAC 6 Clicks Basic Mobility (PT)  -TA AM-PAC 6  Clicks Basic Mobility (PT)  -TA AM-PAC 6 Clicks Basic Mobility (PT)  -DANIEL          User Key  (r) = Recorded By, (t) = Taken By, (c) = Cosigned By    Initials Name Provider Type    Anabelle Lopez PTA Physical Therapy Assistant    Dc Iverson PTA Physical Therapy Assistant                 Time Calculation:    PT Charges     Row Name 03/13/22 1538             Time Calculation    Start Time 1459  -TA      Stop Time 1523  -TA      Time Calculation (min) 24 min  -TA      PT Received On 03/13/22  -TA              Time Calculation- PT    Total Timed Code Minutes- PT 24 minute(s)  -TA              Timed Charges    65023 - PT Therapeutic Exercise Minutes 15  -TA      90651 - PT Therapeutic Activity Minutes 9  -TA              Total Minutes    Timed Charges Total Minutes 24  -TA       Total Minutes 24  -TA            User Key  (r) = Recorded By, (t) = Taken By, (c) = Cosigned By    Initials Name Provider Type    Anabelle Lopez PTA Physical Therapy Assistant              Therapy Charges for Today     Code Description Service Date Service Provider Modifiers Qty    35961666191 HC PT THER PROC EA 15 MIN 3/12/2022 Anabelle Lawson, PTA GP 2    54659723891 HC PT THER PROC EA 15 MIN 3/13/2022 Anabelle Lawson, PTA GP 1    21593008408 HC PT THERAPEUTIC ACT EA 15 MIN 3/13/2022 Anabelle Lawson, PTA GP 1          PT G-Codes  Outcome Measure Options: AM-PAC 6 Clicks Basic Mobility (PT)  AM-PAC 6 Clicks Score (PT): 17  AM-PAC 6 Clicks Score (OT): 16    Anabelle Lawson PTA  3/13/2022

## 2022-03-13 NOTE — PROGRESS NOTES
"[facility]  [address] [place] [address]. [address]  T - [phone]  F - [phone]     Progress Note          PATIENT  DEMOGRAPHICS   PATIENT NAME: Naomi Duong Son                      PHYSICIAN: Stevenson Robertson MD  : 1935  MRN: [id number]   LOS: 5 days    Patient Care Team:  Sabino Mobley MD as PCP - General (Family Medicine)  Subjective   SUBJECTIVE      Comfortable today. Poor appetite.     Objective   OBJECTIVE   Vital Signs  Temp:  [97.6 °F (36.4 °C)-98.2 °F (36.8 °C)] 98 °F (36.7 °C)  Heart Rate:  [] 92  Resp:  [18-22] 18  BP: ()/(44-90) 136/90    Flowsheet Rows    Flowsheet Row First Filed Value   Admission Height 157.5 cm (62\") Documented at 2022 0504   Admission Weight 46.7 kg (103 lb) Documented at 2022 0504           I/O last 3 completed shifts:  In: 770 [P.O.:720; IV Piggyback:50]  Out: 950 [Urine:950]    PHYSICAL EXAM    Physical Exam  Constitutional:       Appearance: She is well-developed.   HENT:      Head: Normocephalic.   Eyes:      Pupils: Pupils are equal, round, and reactive to light.   Cardiovascular:      Rate and Rhythm: Normal rate and regular rhythm.      Heart sounds: Normal heart sounds.   Pulmonary:      Effort: Pulmonary effort is normal.      Breath sounds: Normal breath sounds.   Abdominal:      General: Bowel sounds are normal.      Palpations: Abdomen is soft.   Musculoskeletal:         General: Swelling present.   Skin:     Coloration: Skin is not jaundiced.   Neurological:      General: No focal deficit present.      Mental Status: She is alert and oriented to person, place, and time.         RESULTS   Results Review:    Results from last 7 days   Lab Units 22  0555 22  0613 03/10/22  0637 22  1150 22  0521   SODIUM mmol/L 140 137 138   < > 140   POTASSIUM mmol/L 5.3* 4.5 4.6   < > 5.4*   CHLORIDE mmol/L 96* 95* 96*   < > 98   CO2 mmol/L 41.0* 39.0* 35.0*   < > 35.0*   BUN mg/dL 44* 47* 43*   < > " 32*   CREATININE mg/dL 1.72* 2.07* 1.91*   < > 1.77*   CALCIUM mg/dL 9.1 8.9 8.5*   < > 9.2   BILIRUBIN mg/dL  --   --   --   --  0.4   ALK PHOS U/L  --   --   --   --  84   ALT (SGPT) U/L  --   --   --   --  11   AST (SGOT) U/L  --   --   --   --  17   GLUCOSE mg/dL 98 106* 92   < > 93    < > = values in this interval not displayed.       Estimated Creatinine Clearance: 20.9 mL/min (A) (by C-G formula based on SCr of 1.72 mg/dL (H)).    Results from last 7 days   Lab Units 03/10/22  0637 03/09/22  0602 03/08/22  0721 03/07/22  1150   MAGNESIUM mg/dL 2.1 2.1 2.2  --    PHOSPHORUS mg/dL  --  4.6* 4.9* 5.1*             Results from last 7 days   Lab Units 03/13/22  1051 03/12/22  0555 03/11/22  0613 03/10/22  0637 03/09/22  0602   WBC 10*3/mm3 4.83 5.13 4.86 3.85 4.12   HEMOGLOBIN g/dL 8.8* 9.4* 8.8* 7.1* 7.6*   PLATELETS 10*3/mm3 187 168 159 158 157       Results from last 7 days   Lab Units 03/11/22  0903 03/07/22  0521   INR  1.08 0.97         Imaging Results (Last 24 Hours)     ** No results found for the last 24 hours. **           MEDICATIONS    apixaban, 2.5 mg, Oral, BID  aspirin, 81 mg, Oral, Daily  ferric gluconate (FERRLECIT) IVPB, 125 mg, Intravenous, Daily  furosemide, 20 mg, Oral, Daily  ipratropium-albuterol, 3 mL, Nebulization, Q4H - RT  midodrine, 5 mg, Oral, BID AC  polyethylene glycol, 17 g, Oral, Daily  sodium chloride, 10 mL, Intravenous, Q12H      hold, 1 each        Assessment/Plan   ASSESSMENT / PLAN      Acute on chronic respiratory failure with hypoxia (HCC)    Acute on chronic systolic CHF (congestive heart failure) (HCC)    Bilateral pleural effusion    CKD (chronic kidney disease) stage 4, GFR 15-29 ml/min (HCC)    Atrial fibrillation (HCC)    Chronic anemia       1.  CKD 4- Baseline creatinine is around 1.7-1.8, creatinine peak at 2.07. now some better close to baseline.     CO2 is elevated with underlying resp acidosis.  No gross volume overload.  We will keep Lasix to 40 mg p.o. daily.  Check lab tomorrow    2.  Acute on chronic CHF / Bilateral pleural effusions- s/p thoracentesis     3.  Acute on chronic respiratory failure- secondary to underlying CHF as well as new onset atrial fibrillation and chronic pleural effusions.  Managed per primary team.     4.  Atrial fibrillation- managed per cardiology and primary team     6.  Anemia- worsening, fe low and on iv fe. hgb better     7.  History of secondary hyperparathyroidism     8.  History of CAD    9. Hyperkalemia add lokelma again today. Check k in am                 This document has been electronically signed by Stevenson Robertson MD on March 13, 2022 11:28 CDT

## 2022-03-14 NOTE — PROGRESS NOTES
"[facility]  [address] [place] [address]. [address]  T - [phone]  F - [phone]     Progress Note          PATIENT  DEMOGRAPHICS   PATIENT NAME: Naomi Duong Son                      PHYSICIAN: Stevenson Robertson MD  : 1935  MRN: [id number]   LOS: 6 days    Patient Care Team:  Sabino Mobley MD as PCP - General (Family Medicine)  Subjective   SUBJECTIVE      Comfortable today. Breathing better    Objective   OBJECTIVE   Vital Signs  Temp:  [97.6 °F (36.4 °C)-98.2 °F (36.8 °C)] 97.6 °F (36.4 °C)  Heart Rate:  [] 91  Resp:  [18-20] 20  BP: ()/(51-91) 139/91    Flowsheet Rows    Flowsheet Row First Filed Value   Admission Height 157.5 cm (62\") Documented at 2022 0504   Admission Weight 46.7 kg (103 lb) Documented at 2022 0504           I/O last 3 completed shifts:  In: 480 [P.O.:480]  Out: -     PHYSICAL EXAM    Physical Exam  Constitutional:       Appearance: She is well-developed.   HENT:      Head: Normocephalic.   Eyes:      Pupils: Pupils are equal, round, and reactive to light.   Cardiovascular:      Rate and Rhythm: Normal rate and regular rhythm.      Heart sounds: Normal heart sounds.   Pulmonary:      Effort: Pulmonary effort is normal.      Breath sounds: Normal breath sounds.   Abdominal:      General: Bowel sounds are normal.      Palpations: Abdomen is soft.   Musculoskeletal:         General: Swelling present.   Skin:     Coloration: Skin is not jaundiced.   Neurological:      General: No focal deficit present.      Mental Status: She is alert and oriented to person, place, and time.         RESULTS   Results Review:    Results from last 7 days   Lab Units 22  0553 22  1051 22  0555   SODIUM mmol/L 136 136 140   POTASSIUM mmol/L 4.9 5.0 5.3*   CHLORIDE mmol/L 95* 95* 96*   CO2 mmol/L 35.0* 37.0* 41.0*   BUN mg/dL 42* 41* 44*   CREATININE mg/dL 1.66* 1.63* 1.72*   CALCIUM mg/dL 9.0 8.8 9.1   BILIRUBIN mg/dL  --  0.5  --    ALK " PHOS U/L  --  64  --    ALT (SGPT) U/L  --  9  --    AST (SGOT) U/L  --  14  --    GLUCOSE mg/dL 92 124* 98       Estimated Creatinine Clearance: 21.6 mL/min (A) (by C-G formula based on SCr of 1.66 mg/dL (H)).    Results from last 7 days   Lab Units 03/10/22  0637 03/09/22  0602 03/08/22  0721 03/07/22  1150   MAGNESIUM mg/dL 2.1 2.1 2.2  --    PHOSPHORUS mg/dL  --  4.6* 4.9* 5.1*             Results from last 7 days   Lab Units 03/14/22  0553 03/13/22  1051 03/12/22  0555 03/11/22  0613 03/10/22  0637   WBC 10*3/mm3 6.52 4.83 5.13 4.86 3.85   HEMOGLOBIN g/dL 9.4* 8.8* 9.4* 8.8* 7.1*   PLATELETS 10*3/mm3 205 187 168 159 158       Results from last 7 days   Lab Units 03/11/22  0903   INR  1.08         Imaging Results (Last 24 Hours)     Procedure Component Value Units Date/Time    XR Chest PA & Lateral [[id number]] Collected: 03/14/22 0918     Updated: 03/14/22 0949    Narrative:      Chest x-ray PA and lateral       CLINICAL INDICATION: Shortness of breath. Follow-up    COMPARISON: Chest March 11, 2022.    FINDINGS: Cardiac silhouette is enlarged in size. Pulmonary  vascularity is unremarkable.     Midline sternal sutures from prior cardiac surgery.    Bilateral pleural effusions and basilar infiltrative changes.  Stable on the right.    Increasing on the left.      Impression:      Cardiomegaly. Midline sternal sutures from prior  cardiac surgery. Bilateral pleural effusions and underlying  basilar infiltrative changes, increasing on the left since prior  exam.    Electronically signed by:  Andrae Garza MD  3/14/2022 9:47 AM CDT  Workstation: [id number]           MEDICATIONS    apixaban, 2.5 mg, Oral, BID  aspirin, 81 mg, Oral, Daily  furosemide, 20 mg, Oral, Daily  ipratropium-albuterol, 3 mL, Nebulization, Q4H - RT  midodrine, 5 mg, Oral, BID AC  polyethylene glycol, 17 g, Oral, Daily  sodium chloride, 10 mL, Intravenous, Q12H      hold, 1 each        Assessment/Plan   ASSESSMENT / PLAN      Acute on chronic  respiratory failure with hypoxia (HCC)    Acute on chronic systolic CHF (congestive heart failure) (HCC)    Bilateral pleural effusion    CKD (chronic kidney disease) stage 4, GFR 15-29 ml/min (HCC)    Atrial fibrillation (HCC)    Chronic anemia       1.  CKD 4- Baseline creatinine is around 1.7-1.8, creatinine peak at 2.07. now some better close to baseline.     CO2 is elevated with underlying resp acidosis.  We will keep Lasix to 40 mg p.o. daily.     2.  Acute on chronic CHF / Bilateral pleural effusions- s/p thoracentesis     3.  Acute on chronic respiratory failure- secondary to underlying CHF as well as new onset atrial fibrillation and chronic pleural effusions.  Managed per primary team.     4.  Atrial fibrillation- managed per cardiology and primary team     6.  Anemia- worsening, fe low and on iv fe. hgb better     7.  History of secondary hyperparathyroidism     8.  History of CAD    9. Hyperkalemia better after lokelma                This document has been electronically signed by Stevenson Robertson MD on March 14, 2022 10:46 CDT

## 2022-03-14 NOTE — THERAPY TREATMENT NOTE
Acute Care - Physical Therapy Treatment Note  [facility]     Patient Name: Naomi Duong Son  : 1935  MRN: [id number]  Today's Date: 3/14/2022      Visit Dx:     ICD-10-CM ICD-9-CM   1. Acute on chronic respiratory failure with hypoxia (HCC)  J96.21 518.84     799.02   2. Pleural effusion  J90 511.9   3. Renal failure, unspecified chronicity  N19 586   4. Acute on chronic congestive heart failure, unspecified heart failure type (Formerly [facility])  I50.9 428.0   5. Impaired functional mobility, balance, gait, and endurance  Z74.09 V49.89   6. Impaired mobility and ADLs  Z74.09 V49.89    Z78.9      Patient Active Problem List   Diagnosis   • Ischemic cardiomyopathy   • S/P CABG (coronary artery bypass graft)   • Essential hypertension   • Mixed hyperlipidemia   • H/O CHF   • Carotid artery stenosis   • CKD (chronic kidney disease) stage 3, GFR 30-59 ml/min (Formerly [facility])   • Bilateral carotid artery disease (Formerly [facility])   • Carotid stenosis, asymptomatic, bilateral   • Surgical aftercare, circulatory system   • Carotid stenosis, asymptomatic, left   • Acute on chronic systolic CHF (congestive heart failure) (Formerly [facility])   • CKD (chronic kidney disease) stage 3, GFR 30-59 ml/min ([facility]ly [facility])   • Severe malnutrition ([facility]ly [facility])   • Acute respiratory failure with hypoxia ([facility]ly [facility])   • Acute on chronic respiratory failure with hypoxia ([facility]ly [facility])   • Bilateral pleural effusion   • CKD (chronic kidney disease) stage 4, GFR 15-29 ml/min (HCC)   • Atrial fibrillation ([facility]ly [facility])   • Chronic anemia     Past Medical History:   Diagnosis Date   • CAD (coronary artery disease)    • Carotid artery stenosis    • Chronic systolic heart failure (HCC)    • CKD (chronic kidney disease) stage 3, GFR 30-59 ml/min ([facility])    • GERD (gastroesophageal reflux disease)    • Hypercholesterolemia    • Hyperlipidemia    • Hypertension    • Iron deficiency anemia    • Ischemic cardiomyopathy    • MI (myocardial infarction) (Formerly [facility])    • Mitral valve disease    • Osteoarthritis    • UTI (urinary tract  "infection)      Past Surgical History:   Procedure Laterality Date   • CARDIAC CATHETERIZATION     • CAROTID ENDARTERECTOMY     • CORONARY ARTERY BYPASS GRAFT     • TRANSESOPHAGEAL ECHOCARDIOGRAM (MISSY)       PT Assessment (last 12 hours)     PT Evaluation and Treatment     [facility] Name 03/14/22 1326          Physical Therapy Time and Intention    Document Type therapy note (daily note)  -     Mode of Treatment physical therapy;individual therapy  -     Patient Effort good  -     Comment 04.when asked about participating in PT pt states, \"I don't think it will do any good. I think they have given up on me.\" PTA respoded, \"Have you given up on yourself?\". pt responded \"no\" and agreed to PT.  -[facility] Name 03/14/22 1326          General Information    Patient Profile Reviewed yes  -     Existing Precautions/Restrictions oxygen therapy device and L/min;fall  -[facility] Name 03/14/22 1326          Pain    Pretreatment Pain Rating 0/10 - no pain  -     Posttreatment Pain Rating 0/10 - no pain  -[facility] Name 03/14/22 1326          Cognition    Affect/Mental Status (Cognition) WFL  -     Orientation Status (Cognition) oriented x 4  -     Personal Safety Interventions fall prevention program maintained;gait belt;muscle strengthening facilitated;nonskid shoes/slippers when out of bed;supervised activity  -[facility] Name 03/14/22 1326          Bed Mobility    Bed Mobility supine-sit;sit-supine  -     Supine-Sit [place] (Bed Mobility) not tested  -     Sit-Supine [place] (Bed Mobility) not tested  -     Assistive Device (Bed Mobility) head of bed elevated;bed rails  -[facility] Name 03/14/22 1326          Transfers    Transfers stand-sit transfer;sit-stand transfer  -     Sit-Stand [place] (Transfers) contact guard  -     Stand-Sit [place] (Transfers) contact guard  -[facility] Name 03/14/22 1326          Sit-Stand Transfer    Assistive Device (Sit-Stand Transfers) walker, " front-wheeled  -DANIEL     Row Name 03/14/22 1326          Stand-Sit Transfer    Assistive Device (Stand-Sit Transfers) walker, front-wheeled  -DANIEL     Row Name 03/14/22 1326          Gait/Stairs (Locomotion)    Gait/Stairs Locomotion gait/ambulation independence;gait/ambulation assistive device;distance ambulated;gait pattern;gait deviations;maintains weight-bearing status  -     [place] Level (Gait) contact guard  -     Assistive Device (Gait) walker, front-wheeled  -     Distance in Feet (Gait) 6, 22  -     Deviations/Abnormal Patterns (Gait) gait speed decreased;bobby decreased;stride length decreased  -     Bilateral Gait Deviations forward flexed posture  -[facility] Name 03/14/22 1326          Safety Issues, Functional Mobility    Impairments Affecting Function (Mobility) strength;endurance/activity tolerance;shortness of breath;balance;pain  -[facility] Name 03/14/22 1326          Motor Skills    Therapeutic Exercise --  AP, LAQ, hip flexion, hip Ab/Ad, GS- 15x1 ana  -     Additional Documentation --  sit-stands 5x2  -[facility] Name 03/14/22 1326          Vital Signs    Pre Systolic BP Rehab 136  -DANIEL     Pre Treatment Diastolic BP 77  -DANIEL     Post Systolic BP Rehab 135  -DANIEL     Post Treatment Diastolic BP 75  -DANIEL     Pretreatment Heart Rate (beats/min) 95  -DANIEL     Intratreatment Heart Rate (beats/min) 101  -DANIEL     Posttreatment Heart Rate (beats/min) 94  -     Pre SpO2 (%) 90  99% once sitting EOB.  -     O2 Delivery Pre Treatment supplemental O2  -DANIEL     Intra SpO2 (%) 91  -DANIEL     O2 Delivery Intra Treatment supplemental O2  -DANIEL     Post SpO2 (%) 99  -DNAIEL     O2 Delivery Post Treatment supplemental O2  -DANIEL     Pre Patient Position Supine  -DANIEL     Post Patient Position Supine  -[facility] Name 03/14/22 1326          Positioning and Restraints    Pre-Treatment Position in bed  -DANIEL     Post Treatment Position bed  -DANIEL     In Bed fowlers;call light within reach;encouraged to call for  assist;exit alarm on  all needs met  -     Row Name 03/14/22 1326          Therapy Assessment/Plan (PT)    Criteria for Skilled Interventions Met (PT) yes;skilled treatment is necessary  -DANIEL     Row Name 03/14/22 1326          Bed Mobility Goal 1 (PT)    Activity/Assistive Device (Bed Mobility Goal 1, PT) sit to supine/supine to sit  -DANIEL     [place] Level/Cues Needed (Bed Mobility Goal 1, PT) modified independence  -DANIEL     Time Frame (Bed Mobility Goal 1, PT) by discharge  -DANIEL     Strategies/Barriers (Bed Mobility Goal 1, PT) Maintain SpO2 >90%.  -DANIEL     Row Name 03/14/22 1326          Transfer Goal 1 (PT)    Activity/Assistive Device (Transfer Goal 1, PT) sit-to-stand/stand-to-sit;bed-to-chair/chair-to-bed;walker, rolling  -DANIEL     [place] Level/Cues Needed (Transfer Goal 1, PT) modified independence  -DANIEL     Time Frame (Transfer Goal 1, PT) by discharge  -DANIEL     Strategies/Barriers (Transfers Goal 1, PT) Maintain SpO2 >90%.  -DANIEL     Progress/Outcome (Transfer Goal 1, PT) goal not met  -DANIEL     Row Name 03/14/22 1326          Gait Training Goal 1 (PT)    Activity/Assistive Device (Gait Training Goal 1, PT) walker, rolling  -DANIEL     [place] Level (Gait Training Goal 1, PT) modified independence  -DANIEL     Distance (Gait Training Goal 1, PT) 75'x2.  -DANIEL     Time Frame (Gait Training Goal 1, PT) by discharge  -DANIEL     Strategies/Barriers (Gait Training Goal 1, PT) Maintain SpO2 >90%.  -DANIEL     Progress/Outcome (Gait Training Goal 1, PT) goal not met  -DANIEL           User Key  (r) = Recorded By, (t) = Taken By, (c) = Cosigned By    Initials Name Provider Type    Dc Iverson, PTA Physical Therapy Assistant                Physical Therapy Education                 Title: PT OT SLP Therapies (In Progress)     Topic: Physical Therapy (In Progress)     Point: Mobility training (In Progress)     Learning Progress Summary           Patient Acceptance, E, NR by DANIEL at 3/14/2022 1351    Acceptance, E, NR by DANIEL at  3/11/2022 1438    Acceptance, E, NR by DANIEL at 3/10/2022 1318    Acceptance, E, NR by DANIEL at 3/9/2022 1614    Acceptance, E, VU,NR by  at 3/8/2022 0937    Comment: PT POC, breathing technique, transfer technique.                   Point: Home exercise program (Not Started)     Learner Progress:  Not documented in this visit.          Point: Body mechanics (Not Started)     Learner Progress:  Not documented in this visit.          Point: Precautions (Not Started)     Learner Progress:  Not documented in this visit.                      User Key     Initials Effective Dates Name Provider Type Discipline     06/16/21 -  Dc Thakur, PTA Physical Therapy Assistant PT    LENORE 06/16/21 -  Martin Alvarez, PT Physical Therapist PT              PT Recommendation and Plan  Anticipated Discharge Disposition (PT): assisted living  Therapy Frequency (PT): other (see comments) (3-7 days/week)  Plan of Care Reviewed With: patient  Progress: improving  Outcome Evaluation: pt responded well to PT. SpO2 remained on the 90s throughout PT tx. pt requires SBA for sup-sit and CGA for sit-stand. increased gait trips up to 22 ft CGA w/ RW. pt participated in seated LE ther ex. no new goals met at this time. pt would continue to benefit from PT services.   Outcome Measures     Row Name 03/14/22 1326 03/13/22 1500 03/12/22 1500       How much help from another person do you currently need...    Turning from your back to your side while in flat bed without using bedrails? 3  -DANIEL 3  -TA 3  -TA    Moving from lying on back to sitting on the side of a flat bed without bedrails? 3  -DANIEL 3  -TA 3  -TA    Moving to and from a bed to a chair (including a wheelchair)? 3  -DANIEL 3  -TA 3  -TA    Standing up from a chair using your arms (e.g., wheelchair, bedside chair)? 3  -DANIEL 3  -TA 3  -TA    Climbing 3-5 steps with a railing? 3  -DANIEL 2  -TA 2  -TA    To walk in hospital room? 3  -DANIEL 3  -TA 3  -TA    AM-PAC 6 Clicks Score (PT) 18  -DANIEL 17  -TA 17   -TA       Functional Assessment    Outcome Measure Options AM-PAC 6 Clicks Basic Mobility (PT)  -DANIEL AM-PAC 6 Clicks Basic Mobility (PT)  -TA AM-PAC 6 Clicks Basic Mobility (PT)  -    Row Name 03/11/22 1419             How much help from another person do you currently need...    Turning from your back to your side while in flat bed without using bedrails? 3  -DANIEL      Moving from lying on back to sitting on the side of a flat bed without bedrails? 3  -DANIEL      Moving to and from a bed to a chair (including a wheelchair)? 3  -DANIEL      Standing up from a chair using your arms (e.g., wheelchair, bedside chair)? 3  -DANIEL      Climbing 3-5 steps with a railing? 2  -DANIEL      To walk in hospital room? 3  -DANIEL      AM-PAC 6 Clicks Score (PT) 17  -DANIEL              Functional Assessment    Outcome Measure Options AM-[facility] 6 Clicks Basic Mobility (PT)  -            User Key  (r) = Recorded By, (t) = Taken By, (c) = Cosigned By    Initials Name Provider Type    Anabelle Lopez PTA Physical Therapy Assistant    Dc Iverson PTA Physical Therapy Assistant                 Time Calculation:    PT Charges     Row Name 03/14/22 1404             Time Calculation    Start Time 1326  -      Stop Time 1404  -      Time Calculation (min) 38 min  -              Time Calculation- PT    Total Timed Code Minutes- PT 38 minute(s)  -              Timed Charges    24170 - PT Therapeutic Exercise Minutes 15  -      23542 - Gait Training Minutes  8  -      03657 - PT Therapeutic Activity Minutes 15  -DANIEL              Total Minutes    Timed Charges Total Minutes 38  -DANIEL       Total Minutes 38  -DANIEL            User Key  (r) = Recorded By, (t) = Taken By, (c) = Cosigned By    Initials Name Provider Type    Dc Iverson PTA Physical Therapy Assistant              Therapy Charges for Today     Code Description Service Date Service Provider Modifiers Qty    99505428706 HC PT THER PROC EA 15 MIN 3/14/2022 Dc Thakur PTA GP  1    19133510328  GAIT TRAINING EA 15 MIN 3/14/2022 Dc Thakur, PTA GP 1    41599873082 HC PT THERAPEUTIC ACT EA 15 MIN 3/14/2022 Dc Thakur, HALINA GP 1          PT G-Codes  Outcome Measure Options: AM-PAC 6 Clicks Basic Mobility (PT)  AM-PAC 6 Clicks Score (PT): 18  AM-PAC 6 Clicks Score (OT): 16    Dc Thakur PTA  3/14/2022

## 2022-03-14 NOTE — PROGRESS NOTES
[facility] Medicine Services  INPATIENT PROGRESS NOTE    Length of Stay: 6  Date of Admission: 3/7/2022  Primary Care Physician: Sabino Mobley MD    Subjective   Chief Complaint: No complaints    HPI: This is an 87-year-old female with past medical history of CAD, LEROY, chronic systolic heart failure (EF 35%), CKD 3, GERD, HLD, HTN, ischemic cardiomyopathy and OA who presented to [facility] on 3/7/2022 with complaints of shortness of air.  She chronically wears 2 L of oxygen at home.    The patient was found to be in atrial fibrillation with RVR.  She had bilateral pleural effusions and required thoracentesis.  Approximately 1200 mL of serous fluid was removed from left.    3/14/2022: The patient is currently on 8 L of oxygen.  Repeat chest x-ray today shows bilateral pleural effusion with underlying basilar infiltrative changes, increasing on the left since prior exam.     Review of Systems   Constitutional: Positive for activity change, appetite change and fatigue.   HENT: Negative for ear pain and sore throat.    Eyes: Negative for pain and discharge.   Respiratory: Positive for shortness of breath. Negative for cough.    Cardiovascular: Negative for chest pain and palpitations.   Gastrointestinal: Negative for abdominal pain and nausea.   Endocrine: Negative for cold intolerance and heat intolerance.   Genitourinary: Negative for difficulty urinating and dysuria.   Musculoskeletal: Negative for arthralgias and gait problem.   Skin: Negative for color change and rash.   Neurological: Negative for dizziness and weakness.   Psychiatric/Behavioral: Negative for agitation and confusion.        Objective    Temp:  [97.6 °F (36.4 °C)-98.2 °F (36.8 °C)] 98 °F (36.7 °C)  Heart Rate:  [] 102  Resp:  [18-22] 20  BP: ()/(51-91) 128/75    Physical Exam  Constitutional:       Appearance: She is well-developed.   HENT:      Head: Normocephalic and atraumatic.   Eyes:      Pupils: Pupils are  equal, round, and reactive to light.   Cardiovascular:      Rate and Rhythm: Normal rate and regular rhythm.   Pulmonary:      Effort: Pulmonary effort is normal.      Breath sounds: Normal breath sounds.   Abdominal:      General: Bowel sounds are normal.      Palpations: Abdomen is soft.   Musculoskeletal:         General: Normal range of motion.      Cervical back: Normal range of motion and neck supple.   Skin:     General: Skin is warm and dry.   Neurological:      Mental Status: She is alert and oriented to person, place, and time.   Psychiatric:         Behavior: Behavior normal.       Results Review:  I have reviewed the labs, radiology results, and diagnostic studies.    Laboratory Data:   Results from last 7 days   Lab Units 03/14/22  0553 03/13/22  1051 03/12/22  0555   SODIUM mmol/L 136 136 140   POTASSIUM mmol/L 4.9 5.0 5.3*   CHLORIDE mmol/L 95* 95* 96*   CO2 mmol/L 35.0* 37.0* 41.0*   BUN mg/dL 42* 41* 44*   CREATININE mg/dL 1.66* 1.63* 1.72*   GLUCOSE mg/dL 92 124* 98   CALCIUM mg/dL 9.0 8.8 9.1   BILIRUBIN mg/dL  --  0.5  --    ALK PHOS U/L  --  64  --    ALT (SGPT) U/L  --  9  --    AST (SGOT) U/L  --  14  --    ANION GAP mmol/L 6.0 4.0* 3.0*     Estimated Creatinine Clearance: 21.6 mL/min (A) (by C-G formula based on SCr of 1.66 mg/dL (H)).  Results from last 7 days   Lab Units 03/10/22  0637 03/09/22  0602 03/08/22  0721   MAGNESIUM mg/dL 2.1 2.1 2.2   PHOSPHORUS mg/dL  --  4.6* 4.9*         Results from last 7 days   Lab Units 03/14/22  0553 03/13/22  1051 03/12/22  0555 03/11/22  0613 03/10/22  0637   WBC 10*3/mm3 6.52 4.83 5.13 4.86 3.85   HEMOGLOBIN g/dL 9.4* 8.8* 9.4* 8.8* 7.1*   HEMATOCRIT % 30.0* 28.3* 30.7* 28.3* 23.0*   PLATELETS 10*3/mm3 205 187 168 159 158     Results from last 7 days   Lab Units 03/11/22  0903   INR  1.08       Culture Data:   No results found for: BLOODCX  No results found for: URINECX  No results found for: RESPCX  No results found for: WOUNDCX  No results found  for: STOOLCX  No components found for: BODYFLD    Radiology Data:   Imaging Results (Last 24 Hours)     Procedure Component Value Units Date/Time    XR Chest PA & Lateral [[id number]] Collected: 03/14/22 0918     Updated: 03/14/22 0949    Narrative:      Chest x-ray PA and lateral       CLINICAL INDICATION: Shortness of breath. Follow-up    COMPARISON: Chest March 11, 2022.    FINDINGS: Cardiac silhouette is enlarged in size. Pulmonary  vascularity is unremarkable.     Midline sternal sutures from prior cardiac surgery.    Bilateral pleural effusions and basilar infiltrative changes.  Stable on the right.    Increasing on the left.      Impression:      Cardiomegaly. Midline sternal sutures from prior  cardiac surgery. Bilateral pleural effusions and underlying  basilar infiltrative changes, increasing on the left since prior  exam.    Electronically signed by:  Andrae Garza MD  3/14/2022 9:47 AM CDT  Workstation: [id number]          I have reviewed the patient's current medications.     Assessment/Plan     Active Hospital Problems    Diagnosis    • **Acute on chronic respiratory failure with hypoxia ([facility])    • Chronic anemia    • Bilateral pleural effusion    • CKD (chronic kidney disease) stage 4, GFR 15-29 ml/min ([facility])    • Atrial fibrillation ([facility])    • Acute on chronic systolic CHF (congestive heart failure) ([facility])        Plan:    1.  Acute on chronic heart failure with reduced ejection fraction: Cardiology consult appreciated.  Continue IV Lasix, fluid restrictions, strict I&O and sodium reduction.  Left thoracentesis on 3/11 of 1200 ml.  2.  Acute on chronic respiratory failure with hypoxia: Patient is currently on 8 L of oxygen.  3.  Atrial fibrillation, now rate controlled: Continue Coreg and Eliquis.  4.  Acute on chronic anemia: Continue IV iron.  Hemoglobin today is 9.4.  5.  Chronic kidney disease, stage IV: Baseline creatinine 1.7-1.8.  Creatinine today is 1.6.  Nephrology consult appreciated.    Cardiology  spoke with patient today and she was changed to DNR CODE STATUS.    Discharge Planning: I expect patient to be discharged to home in 3-4 days.    I confirmed that the patient's Advance Care Plan is present, code status is documented, or surrogate decision maker is listed in the patient's medical record.      I have utilized all available immediate resources to obtain, update, or review the patient's current medications.         This document has been electronically signed by CHANA Spring on March 14, 2022 15:50 CDT

## 2022-03-14 NOTE — PLAN OF CARE
Problem: Fall Injury Risk  Goal: Absence of Fall and Fall-Related Injury  Outcome: Ongoing, Progressing     Problem: Activity Intolerance (Pulmonary Impairment)  Goal: Improved Activity Tolerance  Outcome: Ongoing, Progressing     Problem: Airway Clearance Ineffective (Pulmonary Impairment)  Goal: Effective Airway Clearance  Outcome: Ongoing, Progressing     Problem: Gas Exchange Impaired (Pulmonary Impairment)  Goal: Optimal Gas Exchange  Outcome: Ongoing, Progressing     Problem: Adult Inpatient Plan of Care  Goal: Plan of Care Review  Outcome: Ongoing, Progressing  Goal: Patient-Specific Goal (Individualized)  Outcome: Ongoing, Progressing  Goal: Absence of Hospital-Acquired Illness or Injury  Outcome: Ongoing, Progressing  Goal: Optimal Comfort and Wellbeing  Outcome: Ongoing, Progressing  Goal: Readiness for Transition of Care  Outcome: Ongoing, Progressing     Problem: Diabetes Comorbidity  Goal: Blood Glucose Level Within Targeted Range  Outcome: Ongoing, Progressing     Problem: Skin Injury Risk Increased  Goal: Skin Health and Integrity  Outcome: Ongoing, Progressing   Goal Outcome Evaluation:

## 2022-03-14 NOTE — PLAN OF CARE
Goal Outcome Evaluation:  Plan of Care Reviewed With: caregiver, patient        Progress: no change  Outcome Evaluation: Pt still reports a decreased appetite, intake averaging ~45%.  Still with SOB.  She continues on Lasix.  Will monitor preferences and honor as able.

## 2022-03-14 NOTE — PLAN OF CARE
Goal Outcome Evaluation:  Plan of Care Reviewed With: patient        Progress: no change  Outcome Evaluation: pt pleasant and cooperative. no c/o pain voiced. v/s are stable. no s/s of distress noted or observed at this time.

## 2022-03-14 NOTE — DISCHARGE PLACEMENT REQUEST
"Duong Abdalla (87 y.o. Female)             Date of Birth   1935    Social Security Number   [id number]    Address   [address] [address]    Home Phone   [phone]    MRN   [id number]       Restorationism   Amish    Marital Status                               Admission Date   3/7/22    Admission Type   Emergency    Admitting Provider   Roberto Jaramillo MD    Attending Provider   Roberto Jaramillo MD    Department, Room/Bed   [address], 382/1       Discharge Date       Discharge Disposition       Discharge Destination                               Attending Provider: Roberto Jaramillo MD    Allergies: No Known Allergies    Isolation: None   Infection: None   Code Status: No CPR   Advance Care Planning Activity    Ht: 157.5 cm (62.01\")   Wt: 55.3 kg (121 lb 15.9 oz)    Admission Cmt: None   Principal Problem: Acute on chronic respiratory failure with hypoxia (HCC) [J96.21]                 Active Insurance as of 3/7/2022     Primary Coverage     Payor Plan Insurance Group Employer/Plan Group    MEDICARE MEDICARE A & B      Payor Plan Address Payor Plan Phone Number Payor Plan Fax Number Effective Dates    [address] [phone]  1/1/2002 - None Entered    [facility] [id number]       Subscriber Name Subscriber Birth Date Member ID       DUONG ABDALLA 1935 [id number]           Secondary Coverage     Payor Plan Insurance Group Employer/Plan Group     FOR LIFE  FOR LIFE  SUP  [id number]     Payor Plan Address Payor Plan Phone Number Payor Plan Fax Number Effective Dates    [address] [phone]  7/17/2016 - None Entered    [facility] [address]       Subscriber Name Subscriber Birth Date Member ID       DUONG ABDALLA 1935 [id number]                 Emergency Contacts      (Rel.) Home Phone Work Phone Mobile Phone    Alden Abdalla (Rm) [phone] -- [phone]            Insurance Information                " "MEDICARE/MEDICARE A & B Phone: [phone]    Subscriber: Naomi Abdalla Subscriber#: [id number]    Group#: -- Precert#: --         FOR LIFE/ FOR LIFE  SUP  Phone: [phone]    Subscriber: Naomi Abdalla Subscriber#: [id number]    Group#: [id number] Precert#: --             History & Physical      Randall Cabrera MD at 03/07/22 0830     Summary:History and physical                   [facility]  HISTORY AND PHYSICAL      Date of Admission: 3/7/2022  Primary Care Physician: Sabino Mobley MD    Subjective     Chief Complaint: Shortness of breath and generalized weakness    History of Present Illness  Patient presents to emergency room extremely dyspneic, and deconditioned.  Patient states she normally wears oxygen as needed at home, but over past 30 days has worn oxygen 24/7 every day.  Patient states shortness of breath gradually worsening with time.  Denies chest pain, but admits to occasional chest discomfort.  Admits to increased weakness, and easy fatigue with minimal exertion.  Admits to increased chest pressure over past several days, and states she feels like \"rock on my chest.\"  Believes shortness of breath has worsened after recent carotid endarterectomy procedure.  She is D-dimer elevated in emergency room, however unable to perform CTA chest due to renal dysfunction.  Lateral pleural effusions right greater than left noted on ER imaging.  Patient also admits to previous lower extremity swelling, orthopnea, and PND worsening over the past month.  Denies fevers, chills, sick contacts, cough, or recent travel.      Review of Systems   Constitutional: Positive for activity change, appetite change and fatigue. Negative for chills and fever.   HENT: Negative for congestion, ear discharge, sinus pressure and sneezing.    Eyes: Negative for discharge and visual disturbance.   Respiratory: Positive for shortness of breath and wheezing. " Negative for chest tightness.    Cardiovascular: Negative for chest pain and palpitations.   Gastrointestinal: Negative for abdominal distention, constipation, diarrhea, nausea and vomiting.   Endocrine: Negative for polyphagia and polyuria.   Genitourinary: Positive for difficulty urinating. Negative for dysuria.   Musculoskeletal: Positive for gait problem and myalgias.   Skin: Negative for color change and wound.   Allergic/Immunologic: Negative for immunocompromised state.   Neurological: Positive for weakness. Negative for dizziness and syncope.   Hematological: Negative for adenopathy.   Psychiatric/Behavioral: Negative for agitation, confusion, hallucinations and suicidal ideas.          Otherwise complete ROS reviewed and negative except as mentioned in the HPI.    Past Medical History:   Past Medical History:   Diagnosis Date   • CAD (coronary artery disease)    • Carotid artery stenosis    • Chronic systolic heart failure (HCC)    • CKD (chronic kidney disease) stage 3, GFR 30-59 ml/min ([facility])    • GERD (gastroesophageal reflux disease)    • Hypercholesterolemia    • Hyperlipidemia    • Hypertension    • Iron deficiency anemia    • Ischemic cardiomyopathy    • MI (myocardial infarction) ([facility])    • Mitral valve disease    • Osteoarthritis    • UTI (urinary tract infection)      Past Surgical History:  Past Surgical History:   Procedure Laterality Date   • CARDIAC CATHETERIZATION     • CAROTID ENDARTERECTOMY     • CORONARY ARTERY BYPASS GRAFT     • TRANSESOPHAGEAL ECHOCARDIOGRAM (MISSY)       Social History:  reports that she quit smoking about 22 years ago. She has never used smokeless tobacco. She reports that she does not drink alcohol and does not use drugs.    Family History: family history includes Hypertension in her father.       Allergies:  No Known Allergies    Medications:  Prior to Admission medications    Medication Sig Start Date End Date Taking? Authorizing Provider   acetaminophen (TYLENOL) 325  "MG tablet Take 2 tablets by mouth Every 4 (Four) Hours As Needed for Mild Pain . 11/18/20   Ryan Dubois APRN   albuterol sulfate  (90 Base) MCG/ACT inhaler Inhale 2 puffs Every 4 (Four) Hours As Needed for Wheezing or Shortness of Air. 7/3/21   Roberto Jaramillo MD   aspirin 81 MG chewable tablet Chew 81 mg Daily.    ProviderChula MD   atorvastatin (LIPITOR) 20 MG tablet Take 1 tablet by mouth Every Night. 12/30/21   Ryan Dubois APRN   carvedilol (Coreg) 12.5 MG tablet Take 1 tablet by mouth 2 (Two) Times a Day With Meals. 8/9/21   Kalee Cisneros MD   clopidogrel (PLAVIX) 75 MG tablet Take 1 tablet by mouth Daily. 8/9/21   Kalee Cisneros MD   furosemide (LASIX) 40 MG tablet 40 mg 2 (Two) Times a Week. 8/29/21   ProviderChula MD   irbesartan (AVAPRO) 75 MG tablet Take 1 tablet by mouth Daily. 8/9/21   Kalee Cisneros MD   sennosides-docusate (senna-docusate sodium) 8.6-50 MG per tablet Take 2 tablets by mouth 2 (Two) Times a Day As Needed for Constipation. 11/18/20   Ryan Dubois APRN     I have utilized all available immediate resources to obtain, update, and review the patient's current medications.    Objective     Vital Signs: /75   Pulse 90   Temp 97.4 °F (36.3 °C) (Oral)   Resp 24   Ht 157.5 cm (62\")   Wt 46.7 kg (103 lb)   SpO2 99%   BMI 18.84 kg/m²   Physical Exam  Constitutional:       Appearance: Normal appearance. She is normal weight.   HENT:      Head: Normocephalic.      Nose: Nose normal.      Mouth/Throat:      Mouth: Mucous membranes are moist.      Pharynx: Oropharynx is clear.   Eyes:      Extraocular Movements: Extraocular movements intact.      Conjunctiva/sclera: Conjunctivae normal.      Pupils: Pupils are equal, round, and reactive to light.   Cardiovascular:      Rate and Rhythm: Normal rate and regular rhythm.      Pulses: Normal pulses.      Heart sounds: Normal heart sounds.   Pulmonary:      Effort: " Pulmonary effort is normal.      Breath sounds: Normal breath sounds.   Abdominal:      General: Abdomen is flat. Bowel sounds are normal.      Palpations: Abdomen is soft.   Musculoskeletal:         General: Swelling present.      Cervical back: Normal range of motion and neck supple.      Right lower leg: Edema present.      Left lower leg: Edema present.      Comments: +2 bilateral lower extremity edema appreciated pitting in nature   Skin:     General: Skin is warm and dry.      Capillary Refill: Capillary refill takes less than 2 seconds.   Neurological:      General: No focal deficit present.      Mental Status: She is alert and oriented to person, place, and time. Mental status is at baseline.      Sensory: No sensory deficit.      Motor: Weakness present.      Gait: Gait abnormal.   Psychiatric:         Mood and Affect: Mood normal.         Behavior: Behavior normal.         Thought Content: Thought content normal.         Judgment: Judgment normal.              Results Reviewed:  Lab Results (last 24 hours)     Procedure Component Value Units Date/Time    Protime-INR [[id number]]  (Normal) Collected: 03/07/22 0521    Specimen: Blood Updated: 03/07/22 0711     Protime 12.8 Seconds      INR 0.97    Narrative:      Therapeutic range for most indications is 2.0-3.0 INR,  or 2.5-3.5 for mechanical heart valves.    aPTT [[id number]]  (Normal) Collected: 03/07/22 0521    Specimen: Blood Updated: 03/07/22 0711     PTT 30.0 seconds     Narrative:      The recommended Heparin therapeutic range is 68-97 seconds.    Procalcitonin [[id number]]  (Normal) Collected: 03/07/22 0521    Specimen: Blood Updated: 03/07/22 0635     Procalcitonin 0.17 ng/mL     Narrative:      As a Marker for Sepsis (Non-Neonates):    1. <0.5 ng/mL represents a low risk of severe sepsis and/or septic shock.  2. >2 ng/mL represents a high risk of severe sepsis and/or septic shock.    As a Marker for Lower Respiratory Tract Infections that require  "antibiotic therapy:    PCT on Admission    Antibiotic Therapy       6-12 Hrs later    >0.5                Strongly Recommended  >0.25 - <0.5        Recommended   0.1 - 0.25          Discouraged              Remeasure/reassess PCT  <0.1                Strongly Discouraged     Remeasure/reassess PCT    As 28 day mortality risk marker: \"Change in Procalcitonin Result\" (>80% or <=80%) if Day 0 (or Day 1) and Day 4 values are available. Refer to http://www.[facility]-pct-calculator.com    Change in PCT <=80%  A decrease of PCT levels below or equal to 80% defines a positive change in PCT test result representing a higher risk for 28-day all-cause mortality of patients diagnosed with severe sepsis for septic shock.    Change in PCT >80%  A decrease of PCT levels of more than 80% defines a negative change in PCT result representing a lower risk for 28-day all-cause mortality of patients diagnosed with severe sepsis or septic shock.       [place] [facility] [[id number]] Collected: 03/07/22 0521    Specimen: Blood Updated: 03/07/22 0633    Narrative:      The following orders were created for panel order [place] [facility].  Procedure                               Abnormality         Status                     ---------                               -----------         ------                     Green Top (Gel)[[id number]]                                  Final result               Lavender Top[[id number]]                                     Final result               Gold Top - SST[[id number]]                                   Final result               Light Blue Top[[id number]]                                   Final result                 Please view results for these tests on the individual orders.    Green Top (Gel) [[id number]] Collected: 03/07/22 0521    Specimen: Blood Updated: 03/07/22 0633     Extra Tube Hold for add-ons.     Comment: Auto resulted.       Lavender Top [[id number]] Collected: 03/07/22 0521    Specimen: Blood Updated: " 03/07/22 0633     Extra Tube hold for add-on     Comment: Auto resulted       Gold Top - SST [[id number]] Collected: 03/07/22 0521    Specimen: Blood Updated: 03/07/22 0633     Extra Tube Hold for add-ons.     Comment: Auto resulted.       Light Blue Top [[id number]] Collected: 03/07/22 0521    Specimen: Blood Updated: 03/07/22 0633     Extra Tube hold for add-on     Comment: Auto resulted       D-dimer, Quantitative [[id number]]  (Abnormal) Collected: 03/07/22 0521    Specimen: Blood Updated: 03/07/22 0624     D-Dimer, Quantitative 3,153 ng/mL (FEU)     Narrative:      Dimer values <500 ng/ml FEU are FDA approved as aid in diagnosis of deep venous thrombosis and pulmonary embolism.  This test should not be used in an exclusion strategy with pretest probability alone.    A recent guideline regarding diagnosis for pulmonary thromboembolism recommends an adjusted exclusion criterion of age x 10 ng/ml FEU for patients >50 years of age ([employer] Intern Med 2015; 163: 701-711).      CK [[id number]]  (Normal) Collected: 03/07/22 0521    Specimen: Blood Updated: 03/07/22 0624     Creatine Kinase 36 U/L     Magnesium [[id number]]  (Abnormal) Collected: 03/07/22 0521    Specimen: Blood Updated: 03/07/22 0624     Magnesium 2.5 mg/dL     COVID-19 and FLU A/B PCR - Swab, Nasopharynx [[id number]]  (Normal) Collected: 03/07/22 0545    Specimen: Swab from Nasopharynx Updated: 03/07/22 0611     COVID19 Not Detected     Influenza A PCR Not Detected     Influenza B PCR Not Detected    Narrative:      Fact sheet for providers: https://www.fda.gov/media/431280/download    Fact sheet for patients: https://www.fda.gov/media/489281/download    Test performed by PCR.    Comprehensive Metabolic Panel [[id number]]  (Abnormal) Collected: 03/07/22 0521    Specimen: Blood Updated: 03/07/22 0609     Glucose 93 mg/dL      BUN 32 mg/dL      Creatinine 1.77 mg/dL      Sodium 140 mmol/L      Potassium 5.4 mmol/L      Chloride 98 mmol/L      CO2 35.0 mmol/L       Calcium 9.2 mg/dL      Total Protein 7.2 g/dL      Albumin 3.90 g/dL      ALT (SGPT) 11 U/L      AST (SGOT) 17 U/L      Alkaline Phosphatase 84 U/L      Total Bilirubin 0.4 mg/dL      Globulin 3.3 gm/dL      A/G Ratio 1.2 g/dL      BUN/Creatinine Ratio 18.1     Anion Gap 7.0 mmol/L      eGFR 27.9 mL/min/1.73      Comment: National Kidney Foundation and American Society of Nephrology (ASN) Task Force recommended calculation based on the Chronic Kidney Disease Epidemiology Collaboration (CKD-EPI) equation refit without adjustment for race.       Narrative:      GFR Normal >60  Chronic Kidney Disease <60  Kidney Failure <15      Troponin [[id number]]  (Normal) Collected: 03/07/22 0521    Specimen: Blood Updated: 03/07/22 0602     Troponin T <0.010 ng/mL     Narrative:      Troponin T Reference Range:  <= 0.03 ng/mL-   Negative for AMI  >0.03 ng/mL-     Abnormal for myocardial necrosis.  Clinicians would have to utilize clinical acumen, EKG, Troponin and serial changes to determine if it is an Acute Myocardial Infarction or myocardial injury due to an underlying chronic condition.       Results may be falsely decreased if patient taking Biotin.      BNP [[id number]]  (Abnormal) Collected: 03/07/22 0521    Specimen: Blood Updated: 03/07/22 0601     proBNP 16,282.0 pg/mL     Narrative:      Among patients with dyspnea, NT-proBNP is highly sensitive for the detection of acute congestive heart failure. In addition NT-proBNP of <300 pg/ml effectively rules out acute congestive heart failure with 99% negative predictive value.    Results may be falsely decreased if patient taking Biotin.      CBC & Differential [[id number]]  (Abnormal) Collected: 03/07/22 0521    Specimen: Blood Updated: 03/07/22 0528    Narrative:      The following orders were created for panel order CBC & Differential.  Procedure                               Abnormality         Status                     ---------                               -----------          ------                     CBC Auto Differential[[id number]]        Abnormal            Final result                 Please view results for these tests on the individual orders.    CBC Auto Differential [[id number]]  (Abnormal) Collected: 03/07/22 0521    Specimen: Blood Updated: 03/07/22 0528     WBC 4.31 10*3/mm3      RBC 3.80 10*6/mm3      Hemoglobin 10.4 g/dL      Hematocrit 34.7 %      MCV 91.3 fL      MCH 27.4 pg      MCHC 30.0 g/dL      RDW 14.6 %      RDW-SD 48.4 fl      MPV 10.7 fL      Platelets 197 10*3/mm3      Neutrophil % 71.3 %      Lymphocyte % 15.5 %      Monocyte % 11.8 %      Eosinophil % 0.0 %      Basophil % 0.9 %      Immature Grans % 0.5 %      Neutrophils, Absolute 3.07 10*3/mm3      Lymphocytes, Absolute 0.67 10*3/mm3      Monocytes, Absolute 0.51 10*3/mm3      Eosinophils, Absolute 0.00 10*3/mm3      Basophils, Absolute 0.04 10*3/mm3      Immature Grans, Absolute 0.02 10*3/mm3      nRBC 0.0 /100 WBC         Imaging Results (Last 24 Hours)     Procedure Component Value Units Date/Time    XR Chest 1 View [[id number]] Collected: 03/07/22 0512     Updated: 03/07/22 0615    Narrative:      EXAM:    XR Chest, 1 View    CLINICAL HISTORY:    The patient is 87 years old and is Female; SO[facility] Triage Protocol    TECHNIQUE:    Frontal view of the chest.    COMPARISON:    XR Chest dated July 1 2021    FINDINGS:    LUNGS:  Coarsened interstitial markings are present throughout  the lungs with associated bilateral lower lobe infiltrates.    PLEURAL SPACE:  Moderate to large left pleural effusion and  small right pleural effusion are present.  No pneumothorax.    HEART:  The cardiac silhouette is obscured.    MEDIASTINUM:  Unremarkable.    BONES/JOINTS:  There are degenerative changes of the bones.  Median sternotomy is present.    VASCULATURE:  Atherosclerosis of the aorta is noted.    UPPER ABDOMEN:  Unremarkable as visualized.      Impression:        Bilateral pleural effusions, left greater right with  likely  associated atelectasis.    Electronically signed by:  Tish Avalos MD  3/7/2022 6:12 AM CST  Workstation: [id number]        I have personally reviewed and interpreted the radiology studies and ECG obtained at time of admission.     Assessment / Plan     Assessment:   Active Hospital Problems    Diagnosis    • Acute on chronic respiratory failure with hypoxia (HCC)      Symptomatic hypoxia most likely secondary to CHF exacerbation however need to rule out pulmonary embolus.    Plan:     Hypoxia:  -VQ scan in a.m.  Heparin gtt. until VQ scan results available.  IV Lasix, strict I's and O's, fluid restriction 1200 mL/day, daily weights, echocardiogram for empirical CHF exacerbation care.    Deconditioning: PT/OT evaluation ordered at time of admission.  Patient may require short-term rehab at time of hospital discharge.      FEN cardiac with 1200 mL/day fluid restriction, electrolyte replacement protocol  PPX Heparin gtt.      Code Status/Advanced Care Plan: Full    The patient's surrogate decision maker is daughter/son.     I discussed my findings and recommendations with the patient.    Estimated length of stay is 1 to 3 days.     The patient was seen and examined by me on 3/7/2022 at 7:30 AM.    Electronically signed by Randall Cabrera MD, 03/07/22, 08:30 CST.              Electronically signed by Randall Cabrera MD at 03/07/22 0841       Vital Signs (last day)     Date/Time Temp Temp src Pulse Resp BP Patient Position SpO2    03/14/22 1216 98 (36.7) Oral 101 22 145/85 Lying 91    03/14/22 1114 -- -- 66 20 -- -- --    03/14/22 1102 -- -- 92 20 -- -- 90    03/14/22 0848 -- -- 91 20 139/91 Lying 90    03/14/22 0751 -- -- 107 20 -- -- --    03/14/22 0738 -- -- 99 20 -- -- 96    03/14/22 0353 97.6 (36.4) Temporal 93 18 115/59 Sitting 95    03/14/22 0341 -- -- 92 18 -- -- --    03/14/22 0335 -- -- 96 18 -- -- 91    03/14/22 0109 -- -- 90 -- -- -- --    03/13/22 2241 -- -- 94 18 -- -- 92    03/13/22 1925 -- -- 78  18 -- -- --    03/13/22 1919 -- -- 71 18 -- -- 94    03/13/22 1909 98.2 (36.8) Temporal 91 18 99/51 Sitting 95    03/13/22 1550 -- -- 90 -- -- -- --    03/13/22 1530 98 (36.7) Oral 99 18 118/64 Lying 92    03/13/22 1142 97.9 (36.6) Oral 95 18 116/63 Lying 92    03/13/22 1025 -- -- 92 18 -- -- --    03/13/22 1019 -- -- 90 -- -- -- 94    03/13/22 0810 -- -- 101 18 136/90 Lying 97    03/13/22 0757 -- -- 96 20 -- -- 94    03/13/22 0752 -- -- 98 -- -- -- --    03/13/22 0743 -- -- 97 22 -- -- 91    03/13/22 0319 -- -- 93 18 -- -- 90    03/13/22 0304 98 (36.7) Temporal 94 18 111/61 Sitting 91    03/13/22 0111 -- -- 93 -- -- -- --           Prior to Admission Medications     Prescriptions Last Dose Informant Patient Reported? Taking?    acetaminophen (TYLENOL) 325 MG tablet Past Week Self No Yes    Take 2 tablets by mouth Every 4 (Four) Hours As Needed for Mild Pain .    albuterol sulfate  (90 Base) MCG/ACT inhaler Past Week  No Yes    Inhale 2 puffs Every 4 (Four) Hours As Needed for Wheezing or Shortness of Air.    aspirin 81 MG chewable tablet 3/6/2022 Self Yes Yes    Chew 81 mg Daily.    atorvastatin (LIPITOR) 20 MG tablet 3/6/2022  No Yes    Take 1 tablet by mouth Every Night.    carvedilol (Coreg) 12.5 MG tablet 3/6/2022  No Yes    Take 1 tablet by mouth 2 (Two) Times a Day With Meals.    clopidogrel (PLAVIX) 75 MG tablet 3/6/2022  No Yes    Take 1 tablet by mouth Daily.    furosemide (LASIX) 40 MG tablet 3/6/2022  Yes Yes    40 mg 2 (Two) Times a Week.    irbesartan (AVAPRO) 75 MG tablet 3/6/2022  No Yes    Take 1 tablet by mouth Daily.    sennosides-docusate (senna-docusate sodium) 8.6-50 MG per tablet 3/6/2022 Self No Yes    Take 2 tablets by mouth 2 (Two) Times a Day As Needed for Constipation.             Physical Therapy Notes (last 24 hours)      Anabelle Lawson, PTA at 03/13/22 1538  Version 1 of 1       Goal Outcome Evaluation:  Plan of Care Reviewed With: patient           Outcome Evaluation: pt  sitting on EOB, pt sit<>stand with CGA, pt took 3 steps >HOB with RW & CGA. pt would benefit from continued PT services    Electronically signed by Anabelle Lawson PTA at 22     Anabelle Lawson PTA at 22 153  Version 1 of 1         Acute Care - Physical Therapy Treatment Note  [facility]     Patient Name: Naomi Duong Son  : 1935  MRN: [id number]  Today's Date: 3/13/2022      Visit Dx:     ICD-10-CM ICD-9-CM   1. Acute on chronic respiratory failure with hypoxia (HCC)  J96.21 518.84     799.02   2. Pleural effusion  J90 511.9   3. Renal failure, unspecified chronicity  N19 586   4. Acute on chronic congestive heart failure, unspecified heart failure type (HCC)  I50.9 428.0   5. Impaired functional mobility, balance, gait, and endurance  Z74.09 V49.89   6. Impaired mobility and ADLs  Z74.09 V49.89    Z78.9      Patient Active Problem List   Diagnosis   • Ischemic cardiomyopathy   • S/P CABG (coronary artery bypass graft)   • Essential hypertension   • Mixed hyperlipidemia   • H/O CHF   • Carotid artery stenosis   • CKD (chronic kidney disease) stage 3, GFR 30-59 ml/min (HCC)   • Bilateral carotid artery disease (HCC)   • Carotid stenosis, asymptomatic, bilateral   • Surgical aftercare, circulatory system   • Carotid stenosis, asymptomatic, left   • Acute on chronic systolic CHF (congestive heart failure) (HCC)   • CKD (chronic kidney disease) stage 3, GFR 30-59 ml/min (HCC)   • Severe malnutrition (HCC)   • Acute respiratory failure with hypoxia (HCC)   • Acute on chronic respiratory failure with hypoxia (HCC)   • Bilateral pleural effusion   • CKD (chronic kidney disease) stage 4, GFR 15-29 ml/min (HCC)   • Atrial fibrillation (HCC)   • Chronic anemia     Past Medical History:   Diagnosis Date   • CAD (coronary artery disease)    • Carotid artery stenosis    • Chronic systolic heart failure (HCC)    • CKD (chronic kidney disease) stage 3, GFR 30-59 ml/min (HCC)    • GERD (gastroesophageal  reflux disease)    • Hypercholesterolemia    • Hyperlipidemia    • Hypertension    • Iron deficiency anemia    • Ischemic cardiomyopathy    • MI (myocardial infarction) (HCC)    • Mitral valve disease    • Osteoarthritis    • UTI (urinary tract infection)      Past Surgical History:   Procedure Laterality Date   • CARDIAC CATHETERIZATION     • CAROTID ENDARTERECTOMY     • CORONARY ARTERY BYPASS GRAFT     • TRANSESOPHAGEAL ECHOCARDIOGRAM (MISSY)       PT Assessment (last 12 hours)     PT Evaluation and Treatment     Row Name 03/13/22 1459          Physical Therapy Time and Intention    Subjective Information no complaints  -TA     Document Type therapy note (daily note)  -TA     Mode of Treatment physical therapy;individual therapy  -TA     Patient Effort good  due to lethagy  -TA     Row Name 03/13/22 1457          General Information    Patient Profile Reviewed yes  -TA     Existing Precautions/Restrictions oxygen therapy device and L/min;fall  -TA     Row Name 03/13/22 1450          Pain    Pretreatment Pain Rating 0/10 - no pain  -TA     Posttreatment Pain Rating 0/10 - no pain  -TA     Row Name 03/13/22 1453          Cognition    Orientation Status (Cognition) oriented x 4  -TA     Personal Safety Interventions fall prevention program maintained;gait belt;muscle strengthening facilitated;nonskid shoes/slippers when out of bed;supervised activity  -TA     Row Name 03/13/22 1458          Bed Mobility    Bed Mobility supine-sit;sit-supine  -TA     Supine-Sit [place] (Bed Mobility) not tested  -TA     Sit-Supine [place] (Bed Mobility) not tested  -TA     Assistive Device (Bed Mobility) head of bed elevated;bed rails  -TA     Row Name 03/13/22 1458          Transfers    Transfers stand-sit transfer;sit-stand transfer  -TA     Comment, (Transfers) pt performed sit<>stand x 3  -TA     Sit-Stand [place] (Transfers) contact guard  -TA     Stand-Sit [place] (Transfers) contact guard  -TA     Row Name  03/13/22 1459          Sit-Stand Transfer    Assistive Device (Sit-Stand Transfers) walker, front-wheeled  -TA     Row Name 03/13/22 1459          Stand-Sit Transfer    Assistive Device (Stand-Sit Transfers) walker, front-wheeled  -TA     Row Name 03/13/22 1459          Gait/Stairs (Locomotion)    [place] Level (Gait) contact guard  -TA     Assistive Device (Gait) walker, front-wheeled  -TA     Distance in Feet (Gait) 3 steps>HOB  -TA     Row Name 03/13/22 1459          Safety Issues, Functional Mobility    Impairments Affecting Function (Mobility) strength;endurance/activity tolerance;shortness of breath;balance;pain  -TA     Row Name 03/13/22 1459          Hip (Therapeutic Exercise)    Hip (Therapeutic Exercise) AROM (active range of motion)  -TA     Hip AROM (Therapeutic Exercise) bilateral;flexion;aBduction;aDduction;sitting;20 repititions  -TA     Row Name 03/13/22 1459          Knee (Therapeutic Exercise)    Knee (Therapeutic Exercise) AROM (active range of motion)  -TA     Knee AROM (Therapeutic Exercise) bilateral;LAQ (long arc quad);sitting;20 repititions  -TA     Row Name 03/13/22 1459          Ankle (Therapeutic Exercise)    Ankle (Therapeutic Exercise) AROM (active range of motion)  -TA     Ankle AROM (Therapeutic Exercise) bilateral;dorsiflexion;plantarflexion;20 repititions  -TA     Row Name 03/13/22 1459          Plan of Care Review    Plan of Care Reviewed With patient  -TA     Outcome Evaluation pt sitting on EOB, pt sit<>stand with CGA, pt took 3 steps >HOB with RW & CGA. pt would benefit from continued PT services  -TA     Row Name 03/13/22 1459          Vital Signs    Pre Systolic BP Rehab 92  -TA     Pre Treatment Diastolic BP 52  -TA     Post Systolic BP Rehab 103  -TA     Post Treatment Diastolic BP 59  -TA     Pretreatment Heart Rate (beats/min) 102  -TA     Posttreatment Heart Rate (beats/min) 98  -TA     Pre SpO2 (%) 97  -TA     O2 Delivery Pre Treatment supplemental O2  -TA     Post  SpO2 (%) 94  -TA     O2 Delivery Post Treatment supplemental O2  -TA     Pre Patient Position Sitting  -TA     Post Patient Position Sitting  -TA     Row Name 03/13/22 1459          Positioning and Restraints    Pre-Treatment Position in bed  -TA     Post Treatment Position bed  -TA     In Bed sitting EOB;call light within reach;exit alarm on  -TA     Row Name 03/13/22 1459          Therapy Assessment/Plan (PT)    Criteria for Skilled Interventions Met (PT) yes;skilled treatment is necessary  -TA     Comment, Therapy Assessment/Plan (PT) continue  -TA     Row Name 03/13/22 1459          Bed Mobility Goal 1 (PT)    Activity/Assistive Device (Bed Mobility Goal 1, PT) sit to supine/supine to sit  -TA     [place] Level/Cues Needed (Bed Mobility Goal 1, PT) modified independence  -TA     Time Frame (Bed Mobility Goal 1, PT) by discharge  -TA     Strategies/Barriers (Bed Mobility Goal 1, PT) Maintain SpO2 >90%.  -TA     Row Name 03/13/22 1459          Transfer Goal 1 (PT)    Activity/Assistive Device (Transfer Goal 1, PT) sit-to-stand/stand-to-sit;bed-to-chair/chair-to-bed;walker, rolling  -TA     [place] Level/Cues Needed (Transfer Goal 1, PT) modified independence  -TA     Time Frame (Transfer Goal 1, PT) by discharge  -TA     Strategies/Barriers (Transfers Goal 1, PT) Maintain SpO2 >90%.  -TA     Progress/Outcome (Transfer Goal 1, PT) goal not met  -TA     Row Name 03/13/22 1459          Gait Training Goal 1 (PT)    Activity/Assistive Device (Gait Training Goal 1, PT) walker, rolling  -TA     [place] Level (Gait Training Goal 1, PT) modified independence  -TA     Distance (Gait Training Goal 1, PT) 75'x2.  -TA     Time Frame (Gait Training Goal 1, PT) by discharge  -TA     Strategies/Barriers (Gait Training Goal 1, PT) Maintain SpO2 >90%.  -TA     Progress/Outcome (Gait Training Goal 1, PT) goal not met  -TA           User Key  (r) = Recorded By, (t) = Taken By, (c) = Cosigned By    Initials Name  Provider Type    Anabelle Lopez PTA Physical Therapy Assistant                Physical Therapy Education                 Title: PT OT SLP Therapies (In Progress)     Topic: Physical Therapy (In Progress)     Point: Mobility training (In Progress)     Learning Progress Summary           Patient Acceptance, E, NR by DANIEL at 3/11/2022 1438    Acceptance, E, NR by DANIEL at 3/10/2022 1318    Acceptance, E, NR by DANIEL at 3/9/2022 1614    Acceptance, E, VU,NR by  at 3/8/2022 0937    Comment: PT POC, breathing technique, transfer technique.                   Point: Home exercise program (Not Started)     Learner Progress:  Not documented in this visit.          Point: Body mechanics (Not Started)     Learner Progress:  Not documented in this visit.          Point: Precautions (Not Started)     Learner Progress:  Not documented in this visit.                      User Key     Initials Effective Dates Name Provider Type Discipline     06/16/21 -  Dc Thakur PTA Physical Therapy Assistant PT    LENORE 06/16/21 -  Martin Alvarez, PT Physical Therapist PT              PT Recommendation and Plan  Anticipated Discharge Disposition (PT): assisted living  Therapy Frequency (PT): other (see comments) (3-7 days/week)  Plan of Care Reviewed With: patient  Outcome Evaluation: pt sitting on EOB, pt sit<>stand with CGA, pt took 3 steps >HOB with RW & CGA. pt would benefit from continued PT services   Outcome Measures     Row Name 03/13/22 1500 03/12/22 1500 03/11/22 1419       How much help from another person do you currently need...    Turning from your back to your side while in flat bed without using bedrails? 3  -TA 3  -TA 3  -DANIEL    Moving from lying on back to sitting on the side of a flat bed without bedrails? 3  -TA 3  -TA 3  -DANIEL    Moving to and from a bed to a chair (including a wheelchair)? 3  -TA 3  -TA 3  -DANIEL    Standing up from a chair using your arms (e.g., wheelchair, bedside chair)? 3  -TA 3  -TA 3  -DANIEL    Climbing  3-5 steps with a railing? 2  -TA 2  -TA 2  -DANIEL    To walk in hospital room? 3  -TA 3  -TA 3  -DANIEL    AM-PAC 6 Clicks Score (PT) 17  -TA 17  -TA 17  -DANIEL       Functional Assessment    Outcome Measure Options AM-PAC 6 Clicks Basic Mobility (PT)  -TA AM-PAC 6 Clicks Basic Mobility (PT)  -TA AM-PAC 6 Clicks Basic Mobility (PT)  -DANIEL          User Key  (r) = Recorded By, (t) = Taken By, (c) = Cosigned By    Initials Name Provider Type    Anabelle Lopez PTA Physical Therapy Assistant    Dc Iverson PTA Physical Therapy Assistant                 Time Calculation:    PT Charges     Row Name 03/13/22 1538             Time Calculation    Start Time 1459  -TA      Stop Time 1523  -TA      Time Calculation (min) 24 min  -TA      PT Received On 03/13/22  -TA              Time Calculation- PT    Total Timed Code Minutes- PT 24 minute(s)  -TA              Timed Charges    39345 - PT Therapeutic Exercise Minutes 15  -TA      85951 - PT Therapeutic Activity Minutes 9  -TA              Total Minutes    Timed Charges Total Minutes 24  -TA       Total Minutes 24  -TA            User Key  (r) = Recorded By, (t) = Taken By, (c) = Cosigned By    Initials Name Provider Type    Anabelle Lopez PTA Physical Therapy Assistant              Therapy Charges for Today     Code Description Service Date Service Provider Modifiers Qty    36359488874 HC PT THER PROC EA 15 MIN 3/12/2022 Anabelle Lawson PTA GP 2    83212333147 HC PT THER PROC EA 15 MIN 3/13/2022 Anabelle Lawson, HALINA GP 1    17849970349 HC PT THERAPEUTIC ACT EA 15 MIN 3/13/2022 Anabelle Lawson PTA GP 1          PT G-Codes  Outcome Measure Options: AM-PAC 6 Clicks Basic Mobility (PT)  AM-PAC 6 Clicks Score (PT): 17  AM-PAC 6 Clicks Score (OT): 16    Anabelle Lawson PTA  3/13/2022      Electronically signed by Anabelle Lawson PTA at 03/13/22 0501

## 2022-03-14 NOTE — SIGNIFICANT NOTE
03/14/22 1429   OTHER   Discipline occupational therapy assistant   Rehab Time/Intention   Session Not Performed patient/family declined treatment  (Pt declined tx this pm. OT will check back tomorrow.)

## 2022-03-14 NOTE — PROGRESS NOTES
"  [facility] [place]   LOS: 6 days   Patient Care Team:  Sabino Mobley MD as PCP - General (Family Medicine)    Chief Complaint   Patient presents with    Shortness of Breath         Subjective     Interval History:   Patient Denies: chest pain  Patient Complaints: shortness of air and weakness  History taken from: patient    Patient states any more short of breath today and very weak.  Chest x-ray pending.  CODE STATUS talk with patient and son today.  Patient has decided for DNR.    Objective     Vital Sign Min/Max for last 24 hours  Temp  Min: 97.6 °F (36.4 °C)  Max: 98.2 °F (36.8 °C)   BP  Min: 99/51  Max: 139/91   Pulse  Min: 66  Max: 107   Resp  Min: 18  Max: 20   SpO2  Min: 90 %  Max: 96 %   Flow (L/min)  Min: 7  Max: 8   Weight  Min: 55.3 kg (121 lb 15.9 oz)  Max: 55.3 kg (121 lb 15.9 oz)     Flowsheet Rows      Flowsheet Row First Filed Value   Admission Height 157.5 cm (62\") Documented at 03/07/2022 0504   Admission Weight 46.7 kg (103 lb) Documented at 03/07/2022 0504              03/11/22  0425 03/12/22  0500 03/14/22  0457   Weight: 56.2 kg (123 lb 14.4 oz) 55.3 kg (122 lb) 55.3 kg (121 lb 15.9 oz)       Physical Exam:  Physical Exam  Constitutional:       Appearance: She is ill-appearing.   HENT:      Mouth/Throat:      Mouth: Mucous membranes are dry.   Cardiovascular:      Rate and Rhythm: Normal rate. Rhythm irregular.      Pulses: Normal pulses.   Pulmonary:      Effort: Accessory muscle usage present.      Breath sounds: Examination of the right-middle field reveals decreased breath sounds. Examination of the left-middle field reveals decreased breath sounds. Examination of the right-lower field reveals decreased breath sounds. Examination of the left-lower field reveals decreased breath sounds. Decreased breath sounds present.   Abdominal:      General: Abdomen is flat.      Palpations: Abdomen is soft.   Musculoskeletal:         General: No swelling.      Right lower leg: No " edema.      Left lower leg: No edema.   Skin:     General: Skin is warm.      Coloration: Skin is pale.   Neurological:      General: No focal deficit present.   Psychiatric:         Mood and Affect: Mood normal.          Results Reviewed by myself:     Results from last 7 days   Lab Units 03/14/22  0553 03/13/22  1051 03/12/22  0555   SODIUM mmol/L 136 136 140   POTASSIUM mmol/L 4.9 5.0 5.3*   CHLORIDE mmol/L 95* 95* 96*   CO2 mmol/L 35.0* 37.0* 41.0*   BUN mg/dL 42* 41* 44*   CREATININE mg/dL 1.66* 1.63* 1.72*   CALCIUM mg/dL 9.0 8.8 9.1   BILIRUBIN mg/dL  --  0.5  --    ALK PHOS U/L  --  64  --    ALT (SGPT) U/L  --  9  --    AST (SGOT) U/L  --  14  --    GLUCOSE mg/dL 92 124* 98       Estimated Creatinine Clearance: 21.6 mL/min (A) (by C-G formula based on SCr of 1.66 mg/dL (H)).    Results from last 7 days   Lab Units 03/10/22  0637 03/09/22  0602 03/08/22  0721 03/07/22  1150   MAGNESIUM mg/dL 2.1 2.1 2.2  --    PHOSPHORUS mg/dL  --  4.6* 4.9* 5.1*             Results from last 7 days   Lab Units 03/14/22  0553 03/13/22  1051 03/12/22  0555 03/11/22  0613 03/10/22  0637   WBC 10*3/mm3 6.52 4.83 5.13 4.86 3.85   HEMOGLOBIN g/dL 9.4* 8.8* 9.4* 8.8* 7.1*   PLATELETS 10*3/mm3 205 187 168 159 158       Results from last 7 days   Lab Units 03/11/22  0903   INR  1.08     Lab Results   Component Value Date    PROBNP 16,929.0 (H) 03/14/2022       I/O last 3 completed shifts:  In: 480 [P.O.:480]  Out: -     Cardiographics:  ECG/EMG Results (last 24 hours)       Procedure Component Value Units Date/Time    SCANNED - TELEMETRY   [[id number]] Resulted: 03/07/22     Updated: 03/09/22 1839    SCANNED - TELEMETRY   [[id number]] Resulted: 03/07/22     Updated: 03/09/22 1857    SCANNED - TELEMETRY   [[id number]] Resulted: 03/07/22     Updated: 03/10/22 1016            Results for orders placed during the hospital encounter of 03/07/22    Adult Transthoracic Echo Complete w/ Color, Spectral and Contrast if necessary per  protocol    Interpretation Summary  · Estimated left ventricular EF = 35% Left ventricular ejection fraction appears to be 31 - 35%. Left ventricular systolic function is moderately decreased. The left ventricular cavity is borderline dilated. Left ventricular wall thickness is consistent with mild concentric hypertrophy. There is left ventricular global hypokinesis noted. Left ventricular diastolic function is consistent with (grade II w/high LAP) pseudonormalization.  · The left atrial cavity is moderately dilated.  · Moderate mitral valve regurgitation is present with an eccentric jet noted.  · Moderate tricuspid valve regurgitation is present.  · Estimated right ventricular systolic pressure from tricuspid regurgitation is moderately elevated (45-55 mmHg).  · Borderline dilation of the aortic root is present (3.8 cms)      XR Chest 1 View    Result Date: 3/11/2022  Interval left thoracentesis. Small residual left pleural effusion. Persistent moderate right pleural effusion without significant change. No pneumothorax. Electronically signed by:  Surendra Carter MD  3/11/2022 10:46 AM CST Workstation: [id number]    XR Chest 1 View    Result Date: 3/8/2022  Some improvement and fluid status compared with yesterday. Significant residual pleural effusions and basilar volume loss/consolidation left greater than right. No new abnormality. Electronically signed by:  Gael Caballero MD  3/8/2022 7:59 AM CST Workstation: [id number]    XR Chest 1 View    Result Date: 3/7/2022    Bilateral pleural effusions, left greater right with likely associated atelectasis. Electronically signed by:  Tish Avalos MD  3/7/2022 6:12 AM CST Workstation: [id number]    US Thoracentesis    Result Date: 3/11/2022  CONCLUSION: Successful ultrasound-guided thoracentesis with removal of 1200 mL of serous fluid, as described above. Electronically signed by:  Surendra Carter MD  3/11/2022 10:44 AM CST Workstation: [id number]    XR Chest PA &  Lateral    Result Date: 3/14/2022  Cardiomegaly. Midline sternal sutures from prior cardiac surgery. Bilateral pleural effusions and underlying basilar infiltrative changes, increasing on the left since prior exam. Electronically signed by:  Andrae Garza MD  3/14/2022 9:47 AM CDT Workstation: [id number]      Medication Review:     Current Facility-Administered Medications:     acetaminophen (TYLENOL) tablet 650 mg, 650 mg, Oral, Q4H PRN, 650 mg at 03/13/22 0913 **OR** acetaminophen (TYLENOL) 160 MG/5ML solution 650 mg, 650 mg, Oral, Q4H PRN **OR** acetaminophen (TYLENOL) suppository 650 mg, 650 mg, Rectal, Q4H PRN, Randall Cabrera MD    apixaban (ELIQUIS) tablet 2.5 mg, 2.5 mg, Oral, BID, Roberto Jaramillo MD, 2.5 mg at 03/14/22 0858    aspirin EC tablet 81 mg, 81 mg, Oral, Daily, Roberto Jaramillo MD, 81 mg at 03/14/22 0858    bisacodyl (DULCOLAX) suppository 10 mg, 10 mg, Rectal, Daily PRN, Roberto Jaramillo MD, 10 mg at 03/13/22 1458    calcium carbonate (TUMS) chewable tablet 500 mg (200 mg elemental), 1 tablet, Oral, BID PRN, Randall Cabrera MD    furosemide (LASIX) tablet 20 mg, 20 mg, Oral, Once, Stevenson Robertson MD    [START ON 3/15/2022] furosemide (LASIX) tablet 40 mg, 40 mg, Oral, Daily, Stevenson Robertson MD    Hold medication, 1 each, Does not apply, Continuous PRN, Roberto Jaramillo MD    ipratropium-albuterol (DUO-NEB) nebulizer solution 3 mL, 3 mL, Nebulization, Q4H - RT, Roberto Jaramillo MD, 3 mL at 03/14/22 1102    Magnesium Sulfate 2 gram Bolus, followed by 8 gram infusion (total Mg dose 10 grams)- Mg less than or equal to 1mg/dL, 2 g, Intravenous, PRN **OR** Magnesium Sulfate 2 gram / 50mL Infusion (GIVE X 3 BAGS TO EQUAL 6GM TOTAL DOSE) - Mg 1.1 - 1.5 mg/dl, 2 g, Intravenous, PRN **OR** Magnesium Sulfate 4 gram infusion- Mg 1.6-1.9 mg/dL, 4 g, Intravenous, PRN, Randall Cabrera MD    melatonin tablet 5.25 mg, 5.25 mg, Oral, Nightly PRN, Randall Cabrera MD    midodrine (PROAMATINE) tablet 5 mg, 5 mg, Oral, BID AC,  Roberto Jraamillo MD, 5 mg at 03/14/22 0858    ondansetron (ZOFRAN) injection 4 mg, 4 mg, Intravenous, Q6H PRN, Randall Cabrera MD    oxyCODONE (ROXICODONE) immediate release tablet 5 mg, 5 mg, Oral, Q4H PRN, Randall Cabrera MD, 5 mg at 03/10/22 2225    polyethylene glycol (MIRALAX) packet 17 g, 17 g, Oral, Daily, Roberto Jaramillo MD, 17 g at 03/13/22 0900    potassium chloride (MICRO-K) CR capsule 40 mEq, 40 mEq, Oral, PRN **OR** potassium chloride (KLOR-CON) packet 40 mEq, 40 mEq, Oral, PRN **OR** potassium chloride 10 mEq in 100 mL IVPB, 10 mEq, Intravenous, Q1H PRN, Randall Cabrera MD    potassium phosphate 45 mmol in sodium chloride 0.9 % 500 mL infusion, 45 mmol, Intravenous, PRN **OR** potassium phosphate 30 mmol in sodium chloride 0.9 % 250 mL infusion, 30 mmol, Intravenous, PRN **OR** Potassium Phosphates 15 mmol in sodium chloride 0.9 % 100 mL infusion, 15 mmol, Intravenous, PRN **OR** sodium phosphates 45 mmol in sodium chloride 0.9 % 500 mL IVPB, 45 mmol, Intravenous, PRN **OR** sodium phosphates 30 mmol in sodium chloride 0.9 % 250 mL IVPB, 30 mmol, Intravenous, PRN **OR** sodium phosphates 15 mmol in sodium chloride 0.9 % 250 mL IVPB, 15 mmol, Intravenous, PRN, Randall Cabrera MD    sodium chloride 0.9 % flush 10 mL, 10 mL, Intravenous, PRNRick Enoch K, MD, 10 mL at 03/08/22 2118    sodium chloride 0.9 % flush 10 mL, 10 mL, Intravenous, Q12H, Randall Cabrera MD, 10 mL at 03/14/22 0859    sodium chloride 0.9 % flush 10 mL, 10 mL, Intravenous, PRNRick Enoch K, MD    Patient's recent labs and results as included in the subjective data were reviewed by me. Any pertinent outside data will be included.        Assessment/Plan       Acute on chronic respiratory failure with hypoxia (HCC)    Acute on chronic systolic CHF (congestive heart failure) (HCC)    Bilateral pleural effusion    CKD (chronic kidney disease) stage 4, GFR 15-29 ml/min (HCC)    Atrial fibrillation (HCC)    Chronic anemia    HFrEF: chronic and  she has third spaced into her pleural cavities. She is not grossly volume overloaded. S/P thoracentesis- 1200ml.   EF 35%, ICM.  She appears to have bilateral infiltrates.   - she is on 40mg PO lasix now. Euvolemic intravascularly.     Pleural effusions:  -1200ml removed from left lung by IR.        She has CKD IV.   -Nephrology will be on board to assist with diuretics.     New onset AF:  She is rate controlled on 3.125mg BID of Coreg.   - Blood pressure is borderline.  - Tolerating Eliquis 2.5mg BID      Anemia  - s/p 1 unit PRBC  - occult stool     30 minute voluntary discussion regarding goals of care,  advanced directive, and DNR/DNI status.      Discussion took place due to patient's diagnosis of: CHF, CKD. AF.     Encouragement has been given to patient to include family in discussion of their desired wishes.      The discussion today did include: CPR, intubation, mechanical ventilation, defibrillation, feeding tubes, IV fluids, vasopressors, dialysis, and other supportive measures not mentioned specifically.      Patient desires CODE STATUS of: DNR, no intubation, no CPR, no defibrillation, no feeding tubes, no vasopressors, no Dialysis. Patient wishes to allow natural death. Called and updated sonAlden of patients decision and prognosis. He was thankful for the call and update.     Regarding advanced directive initiation and completion, consultations with  has been requested regarding further discussion and completion of appropriate documents.     Plan for disposition:Where: Continue current location When:  today          This document has been electronically signed by CHANA Dinh on March 14, 2022 12:05 CDT     Electronically signed by CHANA Dinh, 03/14/22, 12:05 PM CDT.    History and events over the weekend noted.  Patient examined and chart reviewed.  Agree with assessment and plan as outlined by CHANA Xie.  Ada Son is an 87-year-old female with ischemic  cardiomyopathy with ejection fraction of 35%, HFrEF, CHF, bilateral pleural effusions status post left thoracentesis, CKD, anemia, atrial fibrillation persistent.    Patient continues to remain dyspneic requiring 7 to 8L of oxygen by NC.  X-ray of the chest shows reaccumulation of left-sided pleural effusion.  Heart rate 96 bpm, irregular, blood pressure 110/70 mmHg, patient frail  There is pallor with no icterus or cyanosis  Exam of the heart showed varying intensity of the first heart sound with no S3 or S4.  Exam of the chest showed the use of accessory muscles.  Decreased breath sounds in both lung bases  Abdomen soft with no definite mass or tenderness  Extremities showed no edema    Labs reviewed.  BUN 42 and creatinine 1.66.  Hemoglobin 9.4    Options limited at this time.  Agree with current management with rate control with carvedilol, anticoagulation with Eliquis low-dose.  Diuretics as per nephrology.  Patient desires changing CODE STATUS to DNR as described above in detail.    Electronically signed by Kalee Cisneros MD, 03/14/22, 12:40 PM CDT.

## 2022-03-14 NOTE — PLAN OF CARE
Goal Outcome Evaluation:  Plan of Care Reviewed With: patient        Progress: improving  Outcome Evaluation: pt responded well to PT. SpO2 remained on the 90s throughout PT tx. pt requires SBA for sup-sit and CGA for sit-stand. increased gait trips up to 22 ft CGA w/ RW. pt participated in seated LE ther ex. no new goals met at this time. pt would continue to benefit from PT services.

## 2022-03-14 NOTE — PROGRESS NOTES
"Adult Nutrition  Assessment    Patient Name:  Naomi Duong Son  YOB: 1935  MRN: [id number]  Admit Date:  3/7/2022    Assessment Date:  3/14/2022    Comments:  Pt remains on O2 supplement.  STill with a decreased appetite.  Intake Averaging ~45%.  Still being managed for A/C Resp Failure; A/C CHF; Bilateral Effusion; AFib; CKD; and Chronic Anemia.  She remains on Lasix.  She still refuses supplements as part of her fluid restrictions.  Poor acceptance of magic cups.  Will continue ice cream and honor requests as able. Will monitor POC     Reason for Assessment     Row Name 03/14/22 1523          Reason for Assessment    Reason For Assessment follow-up protocol                Nutrition/Diet History     Row Name 03/14/22 1523          Nutrition/Diet History    Typical Intake (Food/Fluid/EN/PN) Pt states that she still doesn't have much of an appetite.  She states that she doesn't really eat a lot for breakfast.  She voiced preferences.  She is eating \"A lot of ice cream\"  STates that she is still having SOB                  Labs/Tests/Procedures/Meds     Row Name 03/14/22 1526          Labs/Procedures/Meds    Lab Results Reviewed reviewed, pertinent     Lab Results Comments Bun 42; Creat 1.66; Alb 3.0;            Diagnostic Tests/Procedures    Diagnostic Test/Procedure Reviewed reviewed, pertinent            Medications    Pertinent Medications Reviewed reviewed, pertinent     Pertinent Medications Comments Lasix; Midodrine; Miralax                Physical Findings     Row Name 03/14/22 1532          Physical Findings    Overall Physical Appearance supplemental 02                  Nutrition Prescription Ordered     Row Name 03/14/22 1532          Nutrition Prescription PO    Current PO Diet Regular     Common Modifiers Cardiac;Fluid Restriction     Fluid Restriction mL per Tray 250 mL     Fluid Restriction mL per Day Other (comment)  1500cc                       Electronically signed by:  Ruby Soliman, " RD  03/14/22 15:43 CDT

## 2022-03-15 NOTE — PLAN OF CARE
Goal Outcome Evaluation:           Progress: no change  Outcome Evaluation: Pt rested well through the night. No c/o pain. Voiding well via purewick. Denies any needs. No s/s of distress.

## 2022-03-15 NOTE — THERAPY TREATMENT NOTE
Acute Care - Physical Therapy Treatment Note  [facility]     Patient Name: Naomi Duong Son  : 1935  MRN: [id number]  Today's Date: 3/15/2022      Visit Dx:     ICD-10-CM ICD-9-CM   1. Acute on chronic respiratory failure with hypoxia (HCC)  J96.21 518.84     799.02   2. Pleural effusion  J90 511.9   3. Renal failure, unspecified chronicity  N19 586   4. Acute on chronic congestive heart failure, unspecified heart failure type ([facility])  I50.9 428.0   5. Impaired functional mobility, balance, gait, and endurance  Z74.09 V49.89   6. Impaired mobility and ADLs  Z74.09 V49.89    Z78.9      Patient Active Problem List   Diagnosis   • Ischemic cardiomyopathy   • S/P CABG (coronary artery bypass graft)   • Essential hypertension   • Mixed hyperlipidemia   • H/O CHF   • Carotid artery stenosis   • CKD (chronic kidney disease) stage 3, GFR 30-59 ml/min ([facility])   • Bilateral carotid artery disease ([facility])   • Carotid stenosis, asymptomatic, bilateral   • Surgical aftercare, circulatory system   • Carotid stenosis, asymptomatic, left   • Acute on chronic systolic CHF (congestive heart failure) ([facility])   • CKD (chronic kidney disease) stage 3, GFR 30-59 ml/min ([facility])   • Severe malnutrition ([facility])   • Acute respiratory failure with hypoxia ([facility])   • Acute on chronic respiratory failure with hypoxia ([facility])   • Bilateral pleural effusion   • CKD (chronic kidney disease) stage 4, GFR 15-29 ml/min (HCC)   • Atrial fibrillation ([facility])   • Chronic anemia     Past Medical History:   Diagnosis Date   • CAD (coronary artery disease)    • Carotid artery stenosis    • Chronic systolic heart failure (HCC)    • CKD (chronic kidney disease) stage 3, GFR 30-59 ml/min (HCC)    • GERD (gastroesophageal reflux disease)    • Hypercholesterolemia    • Hyperlipidemia    • Hypertension    • Iron deficiency anemia    • Ischemic cardiomyopathy    • MI (myocardial infarction) ([facility])    • Mitral valve disease    • Osteoarthritis    • UTI (urinary tract  infection)      Past Surgical History:   Procedure Laterality Date   • CARDIAC CATHETERIZATION     • CAROTID ENDARTERECTOMY     • CORONARY ARTERY BYPASS GRAFT     • TRANSESOPHAGEAL ECHOCARDIOGRAM (MISSY)       PT Assessment (last 12 hours)     PT Evaluation and Treatment     [facility] Name 03/15/22 0958          Physical Therapy Time and Intention    Document Type therapy note (daily note)  -     Mode of Treatment physical therapy;individual therapy  -     Patient Effort good  -[facility] Name 03/15/22 0958          General Information    Patient Profile Reviewed yes  -     Existing Precautions/Restrictions oxygen therapy device and L/min;fall  -[facility] Name 03/15/22 0958          Pain    Pretreatment Pain Rating 0/10 - no pain  -     Posttreatment Pain Rating 0/10 - no pain  -[facility] Name 03/15/22 0958          Cognition    Affect/Mental Status (Cognition) WFL  -     Orientation Status (Cognition) oriented x 4  -     Personal Safety Interventions nonskid shoes/slippers when out of bed;muscle strengthening facilitated;gait belt;fall prevention program maintained  -[facility] Name 03/15/22 0958          Bed Mobility    Bed Mobility supine-sit  -     Supine-Sit Person (Bed Mobility) contact guard  -     Sit-Supine Person (Bed Mobility) --  -     Assistive Device (Bed Mobility) head of bed elevated;bed rails  -[facility] Name 03/15/22 0958          Transfers    Transfers stand-sit transfer;sit-stand transfer;bed-chair transfer  -     Bed-Chair Person (Transfers) contact guard  -     Assistive Device (Bed-Chair Transfers) walker, front-wheeled  -     Sit-Stand Person (Transfers) contact guard  -     Stand-Sit Person (Transfers) contact guard  -[facility] Name 03/15/22 0958          Sit-Stand Transfer    Assistive Device (Sit-Stand Transfers) walker, front-wheeled  -[facility] Name 03/15/22 0958          Stand-Sit Transfer    Assistive Device (Stand-Sit Transfers)  walker, front-wheeled  -     Row Name 03/15/22 0958          Gait/Stairs (Locomotion)    Gait/Stairs Locomotion gait/ambulation independence;gait/ambulation assistive device;distance ambulated;gait pattern;gait deviations;maintains weight-bearing status  -     [place] Level (Gait) contact guard  -     Assistive Device (Gait) walker, front-wheeled  -     Distance in Feet (Gait) 45  -     Deviations/Abnormal Patterns (Gait) gait speed decreased;bobby decreased;stride length decreased  -     Bilateral Gait Deviations forward flexed posture  -     Row Name 03/15/22 0958          Safety Issues, Functional Mobility    Impairments Affecting Function (Mobility) strength;endurance/activity tolerance;shortness of breath;balance;pain  -[facility] Name 03/15/22 0958          Vital Signs    Pre Systolic BP Rehab 123  -     Pre Treatment Diastolic BP 68  -DANIEL     Post Systolic BP Rehab 119  -DANIEL     Post Treatment Diastolic BP 70  -     Pretreatment Heart Rate (beats/min) 102  -DANIEL     Posttreatment Heart Rate (beats/min) 105  -     Pre SpO2 (%) 98  -     O2 Delivery Pre Treatment supplemental O2  -     Pre Patient Position Supine  -     Post Patient Position Supine  -[facility] Name 03/15/22 0958          Positioning and Restraints    Pre-Treatment Position in bed  -     Post Treatment Position bed  -     In Bed fowlers;call light within reach;encouraged to call for assist;exit alarm on  -[facility] Name 03/15/22 0958          Therapy Assessment/Plan (PT)    Criteria for Skilled Interventions Met (PT) yes;skilled treatment is necessary  -[facility] Name 03/15/22 0958          Bed Mobility Goal 1 (PT)    Activity/Assistive Device (Bed Mobility Goal 1, PT) sit to supine/supine to sit  -     [place] Level/Cues Needed (Bed Mobility Goal 1, PT) modified independence  -DANIEL     Time Frame (Bed Mobility Goal 1, PT) by discharge  -     Strategies/Barriers (Bed Mobility Goal 1, PT) Maintain SpO2  >90%.  -DANIEL     Row Name 03/15/22 0958          Transfer Goal 1 (PT)    Activity/Assistive Device (Transfer Goal 1, PT) sit-to-stand/stand-to-sit;bed-to-chair/chair-to-bed;walker, rolling  -DANIEL     [place] Level/Cues Needed (Transfer Goal 1, PT) modified independence  -DANIEL     Time Frame (Transfer Goal 1, PT) by discharge  -DANIEL     Strategies/Barriers (Transfers Goal 1, PT) Maintain SpO2 >90%.  -DANIEL     Progress/Outcome (Transfer Goal 1, PT) goal not met  -DANIEL     Row Name 03/15/22 0958          Gait Training Goal 1 (PT)    Activity/Assistive Device (Gait Training Goal 1, PT) walker, rolling  -DANIEL     [place] Level (Gait Training Goal 1, PT) modified independence  -DANIEL     Distance (Gait Training Goal 1, PT) 75'x2.  -DANIEL     Time Frame (Gait Training Goal 1, PT) by discharge  -DANIEL     Strategies/Barriers (Gait Training Goal 1, PT) Maintain SpO2 >90%.  -DANIEL     Progress/Outcome (Gait Training Goal 1, PT) goal not met  -DANIEL           User Key  (r) = Recorded By, (t) = Taken By, (c) = Cosigned By    Initials Name Provider Type    Dc Iverson, PTA Physical Therapy Assistant                Physical Therapy Education                 Title: PT OT SLP Therapies (In Progress)     Topic: Physical Therapy (In Progress)     Point: Mobility training (In Progress)     Learning Progress Summary           Patient Acceptance, E, NR by DANIEL at 3/15/2022 1259    Acceptance, E, NR by DANIEL at 3/14/2022 1351    Acceptance, E, NR by DANIEL at 3/11/2022 1438    Acceptance, E, NR by DANIEL at 3/10/2022 1318    Acceptance, E, NR by DANIEL at 3/9/2022 1614    Acceptance, E, VU,NR by  at 3/8/2022 0937    Comment: PT POC, breathing technique, transfer technique.                   Point: Home exercise program (Not Started)     Learner Progress:  Not documented in this visit.          Point: Body mechanics (Not Started)     Learner Progress:  Not documented in this visit.          Point: Precautions (Not Started)     Learner Progress:  Not documented in  this visit.                      User Key     Initials Effective Dates Name Provider Type Discipline     06/16/21 -  Dc Thakur, PTA Physical Therapy Assistant PT    CZ 06/16/21 -  Martin Alvarez, PT Physical Therapist PT              PT Recommendation and Plan  Anticipated Discharge Disposition (PT): assisted living  Therapy Frequency (PT): other (see comments) (3-7 days/week)  Plan of Care Reviewed With: patient  Progress: improving  Outcome Evaluation: pt responded well to PT w/ increased gait to 45 ft CGA w/ RW. pt requires CGA for bed mobiity and t/f to recliner. VSS throughout PT tx. no new goal smet at this time. pt would continue to benefit from PT services.   Outcome Measures     Row Name 03/15/22 0958 03/14/22 1326 03/13/22 1500       How much help from another person do you currently need...    Turning from your back to your side while in flat bed without using bedrails? 3  -DANIEL 3  -DANIEL 3  -TA    Moving from lying on back to sitting on the side of a flat bed without bedrails? 3  -DANIEL 3  -DANIEL 3  -TA    Moving to and from a bed to a chair (including a wheelchair)? 3  -DANIEL 3  -DANIEL 3  -TA    Standing up from a chair using your arms (e.g., wheelchair, bedside chair)? 3  -DANIEL 3  -DANIEL 3  -TA    Climbing 3-5 steps with a railing? 3  -DANIEL 3  -DANIEL 2  -TA    To walk in hospital room? 3  -DANIEL 3  -DANIEL 3  -TA    AM-PAC 6 Clicks Score (PT) 18  -DANIEL 18  -DANIEL 17  -TA       Functional Assessment    Outcome Measure Options AM-PAC 6 Clicks Basic Mobility (PT)  -DANIEL AM-PAC 6 Clicks Basic Mobility (PT)  -DANIEL AM-PAC 6 Clicks Basic Mobility (PT)  -TA    Row Name 03/12/22 1500             How much help from another person do you currently need...    Turning from your back to your side while in flat bed without using bedrails? 3  -TA      Moving from lying on back to sitting on the side of a flat bed without bedrails? 3  -TA      Moving to and from a bed to a chair (including a wheelchair)? 3  -TA      Standing up from a chair using  your arms (e.g., wheelchair, bedside chair)? 3  -TA      Climbing 3-5 steps with a railing? 2  -TA      To walk in hospital room? 3  -TA      AM-PAC 6 Clicks Score (PT) 17  -TA              Functional Assessment    Outcome Measure Options AM-PAC 6 Clicks Basic Mobility (PT)  -TA            User Key  (r) = Recorded By, (t) = Taken By, (c) = Cosigned By    Initials Name Provider Type    Anabelle Lopez PTA Physical Therapy Assistant    Dc Iverson PTA Physical Therapy Assistant                 Time Calculation:    PT Charges     Row Name 03/15/22 1301             Time Calculation    Start Time 0958  -DANIEL      Stop Time 1029  -DANIEL      Time Calculation (min) 31 min  -DANIEL      PT Non-Billable Time (min) 5 min  -DANIEL              Time Calculation- PT    Total Timed Code Minutes- PT 26 minute(s)  -DANIEL              Timed Charges    29665 - Gait Training Minutes  10  -DANIEL      85777 - PT Therapeutic Activity Minutes 16  -DANIEL              Total Minutes    Timed Charges Total Minutes 26  -DANIEL       Total Minutes 26  -DANIEL            User Key  (r) = Recorded By, (t) = Taken By, (c) = Cosigned By    Initials Name Provider Type    Dc Iverson PTA Physical Therapy Assistant              Therapy Charges for Today     Code Description Service Date Service Provider Modifiers Qty    58165931886 HC PT THER PROC EA 15 MIN 3/14/2022 Dc Thakur, PTA GP 1    65414059142 HC GAIT TRAINING EA 15 MIN 3/14/2022 Dc Thakur, PTA GP 1    15334263907 HC PT THERAPEUTIC ACT EA 15 MIN 3/14/2022 Dc Thakur, PTA GP 1    22052406104 HC GAIT TRAINING EA 15 MIN 3/15/2022 Dc Thakur, PTA GP 1    13581779644 HC PT THERAPEUTIC ACT EA 15 MIN 3/15/2022 Dc Thakur, PTA GP 1          PT G-Codes  Outcome Measure Options: AM-PAC 6 Clicks Basic Mobility (PT)  AM-PAC 6 Clicks Score (PT): 18  AM-PAC 6 Clicks Score (OT): 16    Dc Thakur PTA  3/15/2022

## 2022-03-15 NOTE — PLAN OF CARE
Problem: Adult Inpatient Plan of Care  Goal: Plan of Care Review  Outcome: Ongoing, Progressing  Flowsheets (Taken 3/15/2022 1300)  Progress: improving  Outcome Evaluation: pt responded well to PT w/ increased gait to 45 ft CGA w/ RW. pt requires CGA for bed mobiity and t/f to recliner. VSS throughout PT tx. no new goals met at this time. pt would continue to benefit from PT services.

## 2022-03-15 NOTE — SIGNIFICANT NOTE
03/15/22 1558   OTHER   Discipline occupational therapy assistant   Rehab Time/Intention   Session Not Performed patient/family declined, not feeling well  (Pt declined tx due to fatigue.)

## 2022-03-15 NOTE — PROGRESS NOTES
"[facility]  [address] [place] [address]. [address]  T - [phone]  F - [phone]     Progress Note          PATIENT  DEMOGRAPHICS   PATIENT NAME: Naomi Duong Son                      PHYSICIAN: Stevenson Robertson MD  : 1935  MRN: [id number]   LOS: 7 days    Patient Care Team:  Sabino Mobley MD as PCP - General (Family Medicine)  Subjective   SUBJECTIVE      Comfortable today. Breathing better. No chest pain     Objective   OBJECTIVE   Vital Signs  Temp:  [97 °F (36.1 °C)-97.6 °F (36.4 °C)] 97 °F (36.1 °C)  Heart Rate:  [] 96  Resp:  [18-22] 18  BP: (102-130)/(57-66) 129/62    Flowsheet Rows    Flowsheet Row First Filed Value   Admission Height 157.5 cm (62\") Documented at 2022 0504   Admission Weight 46.7 kg (103 lb) Documented at 2022 0504           I/O last 3 completed shifts:  In: 940 [P.O.:940]  Out: 500 [Urine:500]    PHYSICAL EXAM    Physical Exam  Constitutional:       Appearance: She is well-developed.   HENT:      Head: Normocephalic.   Eyes:      Pupils: Pupils are equal, round, and reactive to light.   Cardiovascular:      Rate and Rhythm: Normal rate and regular rhythm.      Heart sounds: Normal heart sounds.   Pulmonary:      Effort: Pulmonary effort is normal.      Breath sounds: Normal breath sounds.   Abdominal:      General: Bowel sounds are normal.      Palpations: Abdomen is soft.   Musculoskeletal:         General: Swelling present.   Skin:     Coloration: Skin is not jaundiced.   Neurological:      General: No focal deficit present.      Mental Status: She is alert and oriented to person, place, and time.         RESULTS   Results Review:    Results from last 7 days   Lab Units 03/15/22  0530 22  0553 22  1051   SODIUM mmol/L 133* 136 136   POTASSIUM mmol/L 4.9 4.9 5.0   CHLORIDE mmol/L 91* 95* 95*   CO2 mmol/L 36.0* 35.0* 37.0*   BUN mg/dL 43* 42* 41*   CREATININE mg/dL 1.56* 1.66* 1.63*   CALCIUM mg/dL 9.1 9.0 8.8   BILIRUBIN " mg/dL 0.6  --  0.5   ALK PHOS U/L 76  --  64   ALT (SGPT) U/L 11  --  9   AST (SGOT) U/L 16  --  14   GLUCOSE mg/dL 95 92 124*       Estimated Creatinine Clearance: 22.4 mL/min (A) (by C-G formula based on SCr of 1.56 mg/dL (H)).    Results from last 7 days   Lab Units 03/10/22  0637 03/09/22  0602   MAGNESIUM mg/dL 2.1 2.1   PHOSPHORUS mg/dL  --  4.6*             Results from last 7 days   Lab Units 03/15/22  0530 03/14/22  0553 03/13/22  1051 03/12/22  0555 03/11/22  0613   WBC 10*3/mm3 6.14 6.52 4.83 5.13 4.86   HEMOGLOBIN g/dL 9.8* 9.4* 8.8* 9.4* 8.8*   PLATELETS 10*3/mm3 217 205 187 168 159       Results from last 7 days   Lab Units 03/11/22  0903   INR  1.08         Imaging Results (Last 24 Hours)     ** No results found for the last 24 hours. **           MEDICATIONS    apixaban, 2.5 mg, Oral, BID  aspirin, 81 mg, Oral, Daily  furosemide, 40 mg, Oral, Daily  ipratropium-albuterol, 3 mL, Nebulization, TID - RT  midodrine, 5 mg, Oral, BID AC  polyethylene glycol, 17 g, Oral, Daily  sodium chloride, 10 mL, Intravenous, Q12H      hold, 1 each        Assessment/Plan   ASSESSMENT / PLAN      Acute on chronic respiratory failure with hypoxia (HCC)    Acute on chronic systolic CHF (congestive heart failure) (HCC)    Bilateral pleural effusion    CKD (chronic kidney disease) stage 4, GFR 15-29 ml/min (HCC)    Atrial fibrillation (HCC)    Chronic anemia       1.  CKD 4- Baseline creatinine is around 1.7-1.8, creatinine peak at 2.07. now some better close to baseline.     CO2 is elevated with underlying resp acidosis.  overall bicarb is stable. We will keep Lasix to 40 mg p.o. daily.     2.  Acute on chronic CHF / Bilateral pleural effusions- s/p thoracentesis     3.  Acute on chronic respiratory failure- secondary to underlying CHF as well as new onset atrial fibrillation and chronic pleural effusions.  Managed per primary team.     4.  Atrial fibrillation- managed per cardiology and primary team     6.  Anemia-  worsening, fe low and on iv fe. hgb better     7.  History of secondary hyperparathyroidism     8.  History of CAD    9. Hyperkalemia better after lokelma. Currently stable.    Will sign off here pls call for questions. Awaiting transfer to rehab facility              This document has been electronically signed by Stevenson Robertson MD on March 15, 2022 16:37 CDT

## 2022-03-15 NOTE — PROGRESS NOTES
[facility] Medicine Services  INPATIENT PROGRESS NOTE    Length of Stay: 7  Date of Admission: 3/7/2022  Primary Care Physician: Sabino Mobley MD    Subjective   Chief Complaint: No complaints    HPI: This is an 87-year-old female with past medical history of CAD, LEROY, chronic systolic heart failure (EF 35%), CKD 3, GERD, HLD, HTN, ischemic cardiomyopathy and OA who presented to [facility] on 3/7/2022 with complaints of shortness of air.  She chronically wears 2 L of oxygen at home.    The patient was found to be in atrial fibrillation with RVR.  She had bilateral pleural effusions and required thoracentesis.  Approximately 1200 mL of serous fluid was removed from left.    3/14/2022: The patient is currently on 8 L of oxygen.  Repeat chest x-ray today shows bilateral pleural effusion with underlying basilar infiltrative changes, increasing on the left since prior exam.     3/15/2022:  Patient states her breathing is improved today.  Weight is down 3 lbs overnight.      Review of Systems   Constitutional: Positive for activity change, appetite change and fatigue.   HENT: Negative for ear pain and sore throat.    Eyes: Negative for pain and discharge.   Respiratory: Positive for shortness of breath. Negative for cough.    Cardiovascular: Negative for chest pain and palpitations.   Gastrointestinal: Negative for abdominal pain and nausea.   Endocrine: Negative for cold intolerance and heat intolerance.   Genitourinary: Negative for difficulty urinating and dysuria.   Musculoskeletal: Negative for arthralgias and gait problem.   Skin: Negative for color change and rash.   Neurological: Negative for dizziness and weakness.   Psychiatric/Behavioral: Negative for agitation and confusion.        Objective    Temp:  [97 °F (36.1 °C)-98 °F (36.7 °C)] 97.1 °F (36.2 °C)  Heart Rate:  [] 101  Resp:  [18-22] 22  BP: (102-145)/(57-85) 130/66    Physical Exam  Constitutional:       Appearance: She is  well-developed.   HENT:      Head: Normocephalic and atraumatic.   Eyes:      Pupils: Pupils are equal, round, and reactive to light.   Cardiovascular:      Rate and Rhythm: Normal rate and regular rhythm.   Pulmonary:      Effort: Pulmonary effort is normal.      Breath sounds: Normal breath sounds.   Abdominal:      General: Bowel sounds are normal.      Palpations: Abdomen is soft.   Musculoskeletal:         General: Normal range of motion.      Cervical back: Normal range of motion and neck supple.   Skin:     General: Skin is warm and dry.   Neurological:      Mental Status: She is alert and oriented to person, place, and time.   Psychiatric:         Behavior: Behavior normal.       Results Review:  I have reviewed the labs, radiology results, and diagnostic studies.    Laboratory Data:   Results from last 7 days   Lab Units 03/15/22  0530 03/14/22  0553 03/13/22  1051   SODIUM mmol/L 133* 136 136   POTASSIUM mmol/L 4.9 4.9 5.0   CHLORIDE mmol/L 91* 95* 95*   CO2 mmol/L 36.0* 35.0* 37.0*   BUN mg/dL 43* 42* 41*   CREATININE mg/dL 1.56* 1.66* 1.63*   GLUCOSE mg/dL 95 92 124*   CALCIUM mg/dL 9.1 9.0 8.8   BILIRUBIN mg/dL 0.6  --  0.5   ALK PHOS U/L 76  --  64   ALT (SGPT) U/L 11  --  9   AST (SGOT) U/L 16  --  14   ANION GAP mmol/L 6.0 6.0 4.0*     Estimated Creatinine Clearance: 22.4 mL/min (A) (by C-G formula based on SCr of 1.56 mg/dL (H)).  Results from last 7 days   Lab Units 03/10/22  0637 03/09/22  0602   MAGNESIUM mg/dL 2.1 2.1   PHOSPHORUS mg/dL  --  4.6*         Results from last 7 days   Lab Units 03/15/22  0530 03/14/22  0553 03/13/22  1051 03/12/22  0555 03/11/22  0613   WBC 10*3/mm3 6.14 6.52 4.83 5.13 4.86   HEMOGLOBIN g/dL 9.8* 9.4* 8.8* 9.4* 8.8*   HEMATOCRIT % 31.7* 30.0* 28.3* 30.7* 28.3*   PLATELETS 10*3/mm3 217 205 187 168 159     Results from last 7 days   Lab Units 03/11/22  0903   INR  1.08       Culture Data:   No results found for: BLOODCX  No results found for: URINECX  No results found  for: RESPCX  No results found for: WOUNDCX  No results found for: STOOLCX  No components found for: BODYFLD    Radiology Data:   Imaging Results (Last 24 Hours)     ** No results found for the last 24 hours. **          I have reviewed the patient's current medications.     Assessment/Plan     Active Hospital Problems    Diagnosis    • **Acute on chronic respiratory failure with hypoxia ([facility])    • Chronic anemia    • Bilateral pleural effusion    • CKD (chronic kidney disease) stage 4, GFR 15-29 ml/min ([facility])    • Atrial fibrillation ([facility])    • Acute on chronic systolic CHF (congestive heart failure) ([facility])        Plan:    1.  Acute on chronic heart failure with reduced ejection fraction: Cardiology consult appreciated.  Continue IV Lasix, fluid restrictions, strict I&O and sodium reduction.  Left thoracentesis on 3/11 of 1200 ml.  2.  Acute on chronic respiratory failure with hypoxia: Patient is currently on 8 L of oxygen.  3.  Atrial fibrillation, now rate controlled: Continue Coreg and Eliquis.  4.  Acute on chronic anemia: Continue IV iron.  Hemoglobin today is 9.8.  5.  Chronic kidney disease, stage IV: Baseline creatinine 1.7-1.8.  Creatinine today is 1.56.  Nephrology consult appreciated.    Cardiology spoke with patient today and she was changed to DNR CODE STATUS.    Discharge Planning: I expect patient to be discharged to home in 3-4 days.    I confirmed that the patient's Advance Care Plan is present, code status is documented, or surrogate decision maker is listed in the patient's medical record.      I have utilized all available immediate resources to obtain, update, or review the patient's current medications.         This document has been electronically signed by CHANA Spring on March 15, 2022 11:07 CDT

## 2022-03-15 NOTE — PLAN OF CARE
Goal Outcome Evaluation:              Outcome Evaluation: Pt up to the chair once today and tolerated well. Pt still on 8L NC and is SOB with exertion. Pt has had no complaints of pain.

## 2022-03-15 NOTE — PROGRESS NOTES
Item 0 Points 1 Point 2 Points 3 Points 4 Points Subtotal   Mental Status Alert, oriented, cooperative Lethargic, follows commands Confused, not following commands Obtunded or Somnolent Comatose 0   Respiratory Pattern Regular RR 8-16 breaths/minute Increased RR 18-25 breaths/minute Dyspnea on exertion, irregular RR 26-30 breaths/minute Shortness of breath,  RR 31-35 breaths/minute Accessory muscle use, severe SOB  RR > 35 breaths/minute 1   Breath Sounds Clear Decreased unilaterally Decreased bilaterally Basilar crackles Wheezing and/or rhonchi 2   Cough Strong, spontaneous, non-productive Strong productive Weak, non-productive Weak, productive or weak with rhonchi Absent or may require suctioning 0   Pulmonary Status Nonsmoker, no previous history, >1 year quit < 1 PPD  <1 year quit > or = 1 PPD Diagnosed pulmonary disease (severe or chronic) Severe or chronic pulmonary disease with exacerbation 0   Surgical Status None General surgery (non-abdominal or non-thoracic) Lower abdominal Thoracic or upper abdominal Thoracic with pulmonary disease 0   Chest X-ray Clear Chronic changes Infiltrates, atelectasis or pleural effusion Infiltrates in > 1 lobe Diffuse infiltrates and atelectasis and/or effusions 2   Activity   Level Ambulatory Ambulatory with assistance Non-ambulatory Paraplegic Quadriplegic 0        Total Score   5   Score    Drug Therapy Frequency 20 or >    Q4 Duoneb with Q2 Albuterol PRN 15-19    Q6 Duoneb with Q4 Albuterol PRN 10-14    QID Duoneb with Q4 Albuterol PRN 5-9    TID Duoneb with Q6 Albuterol PRN 0-4    Q4 PRN Duoneb                  Lung Expansion Therapy (PEP) Bronchopulmonary Hygiene (CPT)   Q4 & PRN - Severe atelectasis, poor oxygenation Q4 - Copious secretions, dyspnea, unable to sleep   QID - High risk for persistent atelectasis, existence of atelectasis QID & Q4 PRN - Moderate secretion production   TID - At risk for developing atelectasis TID - Small amounts of secretions with poor cough    BID - Prevention of atelectasis BID - Unable to breathe deeply and cough spontaneously     RT Comments / Recommendations:

## 2022-03-15 NOTE — PROGRESS NOTES
"  [facility] Progress Note   LOS: 7 days   Patient Care Team:  Sabino Mobley MD as PCP - General (Family Medicine)    Chief Complaint   Patient presents with    Shortness of Breath         Subjective     Interval History:   Patient Denies: chest pain  Patient Complaints: shortness of air and weakness  History taken from: patient    Patient reports feeling some better today. Shortness of breath improved today. CXR yesterday showing increasing pleural effusion on the left.       Objective     Vital Sign Min/Max for last 24 hours  Temp  Min: 97 °F (36.1 °C)  Max: 98 °F (36.7 °C)   BP  Min: 102/57  Max: 145/85   Pulse  Min: 94  Max: 111   Resp  Min: 18  Max: 22   SpO2  Min: 90 %  Max: 95 %   Flow (L/min)  Min: 8  Max: 8   Weight  Min: 53.8 kg (118 lb 9.6 oz)  Max: 53.8 kg (118 lb 9.6 oz)     Flowsheet Rows      Flowsheet Row First Filed Value   Admission Height 157.5 cm (62\") Documented at 03/07/2022 0504   Admission Weight 46.7 kg (103 lb) Documented at 03/07/2022 0504              03/12/22  0500 03/14/22  0457 03/15/22  0600   Weight: 55.3 kg (122 lb) 55.3 kg (121 lb 15.9 oz) 53.8 kg (118 lb 9.6 oz)       Physical Exam:  Physical Exam  Constitutional:       Appearance: She is ill-appearing.   HENT:      Mouth/Throat:      Mouth: Mucous membranes are dry.   Cardiovascular:      Rate and Rhythm: Normal rate. Rhythm irregular.      Pulses: Normal pulses.   Pulmonary:      Effort: No tachypnea or accessory muscle usage.      Breath sounds: Examination of the right-middle field reveals decreased breath sounds. Examination of the left-middle field reveals decreased breath sounds. Examination of the right-lower field reveals decreased breath sounds. Examination of the left-lower field reveals decreased breath sounds. Decreased breath sounds present.   Abdominal:      General: Abdomen is flat.      Palpations: Abdomen is soft.   Musculoskeletal:         General: No swelling.      Right lower leg: No edema.      Left " lower leg: No edema.   Skin:     General: Skin is warm.      Coloration: Skin is pale.   Neurological:      General: No focal deficit present.   Psychiatric:         Mood and Affect: Mood normal.          Results Reviewed by myself:     Results from last 7 days   Lab Units 03/15/22  0530 03/14/22  0553 03/13/22  1051   SODIUM mmol/L 133* 136 136   POTASSIUM mmol/L 4.9 4.9 5.0   CHLORIDE mmol/L 91* 95* 95*   CO2 mmol/L 36.0* 35.0* 37.0*   BUN mg/dL 43* 42* 41*   CREATININE mg/dL 1.56* 1.66* 1.63*   CALCIUM mg/dL 9.1 9.0 8.8   BILIRUBIN mg/dL 0.6  --  0.5   ALK PHOS U/L 76  --  64   ALT (SGPT) U/L 11  --  9   AST (SGOT) U/L 16  --  14   GLUCOSE mg/dL 95 92 124*       Estimated Creatinine Clearance: 22.4 mL/min (A) (by C-G formula based on SCr of 1.56 mg/dL (H)).    Results from last 7 days   Lab Units 03/10/22  0637 03/09/22  0602   MAGNESIUM mg/dL 2.1 2.1   PHOSPHORUS mg/dL  --  4.6*             Results from last 7 days   Lab Units 03/15/22  0530 03/14/22  0553 03/13/22  1051 03/12/22  0555 03/11/22  0613   WBC 10*3/mm3 6.14 6.52 4.83 5.13 4.86   HEMOGLOBIN g/dL 9.8* 9.4* 8.8* 9.4* 8.8*   PLATELETS 10*3/mm3 217 205 187 168 159       Results from last 7 days   Lab Units 03/11/22  0903   INR  1.08     Lab Results   Component Value Date    PROBNP 16,929.0 (H) 03/14/2022       I/O last 3 completed shifts:  In: 940 [P.O.:940]  Out: 500 [Urine:500]    Cardiographics:  ECG/EMG Results (last 24 hours)       Procedure Component Value Units Date/Time    SCANNED - TELEMETRY   [[id number]] Resulted: 03/07/22     Updated: 03/09/22 1839    SCANNED - TELEMETRY   [[id number]] Resulted: 03/07/22     Updated: 03/09/22 1857    SCANNED - TELEMETRY   [[id number]] Resulted: 03/07/22     Updated: 03/10/22 1016            Results for orders placed during the hospital encounter of 03/07/22    Adult Transthoracic Echo Complete w/ Color, Spectral and Contrast if necessary per protocol    Interpretation Summary  · Estimated left ventricular EF =  35% Left ventricular ejection fraction appears to be 31 - 35%. Left ventricular systolic function is moderately decreased. The left ventricular cavity is borderline dilated. Left ventricular wall thickness is consistent with mild concentric hypertrophy. There is left ventricular global hypokinesis noted. Left ventricular diastolic function is consistent with (grade II w/high LAP) pseudonormalization.  · The left atrial cavity is moderately dilated.  · Moderate mitral valve regurgitation is present with an eccentric jet noted.  · Moderate tricuspid valve regurgitation is present.  · Estimated right ventricular systolic pressure from tricuspid regurgitation is moderately elevated (45-55 mmHg).  · Borderline dilation of the aortic root is present (3.8 cms)      XR Chest 1 View    Result Date: 3/11/2022  Interval left thoracentesis. Small residual left pleural effusion. Persistent moderate right pleural effusion without significant change. No pneumothorax. Electronically signed by:  Surendra Carter MD  3/11/2022 10:46 AM CST Workstation: [id number]    XR Chest 1 View    Result Date: 3/8/2022  Some improvement and fluid status compared with yesterday. Significant residual pleural effusions and basilar volume loss/consolidation left greater than right. No new abnormality. Electronically signed by:  Gael Caballero MD  3/8/2022 7:59 AM CST Workstation: [id number]    XR Chest 1 View    Result Date: 3/7/2022    Bilateral pleural effusions, left greater right with likely associated atelectasis. Electronically signed by:  Tish Avalos MD  3/7/2022 6:12 AM CST Workstation: [id number]    US Thoracentesis    Result Date: 3/11/2022  CONCLUSION: Successful ultrasound-guided thoracentesis with removal of 1200 mL of serous fluid, as described above. Electronically signed by:  Surendra Carter MD  3/11/2022 10:44 AM CST Workstation: [id number]    XR Chest PA & Lateral    Result Date: 3/14/2022  Cardiomegaly. Midline sternal sutures  from prior cardiac surgery. Bilateral pleural effusions and underlying basilar infiltrative changes, increasing on the left since prior exam. Electronically signed by:  Andrae Garza MD  3/14/2022 9:47 AM CDT Workstation: [id number]      Medication Review:     Current Facility-Administered Medications:     acetaminophen (TYLENOL) tablet 650 mg, 650 mg, Oral, Q4H PRN, 650 mg at 03/13/22 0913 **OR** acetaminophen (TYLENOL) 160 MG/5ML solution 650 mg, 650 mg, Oral, Q4H PRN **OR** acetaminophen (TYLENOL) suppository 650 mg, 650 mg, Rectal, Q4H PRN, Randall Cabrera MD    albuterol (PROVENTIL) nebulizer solution 0.083% 2.5 mg/3mL, 2.5 mg, Nebulization, Q6H PRN, Levill, Haylee G, APRN    apixaban (ELIQUIS) tablet 2.5 mg, 2.5 mg, Oral, BID, Roberto Jaramillo MD, 2.5 mg at 03/15/22 0842    aspirin EC tablet 81 mg, 81 mg, Oral, Daily, Roberto Jaramillo MD, 81 mg at 03/15/22 0842    bisacodyl (DULCOLAX) suppository 10 mg, 10 mg, Rectal, Daily PRN, Roberto Jaramillo MD, 10 mg at 03/13/22 1458    calcium carbonate (TUMS) chewable tablet 500 mg (200 mg elemental), 1 tablet, Oral, BID PRN, Randall Cabrera MD    furosemide (LASIX) tablet 40 mg, 40 mg, Oral, Daily, Stevenson Robertson MD, 40 mg at 03/15/22 0842    Hold medication, 1 each, Does not apply, Continuous PRN, Roberto Jaramillo MD    ipratropium-albuterol (DUO-NEB) nebulizer solution 3 mL, 3 mL, Nebulization, TID - RT, Levill, Haylee G, APRN, 3 mL at 03/15/22 0756    Magnesium Sulfate 2 gram Bolus, followed by 8 gram infusion (total Mg dose 10 grams)- Mg less than or equal to 1mg/dL, 2 g, Intravenous, PRN **OR** Magnesium Sulfate 2 gram / 50mL Infusion (GIVE X 3 BAGS TO EQUAL 6GM TOTAL DOSE) - Mg 1.1 - 1.5 mg/dl, 2 g, Intravenous, PRN **OR** Magnesium Sulfate 4 gram infusion- Mg 1.6-1.9 mg/dL, 4 g, Intravenous, PRN, Randall Cabrera MD    melatonin tablet 5.25 mg, 5.25 mg, Oral, Nightly PRN, Randall Cabrera MD    midodrine (PROAMATINE) tablet 5 mg, 5 mg, Oral, BID AC, Roberto Jaramillo MD, 5 mg at  03/15/22 0623    ondansetron (ZOFRAN) injection 4 mg, 4 mg, Intravenous, Q6H PRN, Randall Cabrera MD    polyethylene glycol (MIRALAX) packet 17 g, 17 g, Oral, Daily, Roberto Jaramillo MD, 17 g at 03/15/22 0842    potassium chloride (MICRO-K) CR capsule 40 mEq, 40 mEq, Oral, PRN **OR** potassium chloride (KLOR-CON) packet 40 mEq, 40 mEq, Oral, PRN **OR** potassium chloride 10 mEq in 100 mL IVPB, 10 mEq, Intravenous, Q1H PRN, Randall Cabrera MD    potassium phosphate 45 mmol in sodium chloride 0.9 % 500 mL infusion, 45 mmol, Intravenous, PRN **OR** potassium phosphate 30 mmol in sodium chloride 0.9 % 250 mL infusion, 30 mmol, Intravenous, PRN **OR** Potassium Phosphates 15 mmol in sodium chloride 0.9 % 100 mL infusion, 15 mmol, Intravenous, PRN **OR** sodium phosphates 45 mmol in sodium chloride 0.9 % 500 mL IVPB, 45 mmol, Intravenous, PRN **OR** sodium phosphates 30 mmol in sodium chloride 0.9 % 250 mL IVPB, 30 mmol, Intravenous, PRN **OR** sodium phosphates 15 mmol in sodium chloride 0.9 % 250 mL IVPB, 15 mmol, Intravenous, PRN, Randall Cabrera MD    sodium chloride 0.9 % flush 10 mL, 10 mL, Intravenous, PRN, Randall Cabrera MD, 10 mL at 03/08/22 2118    sodium chloride 0.9 % flush 10 mL, 10 mL, Intravenous, Q12H, Randall Cabrera MD, 10 mL at 03/15/22 0844    sodium chloride 0.9 % flush 10 mL, 10 mL, Intravenous, PRNRick Enoch K, MD    Patient's recent labs and results as included in the subjective data were reviewed by me. Any pertinent outside data will be included.        Assessment/Plan       Acute on chronic respiratory failure with hypoxia (HCC)    Acute on chronic systolic CHF (congestive heart failure) (HCC)    Bilateral pleural effusion    CKD (chronic kidney disease) stage 4, GFR 15-29 ml/min ([facility])    Atrial fibrillation ([facility])    Chronic anemia    HFrEF: chronic and she has third spaced into her pleural cavities. She is not grossly volume overloaded. S/P thoracentesis- 1200ml.   EF 35%, ICM.  She appears to  have bilateral infiltrates.   - she is on 40mg PO lasix now. Euvolemic intravascularly.     Pleural effusions:  -1200ml removed from left lung by IR.   -CXR yesterday showing worsening effusion on the left. Improved today with dose IV lasix.        She has CKD IV.   -Nephrology will be on board to assist with diuretics.     New onset AF:  - Blood pressure is stable  - Tolerating Eliquis 2.5mg BID       Anemia  - s/p 1 unit PRBC  - occult stool    CODE STATUS of: DNR       Plan for disposition: Continue current location per primary. We will SIGN off. Please call with issues/concerns.             This document has been electronically signed by CHANA Dinh on March 15, 2022 11:19 CDT     Electronically signed by CHANA Dinh, 03/15/22, 11:18 AM CDT.    History and events reviewed.  Patient examined at 1 PM today.     Ada Son is symptomatically better today with mild decrease in dyspnea.  She has ischemic cardiomyopathy, HFrEF, persistent atrial fibrillation, anemia, left-sided pleural effusion.      She was able to sit up in a chair today.  No chest pain reported.  She does have dyspnea on exertion.  Rate controlled with underlying atrial fibrillation.  BUN 43 and creatinine 1.56.  Hemoglobin 9.8 and platelet count 217.    Continue current management with Lasix 40 mg daily, Eliquis 2.5 mg twice daily.  Nephrology following.  Patient is currently DNR.    Electronically signed by Kalee Cisneros MD, 03/15/22, 5:37 PM CDT.

## 2022-03-15 NOTE — DISCHARGE PLACEMENT REQUEST
"Duong Abdalla (87 y.o. Female)             Date of Birth   1935    Social Security Number   [id number]    Address   [address] [address]    Home Phone   [phone]    MRN   [id number]       [employer]ational   [employer]    Marital Status                               Admission Date   3/7/22    Admission Type   Emergency    Admitting Provider   Roberto Jaramillo MD    Attending Provider   Roberto Jaramillo MD    Department, Room/Bed   [address], 382/1       Discharge Date       Discharge Disposition       Discharge Destination                               Attending Provider: Roberto Jaramillo MD    Allergies: No Known Allergies    Isolation: None   Infection: None   Code Status: No CPR   Advance Care Planning Activity    Ht: 157.5 cm (62.01\")   Wt: 53.8 kg (118 lb 9.6 oz)    Admission Cmt: None   Principal Problem: Acute on chronic respiratory failure with hypoxia (HCC) [J96.21]                 Active Insurance as of 3/7/2022     Primary Coverage     Payor Plan Insurance Group Employer/Plan Group    MEDICARE MEDICARE A & B      Payor Plan Address Payor Plan Phone Number Payor Plan Fax Number Effective Dates    [address] [phone]  1/1/2002 - None Entered    [facility] [id number]       Subscriber Name Subscriber Birth Date Member ID       DUONG ABDALLA 1935 [id number]           Secondary Coverage     Payor Plan Insurance Group Employer/Plan Group     FOR LIFE  FOR LIFE  SUP  [id number]     Payor Plan Address Payor Plan Phone Number Payor Plan Fax Number Effective Dates    [address] [phone]  7/17/2016 - None Entered    [facility] [id number]       Subscriber Name Subscriber Birth Date Member ID       DUONG ABDALLA 1935 [id number]                 Emergency Contacts      (Rel.) Home Phone Work Phone Mobile Phone    Alden Abdalla (Rm) [phone] -- [phone]            Insurance Information                " "MEDICARE/MEDICARE A & B Phone: [phone]    Subscriber: Naomi Abdalla Subscriber#: [id number]    Group#: -- Precert#: --         FOR LIFE/ FOR LIFE  SUP  Phone: [phone]    Subscriber: Naomi Abdalla Subscriber#: [id number]    Group#: [id number] Precert#: --             History & Physical      Randall Cabrera MD at 03/07/22 0830     Summary:History and physical                   [facility] Medicine  HISTORY AND PHYSICAL      Date of Admission: 3/7/2022  Primary Care Physician: Sabino Mobley MD    Subjective     Chief Complaint: Shortness of breath and generalized weakness    History of Present Illness  Patient presents to emergency room extremely dyspneic, and deconditioned.  Patient states she normally wears oxygen as needed at home, but over past 30 days has worn oxygen 24/7 every day.  Patient states shortness of breath gradually worsening with time.  Denies chest pain, but admits to occasional chest discomfort.  Admits to increased weakness, and easy fatigue with minimal exertion.  Admits to increased chest pressure over past several days, and states she feels like \"rock on my chest.\"  Believes shortness of breath has worsened after recent carotid endarterectomy procedure.  She is D-dimer elevated in emergency room, however unable to perform CTA chest due to renal dysfunction.  Lateral pleural effusions right greater than left noted on ER imaging.  Patient also admits to previous lower extremity swelling, orthopnea, and PND worsening over the past month.  Denies fevers, chills, sick contacts, cough, or recent travel.      Review of Systems   Constitutional: Positive for activity change, appetite change and fatigue. Negative for chills and fever.   HENT: Negative for congestion, ear discharge, sinus pressure and sneezing.    Eyes: Negative for discharge and visual disturbance.   Respiratory: Positive for shortness of breath and wheezing. " Negative for chest tightness.    Cardiovascular: Negative for chest pain and palpitations.   Gastrointestinal: Negative for abdominal distention, constipation, diarrhea, nausea and vomiting.   Endocrine: Negative for polyphagia and polyuria.   Genitourinary: Positive for difficulty urinating. Negative for dysuria.   Musculoskeletal: Positive for gait problem and myalgias.   Skin: Negative for color change and wound.   Allergic/Immunologic: Negative for immunocompromised state.   Neurological: Positive for weakness. Negative for dizziness and syncope.   Hematological: Negative for adenopathy.   Psychiatric/Behavioral: Negative for agitation, confusion, hallucinations and suicidal ideas.          Otherwise complete ROS reviewed and negative except as mentioned in the HPI.    Past Medical History:   Past Medical History:   Diagnosis Date   • CAD (coronary artery disease)    • Carotid artery stenosis    • Chronic systolic heart failure (HCC)    • CKD (chronic kidney disease) stage 3, GFR 30-59 ml/min ([facility])    • GERD (gastroesophageal reflux disease)    • Hypercholesterolemia    • Hyperlipidemia    • Hypertension    • Iron deficiency anemia    • Ischemic cardiomyopathy    • MI (myocardial infarction) ([facility])    • Mitral valve disease    • Osteoarthritis    • UTI (urinary tract infection)      Past Surgical History:  Past Surgical History:   Procedure Laterality Date   • CARDIAC CATHETERIZATION     • CAROTID ENDARTERECTOMY     • CORONARY ARTERY BYPASS GRAFT     • TRANSESOPHAGEAL ECHOCARDIOGRAM (MISSY)       Social History:  reports that she quit smoking about 22 years ago. She has never used smokeless tobacco. She reports that she does not drink alcohol and does not use drugs.    Family History: family history includes Hypertension in her father.       Allergies:  No Known Allergies    Medications:  Prior to Admission medications    Medication Sig Start Date End Date Taking? Authorizing Provider   acetaminophen (TYLENOL) 325  "MG tablet Take 2 tablets by mouth Every 4 (Four) Hours As Needed for Mild Pain . 11/18/20   Ryan Dubois APRN   albuterol sulfate  (90 Base) MCG/ACT inhaler Inhale 2 puffs Every 4 (Four) Hours As Needed for Wheezing or Shortness of Air. 7/3/21   Roberto Jaramillo MD   aspirin 81 MG chewable tablet Chew 81 mg Daily.    ProviderChula MD   atorvastatin (LIPITOR) 20 MG tablet Take 1 tablet by mouth Every Night. 12/30/21   Ryan Dubois APRN   carvedilol (Coreg) 12.5 MG tablet Take 1 tablet by mouth 2 (Two) Times a Day With Meals. 8/9/21   Kalee Cisneros MD   clopidogrel (PLAVIX) 75 MG tablet Take 1 tablet by mouth Daily. 8/9/21   Kalee Cisneros MD   furosemide (LASIX) 40 MG tablet 40 mg 2 (Two) Times a Week. 8/29/21   ProviderChula MD   irbesartan (AVAPRO) 75 MG tablet Take 1 tablet by mouth Daily. 8/9/21   Kalee Cisneros MD   sennosides-docusate (senna-docusate sodium) 8.6-50 MG per tablet Take 2 tablets by mouth 2 (Two) Times a Day As Needed for Constipation. 11/18/20   Ryan Dubois APRN     I have utilized all available immediate resources to obtain, update, and review the patient's current medications.    Objective     Vital Signs: /75   Pulse 90   Temp 97.4 °F (36.3 °C) (Oral)   Resp 24   Ht 157.5 cm (62\")   Wt 46.7 kg (103 lb)   SpO2 99%   BMI 18.84 kg/m²   Physical Exam  Constitutional:       Appearance: Normal appearance. She is normal weight.   HENT:      Head: Normocephalic.      Nose: Nose normal.      Mouth/Throat:      Mouth: Mucous membranes are moist.      Pharynx: Oropharynx is clear.   Eyes:      Extraocular Movements: Extraocular movements intact.      Conjunctiva/sclera: Conjunctivae normal.      Pupils: Pupils are equal, round, and reactive to light.   Cardiovascular:      Rate and Rhythm: Normal rate and regular rhythm.      Pulses: Normal pulses.      Heart sounds: Normal heart sounds.   Pulmonary:      Effort: " Pulmonary effort is normal.      Breath sounds: Normal breath sounds.   Abdominal:      General: Abdomen is flat. Bowel sounds are normal.      Palpations: Abdomen is soft.   Musculoskeletal:         General: Swelling present.      Cervical back: Normal range of motion and neck supple.      Right lower leg: Edema present.      Left lower leg: Edema present.      Comments: +2 bilateral lower extremity edema appreciated pitting in nature   Skin:     General: Skin is warm and dry.      Capillary Refill: Capillary refill takes less than 2 seconds.   Neurological:      General: No focal deficit present.      Mental Status: She is alert and oriented to person, place, and time. Mental status is at baseline.      Sensory: No sensory deficit.      Motor: Weakness present.      Gait: Gait abnormal.   Psychiatric:         Mood and Affect: Mood normal.         Behavior: Behavior normal.         Thought Content: Thought content normal.         Judgment: Judgment normal.              Results Reviewed:  Lab Results (last 24 hours)     Procedure Component Value Units Date/Time    Protime-INR [[id number]]  (Normal) Collected: 03/07/22 0521    Specimen: Blood Updated: 03/07/22 0711     Protime 12.8 Seconds      INR 0.97    Narrative:      Therapeutic range for most indications is 2.0-3.0 INR,  or 2.5-3.5 for mechanical heart valves.    aPTT [[id number]]  (Normal) Collected: 03/07/22 0521    Specimen: Blood Updated: 03/07/22 0711     PTT 30.0 seconds     Narrative:      The recommended Heparin therapeutic range is 68-97 seconds.    Procalcitonin [[id number]]  (Normal) Collected: 03/07/22 0521    Specimen: Blood Updated: 03/07/22 0635     Procalcitonin 0.17 ng/mL     Narrative:      As a Marker for Sepsis (Non-Neonates):    1. <0.5 ng/mL represents a low risk of severe sepsis and/or septic shock.  2. >2 ng/mL represents a high risk of severe sepsis and/or septic shock.    As a Marker for Lower Respiratory Tract Infections that require  "antibiotic therapy:    PCT on Admission    Antibiotic Therapy       6-12 Hrs later    >0.5                Strongly Recommended  >0.25 - <0.5        Recommended   0.1 - 0.25          Discouraged              Remeasure/reassess PCT  <0.1                Strongly Discouraged     Remeasure/reassess PCT    As 28 day mortality risk marker: \"Change in Procalcitonin Result\" (>80% or <=80%) if Day 0 (or Day 1) and Day 4 values are available. Refer to http://www.[facility]-pct-calculator.com    Change in PCT <=80%  A decrease of PCT levels below or equal to 80% defines a positive change in PCT test result representing a higher risk for 28-day all-cause mortality of patients diagnosed with severe sepsis for septic shock.    Change in PCT >80%  A decrease of PCT levels of more than 80% defines a negative change in PCT result representing a lower risk for 28-day all-cause mortality of patients diagnosed with severe sepsis or septic shock.       [place] Draw [[id number]] Collected: 03/07/22 0521    Specimen: Blood Updated: 03/07/22 0633    Narrative:      The following orders were created for panel order [place] Draw.  Procedure                               Abnormality         Status                     ---------                               -----------         ------                     Green Top (Gel)[[id number]]                                  Final result               Lavender Top[[id number]]                                     Final result               Gold Top - SST[[id number]]                                   Final result               Light Blue Top[[id number]]                                   Final result                 Please view results for these tests on the individual orders.    Green Top (Gel) [[id number]] Collected: 03/07/22 0521    Specimen: Blood Updated: 03/07/22 0633     Extra Tube Hold for add-ons.     Comment: Auto resulted.       Lavender Top [[id number]] Collected: 03/07/22 0521    Specimen: Blood Updated: " 03/07/22 0633     Extra Tube hold for add-on     Comment: Auto resulted       Gold Top - SST [[id number]] Collected: 03/07/22 0521    Specimen: Blood Updated: 03/07/22 0633     Extra Tube Hold for add-ons.     Comment: Auto resulted.       Light Blue Top [[id number]] Collected: 03/07/22 0521    Specimen: Blood Updated: 03/07/22 0633     Extra Tube hold for add-on     Comment: Auto resulted       D-dimer, Quantitative [[id number]]  (Abnormal) Collected: 03/07/22 0521    Specimen: Blood Updated: 03/07/22 0624     D-Dimer, Quantitative 3,153 ng/mL (FEU)     Narrative:      Dimer values <500 ng/ml FEU are FDA approved as aid in diagnosis of deep venous thrombosis and pulmonary embolism.  This test should not be used in an exclusion strategy with pretest probability alone.    A recent guideline regarding diagnosis for pulmonary thromboembolism recommends an adjusted exclusion criterion of age x 10 ng/ml FEU for patients >50 years of age ([employer] Intern Med 2015; 163: 701-711).      CK [[id number]]  (Normal) Collected: 03/07/22 0521    Specimen: Blood Updated: 03/07/22 0624     Creatine Kinase 36 U/L     Magnesium [[id number]]  (Abnormal) Collected: 03/07/22 0521    Specimen: Blood Updated: 03/07/22 0624     Magnesium 2.5 mg/dL     COVID-19 and FLU A/B PCR - Swab, Nasopharynx [[id number]]  (Normal) Collected: 03/07/22 0545    Specimen: Swab from Nasopharynx Updated: 03/07/22 0611     COVID19 Not Detected     Influenza A PCR Not Detected     Influenza B PCR Not Detected    Narrative:      Fact sheet for providers: https://www.fda.gov/media/680310/download    Fact sheet for patients: https://www.fda.gov/media/224776/download    Test performed by PCR.    Comprehensive Metabolic Panel [[id number]]  (Abnormal) Collected: 03/07/22 0521    Specimen: Blood Updated: 03/07/22 0609     Glucose 93 mg/dL      BUN 32 mg/dL      Creatinine 1.77 mg/dL      Sodium 140 mmol/L      Potassium 5.4 mmol/L      Chloride 98 mmol/L      CO2 35.0 mmol/L       Calcium 9.2 mg/dL      Total Protein 7.2 g/dL      Albumin 3.90 g/dL      ALT (SGPT) 11 U/L      AST (SGOT) 17 U/L      Alkaline Phosphatase 84 U/L      Total Bilirubin 0.4 mg/dL      Globulin 3.3 gm/dL      A/G Ratio 1.2 g/dL      BUN/Creatinine Ratio 18.1     Anion Gap 7.0 mmol/L      eGFR 27.9 mL/min/1.73      Comment: National Kidney Foundation and American Society of Nephrology (ASN) Task Force recommended calculation based on the Chronic Kidney Disease Epidemiology Collaboration (CKD-EPI) equation refit without adjustment for race.       Narrative:      GFR Normal >60  Chronic Kidney Disease <60  Kidney Failure <15      Troponin [[id number]]  (Normal) Collected: 03/07/22 0521    Specimen: Blood Updated: 03/07/22 0602     Troponin T <0.010 ng/mL     Narrative:      Troponin T Reference Range:  <= 0.03 ng/mL-   Negative for AMI  >0.03 ng/mL-     Abnormal for myocardial necrosis.  Clinicians would have to utilize clinical acumen, EKG, Troponin and serial changes to determine if it is an Acute Myocardial Infarction or myocardial injury due to an underlying chronic condition.       Results may be falsely decreased if patient taking Biotin.      BNP [[id number]]  (Abnormal) Collected: 03/07/22 0521    Specimen: Blood Updated: 03/07/22 0601     proBNP 16,282.0 pg/mL     Narrative:      Among patients with dyspnea, NT-proBNP is highly sensitive for the detection of acute congestive heart failure. In addition NT-proBNP of <300 pg/ml effectively rules out acute congestive heart failure with 99% negative predictive value.    Results may be falsely decreased if patient taking Biotin.      CBC & Differential [[id number]]  (Abnormal) Collected: 03/07/22 0521    Specimen: Blood Updated: 03/07/22 0528    Narrative:      The following orders were created for panel order CBC & Differential.  Procedure                               Abnormality         Status                     ---------                               -----------          ------                     CBC Auto Differential[[id number]]        Abnormal            Final result                 Please view results for these tests on the individual orders.    CBC Auto Differential [[id number]]  (Abnormal) Collected: 03/07/22 0521    Specimen: Blood Updated: 03/07/22 0528     WBC 4.31 10*3/mm3      RBC 3.80 10*6/mm3      Hemoglobin 10.4 g/dL      Hematocrit 34.7 %      MCV 91.3 fL      MCH 27.4 pg      MCHC 30.0 g/dL      RDW 14.6 %      RDW-SD 48.4 fl      MPV 10.7 fL      Platelets 197 10*3/mm3      Neutrophil % 71.3 %      Lymphocyte % 15.5 %      Monocyte % 11.8 %      Eosinophil % 0.0 %      Basophil % 0.9 %      Immature Grans % 0.5 %      Neutrophils, Absolute 3.07 10*3/mm3      Lymphocytes, Absolute 0.67 10*3/mm3      Monocytes, Absolute 0.51 10*3/mm3      Eosinophils, Absolute 0.00 10*3/mm3      Basophils, Absolute 0.04 10*3/mm3      Immature Grans, Absolute 0.02 10*3/mm3      nRBC 0.0 /100 WBC         Imaging Results (Last 24 Hours)     Procedure Component Value Units Date/Time    XR Chest 1 View [[id number]] Collected: 03/07/22 0512     Updated: 03/07/22 0615    Narrative:      EXAM:    XR Chest, 1 View    CLINICAL HISTORY:    The patient is 87 years old and is Female; [facility] Triage Protocol    TECHNIQUE:    Frontal view of the chest.    COMPARISON:    XR Chest dated July 1 2021    FINDINGS:    LUNGS:  Coarsened interstitial markings are present throughout  the lungs with associated bilateral lower lobe infiltrates.    PLEURAL SPACE:  Moderate to large left pleural effusion and  small right pleural effusion are present.  No pneumothorax.    HEART:  The cardiac silhouette is obscured.    MEDIASTINUM:  Unremarkable.    BONES/JOINTS:  There are degenerative changes of the bones.  Median sternotomy is present.    VASCULATURE:  Atherosclerosis of the aorta is noted.    UPPER ABDOMEN:  Unremarkable as visualized.      Impression:        Bilateral pleural effusions, left greater right with  likely  associated atelectasis.    Electronically signed by:  Tish Avalos MD  3/7/2022 6:12 AM CST  Workstation: [id number]        I have personally reviewed and interpreted the radiology studies and ECG obtained at time of admission.     Assessment / Plan     Assessment:   Active Hospital Problems    Diagnosis    • Acute on chronic respiratory failure with hypoxia (HCC)      Symptomatic hypoxia most likely secondary to CHF exacerbation however need to rule out pulmonary embolus.    Plan:     Hypoxia:  -VQ scan in a.m.  Heparin gtt. until VQ scan results available.  IV Lasix, strict I's and O's, fluid restriction 1200 mL/day, daily weights, echocardiogram for empirical CHF exacerbation care.    Deconditioning: PT/OT evaluation ordered at time of admission.  Patient may require short-term rehab at time of hospital discharge.      FEN cardiac with 1200 mL/day fluid restriction, electrolyte replacement protocol  PPX Heparin gtt.      Code Status/Advanced Care Plan: Full    The patient's surrogate decision maker is daughter/son.     I discussed my findings and recommendations with the patient.    Estimated length of stay is 1 to 3 days.     The patient was seen and examined by me on 3/7/2022 at 7:30 AM.    Electronically signed by Randall Cabrera MD, 03/07/22, 08:30 CST.              Electronically signed by Randall Cabrera MD at 03/07/22 0841       Vital Signs (last day)     Date/Time Temp Temp src Pulse Resp BP Patient Position SpO2    03/15/22 0806 -- -- 101 22 -- -- --    03/15/22 0756 -- -- 100 22 -- -- 95    03/15/22 0749 -- -- 105 -- -- -- --    03/15/22 0741 97.1 (36.2) Tympanic 108 20 130/66 Sitting 94    03/15/22 0336 97 (36.1) Temporal 102 18 102/57 Sitting 95    03/15/22 0026 -- -- 101 -- -- -- --    03/14/22 2253 -- -- 108 -- -- -- --    03/14/22 2241 -- -- 108 18 -- -- 95    03/14/22 1942 97.6 (36.4) Temporal 94 18 127/57 Sitting 93    03/14/22 1939 -- -- 102 -- -- -- --    03/14/22 1930 -- -- 97 18 -- --  92    03/14/22 1553 -- -- 111 -- -- -- --    03/14/22 1544 98 (36.7) Temporal 102 20 128/75 Lying 95    03/14/22 1536 -- -- 98 20 -- -- --    03/14/22 1529 -- -- 105 20 -- -- 90    03/14/22 1216 98 (36.7) Oral 101 22 145/85 Lying 91    03/14/22 1114 -- -- 66 20 -- -- --    03/14/22 1102 -- -- 92 20 -- -- 90    03/14/22 0848 -- -- 91 20 139/91 Lying 90    03/14/22 0751 -- -- 107 20 -- -- --    03/14/22 0738 -- -- 99 20 -- -- 96    03/14/22 0353 97.6 (36.4) Temporal 93 18 115/59 Sitting 95    03/14/22 0341 -- -- 92 18 -- -- --    03/14/22 0335 -- -- 96 18 -- -- 91    03/14/22 0109 -- -- 90 -- -- -- --            Current Facility-Administered Medications   Medication Dose Route Frequency Provider Last Rate Last Admin   • acetaminophen (TYLENOL) tablet 650 mg  650 mg Oral Q4H PRN Randall Cabrera MD   650 mg at 03/13/22 0913    Or   • acetaminophen (TYLENOL) 160 MG/5ML solution 650 mg  650 mg Oral Q4H PRN Randall Cabrera MD        Or   • acetaminophen (TYLENOL) suppository 650 mg  650 mg Rectal Q4H PRN Randall Cabrera MD       • albuterol (PROVENTIL) nebulizer solution 0.083% 2.5 mg/3mL  2.5 mg Nebulization Q6H PRN Haylee Armenta APRN       • apixaban (ELIQUIS) tablet 2.5 mg  2.5 mg Oral BID Roberto Jaramillo MD   2.5 mg at 03/15/22 0842   • aspirin EC tablet 81 mg  81 mg Oral Daily Roberto Jaramillo MD   81 mg at 03/15/22 0842   • bisacodyl (DULCOLAX) suppository 10 mg  10 mg Rectal Daily PRN Roberto Jaramillo MD   10 mg at 03/13/22 1458   • calcium carbonate (TUMS) chewable tablet 500 mg (200 mg elemental)  1 tablet Oral BID PRN Randall Cabrera MD       • furosemide (LASIX) tablet 40 mg  40 mg Oral Daily Stevenson Robertson MD   40 mg at 03/15/22 0842   • Hold medication  1 each Does not apply Continuous PRN Roberto Jaramillo MD       • ipratropium-albuterol (DUO-NEB) nebulizer solution 3 mL  3 mL Nebulization TID - RT Haylee Armenta APRN   3 mL at 03/15/22 0756   • Magnesium Sulfate 2 gram Bolus, followed by 8 gram infusion (total  Mg dose 10 grams)- Mg less than or equal to 1mg/dL  2 g Intravenous PRN Randall Cabrera MD        Or   • Magnesium Sulfate 2 gram / 50mL Infusion (GIVE X 3 BAGS TO EQUAL 6GM TOTAL DOSE) - Mg 1.1 - 1.5 mg/dl  2 g Intravenous PRN Randall Cabrera MD        Or   • Magnesium Sulfate 4 gram infusion- Mg 1.6-1.9 mg/dL  4 g Intravenous PRN Randall Cabrera MD       • melatonin tablet 5.25 mg  5.25 mg Oral Nightly PRN Randall Cabrera MD       • midodrine (PROAMATINE) tablet 5 mg  5 mg Oral BID AC Roberto Jaramillo MD   5 mg at 03/15/22 0623   • ondansetron (ZOFRAN) injection 4 mg  4 mg Intravenous Q6H PRN Randall Cabrera MD       • polyethylene glycol (MIRALAX) packet 17 g  17 g Oral Daily Roberto Jaramillo MD   17 g at 03/15/22 0842   • potassium chloride (MICRO-K) CR capsule 40 mEq  40 mEq Oral PRN Randall Cabrera MD        Or   • potassium chloride (KLOR-CON) packet 40 mEq  40 mEq Oral PRN Randall Cabrera MD        Or   • potassium chloride 10 mEq in 100 mL IVPB  10 mEq Intravenous Q1H Randall Muñoz MD       • potassium phosphate 45 mmol in sodium chloride 0.9 % 500 mL infusion  45 mmol Intravenous PRN Randall Cabrera MD        Or   • potassium phosphate 30 mmol in sodium chloride 0.9 % 250 mL infusion  30 mmol Intravenous PRRandall Gomez MD        Or   • Potassium Phosphates 15 mmol in sodium chloride 0.9 % 100 mL infusion  15 mmol Intravenous PRN Randall Cabrera MD        Or   • sodium phosphates 45 mmol in sodium chloride 0.9 % 500 mL IVPB  45 mmol Intravenous PRN Randall Cabrera MD        Or   • sodium phosphates 30 mmol in sodium chloride 0.9 % 250 mL IVPB  30 mmol Intravenous PRN Randall Cabrera MD        Or   • sodium phosphates 15 mmol in sodium chloride 0.9 % 250 mL IVPB  15 mmol Intravenous PRN Randall Cabrera MD       • sodium chloride 0.9 % flush 10 mL  10 mL Intravenous Randall Muñoz MD   10 mL at 03/08/22 2118   • sodium chloride 0.9 % flush 10 mL  10 mL Intravenous Q12H Randall Cabrera MD   10 mL at 03/15/22  0844   • sodium chloride 0.9 % flush 10 mL  10 mL Intravenous PRN Randall Cabrera MD

## 2022-03-15 NOTE — DISCHARGE PLACEMENT REQUEST
"Duong Abdalla (87 y.o. Female)             Date of Birth   1935    Social Security Number   [id number]    Address   [address] [address]    Home Phone   [phone]    MRN   [id number]       Sikhism   Confucianism    Marital Status                               Admission Date   3/7/22    Admission Type   Emergency    Admitting Provider   Roberto Jaramillo MD    Attending Provider   Roberto Jaramillo MD    Department, Room/Bed   [address], 382/1       Discharge Date       Discharge Disposition       Discharge Destination                               Attending Provider: Roberto Jaramillo MD    Allergies: No Known Allergies    Isolation: None   Infection: None   Code Status: No CPR   Advance Care Planning Activity    Ht: 157.5 cm (62.01\")   Wt: 53.8 kg (118 lb 9.6 oz)    Admission Cmt: None   Principal Problem: Acute on chronic respiratory failure with hypoxia (HCC) [J96.21]                 Active Insurance as of 3/7/2022     Primary Coverage     Payor Plan Insurance Group Employer/Plan Group    MEDICARE MEDICARE A & B      Payor Plan Address Payor Plan Phone Number Payor Plan Fax Number Effective Dates    [address] [phone]  1/1/2002 - None Entered    [facility] [id number]       Subscriber Name Subscriber Birth Date Member ID       DUONG ABDALLA 1935 [id number]           Secondary Coverage     Payor Plan Insurance Group Employer/Plan Group     FOR LIFE  FOR LIFE  SUP  [id number]     Payor Plan Address Payor Plan Phone Number Payor Plan Fax Number Effective Dates    [address] [phone]  7/17/2016 - None Entered    [facility] [id number]       Subscriber Name Subscriber Birth Date Member ID       DUONG ABDALLA 1935 [id number]                 Emergency Contacts      (Rel.) Home Phone Work Phone Mobile Phone    Alden Abdalla (Rm) [phone] -- [phone]            Insurance Information                " "MEDICARE/MEDICARE A & B Phone: [phone]    Subscriber: Naomi Abdalla Subscriber#: [id number]    Group#: -- Precert#: --         FOR LIFE/ FOR LIFE  SUP  Phone: [phone]    Subscriber: Naomi Abdalla Subscriber#: [id number]    Group#: [id number] Precert#: --             History & Physical      Randall Cabrera MD at 03/07/22 0830     Summary:History and physical                   [facility]  HISTORY AND PHYSICAL      Date of Admission: 3/7/2022  Primary Care Physician: Sabino Mobley MD    Subjective     Chief Complaint: Shortness of breath and generalized weakness    History of Present Illness  Patient presents to emergency room extremely dyspneic, and deconditioned.  Patient states she normally wears oxygen as needed at home, but over past 30 days has worn oxygen 24/7 every day.  Patient states shortness of breath gradually worsening with time.  Denies chest pain, but admits to occasional chest discomfort.  Admits to increased weakness, and easy fatigue with minimal exertion.  Admits to increased chest pressure over past several days, and states she feels like \"rock on my chest.\"  Believes shortness of breath has worsened after recent carotid endarterectomy procedure.  She is D-dimer elevated in emergency room, however unable to perform CTA chest due to renal dysfunction.  Lateral pleural effusions right greater than left noted on ER imaging.  Patient also admits to previous lower extremity swelling, orthopnea, and PND worsening over the past month.  Denies fevers, chills, sick contacts, cough, or recent travel.      Review of Systems   Constitutional: Positive for activity change, appetite change and fatigue. Negative for chills and fever.   HENT: Negative for congestion, ear discharge, sinus pressure and sneezing.    Eyes: Negative for discharge and visual disturbance.   Respiratory: Positive for shortness of breath and wheezing. " Negative for chest tightness.    Cardiovascular: Negative for chest pain and palpitations.   Gastrointestinal: Negative for abdominal distention, constipation, diarrhea, nausea and vomiting.   Endocrine: Negative for polyphagia and polyuria.   Genitourinary: Positive for difficulty urinating. Negative for dysuria.   Musculoskeletal: Positive for gait problem and myalgias.   Skin: Negative for color change and wound.   Allergic/Immunologic: Negative for immunocompromised state.   Neurological: Positive for weakness. Negative for dizziness and syncope.   Hematological: Negative for adenopathy.   Psychiatric/Behavioral: Negative for agitation, confusion, hallucinations and suicidal ideas.          Otherwise complete ROS reviewed and negative except as mentioned in the HPI.    Past Medical History:   Past Medical History:   Diagnosis Date   • CAD (coronary artery disease)    • Carotid artery stenosis    • Chronic systolic heart failure (HCC)    • CKD (chronic kidney disease) stage 3, GFR 30-59 ml/min ([facility])    • GERD (gastroesophageal reflux disease)    • Hypercholesterolemia    • Hyperlipidemia    • Hypertension    • Iron deficiency anemia    • Ischemic cardiomyopathy    • MI (myocardial infarction) ([facility])    • Mitral valve disease    • Osteoarthritis    • UTI (urinary tract infection)      Past Surgical History:  Past Surgical History:   Procedure Laterality Date   • CARDIAC CATHETERIZATION     • CAROTID ENDARTERECTOMY     • CORONARY ARTERY BYPASS GRAFT     • TRANSESOPHAGEAL ECHOCARDIOGRAM (MISSY)       Social History:  reports that she quit smoking about 22 years ago. She has never used smokeless tobacco. She reports that she does not drink alcohol and does not use drugs.    Family History: family history includes Hypertension in her father.       Allergies:  No Known Allergies    Medications:  Prior to Admission medications    Medication Sig Start Date End Date Taking? Authorizing Provider   acetaminophen (TYLENOL) 325  "MG tablet Take 2 tablets by mouth Every 4 (Four) Hours As Needed for Mild Pain . 11/18/20   Ryan Dubois APRN   albuterol sulfate  (90 Base) MCG/ACT inhaler Inhale 2 puffs Every 4 (Four) Hours As Needed for Wheezing or Shortness of Air. 7/3/21   Roberto Jaramillo MD   aspirin 81 MG chewable tablet Chew 81 mg Daily.    ProviderChula MD   atorvastatin (LIPITOR) 20 MG tablet Take 1 tablet by mouth Every Night. 12/30/21   Ryan Dubois APRN   carvedilol (Coreg) 12.5 MG tablet Take 1 tablet by mouth 2 (Two) Times a Day With Meals. 8/9/21   Kalee Cisneros MD   clopidogrel (PLAVIX) 75 MG tablet Take 1 tablet by mouth Daily. 8/9/21   Kalee Cisneros MD   furosemide (LASIX) 40 MG tablet 40 mg 2 (Two) Times a Week. 8/29/21   ProviderChula MD   irbesartan (AVAPRO) 75 MG tablet Take 1 tablet by mouth Daily. 8/9/21   Kalee Cisneros MD   sennosides-docusate (senna-docusate sodium) 8.6-50 MG per tablet Take 2 tablets by mouth 2 (Two) Times a Day As Needed for Constipation. 11/18/20   Ryan Dubois APRN     I have utilized all available immediate resources to obtain, update, and review the patient's current medications.    Objective     Vital Signs: /75   Pulse 90   Temp 97.4 °F (36.3 °C) (Oral)   Resp 24   Ht 157.5 cm (62\")   Wt 46.7 kg (103 lb)   SpO2 99%   BMI 18.84 kg/m²   Physical Exam  Constitutional:       Appearance: Normal appearance. She is normal weight.   HENT:      Head: Normocephalic.      Nose: Nose normal.      Mouth/Throat:      Mouth: Mucous membranes are moist.      Pharynx: Oropharynx is clear.   Eyes:      Extraocular Movements: Extraocular movements intact.      Conjunctiva/sclera: Conjunctivae normal.      Pupils: Pupils are equal, round, and reactive to light.   Cardiovascular:      Rate and Rhythm: Normal rate and regular rhythm.      Pulses: Normal pulses.      Heart sounds: Normal heart sounds.   Pulmonary:      Effort: " Pulmonary effort is normal.      Breath sounds: Normal breath sounds.   Abdominal:      General: Abdomen is flat. Bowel sounds are normal.      Palpations: Abdomen is soft.   Musculoskeletal:         General: Swelling present.      Cervical back: Normal range of motion and neck supple.      Right lower leg: Edema present.      Left lower leg: Edema present.      Comments: +2 bilateral lower extremity edema appreciated pitting in nature   Skin:     General: Skin is warm and dry.      Capillary Refill: Capillary refill takes less than 2 seconds.   Neurological:      General: No focal deficit present.      Mental Status: She is alert and oriented to person, place, and time. Mental status is at baseline.      Sensory: No sensory deficit.      Motor: Weakness present.      Gait: Gait abnormal.   Psychiatric:         Mood and Affect: Mood normal.         Behavior: Behavior normal.         Thought Content: Thought content normal.         Judgment: Judgment normal.              Results Reviewed:  Lab Results (last 24 hours)     Procedure Component Value Units Date/Time    Protime-INR [[id number]]  (Normal) Collected: 03/07/22 0521    Specimen: Blood Updated: 03/07/22 0711     Protime 12.8 Seconds      INR 0.97    Narrative:      Therapeutic range for most indications is 2.0-3.0 INR,  or 2.5-3.5 for mechanical heart valves.    aPTT [[id number]]  (Normal) Collected: 03/07/22 0521    Specimen: Blood Updated: 03/07/22 0711     PTT 30.0 seconds     Narrative:      The recommended Heparin therapeutic range is 68-97 seconds.    Procalcitonin [[id number]]  (Normal) Collected: 03/07/22 0521    Specimen: Blood Updated: 03/07/22 0635     Procalcitonin 0.17 ng/mL     Narrative:      As a Marker for Sepsis (Non-Neonates):    1. <0.5 ng/mL represents a low risk of severe sepsis and/or septic shock.  2. >2 ng/mL represents a high risk of severe sepsis and/or septic shock.    As a Marker for Lower Respiratory Tract Infections that require  "antibiotic therapy:    PCT on Admission    Antibiotic Therapy       6-12 Hrs later    >0.5                Strongly Recommended  >0.25 - <0.5        Recommended   0.1 - 0.25          Discouraged              Remeasure/reassess PCT  <0.1                Strongly Discouraged     Remeasure/reassess PCT    As 28 day mortality risk marker: \"Change in Procalcitonin Result\" (>80% or <=80%) if Day 0 (or Day 1) and Day 4 values are available. Refer to http://www.[facility]-pct-calculator.com    Change in PCT <=80%  A decrease of PCT levels below or equal to 80% defines a positive change in PCT test result representing a higher risk for 28-day all-cause mortality of patients diagnosed with severe sepsis for septic shock.    Change in PCT >80%  A decrease of PCT levels of more than 80% defines a negative change in PCT result representing a lower risk for 28-day all-cause mortality of patients diagnosed with severe sepsis or septic shock.       Blodgett Draw [[id number]] Collected: 03/07/22 0521    Specimen: Blood Updated: 03/07/22 0633    Narrative:      The following orders were created for panel order Blodgett Draw.  Procedure                               Abnormality         Status                     ---------                               -----------         ------                     Green Top (Gel)[[id number]]                                  Final result               Lavender Top[[id number]]                                     Final result               Gold Top - SST[[id number]]                                   Final result               Light Blue Top[[id number]]                                   Final result                 Please view results for these tests on the individual orders.    Green Top (Gel) [[id number]] Collected: 03/07/22 0521    Specimen: Blood Updated: 03/07/22 0633     Extra Tube Hold for add-ons.     Comment: Auto resulted.       Lavender Top [[id number]] Collected: 03/07/22 0521    Specimen: Blood Updated: " 03/07/22 0633     Extra Tube hold for add-on     Comment: Auto resulted       Gold Top - SST [[id number]] Collected: 03/07/22 0521    Specimen: Blood Updated: 03/07/22 0633     Extra Tube Hold for add-ons.     Comment: Auto resulted.       Light Blue Top [[id number]] Collected: 03/07/22 0521    Specimen: Blood Updated: 03/07/22 0633     Extra Tube hold for add-on     Comment: Auto resulted       D-dimer, Quantitative [[id number]]  (Abnormal) Collected: 03/07/22 0521    Specimen: Blood Updated: 03/07/22 0624     D-Dimer, Quantitative 3,153 ng/mL (FEU)     Narrative:      Dimer values <500 ng/ml FEU are FDA approved as aid in diagnosis of deep venous thrombosis and pulmonary embolism.  This test should not be used in an exclusion strategy with pretest probability alone.    A recent guideline regarding diagnosis for pulmonary thromboembolism recommends an adjusted exclusion criterion of age x 10 ng/ml FEU for patients >50 years of age ([employer] Intern Med 2015; 163: 701-711).      CK [[id number]]  (Normal) Collected: 03/07/22 0521    Specimen: Blood Updated: 03/07/22 0624     Creatine Kinase 36 U/L     Magnesium [[id number]]  (Abnormal) Collected: 03/07/22 0521    Specimen: Blood Updated: 03/07/22 0624     Magnesium 2.5 mg/dL     COVID-19 and FLU A/B PCR - Swab, Nasopharynx [[id number]]  (Normal) Collected: 03/07/22 0545    Specimen: Swab from Nasopharynx Updated: 03/07/22 0611     COVID19 Not Detected     Influenza A PCR Not Detected     Influenza B PCR Not Detected    Narrative:      Fact sheet for providers: https://www.fda.gov/media/820820/download    Fact sheet for patients: https://www.fda.gov/media/274032/download    Test performed by PCR.    Comprehensive Metabolic Panel [[id number]]  (Abnormal) Collected: 03/07/22 0521    Specimen: Blood Updated: 03/07/22 0609     Glucose 93 mg/dL      BUN 32 mg/dL      Creatinine 1.77 mg/dL      Sodium 140 mmol/L      Potassium 5.4 mmol/L      Chloride 98 mmol/L      CO2 35.0 mmol/L       Calcium 9.2 mg/dL      Total Protein 7.2 g/dL      Albumin 3.90 g/dL      ALT (SGPT) 11 U/L      AST (SGOT) 17 U/L      Alkaline Phosphatase 84 U/L      Total Bilirubin 0.4 mg/dL      Globulin 3.3 gm/dL      A/G Ratio 1.2 g/dL      BUN/Creatinine Ratio 18.1     Anion Gap 7.0 mmol/L      eGFR 27.9 mL/min/1.73      Comment: National Kidney Foundation and American Society of Nephrology (ASN) Task Force recommended calculation based on the Chronic Kidney Disease Epidemiology Collaboration (CKD-EPI) equation refit without adjustment for race.       Narrative:      GFR Normal >60  Chronic Kidney Disease <60  Kidney Failure <15      Troponin [[id number]]  (Normal) Collected: 03/07/22 0521    Specimen: Blood Updated: 03/07/22 0602     Troponin T <0.010 ng/mL     Narrative:      Troponin T Reference Range:  <= 0.03 ng/mL-   Negative for AMI  >0.03 ng/mL-     Abnormal for myocardial necrosis.  Clinicians would have to utilize clinical acumen, EKG, Troponin and serial changes to determine if it is an Acute Myocardial Infarction or myocardial injury due to an underlying chronic condition.       Results may be falsely decreased if patient taking Biotin.      BNP [[id number]]  (Abnormal) Collected: 03/07/22 0521    Specimen: Blood Updated: 03/07/22 0601     proBNP 16,282.0 pg/mL     Narrative:      Among patients with dyspnea, NT-proBNP is highly sensitive for the detection of acute congestive heart failure. In addition NT-proBNP of <300 pg/ml effectively rules out acute congestive heart failure with 99% negative predictive value.    Results may be falsely decreased if patient taking Biotin.      CBC & Differential [[id number]]  (Abnormal) Collected: 03/07/22 0521    Specimen: Blood Updated: 03/07/22 0528    Narrative:      The following orders were created for panel order CBC & Differential.  Procedure                               Abnormality         Status                     ---------                               -----------          ------                     CBC Auto Differential[[id number]]        Abnormal            Final result                 Please view results for these tests on the individual orders.    CBC Auto Differential [[id number]]  (Abnormal) Collected: 03/07/22 0521    Specimen: Blood Updated: 03/07/22 0528     WBC 4.31 10*3/mm3      RBC 3.80 10*6/mm3      Hemoglobin 10.4 g/dL      Hematocrit 34.7 %      MCV 91.3 fL      MCH 27.4 pg      MCHC 30.0 g/dL      RDW 14.6 %      RDW-SD 48.4 fl      MPV 10.7 fL      Platelets 197 10*3/mm3      Neutrophil % 71.3 %      Lymphocyte % 15.5 %      Monocyte % 11.8 %      Eosinophil % 0.0 %      Basophil % 0.9 %      Immature Grans % 0.5 %      Neutrophils, Absolute 3.07 10*3/mm3      Lymphocytes, Absolute 0.67 10*3/mm3      Monocytes, Absolute 0.51 10*3/mm3      Eosinophils, Absolute 0.00 10*3/mm3      Basophils, Absolute 0.04 10*3/mm3      Immature Grans, Absolute 0.02 10*3/mm3      nRBC 0.0 /100 WBC         Imaging Results (Last 24 Hours)     Procedure Component Value Units Date/Time    XR Chest 1 View [[id number]] Collected: 03/07/22 0512     Updated: 03/07/22 0615    Narrative:      EXAM:    XR Chest, 1 View    CLINICAL HISTORY:    The patient is 87 years old and is Female; SOA Triage Protocol    TECHNIQUE:    Frontal view of the chest.    COMPARISON:    XR Chest dated July 1 2021    FINDINGS:    LUNGS:  Coarsened interstitial markings are present throughout  the lungs with associated bilateral lower lobe infiltrates.    PLEURAL SPACE:  Moderate to large left pleural effusion and  small right pleural effusion are present.  No pneumothorax.    HEART:  The cardiac silhouette is obscured.    MEDIASTINUM:  Unremarkable.    BONES/JOINTS:  There are degenerative changes of the bones.  Median sternotomy is present.    VASCULATURE:  Atherosclerosis of the aorta is noted.    UPPER ABDOMEN:  Unremarkable as visualized.      Impression:        Bilateral pleural effusions, left greater right with  likely  associated atelectasis.    Electronically signed by:  Tish Avalos MD  3/7/2022 6:12 AM CST  Workstation: [id number]        I have personally reviewed and interpreted the radiology studies and ECG obtained at time of admission.     Assessment / Plan     Assessment:   Active Hospital Problems    Diagnosis    • Acute on chronic respiratory failure with hypoxia (HCC)      Symptomatic hypoxia most likely secondary to CHF exacerbation however need to rule out pulmonary embolus.    Plan:     Hypoxia:  -VQ scan in a.m.  Heparin gtt. until VQ scan results available.  IV Lasix, strict I's and O's, fluid restriction 1200 mL/day, daily weights, echocardiogram for empirical CHF exacerbation care.    Deconditioning: PT/OT evaluation ordered at time of admission.  Patient may require short-term rehab at time of hospital discharge.      FEN cardiac with 1200 mL/day fluid restriction, electrolyte replacement protocol  PPX Heparin gtt.      Code Status/Advanced Care Plan: Full    The patient's surrogate decision maker is daughter/son.     I discussed my findings and recommendations with the patient.    Estimated length of stay is 1 to 3 days.     The patient was seen and examined by me on 3/7/2022 at 7:30 AM.    Electronically signed by Randall Cabrera MD, 03/07/22, 08:30 CST.              Electronically signed by Randall Cabrera MD at 03/07/22 0841         Current Facility-Administered Medications   Medication Dose Route Frequency Provider Last Rate Last Admin   • acetaminophen (TYLENOL) tablet 650 mg  650 mg Oral Q4H PRN Randall Cabrera MD   650 mg at 03/13/22 0913    Or   • acetaminophen (TYLENOL) 160 MG/5ML solution 650 mg  650 mg Oral Q4H PRN Randall Cabrera MD        Or   • acetaminophen (TYLENOL) suppository 650 mg  650 mg Rectal Q4H PRN Randall Cabrera MD       • albuterol (PROVENTIL) nebulizer solution 0.083% 2.5 mg/3mL  2.5 mg Nebulization Q6H PRN Haylee Armenta APRN       • apixaban (ELIQUIS) tablet 2.5 mg  2.5 mg  Oral BID Roberto Jaramillo MD   2.5 mg at 03/15/22 0842   • aspirin EC tablet 81 mg  81 mg Oral Daily Roberto Jaramillo MD   81 mg at 03/15/22 0842   • bisacodyl (DULCOLAX) suppository 10 mg  10 mg Rectal Daily PRN Roberto Jaramillo MD   10 mg at 03/13/22 1458   • calcium carbonate (TUMS) chewable tablet 500 mg (200 mg elemental)  1 tablet Oral BID PRN Randall Cabrera MD       • furosemide (LASIX) tablet 40 mg  40 mg Oral Daily Stevenson Robertson MD   40 mg at 03/15/22 0842   • Hold medication  1 each Does not apply Continuous PRN Roberto Jaramillo MD       • ipratropium-albuterol (DUO-NEB) nebulizer solution 3 mL  3 mL Nebulization TID - RT Haylee Armenta, APRN   3 mL at 03/15/22 0756   • Magnesium Sulfate 2 gram Bolus, followed by 8 gram infusion (total Mg dose 10 grams)- Mg less than or equal to 1mg/dL  2 g Intravenous PRN Randall Cabrera MD        Or   • Magnesium Sulfate 2 gram / 50mL Infusion (GIVE X 3 BAGS TO EQUAL 6GM TOTAL DOSE) - Mg 1.1 - 1.5 mg/dl  2 g Intravenous PRN Randall Cabrera MD        Or   • Magnesium Sulfate 4 gram infusion- Mg 1.6-1.9 mg/dL  4 g Intravenous PRN Randall Cabrera MD       • melatonin tablet 5.25 mg  5.25 mg Oral Nightly PRN Randall Cabrera MD       • midodrine (PROAMATINE) tablet 5 mg  5 mg Oral BID AC Roberto Jaramillo MD   5 mg at 03/15/22 0623   • ondansetron (ZOFRAN) injection 4 mg  4 mg Intravenous Q6H PRN Randall Cabrera MD       • polyethylene glycol (MIRALAX) packet 17 g  17 g Oral Daily Roberto Jaramillo MD   17 g at 03/15/22 0842   • potassium chloride (MICRO-K) CR capsule 40 mEq  40 mEq Oral PRN Randall Cabrera MD        Or   • potassium chloride (KLOR-CON) packet 40 mEq  40 mEq Oral PRN Randall Cabrera MD        Or   • potassium chloride 10 mEq in 100 mL IVPB  10 mEq Intravenous Q1H PRN Randall Cabrera MD       • potassium phosphate 45 mmol in sodium chloride 0.9 % 500 mL infusion  45 mmol Intravenous PRRandall Gomez MD        Or   • potassium phosphate 30 mmol in sodium chloride 0.9 % 250  mL infusion  30 mmol Intravenous PRN Randall Cabrera MD        Or   • Potassium Phosphates 15 mmol in sodium chloride 0.9 % 100 mL infusion  15 mmol Intravenous PRN Randall Cabrera MD        Or   • sodium phosphates 45 mmol in sodium chloride 0.9 % 500 mL IVPB  45 mmol Intravenous PRN Randall Cabrera MD        Or   • sodium phosphates 30 mmol in sodium chloride 0.9 % 250 mL IVPB  30 mmol Intravenous PRN Randall Cabrera MD        Or   • sodium phosphates 15 mmol in sodium chloride 0.9 % 250 mL IVPB  15 mmol Intravenous PRN Randall Cabrera MD       • sodium chloride 0.9 % flush 10 mL  10 mL Intravenous PRN Randall Cabrera MD   10 mL at 03/08/22 2118   • sodium chloride 0.9 % flush 10 mL  10 mL Intravenous Q12H Randall Cabrera MD   10 mL at 03/15/22 0844   • sodium chloride 0.9 % flush 10 mL  10 mL Intravenous PRN Randall Cabrera MD

## 2022-03-16 NOTE — SIGNIFICANT NOTE
03/16/22 1257   OTHER   Discipline physical therapy assistant   Rehab Time/Intention   Session Not Performed patient/family declined, not feeling well  (pt is not feeling well, SOA and positioned forward in bed in attempt to breathe easier. pts son states fluid in building in her lungs with plans of thoracentesis tomorrow. pt decline PT. no PT tx at this tiem.)

## 2022-03-16 NOTE — SIGNIFICANT NOTE
03/16/22 1543   OTHER   Discipline occupational therapy assistant   Rehab Time/Intention   Session Not Performed patient/family declined, not feeling well

## 2022-03-16 NOTE — PROGRESS NOTES
Item 0 Points 1 Point 2 Points 3 Points 4 Points Subtotal   Mental Status Alert, oriented, cooperative Lethargic, follows commands Confused, not following commands Obtunded or Somnolent Comatose 2   Respiratory Pattern Regular RR 8-16 breaths/minute Increased RR 18-25 breaths/minute Dyspnea on exertion, irregular RR 26-30 breaths/minute Shortness of breath,  RR 31-35 breaths/minute Accessory muscle use, severe SOB  RR > 35 breaths/minute 3   Breath Sounds Clear Decreased unilaterally Decreased bilaterally Basilar crackles Wheezing and/or rhonchi 2   Cough Strong, spontaneous, non-productive Strong productive Weak, non-productive Weak, productive or weak with rhonchi Absent or may require suctioning 3   Pulmonary Status Nonsmoker, no previous history, >1 year quit < 1 PPD  <1 year quit > or = 1 PPD Diagnosed pulmonary disease (severe or chronic) Severe or chronic pulmonary disease with exacerbation 3   Surgical Status None General surgery (non-abdominal or non-thoracic) Lower abdominal Thoracic or upper abdominal Thoracic with pulmonary disease 3   Chest X-ray Clear Chronic changes Infiltrates, atelectasis or pleural effusion Infiltrates in > 1 lobe Diffuse infiltrates and atelectasis and/or effusions 2   Activity   Level Ambulatory Ambulatory with assistance Non-ambulatory Paraplegic Quadriplegic 2        Total Score   20   Score    Drug Therapy Frequency 20 or >    Q4 Duoneb with Q2 Albuterol PRN 15-19    Q6 Duoneb with Q4 Albuterol PRN 10-14    QID Duoneb with Q4 Albuterol PRN 5-9    TID Duoneb with Q6 Albuterol PRN 0-4    Q4 PRN Duoneb                  Lung Expansion Therapy (PEP) Bronchopulmonary Hygiene (CPT)   Q4 & PRN - Severe atelectasis, poor oxygenation Q4 - Copious secretions, dyspnea, unable to sleep   QID - High risk for persistent atelectasis, existence of atelectasis QID & Q4 PRN - Moderate secretion production   TID - At risk for developing atelectasis TID - Small amounts of secretions with poor  cough   BID - Prevention of atelectasis BID - Unable to breathe deeply and cough spontaneously     RT Comments / Recommendations:    duoneb q4 with Albuterol Q2 PRN

## 2022-03-16 NOTE — PLAN OF CARE
Goal Outcome Evaluation:  Plan of Care Reviewed With: patient        Progress: no change  Outcome Evaluation: Patient unable to rest well this shift. Remains on 8L NC. SOA with exertion.

## 2022-03-16 NOTE — DISCHARGE PLACEMENT REQUEST
"Duong Abdalla (87 y.o. Female)             Date of Birth   1935    Social Security Number   [id number]    Address   [address] [address]    Home Phone   [phone]    MRN   [id number]       Sabianism   Jew    Marital Status                               Admission Date   3/7/22    Admission Type   Emergency    Admitting Provider   Roberto Jaramillo MD    Attending Provider   Roberto Jaramillo MD    Department, Room/Bed   [address], 382/1       Discharge Date       Discharge Disposition       Discharge Destination                               Attending Provider: Roberto Jaramillo MD    Allergies: No Known Allergies    Isolation: None   Infection: None   Code Status: No CPR   Advance Care Planning Activity    Ht: 157.5 cm (62.01\")   Wt: 53.8 kg (118 lb 9.6 oz)    Admission Cmt: None   Principal Problem: Acute on chronic respiratory failure with hypoxia (HCC) [J96.21]                 Active Insurance as of 3/7/2022     Primary Coverage     Payor Plan Insurance Group Employer/Plan Group    MEDICARE MEDICARE A & B      Payor Plan Address Payor Plan Phone Number Payor Plan Fax Number Effective Dates    [address] [phone]  1/1/2002 - None Entered    [facility] [place] [address]       Subscriber Name Subscriber Birth Date Member ID       DUONG ABDALLA 1935 [id number]           Secondary Coverage     Payor Plan Insurance Group Employer/Plan Group     FOR LIFE  FOR LIFE  SUP  [id number]     Payor Plan Address Payor Plan Phone Number Payor Plan Fax Number Effective Dates    [address] [phone]  7/17/2016 - None Entered    [facility] [address]       Subscriber Name Subscriber Birth Date Member ID       DUONG ABDALLA 1935 [id number]                 Emergency Contacts      (Rel.) Home Phone Work Phone Mobile Phone    Alden Abdalla (Rm) [phone] -- [phone]            Insurance Information                " MEDICARE/MEDICARE A & B Phone: [phone]    Subscriber: Naomi Abdalla Subscriber#: [id number]    Group#: -- Precert#: --         FOR LIFE/ FOR LIFE [facility]  Phone: [phone]    Subscriber: Naomi Abdalla Subscriber#: [id number]    Group#: [id number] Precert#: --

## 2022-03-16 NOTE — SIGNIFICANT NOTE
03/16/22 1053   OTHER   Discipline occupational therapy assistant   Rehab Time/Intention   Session Not Performed patient/family declined, not feeling well  (Pt c/o fatigue and asked OT to check back in pm.)

## 2022-03-16 NOTE — PROGRESS NOTES
"[facility]  [address] [place] [address]. [address]  T - [phone]  F - [phone]     Progress Note          PATIENT  DEMOGRAPHICS   PATIENT NAME: Naomi Duong Son                      PHYSICIAN: Stevenson Robertson MD  : 1935  MRN: [id number]   LOS: 8 days    Patient Care Team:  Sabino Mobley MD as PCP - General (Family Medicine)  Subjective   SUBJECTIVE      Comfortable today. Breathing better. No chest pain     Objective   OBJECTIVE   Vital Signs  Temp:  [96.5 °F (35.8 °C)-97.8 °F (36.6 °C)] 97.2 °F (36.2 °C)  Heart Rate:  [] 92  Resp:  [18-26] 18  BP: (112-136)/(62-82) 112/62    Flowsheet Rows    Flowsheet Row First Filed Value   Admission Height 157.5 cm (62\") Documented at 2022 0504   Admission Weight 46.7 kg (103 lb) Documented at 2022 0504           I/O last 3 completed shifts:  In: 720 [P.O.:720]  Out: 425 [Urine:425]    PHYSICAL EXAM    Physical Exam  Constitutional:       Appearance: She is well-developed.   HENT:      Head: Normocephalic.   Eyes:      Pupils: Pupils are equal, round, and reactive to light.   Cardiovascular:      Rate and Rhythm: Normal rate and regular rhythm.      Heart sounds: Normal heart sounds.   Pulmonary:      Effort: Pulmonary effort is normal.      Breath sounds: Normal breath sounds.   Abdominal:      General: Bowel sounds are normal.      Palpations: Abdomen is soft.   Musculoskeletal:         General: Swelling present.   Skin:     Coloration: Skin is not jaundiced.   Neurological:      General: No focal deficit present.      Mental Status: She is alert and oriented to person, place, and time.         RESULTS   Results Review:    Results from last 7 days   Lab Units 22  0609 03/15/22  0530 22  0553 22  1051   SODIUM mmol/L 137 133* 136 136   POTASSIUM mmol/L 5.1 4.9 4.9 5.0   CHLORIDE mmol/L 92* 91* 95* 95*   CO2 mmol/L 39.0* 36.0* 35.0* 37.0*   BUN mg/dL 50* 43* 42* 41*   CREATININE mg/dL 1.87* 1.56* 1.66* " 1.63*   CALCIUM mg/dL 9.8 9.1 9.0 8.8   BILIRUBIN mg/dL 0.6 0.6  --  0.5   ALK PHOS U/L 80 76  --  64   ALT (SGPT) U/L 11 11  --  9   AST (SGOT) U/L 19 16  --  14   GLUCOSE mg/dL 110* 95 92 124*       Estimated Creatinine Clearance: 18.7 mL/min (A) (by C-G formula based on SCr of 1.87 mg/dL (H)).    Results from last 7 days   Lab Units 03/10/22  0637   MAGNESIUM mg/dL 2.1             Results from last 7 days   Lab Units 03/16/22  0609 03/15/22  0530 03/14/22  0553 03/13/22  1051 03/12/22  0555   WBC 10*3/mm3 6.85 6.14 6.52 4.83 5.13   HEMOGLOBIN g/dL 10.3* 9.8* 9.4* 8.8* 9.4*   PLATELETS 10*3/mm3 250 217 205 187 168       Results from last 7 days   Lab Units 03/11/22  0903   INR  1.08         Imaging Results (Last 24 Hours)     Procedure Component Value Units Date/Time    XR Chest 1 View [[id number]] Collected: 03/16/22 0900     Updated: 03/16/22 0946    Narrative:      EXAM: XR CHEST 1 VIEW    COMPARISONS: Radiograph 3/14/2022    INDICATION: assess for thoracentesis need, J96.21 Acute and  chronic respiratory failure with hypoxia J90 Pleural effusion,  not elsewhere classified N19 Unspecified kidney failure I50.9  Heart failure, unspecified Z74.09 Other reduced mobility Z74.09  Other reduced mobility Z78.9 Other specified health status    FINDINGS:  Frontal view of the chest.    No change in dense opacification of the bilateral mid to lower  lung, left greater than right. No visible pneumothorax. Bilateral  interstitial thickening. Atherosclerotic and tortuous aorta. No  acute osseous abnormality. Sternotomy wires are again noted.      Impression:      No significant change in dense opacification of the bilateral mid  to lower lungs, left greater than right. This would be consistent  with large bilateral effusions and atelectasis. Concurrent  airspace disease/pneumonia not excluded.    Electronically signed by:  Shyam Worrell MD  3/16/2022  9:43 AM CDT Workstation: [id number]           MEDICATIONS    aspirin,  81 mg, Oral, Daily  furosemide, 40 mg, Intravenous, Q12H  ipratropium-albuterol, 3 mL, Nebulization, Q4H - RT  midodrine, 5 mg, Oral, BID AC  polyethylene glycol, 17 g, Oral, Daily  sodium chloride, 10 mL, Intravenous, Q12H      hold, 1 each  [START ON 3/17/2022] hold, 1 each        Assessment/Plan   ASSESSMENT / PLAN      Acute on chronic respiratory failure with hypoxia (HCC)    Acute on chronic systolic CHF (congestive heart failure) (HCC)    Bilateral pleural effusion    CKD (chronic kidney disease) stage 4, GFR 15-29 ml/min (HCC)    Atrial fibrillation (HCC)    Chronic anemia       1.  CKD 4- Baseline creatinine is around 1.7-1.8, creatinine peak at 2.07.     CO2 is elevated with underlying resp acidosis.  overall bicarb is stable. Now worsening effusion and agree with iv lasix 40mg bid    2.  Acute on chronic CHF / Bilateral pleural effusions- s/p thoracentesis. Plan for thoracentesis tomorrow     3.  Acute on chronic respiratory failure- secondary to underlying CHF as well as new onset atrial fibrillation and chronic pleural effusions.  Managed per primary team.     4.  Atrial fibrillation- managed per cardiology and primary team     6.  Anemia- worsening, fe low and on iv fe. hgb better     7.  History of secondary hyperparathyroidism     8.  History of CAD    9. Hyperkalemia better after lokelma. Currently stable.    Plan for [place] transfer once stable              This document has been electronically signed by Stevenson Robertson MD on March 16, 2022 16:53 CDT

## 2022-03-16 NOTE — PROGRESS NOTES
[facility] Medicine Services  INPATIENT PROGRESS NOTE    Length of Stay: 8  Date of Admission: 3/7/2022  Primary Care Physician: Sabino Mobley MD    Subjective   Chief Complaint: No complaints    HPI: This is an 87-year-old female with past medical history of CAD, LEROY, chronic systolic heart failure (EF 35%), CKD 3, GERD, HLD, HTN, ischemic cardiomyopathy and OA who presented to [facility] on 3/7/2022 with complaints of shortness of air.  She chronically wears 2 L of oxygen at home.    The patient was found to be in atrial fibrillation with RVR.  She had bilateral pleural effusions and required thoracentesis.  Approximately 1200 mL of serous fluid was removed from left.    3/14/2022: The patient is currently on 8 L of oxygen.  Repeat chest x-ray today shows bilateral pleural effusion with underlying basilar infiltrative changes, increasing on the left since prior exam.     3/15/2022:  Patient states her breathing is improved today.  Weight is down 3 lbs overnight.      3/16/2022: Patient required increased oxygen overnight.  She is more short of breath this morning.  Chest x-ray shows large bilateral pleural effusions, left greater than right.  Patient states she would like to proceed with a thoracentesis to help with her breathing.  Eliquis has been discontinued today.  Will change diuretics to IV.    Review of Systems   Constitutional: Positive for activity change, appetite change and fatigue.   HENT: Negative for ear pain and sore throat.    Eyes: Negative for pain and discharge.   Respiratory: Positive for shortness of breath. Negative for cough.    Cardiovascular: Negative for chest pain and palpitations.   Gastrointestinal: Negative for abdominal pain and nausea.   Endocrine: Negative for cold intolerance and heat intolerance.   Genitourinary: Negative for difficulty urinating and dysuria.   Musculoskeletal: Negative for arthralgias and gait problem.   Skin: Negative for color change and  rash.   Neurological: Negative for dizziness and weakness.   Psychiatric/Behavioral: Negative for agitation and confusion.        Objective    Temp:  [96.5 °F (35.8 °C)-97.8 °F (36.6 °C)] (P) 96.5 °F (35.8 °C)  Heart Rate:  [] 110  Resp:  [18-26] 22  BP: (121-136)/(62-82) (P) 121/72    Physical Exam  Constitutional:       Appearance: She is well-developed.   HENT:      Head: Normocephalic and atraumatic.   Eyes:      Pupils: Pupils are equal, round, and reactive to light.   Cardiovascular:      Rate and Rhythm: Normal rate and regular rhythm.   Pulmonary:      Effort: Pulmonary effort is normal.      Breath sounds: Normal breath sounds.   Abdominal:      General: Bowel sounds are normal.      Palpations: Abdomen is soft.   Musculoskeletal:         General: Normal range of motion.      Cervical back: Normal range of motion and neck supple.   Skin:     General: Skin is warm and dry.   Neurological:      Mental Status: She is alert and oriented to person, place, and time.   Psychiatric:         Behavior: Behavior normal.       Results Review:  I have reviewed the labs, radiology results, and diagnostic studies.    Laboratory Data:   Results from last 7 days   Lab Units 03/16/22  0609 03/15/22  0530 03/14/22  0553 03/13/22  1051   SODIUM mmol/L 137 133* 136 136   POTASSIUM mmol/L 5.1 4.9 4.9 5.0   CHLORIDE mmol/L 92* 91* 95* 95*   CO2 mmol/L 39.0* 36.0* 35.0* 37.0*   BUN mg/dL 50* 43* 42* 41*   CREATININE mg/dL 1.87* 1.56* 1.66* 1.63*   GLUCOSE mg/dL 110* 95 92 124*   CALCIUM mg/dL 9.8 9.1 9.0 8.8   BILIRUBIN mg/dL 0.6 0.6  --  0.5   ALK PHOS U/L 80 76  --  64   ALT (SGPT) U/L 11 11  --  9   AST (SGOT) U/L 19 16  --  14   ANION GAP mmol/L 6.0 6.0 6.0 4.0*     Estimated Creatinine Clearance: 18.7 mL/min (A) (by C-G formula based on SCr of 1.87 mg/dL (H)).  Results from last 7 days   Lab Units 03/10/22  0637   MAGNESIUM mg/dL 2.1         Results from last 7 days   Lab Units 03/16/22  0609 03/15/22  0530  03/14/22  0553 03/13/22  1051 03/12/22  0555   WBC 10*3/mm3 6.85 6.14 6.52 4.83 5.13   HEMOGLOBIN g/dL 10.3* 9.8* 9.4* 8.8* 9.4*   HEMATOCRIT % 33.7* 31.7* 30.0* 28.3* 30.7*   PLATELETS 10*3/mm3 250 217 205 187 168     Results from last 7 days   Lab Units 03/11/22  0903   INR  1.08       Culture Data:   No results found for: BLOODCX  No results found for: URINECX  No results found for: RESPCX  No results found for: WOUNDCX  No results found for: STOOLCX  No components found for: BODYFLD    Radiology Data:   Imaging Results (Last 24 Hours)     Procedure Component Value Units Date/Time    XR Chest 1 View [[id number]] Collected: 03/16/22 0900     Updated: 03/16/22 0946    Narrative:      EXAM: XR CHEST 1 VIEW    COMPARISONS: Radiograph 3/14/2022    INDICATION: assess for thoracentesis need, J96.21 Acute and  chronic respiratory failure with hypoxia J90 Pleural effusion,  not elsewhere classified N19 Unspecified kidney failure I50.9  Heart failure, unspecified Z74.09 Other reduced mobility Z74.09  Other reduced mobility Z78.9 Other specified health status    FINDINGS:  Frontal view of the chest.    No change in dense opacification of the bilateral mid to lower  lung, left greater than right. No visible pneumothorax. Bilateral  interstitial thickening. Atherosclerotic and tortuous aorta. No  acute osseous abnormality. Sternotomy wires are again noted.      Impression:      No significant change in dense opacification of the bilateral mid  to lower lungs, left greater than right. This would be consistent  with large bilateral effusions and atelectasis. Concurrent  airspace disease/pneumonia not excluded.    Electronically signed by:  Shyam Worerll MD  3/16/2022  9:43 AM CDT Workstation: [id number]          I have reviewed the patient's current medications.     Assessment/Plan     Active Hospital Problems    Diagnosis    • **Acute on chronic respiratory failure with hypoxia (HCC)    • Chronic anemia    • Bilateral  pleural effusion    • CKD (chronic kidney disease) stage 4, GFR 15-29 ml/min ([facility])    • Atrial fibrillation ([facility])    • Acute on chronic systolic CHF (congestive heart failure) ([facility])        Plan:    1.  Acute on chronic heart failure with reduced ejection fraction: Cardiology consult appreciated.  Continue fluid restrictions, strict I&O and sodium reduction.  Left thoracentesis on 3/11 of 1200 ml.  Left thoracentesis scheduled for tomorrow secondary to increased shortness of breath.  Lasix switched to IV 40 mg every 12 hours.  2.  Acute on chronic respiratory failure with hypoxia: Patient is currently on 12 L of oxygen.  We will plan thoracentesis tomorrow.  Eliquis held today.  3.  Atrial fibrillation, now rate controlled: Continue Coreg. Eliquis held today for thoracentesis tomorrow.  4.  Acute on chronic anemia: Continue IV iron.  Hemoglobin today is 10.3.  5.  Chronic kidney disease, stage IV: Baseline creatinine 1.7-1.8.  Creatinine today is 1.87.  Nephrology consult appreciated.    Cardiology spoke with patient today and she was changed to DNR CODE STATUS.    Discharge Planning: I expect patient to be discharged to home in 3-4 days.    I confirmed that the patient's Advance Care Plan is present, code status is documented, or surrogate decision maker is listed in the patient's medical record.      I have utilized all available immediate resources to obtain, update, or review the patient's current medications.         This document has been electronically signed by CHANA Spring on March 16, 2022 11:14 CDT

## 2022-03-16 NOTE — PROGRESS NOTES
Nutrition Services    Patient Name:  Naomi Duong Son  YOB: 1935  MRN: [id number]  Admit Date:  3/7/2022    Pt with nursing student.  She reports that she ate a biscuit this morning and yesterday she had pancakes (which she requested).  No requests or complaints.  She continues to be managed for A/C CHF with bilateral pleural effusions, s/p Thoracentesis; A/C resp failure; afib; and Hyperkalemia, Better after Lokelma.    Meds: Lasix;  Labs: K+ 5.2 (Normal High); Gluc 110; Bun 50; Creat 1.87;   PO intake:  90% average for the past 4 meals.    Discharge planning--transfer to a rehab center.    Will monitor POC    Electronically signed by:  Ruby Soliman RD  03/16/22 13:46 CDT

## 2022-03-17 NOTE — SIGNIFICANT NOTE
03/17/22 1459   OTHER   Discipline occupational therapy assistant   Rehab Time/Intention   Session Not Performed patient/family declined, not feeling well

## 2022-03-17 NOTE — BRIEF OP NOTE
INTERVENTIONAL RADIOLOGY: BRIEF OP NOTE    Pre-Procedure Diagnosis: Symptomatic large left pleural effusion  Post-Procedure Diagnosis: same  Procedure: Left Diagnostic and Therapeutic Thoracentesis  Anesthesia: local  Staff: Marina Rios M.D.  EBL: none  Specimens: left pleural fluid  Drains: none  Findings: Kelli left pleural fluid, 1.5L aspirated. More fluid remains.  Complications: none    Marina Rios M.D.  Vascular, Interventional and Wound Physician  IR Chief, [facility]  Cell: [phone] / Office: [phone]

## 2022-03-17 NOTE — PLAN OF CARE
Goal Outcome Evaluation:  Plan of Care Reviewed With: patient        Progress: improving  Outcome Evaluation: tx limited by low BP, 74/42 upn entry. LE ther ex and stretching performed on supine. BP improved to 84/50. no OOB at this time. pt participated well in LE ther ex but fatigues easily. no new goals met at this time. pt would continue to benefit from PT services.

## 2022-03-17 NOTE — THERAPY TREATMENT NOTE
Acute Care - Physical Therapy Treatment Note  [facility]     Patient Name: Noami Duong Son  : 1935  MRN: [id number]  Today's Date: 3/17/2022      Visit Dx:     ICD-10-CM ICD-9-CM   1. Acute on chronic respiratory failure with hypoxia (HCC)  J96.21 518.84     799.02   2. Pleural effusion  J90 511.9   3. Renal failure, unspecified chronicity  N19 586   4. Acute on chronic congestive heart failure, unspecified heart failure type ([facility])  I50.9 428.0   5. Impaired functional mobility, balance, gait, and endurance  Z74.09 V49.89   6. Impaired mobility and ADLs  Z74.09 V49.89    Z78.9      Patient Active Problem List   Diagnosis   • Ischemic cardiomyopathy   • S/P CABG (coronary artery bypass graft)   • Essential hypertension   • Mixed hyperlipidemia   • H/O CHF   • Carotid artery stenosis   • CKD (chronic kidney disease) stage 3, GFR 30-59 ml/min ([facility])   • Bilateral carotid artery disease ([facility])   • Carotid stenosis, asymptomatic, bilateral   • Surgical aftercare, circulatory system   • Carotid stenosis, asymptomatic, left   • Acute on chronic systolic CHF (congestive heart failure) ([facility])   • CKD (chronic kidney disease) stage 3, GFR 30-59 ml/min ([facility])   • Severe malnutrition ([facility])   • Acute respiratory failure with hypoxia ([facility])   • Acute on chronic respiratory failure with hypoxia ([facility])   • Bilateral pleural effusion   • CKD (chronic kidney disease) stage 4, GFR 15-29 ml/min (HCC)   • Atrial fibrillation ([facility])   • Chronic anemia     Past Medical History:   Diagnosis Date   • CAD (coronary artery disease)    • Carotid artery stenosis    • Chronic systolic heart failure (HCC)    • CKD (chronic kidney disease) stage 3, GFR 30-59 ml/min ([facility])    • GERD (gastroesophageal reflux disease)    • Hypercholesterolemia    • Hyperlipidemia    • Hypertension    • Iron deficiency anemia    • Ischemic cardiomyopathy    • MI (myocardial infarction) ([facility])    • Mitral valve disease    • Osteoarthritis    • UTI (urinary tract  infection)      Past Surgical History:   Procedure Laterality Date   • CARDIAC CATHETERIZATION     • CAROTID ENDARTERECTOMY     • CORONARY ARTERY BYPASS GRAFT     • TRANSESOPHAGEAL ECHOCARDIOGRAM (MISSY)       PT Assessment (last 12 hours)     PT Evaluation and Treatment     [facility] Name 03/17/22 133          Physical Therapy Time and Intention    Document Type therapy note (daily note)  -     Mode of Treatment physical therapy;individual therapy  -     Patient Effort good  -     Comment pts BP low upon entry. notified nsg who advises no OOB at this time. nsg and pt agreeable to LE ther ex in supine.  -[facility] Name 03/17/22 1331          General Information    Patient Profile Reviewed yes  -     Existing Precautions/Restrictions oxygen therapy device and L/min;fall  -[facility] Name 03/17/22 1331          Pain    Pretreatment Pain Rating 0/10 - no pain  -     Posttreatment Pain Rating 0/10 - no pain  -[facility] Name 03/17/22 1331          Cognition    Affect/Mental Status (Cognition) WFL  -     Orientation Status (Cognition) oriented x 4  -     Personal Safety Interventions fall prevention program maintained;gait belt;muscle strengthening facilitated;nonskid shoes/slippers when out of bed;supervised activity  -[facility] Name 03/17/22 1331          Bed Mobility    Bed Mobility --  -     Supine-Sit [place] (Bed Mobility) --  -     Assistive Device (Bed Mobility) --  -[facility] Name 03/17/22 1331          Transfers    Transfers --  -     Bed-Chair [place] (Transfers) --  -     Assistive Device (Bed-Chair Transfers) --  -     Sit-Stand [place] (Transfers) --  -     Stand-Sit [place] (Transfers) --  -[facility] Name 03/17/22 1331          Sit-Stand Transfer    Assistive Device (Sit-Stand Transfers) --  -[facility] Name 03/17/22 133          Stand-Sit Transfer    Assistive Device (Stand-Sit Transfers) --  -[facility] Name 03/17/22 1331          Gait/Stairs (Locomotion)     Gait/Stairs Locomotion --  -     [place] Level (Gait) --  -     Assistive Device (Gait) --  -     Deviations/Abnormal Patterns (Gait) --  -     Bilateral Gait Deviations --  -[facility] Name 03/17/22 1331          Safety Issues, Functional Mobility    Impairments Affecting Function (Mobility) strength;endurance/activity tolerance;shortness of breath;balance;pain  -[facility] Name 03/17/22 1331          Motor Skills    Therapeutic Exercise --  AP, GS, QS, hip Ab/Ad, SAQ, HS, SLR- 15-20x1 ana calf S, HS S, hip flexor S- 3x30 sec  -     Additional Documentation --  only 10 reps w/ SLR and quad lag w/ R LE.  -[facility] Name 03/17/22 1331          Vital Signs    Pre Systolic BP Rehab 74   taken manually  -     Pre Treatment Diastolic BP 42   -DANIEL     Post Systolic BP Rehab 84   -DANIEL     Post Treatment Diastolic BP 50   -     Pretreatment Heart Rate (beats/min) 97  -DANIEL     Intratreatment Heart Rate (beats/min) 97  -DANIEL     Posttreatment Heart Rate (beats/min) 94  -DANIEL     Pre SpO2 (%) 98  -DANIEL     O2 Delivery Pre Treatment supplemental O2  -DANIEL     Intra SpO2 (%) 91  -DANIEL     O2 Delivery Intra Treatment supplemental O2  -DANIEL     Post SpO2 (%) 94  -DANIEL     O2 Delivery Post Treatment supplemental O2  -DANIEL     Pre Patient Position Supine  -     Post Patient Position Supine  -[facility] Name 03/17/22 1331          Positioning and Restraints    Pre-Treatment Position in bed  -DANIEL     Post Treatment Position bed  -     In Bed fowlers;call light within reach;encouraged to call for assist;exit alarm on  all needs met.  -[facility] Name 03/17/22 1331          Therapy Assessment/Plan (PT)    Criteria for Skilled Interventions Met (PT) yes;skilled treatment is necessary  -[facility] Name 03/17/22 1331          Bed Mobility Goal 1 (PT)    Activity/Assistive Device (Bed Mobility Goal 1, PT) sit to supine/supine to sit  -     [place] Level/Cues Needed (Bed Mobility Goal 1, PT) modified independence  -     Time  Frame (Bed Mobility Goal 1, PT) by discharge  -DANIEL     Strategies/Barriers (Bed Mobility Goal 1, PT) Maintain SpO2 >90%.  -DANIEL     Row Name 03/17/22 1331          Transfer Goal 1 (PT)    Activity/Assistive Device (Transfer Goal 1, PT) sit-to-stand/stand-to-sit;bed-to-chair/chair-to-bed;walker, rolling  -DANIEL     [place] Level/Cues Needed (Transfer Goal 1, PT) modified independence  -DANIEL     Time Frame (Transfer Goal 1, PT) by discharge  -DANIEL     Strategies/Barriers (Transfers Goal 1, PT) Maintain SpO2 >90%.  -DANIEL     Progress/Outcome (Transfer Goal 1, PT) goal not met  -DANIEL     Row Name 03/17/22 1331          Gait Training Goal 1 (PT)    Activity/Assistive Device (Gait Training Goal 1, PT) walker, rolling  -DANIEL     [place] Level (Gait Training Goal 1, PT) modified independence  -DANIEL     Distance (Gait Training Goal 1, PT) 75'x2.  -DANIEL     Time Frame (Gait Training Goal 1, PT) by discharge  -DANIEL     Strategies/Barriers (Gait Training Goal 1, PT) Maintain SpO2 >90%.  -DANIEL     Progress/Outcome (Gait Training Goal 1, PT) goal not met  -DANIEL           User Key  (r) = Recorded By, (t) = Taken By, (c) = Cosigned By    Initials Name Provider Type    Dc Iverson PTA Physical Therapy Assistant                Physical Therapy Education                 Title: PT OT SLP Therapies (In Progress)     Topic: Physical Therapy (In Progress)     Point: Mobility training (In Progress)     Learning Progress Summary           Patient Acceptance, E, NR by DANIEL at 3/17/2022 1452    Acceptance, E, NR by DANIEL at 3/17/2022 1406    Acceptance, E, NR by DANIEL at 3/15/2022 1259    Acceptance, E, NR by DANIEL at 3/14/2022 1351    Acceptance, E, NR by DANIEL at 3/11/2022 1438    Acceptance, E, NR by DANIEL at 3/10/2022 1318    Acceptance, E, NR by DANIEL at 3/9/2022 1614    Acceptance, E, VU,NR by  at 3/8/2022 0937    Comment: PT POC, breathing technique, transfer technique.                   Point: Home exercise program (Not Started)     Learner Progress:  Not  documented in this visit.          Point: Body mechanics (Not Started)     Learner Progress:  Not documented in this visit.          Point: Precautions (Not Started)     Learner Progress:  Not documented in this visit.                      User Key     Initials Effective Dates Name Provider Type Discipline    DANIEL 06/16/21 -  Dc Thakur PTA Physical Therapy Assistant PT    CZ 06/16/21 -  Martin Alvarez, PT Physical Therapist PT              PT Recommendation and Plan  Anticipated Discharge Disposition (PT): assisted living  Therapy Frequency (PT): other (see comments) (3-7 days/week)  Plan of Care Reviewed With: patient  Progress: improving  Outcome Evaluation: tx limited by low BP, 74/42 upn entry. LE ther ex and stretching performed on supine. BP improved to 84/50. no OOB at this time. pt participated well in LE ther ex but fatigues easily. no new goals met at this time. pt would continue to benefit from PT services.   Outcome Measures     Row Name 03/17/22 1331 03/15/22 0958          How much help from another person do you currently need...    Turning from your back to your side while in flat bed without using bedrails? 3  -DANIEL 3  -DANIEL     Moving from lying on back to sitting on the side of a flat bed without bedrails? 3  -DANIEL 3  -DANIEL     Moving to and from a bed to a chair (including a wheelchair)? 3  -DANIEL 3  -DANIEL     Standing up from a chair using your arms (e.g., wheelchair, bedside chair)? 3  -DANIEL 3  -DANIEL     Climbing 3-5 steps with a railing? 3  -DANIEL 3  -DANIEL     To walk in hospital room? 3  -DANIEL 3  -DANIEL     AM-PAC 6 Clicks Score (PT) 18  -DANIEL 18  -DANIEL            Functional Assessment    Outcome Measure Options AM-PAC 6 Clicks Basic Mobility (PT)  -DANIEL AM-PAC 6 Clicks Basic Mobility (PT)  -DANIEL           User Key  (r) = Recorded By, (t) = Taken By, (c) = Cosigned By    Initials Name Provider Type    DANIEL Dc Thakur PTA Physical Therapy Assistant                 Time Calculation:    PT Charges     Row Name  03/17/22 1454             Time Calculation    Start Time 1331  -DANIEL      Stop Time 1432  -DANIEL      Time Calculation (min) 61 min  -DANIEL              Time Calculation- PT    Total Timed Code Minutes- PT 61 minute(s)  -DANIEL              Timed Charges    91623 - PT Therapeutic Exercise Minutes 46  -DANIEL      46510 - PT Therapeutic Activity Minutes 15  -DANIEL              Total Minutes    Timed Charges Total Minutes 61  -DANIEL       Total Minutes 61  -DANIEL            User Key  (r) = Recorded By, (t) = Taken By, (c) = Cosigned By    Initials Name Provider Type     Dc Thakur PTA Physical Therapy Assistant              Therapy Charges for Today     Code Description Service Date Service Provider Modifiers Qty    31740822246 HC PT THER PROC EA 15 MIN 3/17/2022 Dc Thakur PTA GP 3    50662143383 HC PT THERAPEUTIC ACT EA 15 MIN 3/17/2022 Dc Thakur PTA GP 1          PT G-Codes  Outcome Measure Options: AM-PAC 6 Clicks Basic Mobility (PT)  AM-PAC 6 Clicks Score (PT): 18  AM-PAC 6 Clicks Score (OT): 16    Dc Thakur PTA  3/17/2022

## 2022-03-17 NOTE — PLAN OF CARE
Goal Outcome Evaluation:  Plan of Care Reviewed With: patient        Progress: no change  Outcome Evaluation: Patient rested well this evening, continues on 11l O2. Patient alert and oriented and made needs known. Vital signs stable.

## 2022-03-17 NOTE — CONSULTS
INTERVENTIONAL RADIOLOGY    88yo severely debilitated female PMH CAD s/p stent, severe CHF (EF 35%), CKD3, HLD, HTN, ischemic cardiomyopathy, emphysema ex-smoker admitted 3/7/22 with dyspnea and increased O2 demands (normally only wears 2L O2 qhs). She was found to have large left and moderate right pleural effusions and underwent L thora 1.2L on 3/11/22 with some subjective improvement in dyspnea. Currently O2 demands continue to increase and now she is on 12L HF NC. CT chest today reveals overall patent airways, large left and moderate right pleural effusions. IR consulted for L thora.    On exam, severely debilitated cachectic female, unable to support herself sitting up in bed. HF NC @ 12L sats in mid-90's. Tachypneic.  Sonographic exam of left thorax demonstrates large left pleural effusion.     Plan left thora, followed tomorrow by either repeat left thora or right thora.    The patient's history and physical exam were reviewed, and no changes were noted. The risks, benefits, alternatives, and indications of the procedure were discussed with the patient, and all questions were answered. Informed consent was obtained.    Per 2019 SIR/CAIR/CIRSE Guidelines, Eliquis may be continued for low risk procedures such as thoracentesis.    Thank you very much for the consult. Please do not hesitate to contact me if I may be of further assistance.    Marina Rios M.D.  Vascular, Interventional and Wound Physician  IR Chief, [facility]  Cell: [phone] / Office: [phone]

## 2022-03-17 NOTE — PROGRESS NOTES
"[facility]  [facility] [place]. [address]  T - [phone]  F - [phone]     Progress Note          PATIENT  DEMOGRAPHICS   PATIENT NAME: Naomi Duong Son                      PHYSICIAN: Stevenson Robertson MD  : 1935  MRN: [id number]   LOS: 9 days    Patient Care Team:  Sabino Mobley MD as PCP - General (Family Medicine)  Subjective   SUBJECTIVE      Comfortable today. Breathing better after thoracentesis    Objective   OBJECTIVE   Vital Signs  Temp:  [96.1 °F (35.6 °C)-96.9 °F (36.1 °C)] 96.8 °F (36 °C)  Heart Rate:  [] 97  Resp:  [18-22] 20  BP: (102-144)/(55-77) 102/61    Flowsheet Rows    Flowsheet Row First Filed Value   Admission Height 157.5 cm (62\") Documented at 2022 0504   Admission Weight 46.7 kg (103 lb) Documented at 2022 0504           I/O last 3 completed shifts:  In: 1020 [P.O.:1020]  Out: 725 [Urine:725]    PHYSICAL EXAM    Physical Exam  Constitutional:       Appearance: She is well-developed.   HENT:      Head: Normocephalic.   Eyes:      Pupils: Pupils are equal, round, and reactive to light.   Cardiovascular:      Rate and Rhythm: Normal rate and regular rhythm.      Heart sounds: Normal heart sounds.   Pulmonary:      Effort: Pulmonary effort is normal.      Breath sounds: Normal breath sounds.   Abdominal:      General: Bowel sounds are normal.      Palpations: Abdomen is soft.   Musculoskeletal:         General: Swelling present.   Skin:     Coloration: Skin is not jaundiced.   Neurological:      General: No focal deficit present.      Mental Status: She is alert and oriented to person, place, and time.         RESULTS   Results Review:    Results from last 7 days   Lab Units 22  0654 22  0609 03/15/22  0530   SODIUM mmol/L 137 137 133*   POTASSIUM mmol/L 5.4* 5.1 4.9   CHLORIDE mmol/L 92* 92* 91*   CO2 mmol/L 36.0* 39.0* 36.0*   BUN mg/dL 61* 50* 43*   CREATININE mg/dL 2.15* 1.87* 1.56*   CALCIUM mg/dL 9.5 9.8 9.1 "   BILIRUBIN mg/dL 0.5 0.6 0.6   ALK PHOS U/L 90 80 76   ALT (SGPT) U/L 18 11 11   AST (SGOT) U/L 25 19 16   GLUCOSE mg/dL 92 110* 95       Estimated Creatinine Clearance: 14.6 mL/min (A) (by C-G formula based on SCr of 2.15 mg/dL (H)).                Results from last 7 days   Lab Units 03/17/22  0654 03/16/22  0609 03/15/22  0530 03/14/22  0553 03/13/22  1051   WBC 10*3/mm3 5.43 6.85 6.14 6.52 4.83   HEMOGLOBIN g/dL 10.6* 10.3* 9.8* 9.4* 8.8*   PLATELETS 10*3/mm3 229 250 217 205 187       Results from last 7 days   Lab Units 03/11/22  0903   INR  1.08         Imaging Results (Last 24 Hours)     Procedure Component Value Units Date/Time    CT Chest Without Contrast Diagnostic [[id number]] Collected: 03/17/22 0952     Updated: 03/17/22 1633    Narrative:      CT CHEST WITHOUT CONTRAST    INDICATION: pleural effusion, hypoxia worsening, J96.21 Acute and  chronic respiratory failure with hypoxia J90 Pleural effusion,  not elsewhere classified N19 Unspecified kidney failure I50.9  Heart failure, unspecified Z74.09 Other reduced mobility Z74.09  Other reduced mobility Z78.9 Other specified health status    COMPARISON: None    TECHNIQUE: Spiral CT images were obtained of the thorax with  multiplanar reconstructions.    FINDINGS:  The study is limited by motion artifact.  AIRWAYS: Patent  MEDIASTINUM: Severe vascular calcifications. Severe calcific  coronary atherosclerosis. Cardiomegaly.  LUNGS: There is a 2.6 cm band like opacity extending to the  pleura at the right pulmonary apex which could represent scarring  in this patient with centrilobular emphysema. There are scattered  groundglass and mosaic opacities throughout the aerated portions  of the right lung which can be seen in small airway infection and  inflammation or edema. There is mild peribronchial cuffing at the  right lung apex. Within the aerated portions of the left lung,  there is mosaic attenuation and groundglass opacity that is  patchy suggestive of  interstitial process such as edema. There is  a large left and moderate right pleural effusion.  UPPER ABDOMEN: Trace perihepatic ascites. Suprarenal abdominal  aortic aneurysm measuring 3.2 cm. Left kidney atretic. Left  adrenal adenoma measuring 1.7 cm. Inferior vena cava is enlarged  suggesting elevated right heart pressures.  SOFT TISSUES: Unremarkable  OSSEOUS STRUCTURES: Sternotomy. L2 compression fracture  There is severe kyphotic deformity. Left carotid stent. Right  carotid stent.      Impression:      1. Right apical bandlike opacity measuring approximately 2.6 cm  which could represent scarring in this patient with centrilobular  emphysema.  2. Scattered groundglass and mosaic opacities throughout aerated  portions of both lungs which can be seen in small airway  infection/inflammation or edema.  3. Mild peribronchial cuffing at right lung apex which can be  seen in airway infection or inflammation.  4. Large left pleural effusion.  5. Moderate right pleural effusion.  6. Severe vascular calcifications including severe calcific  coronary atherosclerosis.  7. Suprarenal abdominal aortic aneurysm measuring 3.2 cm.  8. Cardiomegaly.  9. Enlarged inferior vena cava suggesting elevated right heart  pressures.  10. Trace perihepatic ascites.  11. Left adrenal adenoma measuring 1.7 cm.  12. L2 compression fracture.  13. Severe kyphotic deformity.  14. Bilateral carotid artery stents.    Electronically signed by:  Marina Rios MD  3/17/2022 4:30 PM CDT  Workstation: [id number]     Thoracentesis [[id number]] Collected: 03/17/22 1034    Specimen: Body Fluid Updated: 03/17/22 1322    Narrative:      DIAGNOSTIC AND THERAPEUTIC THORACENTESIS    STAFF: Marina Rios M.D.    INDICATION: Symptomatic left pleural effusion.    MEDICATIONS: Local anesthetic.    PROCEDURE: Using ultrasound guidance, the left pleural cavity was  punctured and a catheter placed. A total of 1.5 liters of josefina  fluid was removed. The catheter was  then removed. A post  procedure chest radiograph was obtained.    FINDINGS: Large left pleural effusion, incompletely drained.    EBL: None    COMPLICATIONS: None      Impression:      Successful diagnostic and therapeutic left  thoracentesis.    Electronically signed by:  Marina Rios MD  3/17/2022 1:20 PM CDT  Workstation: [id number]    XR Chest 1 View [[id number]] Collected: 03/17/22 1034     Updated: 03/17/22 1056    Narrative:      PORTABLE CHEST RADIOGRAPH    HISTORY: post thora, J96.21 Acute and chronic respiratory failure  with hypoxia J90 Pleural effusion, not elsewhere classified N19  Unspecified kidney failure I50.9 Heart failure, unspecified  Z74.09 Other reduced mobility Z74.09 Other reduced mobility Z78.9  Other specified health status    TECHNIQUE: Single AP view of the chest.    COMPARISON: Chest radiograph March 16, 2022    FINDINGS:  Upper abdomen is difficult to visualize due to the patient's  severe kyphotic deformity and positioning. Visualized soft  tissues appear unremarkable. Visualized osseous structures  demonstrates severe degenerative change. There is a left carotid  artery stent. Clips in the left upper abdomen. Severe aortic  atherosclerosis. There are moderate bilateral pleural effusions.  The left pleural effusion has decreased status post left  thoracentesis. There is no pneumothorax. The aerated lungs  demonstrate mildly increased interstitial markings compatible  with edema however this is improved since the prior study. There  are postoperative changes from CABG.      Impression:      1. No left pneumothorax status post left thoracentesis.  2. Decreased left pleural effusion, now moderate.  3. Moderate right pleural effusion.  4. Mild improvement in pulmonary interstitial markings consistent  with edema.  5. Left carotid artery stent.  6. CABG.  7. Severe aortic atherosclerosis.    Electronically signed by:  Marina Rios MD  3/17/2022 10:54 AM CDT  Workstation: [id number]            MEDICATIONS    apixaban, 2.5 mg, Oral, Q12H  aspirin, 81 mg, Oral, Daily  [START ON 3/18/2022] cefepime, 2 g, Intravenous, Q24H  [START ON 3/18/2022] furosemide, 40 mg, Intravenous, Daily  ipratropium-albuterol, 3 mL, Nebulization, Q4H - RT  midodrine, 5 mg, Oral, BID AC  polyethylene glycol, 17 g, Oral, Daily  sodium chloride, 10 mL, Intravenous, Q12H      hold, 1 each  hold, 1 each  Pharmacy to Dose Cefepime,         Assessment/Plan   ASSESSMENT / PLAN      Acute on chronic respiratory failure with hypoxia (HCC)    Acute on chronic systolic CHF (congestive heart failure) (HCC)    Bilateral pleural effusion    CKD (chronic kidney disease) stage 4, GFR 15-29 ml/min (HCC)    Atrial fibrillation (HCC)    Chronic anemia       1.  CKD 4- Baseline creatinine is around 1.7-1.8, creatinine peak at 2.07.     Now worsening effusion and was on iv lasix 40mg bid. Cr slightly worse and discussed earlier to lower lasix to daily     2.  Acute on chronic CHF / Bilateral pleural effusions- s/p thoracentesis. Had thoracentesis on left today and plan to do tomorrow on right     3.  Acute on chronic respiratory failure- secondary to underlying CHF as well as new onset atrial fibrillation and chronic pleural effusions.  Managed per primary team.     4.  Atrial fibrillation- managed per cardiology and primary team     6.  Anemia- worsening, fe low and on iv fe. hgb better     7.  History of secondary hyperparathyroidism     8.  History of CAD    9. Hyperkalemia better after lokelma. Will add again today     Plan for [place] transfer once stable              This document has been electronically signed by Stevenson Robertson MD on March 17, 2022 17:13 CDT

## 2022-03-17 NOTE — PROGRESS NOTES
[facility] Medicine Services  INPATIENT PROGRESS NOTE    Length of Stay: 9  Date of Admission: 3/7/2022  Primary Care Physician: Sabino Mobley MD    Subjective   Chief Complaint: Shortness of breath  HPI:  87 year old female with a history of CAD, HFrEF, CKD3, HTN, HLD, GERD, and OA who presented with worsening shortness of breath and increased need for home O2.  She was noted to have bilateral pleural effusions. She was admitted on supplemental oxygen, IV lasix, and fluid restrictions.  She developed atrial fibrillation and was initiated on Eliquis.  Cardiology consulted and she was recommended thoracentesis. She underwent thoracentesis with 1.2 L of pleural fluid removed from the left. Nephrology was consulted due to worsening of creatinine from baseline. She developed worsening hypoxia and is currently requiring 12 LPM by high flow nasal cannula. She is to undergo thoracentesis again today.    Review of Systems   Constitutional: Positive for fatigue. Negative for chills and fever.   Respiratory: Positive for shortness of breath.    Gastrointestinal: Negative for abdominal pain and nausea.        All pertinent negatives and positives are as above. All other systems have been reviewed and are negative unless otherwise stated.     Objective    Temp:  [96.1 °F (35.6 °C)-97.2 °F (36.2 °C)] 96.9 °F (36.1 °C)  Heart Rate:  [] 99  Resp:  [18-22] 20  BP: (106-144)/(55-77) 129/77    Physical Exam  Vitals reviewed.   Constitutional:       Appearance: She is ill-appearing.   HENT:      Head: Normocephalic and atraumatic.   Cardiovascular:      Rate and Rhythm: Normal rate.   Pulmonary:      Effort: Pulmonary effort is normal. No respiratory distress.      Breath sounds: Examination of the right-middle field reveals decreased breath sounds. Examination of the left-middle field reveals decreased breath sounds. Examination of the right-lower field reveals decreased  breath sounds. Examination of the left-lower field reveals decreased breath sounds. Decreased breath sounds present.   Abdominal:      General: There is no distension.      Palpations: Abdomen is soft.   Musculoskeletal:         General: No deformity.      Right lower leg: No edema.      Left lower leg: No edema.   Skin:     General: Skin is warm and dry.   Neurological:      General: No focal deficit present.      Mental Status: She is alert.         Results Review:  I have reviewed the labs, radiology results, and diagnostic studies.    Laboratory Data:   Results from last 7 days   Lab Units 03/17/22  0654 03/16/22  0609 03/15/22  0530   SODIUM mmol/L 137 137 133*   POTASSIUM mmol/L 5.4* 5.1 4.9   CHLORIDE mmol/L 92* 92* 91*   CO2 mmol/L 36.0* 39.0* 36.0*   BUN mg/dL 61* 50* 43*   CREATININE mg/dL 2.15* 1.87* 1.56*   GLUCOSE mg/dL 92 110* 95   CALCIUM mg/dL 9.5 9.8 9.1   BILIRUBIN mg/dL 0.5 0.6 0.6   ALK PHOS U/L 90 80 76   ALT (SGPT) U/L 18 11 11   AST (SGOT) U/L 25 19 16   ANION GAP mmol/L 9.0 6.0 6.0     Estimated Creatinine Clearance: 14.6 mL/min (A) (by C-G formula based on SCr of 2.15 mg/dL (H)).          Results from last 7 days   Lab Units 03/17/22  0654 03/16/22  0609 03/15/22  0530 03/14/22  0553 03/13/22  1051   WBC 10*3/mm3 5.43 6.85 6.14 6.52 4.83   HEMOGLOBIN g/dL 10.6* 10.3* 9.8* 9.4* 8.8*   HEMATOCRIT % 36.9 33.7* 31.7* 30.0* 28.3*   PLATELETS 10*3/mm3 229 250 217 205 187     Results from last 7 days   Lab Units 03/11/22  0903   INR  1.08       Culture Data:   No results found for: BLOODCX  No results found for: URINECX  No results found for: RESPCX  No results found for: WOUNDCX  No results found for: STOOLCX  No components found for: BODYFLD    Radiology Data:   Imaging Results (Last 24 Hours)     Procedure Component Value Units Date/Time    US Thoracentesis [[id number]] Resulted: 03/17/22 1039    Specimen: Body Fluid Updated: 03/17/22 1105    XR Chest 1 View [[id number]] Collected: 03/17/22 103      Updated: 03/17/22 1056    Narrative:      PORTABLE CHEST RADIOGRAPH    HISTORY: post thora, J96.21 Acute and chronic respiratory failure  with hypoxia J90 Pleural effusion, not elsewhere classified N19  Unspecified kidney failure I50.9 Heart failure, unspecified  Z74.09 Other reduced mobility Z74.09 Other reduced mobility Z78.9  Other specified health status    TECHNIQUE: Single AP view of the chest.    COMPARISON: Chest radiograph March 16, 2022    FINDINGS:  Upper abdomen is difficult to visualize due to the patient's  severe kyphotic deformity and positioning. Visualized soft  tissues appear unremarkable. Visualized osseous structures  demonstrates severe degenerative change. There is a left carotid  artery stent. Clips in the left upper abdomen. Severe aortic  atherosclerosis. There are moderate bilateral pleural effusions.  The left pleural effusion has decreased status post left  thoracentesis. There is no pneumothorax. The aerated lungs  demonstrate mildly increased interstitial markings compatible  with edema however this is improved since the prior study. There  are postoperative changes from CABG.      Impression:      1. No left pneumothorax status post left thoracentesis.  2. Decreased left pleural effusion, now moderate.  3. Moderate right pleural effusion.  4. Mild improvement in pulmonary interstitial markings consistent  with edema.  5. Left carotid artery stent.  6. CABG.  7. Severe aortic atherosclerosis.    Electronically signed by:  Marina Rios MD  3/17/2022 10:54 AM CDT  Workstation: [id number]    CT Chest Without Contrast Diagnostic [[id number]] Resulted: 03/17/22 0956     Updated: 03/17/22 0958          I have reviewed the patient's current medications.     Assessment/Plan     Active Hospital Problems    Diagnosis    • **Acute on chronic respiratory failure with hypoxia (HCC)    • Acute on chronic systolic CHF (congestive heart failure) (HCC)    • Chronic anemia    • Bilateral pleural  effusion    • CKD (chronic kidney disease) stage 4, GFR 15-29 ml/min (HCC)    • Atrial fibrillation (HCC)        Plan:    Thoracentesis again today, discussed with IR  CT chest without contrast  Supplemental oxygen, wean as tolerated  Lasix, decrease to 40 mg IV daily  Strict I&O, fluid restriction, daily weights  Cardiology consultation appreciated  Nephrology consultation appreciated  PT/OT  VTE PPx: Resume Eliquis  DNR/DNI      I confirmed that the patient's Advance Care Plan is present, code status is documented, or surrogate decision maker is listed in the patient's medical record.     The patient was evaluated during the global COVID-19 pandemic, and the diagnosis was suspected/considered upon their initial presentation.  Evaluation, treatment, and testing were consistent with current guidelines for patients who present with complaints or symptoms that may be related to COVID-19.          This document has been electronically signed by CHANA Johnson on March 17, 2022 11:21 CDT

## 2022-03-18 PROBLEM — J18.9 HCAP (HEALTHCARE-ASSOCIATED PNEUMONIA): Status: ACTIVE | Noted: 2022-01-01

## 2022-03-18 NOTE — PROGRESS NOTES
[facility] Medicine Services  INPATIENT PROGRESS NOTE    Length of Stay: 10  Date of Admission: 3/7/2022  Primary Care Physician: Sabino Mobley MD    Subjective   Chief Complaint: Shortness of breath  HPI:  87 year old female with a history of CAD, HFrEF, CKD3, HTN, HLD, GERD, and OA who presented with worsening shortness of breath and increased need for home O2.  She was noted to have bilateral pleural effusions. She was admitted on supplemental oxygen, IV lasix, and fluid restrictions.  She developed atrial fibrillation and was initiated on Eliquis.  Cardiology consulted and she was recommended thoracentesis. She underwent thoracentesis with 1.2 L of pleural fluid removed from the left. Nephrology was consulted due to worsening of creatinine from baseline. Yesterday 3/17/22, she underwent thoracentesis with 1.5 liters removed from the left pleural space.  Today 3/18/22, she has weaned to 10 LPM.  She is to undergo thoracentesis of the right pleural space.     Review of Systems   Constitutional: Positive for fatigue. Negative for chills and fever.   Respiratory: Positive for shortness of breath.    Cardiovascular: Negative for chest pain.   Gastrointestinal: Negative for abdominal pain and nausea.        All pertinent negatives and positives are as above. All other systems have been reviewed and are negative unless otherwise stated.     Objective    Temp:  [96.3 °F (35.7 °C)-97.2 °F (36.2 °C)] 97.2 °F (36.2 °C)  Heart Rate:  [] 95  Resp:  [18-22] 20  BP: (102-144)/(54-77) 106/60    Physical Exam  Vitals reviewed.   Constitutional:       Appearance: She is ill-appearing.   HENT:      Head: Normocephalic and atraumatic.   Cardiovascular:      Rate and Rhythm: Normal rate.   Pulmonary:      Effort: Pulmonary effort is normal. No respiratory distress.      Breath sounds: Examination of the right-middle field reveals decreased breath sounds. Examination of  the left-middle field reveals decreased breath sounds. Examination of the right-lower field reveals decreased breath sounds. Examination of the left-lower field reveals decreased breath sounds. Decreased breath sounds present.   Abdominal:      General: There is no distension.      Palpations: Abdomen is soft.   Musculoskeletal:         General: No deformity.      Right lower leg: No edema.      Left lower leg: No edema.   Skin:     General: Skin is warm and dry.   Neurological:      General: No focal deficit present.      Mental Status: She is alert.         Results Review:  I have reviewed the labs, radiology results, and diagnostic studies.    Laboratory Data:   Results from last 7 days   Lab Units 03/18/22  0555 03/17/22  0654 03/16/22  0609   SODIUM mmol/L 137 137 137   POTASSIUM mmol/L 5.5* 5.4* 5.1   CHLORIDE mmol/L 92* 92* 92*   CO2 mmol/L 35.0* 36.0* 39.0*   BUN mg/dL 67* 61* 50*   CREATININE mg/dL 2.53* 2.15* 1.87*   GLUCOSE mg/dL 88 92 110*   CALCIUM mg/dL 8.8 9.5 9.8   BILIRUBIN mg/dL 0.5 0.5 0.6   ALK PHOS U/L 80 90 80   ALT (SGPT) U/L 14 18 11   AST (SGOT) U/L 22 25 19   ANION GAP mmol/L 10.0 9.0 6.0     Estimated Creatinine Clearance: 12.2 mL/min (A) (by C-G formula based on SCr of 2.53 mg/dL (H)).          Results from last 7 days   Lab Units 03/18/22  0555 03/17/22  0654 03/16/22  0609 03/15/22  0530 03/14/22  0553   WBC 10*3/mm3 5.52 5.43 6.85 6.14 6.52   HEMOGLOBIN g/dL 9.4* 10.6* 10.3* 9.8* 9.4*   HEMATOCRIT % 32.2* 36.9 33.7* 31.7* 30.0*   PLATELETS 10*3/mm3 228 229 250 217 205     Results from last 7 days   Lab Units 03/11/22  0903   INR  1.08       Culture Data:   No results found for: BLOODCX  No results found for: URINECX  No results found for: RESPCX  No results found for: WOUNDCX  No results found for: STOOLCX  No components found for: BODYFLD    Radiology Data:   Imaging Results (Last 24 Hours)     Procedure Component Value Units Date/Time    XR Chest 1 View [[id number]] Collected:  03/18/22 0524     Updated: 03/18/22 0741    Narrative:      PROCEDURE: Portable chest x-ray    TECHNIQUE: Single frontal view of the chest    COMPARISON: 3/17/2022    HISTORY: effusion follow-up s/p thoracentesis, J96.21 Acute and  chronic respiratory failure with hypoxia J90 Pleural effusion,  not elsewhere classified N19 Unspecified kidney failure I50.9  Heart failure, unspecified Z74.09 Other reduced mobility Z74.09  Other reduced mobility Z78.9 Other specified health status    FINDINGS:  Moderate size bilateral pleural effusions. The effusion on the  right shows a slight increase in size compared with yesterday's  exam. Right lung base airspace opacification suspicious for  atelectasis or pneumonia. Pulmonary vascular prominence noted in  the upper portions of both lungs. No pneumothorax visualized.  Heart, hilar, and mediastinal structures appear stable accounting  for differences in projection and technique.      Impression:      Moderate size bilateral pleural effusions. The effusion on the  right shows a slight increase in size compared to yesterday's  exam. Right lung base airspace opacification suspicious for  atelectasis or pneumonia. Pulmonary vascular prominence noted in  the upper portions of both lungs. No pneumothorax visualized.     Electronically signed by:  Surendra Carter MD  3/18/2022 7:39 AM CDT  Workstation: [id number]    CT Chest Without Contrast Diagnostic [[id number]] Collected: 03/17/22 0952     Updated: 03/17/22 1633    Narrative:      CT CHEST WITHOUT CONTRAST    INDICATION: pleural effusion, hypoxia worsening, J96.21 Acute and  chronic respiratory failure with hypoxia J90 Pleural effusion,  not elsewhere classified N19 Unspecified kidney failure I50.9  Heart failure, unspecified Z74.09 Other reduced mobility Z74.09  Other reduced mobility Z78.9 Other specified health status    COMPARISON: None    TECHNIQUE: Spiral CT images were obtained of the thorax with  multiplanar  reconstructions.    FINDINGS:  The study is limited by motion artifact.  AIRWAYS: Patent  MEDIASTINUM: Severe vascular calcifications. Severe calcific  coronary atherosclerosis. Cardiomegaly.  LUNGS: There is a 2.6 cm band like opacity extending to the  pleura at the right pulmonary apex which could represent scarring  in this patient with centrilobular emphysema. There are scattered  groundglass and mosaic opacities throughout the aerated portions  of the right lung which can be seen in small airway infection and  inflammation or edema. There is mild peribronchial cuffing at the  right lung apex. Within the aerated portions of the left lung,  there is mosaic attenuation and groundglass opacity that is  patchy suggestive of interstitial process such as edema. There is  a large left and moderate right pleural effusion.  UPPER ABDOMEN: Trace perihepatic ascites. Suprarenal abdominal  aortic aneurysm measuring 3.2 cm. Left kidney atretic. Left  adrenal adenoma measuring 1.7 cm. Inferior vena cava is enlarged  suggesting elevated right heart pressures.  SOFT TISSUES: Unremarkable  OSSEOUS STRUCTURES: Sternotomy. L2 compression fracture  There is severe kyphotic deformity. Left carotid stent. Right  carotid stent.      Impression:      1. Right apical bandlike opacity measuring approximately 2.6 cm  which could represent scarring in this patient with centrilobular  emphysema.  2. Scattered groundglass and mosaic opacities throughout aerated  portions of both lungs which can be seen in small airway  infection/inflammation or edema.  3. Mild peribronchial cuffing at right lung apex which can be  seen in airway infection or inflammation.  4. Large left pleural effusion.  5. Moderate right pleural effusion.  6. Severe vascular calcifications including severe calcific  coronary atherosclerosis.  7. Suprarenal abdominal aortic aneurysm measuring 3.2 cm.  8. Cardiomegaly.  9. Enlarged inferior vena cava suggesting elevated  right heart  pressures.  10. Trace perihepatic ascites.  11. Left adrenal adenoma measuring 1.7 cm.  12. L2 compression fracture.  13. Severe kyphotic deformity.  14. Bilateral carotid artery stents.    Electronically signed by:  Marina Rios MD  3/17/2022 4:30 PM CDT  Workstation: [id number]    US Thoracentesis [[id number]] Collected: 03/17/22 1034    Specimen: Body Fluid Updated: 03/17/22 1322    Narrative:      DIAGNOSTIC AND THERAPEUTIC THORACENTESIS    STAFF: Marina Rios M.D.    INDICATION: Symptomatic left pleural effusion.    MEDICATIONS: Local anesthetic.    PROCEDURE: Using ultrasound guidance, the left pleural cavity was  punctured and a catheter placed. A total of 1.5 liters of josefina  fluid was removed. The catheter was then removed. A post  procedure chest radiograph was obtained.    FINDINGS: Large left pleural effusion, incompletely drained.    EBL: None    COMPLICATIONS: None      Impression:      Successful diagnostic and therapeutic left  thoracentesis.    Electronically signed by:  Marina Rios MD  3/17/2022 1:20 PM CDT  Workstation: [id number]    XR Chest 1 View [[id number]] Collected: 03/17/22 1034     Updated: 03/17/22 1056    Narrative:      PORTABLE CHEST RADIOGRAPH    HISTORY: post thora, J96.21 Acute and chronic respiratory failure  with hypoxia J90 Pleural effusion, not elsewhere classified N19  Unspecified kidney failure I50.9 Heart failure, unspecified  Z74.09 Other reduced mobility Z74.09 Other reduced mobility Z78.9  Other specified health status    TECHNIQUE: Single AP view of the chest.    COMPARISON: Chest radiograph March 16, 2022    FINDINGS:  Upper abdomen is difficult to visualize due to the patient's  severe kyphotic deformity and positioning. Visualized soft  tissues appear unremarkable. Visualized osseous structures  demonstrates severe degenerative change. There is a left carotid  artery stent. Clips in the left upper abdomen. Severe aortic  atherosclerosis. There are moderate  bilateral pleural effusions.  The left pleural effusion has decreased status post left  thoracentesis. There is no pneumothorax. The aerated lungs  demonstrate mildly increased interstitial markings compatible  with edema however this is improved since the prior study. There  are postoperative changes from CABG.      Impression:      1. No left pneumothorax status post left thoracentesis.  2. Decreased left pleural effusion, now moderate.  3. Moderate right pleural effusion.  4. Mild improvement in pulmonary interstitial markings consistent  with edema.  5. Left carotid artery stent.  6. CABG.  7. Severe aortic atherosclerosis.    Electronically signed by:  Marina Rios MD  3/17/2022 10:54 AM CDT  Workstation: [id number]          I have reviewed the patient's current medications.     Assessment/Plan     Active Hospital Problems    Diagnosis    • **Acute on chronic respiratory failure with hypoxia (HCC)    • Acute on chronic systolic CHF (congestive heart failure) (HCC)    • Chronic anemia    • Bilateral pleural effusion    • CKD (chronic kidney disease) stage 4, GFR 15-29 ml/min (HCC)    • Atrial fibrillation (HCC)    Hyperkalemia    Plan:    S/P thoracentesis on 3/11, 3/17  Thoracentesis on the right today, discussed with IR  Supplemental oxygen, wean as tolerated, weaned to 10 LPM  Creatinine increased, stop lasix  Strict I&O, fluid restriction, daily weights  Cardiology consultation appreciated  Nephrology consultation appreciated  Carole for hyperkalemia  PT/OT  VTE PPx: Eliquis  DNR/DNI      I confirmed that the patient's Advance Care Plan is present, code status is documented, or surrogate decision maker is listed in the patient's medical record.     The patient was evaluated during the global COVID-19 pandemic, and the diagnosis was suspected/considered upon their initial presentation.  Evaluation, treatment, and testing were consistent with current guidelines for patients who present with complaints or  symptoms that may be related to COVID-19.          This document has been electronically signed by CHANA Johnson on March 18, 2022 10:02 CDT

## 2022-03-18 NOTE — BRIEF OP NOTE
INTERVENTIONAL RADIOLOGY: BRIEF OP NOTE    Pre-Procedure Diagnosis: symptomatic right pleural effusion  Post-Procedure Diagnosis: same  Procedure: Diagnostic and Therapeutic Right Thoracentesis  Anesthesia: local  Staff: Marina Rios M.D.  EBL: none  Specimens: 900mL josefina right pleural fluid  Drains: none  Findings: no further fluid could be aspirated from right thorax  Complications: none    Marina Rios M.D.  Vascular, Interventional and Wound Physician  IR Chief, [facility]  Cell: [phone] / Office: [phone]

## 2022-03-18 NOTE — PLAN OF CARE
Goal Outcome Evaluation:         Very sweet person.you can tell she doesn't feel well.her arms remained the dusky purple color and she says they itch.dialysis got off 2.3 liters today.pt tolerated wellwaiting to collect a urine.pt is still on 10liters nasal cannula.will cont tominitor

## 2022-03-18 NOTE — PROGRESS NOTES
Item 0 Points 1 Point 2 Points 3 Points 4 Points Subtotal   Mental Status Alert, oriented, cooperative Lethargic, follows commands Confused, not following commands Obtunded or Somnolent Comatose 0   Respiratory Pattern Regular RR 8-16 breaths/minute Increased RR 18-25 breaths/minute Dyspnea on exertion, irregular RR 26-30 breaths/minute Shortness of breath,  RR 31-35 breaths/minute Accessory muscle use, severe SOB  RR > 35 breaths/minute 1   Breath Sounds Clear Decreased unilaterally Decreased bilaterally Basilar crackles Wheezing and/or rhonchi 2   Cough Strong, spontaneous, non-productive Strong productive Weak, non-productive Weak, productive or weak with rhonchi Absent or may require suctioning 2   Pulmonary Status Nonsmoker, no previous history, >1 year quit < 1 PPD  <1 year quit > or = 1 PPD Diagnosed pulmonary disease (severe or chronic) Severe or chronic pulmonary disease with exacerbation 3   Surgical Status None General surgery (non-abdominal or non-thoracic) Lower abdominal Thoracic or upper abdominal Thoracic with pulmonary disease 0   Chest X-ray Clear Chronic changes Infiltrates, atelectasis or pleural effusion Infiltrates in > 1 lobe Diffuse infiltrates and atelectasis and/or effusions 2   Activity   Level Ambulatory Ambulatory with assistance Non-ambulatory Paraplegic Quadriplegic 1        Total Score   11     Score    Drug Therapy Frequency 20 or >    Q4 Duoneb with Q2 Albuterol PRN 15-19    Q6 Duoneb with Q4 Albuterol PRN 10-14    QID Duoneb with Q4 Albuterol PRN 5-9    TID Duoneb with Q6 Albuterol PRN 0-4    Q4 PRN Duoneb                  Lung Expansion Therapy (PEP) Bronchopulmonary Hygiene (CPT)   Q4 & PRN - Severe atelectasis, poor oxygenation Q4 - Copious secretions, dyspnea, unable to sleep   QID - High risk for persistent atelectasis, existence of atelectasis QID & Q4 PRN - Moderate secretion production   TID - At risk for developing atelectasis TID - Small amounts of secretions with poor  cough   BID - Prevention of atelectasis BID - Unable to breathe deeply and cough spontaneously     RT Comments / Recommendations:

## 2022-03-18 NOTE — PROGRESS NOTES
"[facility]  [address] [place] [address]. [address]  T - [phone]  F - [phone]     Progress Note          PATIENT  DEMOGRAPHICS   PATIENT NAME: Naomi Duong Son                      PHYSICIAN: Stevenson Robertson MD  : 1935  MRN: [id number]   LOS: 10 days    Patient Care Team:  Sabino Mobley MD as PCP - General (Family Medicine)  Subjective   SUBJECTIVE      Comfortable today. Now has thoracentesis on right side    Objective   OBJECTIVE   Vital Signs  Temp:  [96.3 °F (35.7 °C)-97.9 °F (36.6 °C)] 97.9 °F (36.6 °C)  Heart Rate:  [] 100  Resp:  [18-22] 22  BP: (102-133)/(54-61) 118/61    Flowsheet Rows    Flowsheet Row First Filed Value   Admission Height 157.5 cm (62\") Documented at 2022 0504   Admission Weight 46.7 kg (103 lb) Documented at 2022 0504           I/O last 3 completed shifts:  In: 530 [P.O.:480; IV Piggyback:50]  Out: 2225 [Urine:725; Chest Tube:1500]    PHYSICAL EXAM    Physical Exam  Constitutional:       Appearance: She is well-developed.   HENT:      Head: Normocephalic.   Eyes:      Pupils: Pupils are equal, round, and reactive to light.   Cardiovascular:      Rate and Rhythm: Normal rate and regular rhythm.      Heart sounds: Normal heart sounds.   Pulmonary:      Effort: Pulmonary effort is normal.      Breath sounds: Normal breath sounds.   Abdominal:      General: Bowel sounds are normal.      Palpations: Abdomen is soft.   Musculoskeletal:         General: Swelling present.   Skin:     Coloration: Skin is not jaundiced.   Neurological:      General: No focal deficit present.      Mental Status: She is alert and oriented to person, place, and time.         RESULTS   Results Review:    Results from last 7 days   Lab Units 22  0555 22  0654 22  0609   SODIUM mmol/L 137 137 137   POTASSIUM mmol/L 5.5* 5.4* 5.1   CHLORIDE mmol/L 92* 92* 92*   CO2 mmol/L 35.0* 36.0* 39.0*   BUN mg/dL 67* 61* 50*   CREATININE mg/dL 2.53* 2.15* " 1.87*   CALCIUM mg/dL 8.8 9.5 9.8   BILIRUBIN mg/dL 0.5 0.5 0.6   ALK PHOS U/L 80 90 80   ALT (SGPT) U/L 14 18 11   AST (SGOT) U/L 22 25 19   GLUCOSE mg/dL 88 92 110*       Estimated Creatinine Clearance: 12.2 mL/min (A) (by C-G formula based on SCr of 2.53 mg/dL (H)).                Results from last 7 days   Lab Units 03/18/22  0555 03/17/22  0654 03/16/22  0609 03/15/22  0530 03/14/22  0553   WBC 10*3/mm3 5.52 5.43 6.85 6.14 6.52   HEMOGLOBIN g/dL 9.4* 10.6* 10.3* 9.8* 9.4*   PLATELETS 10*3/mm3 228 229 250 217 205               Imaging Results (Last 24 Hours)     Procedure Component Value Units Date/Time    US Thoracentesis [[id number]] Resulted: 03/18/22 1135    Specimen: Body Fluid Updated: 03/18/22 1210    XR Chest 1 View [[id number]] Resulted: 03/18/22 1154     Updated: 03/18/22 1201    XR Chest 1 View [[id number]] Collected: 03/18/22 0524     Updated: 03/18/22 0741    Narrative:      PROCEDURE: Portable chest x-ray    TECHNIQUE: Single frontal view of the chest    COMPARISON: 3/17/2022    HISTORY: effusion follow-up s/p thoracentesis, J96.21 Acute and  chronic respiratory failure with hypoxia J90 Pleural effusion,  not elsewhere classified N19 Unspecified kidney failure I50.9  Heart failure, unspecified Z74.09 Other reduced mobility Z74.09  Other reduced mobility Z78.9 Other specified health status    FINDINGS:  Moderate size bilateral pleural effusions. The effusion on the  right shows a slight increase in size compared with yesterday's  exam. Right lung base airspace opacification suspicious for  atelectasis or pneumonia. Pulmonary vascular prominence noted in  the upper portions of both lungs. No pneumothorax visualized.  Heart, hilar, and mediastinal structures appear stable accounting  for differences in projection and technique.      Impression:      Moderate size bilateral pleural effusions. The effusion on the  right shows a slight increase in size compared to yesterday's  exam. Right lung base airspace  opacification suspicious for  atelectasis or pneumonia. Pulmonary vascular prominence noted in  the upper portions of both lungs. No pneumothorax visualized.     Electronically signed by:  Surendra Carter MD  3/18/2022 7:39 AM CDT  Workstation: [id number]    CT Chest Without Contrast Diagnostic [[id number]] Collected: 03/17/22 0952     Updated: 03/17/22 1633    Narrative:      CT CHEST WITHOUT CONTRAST    INDICATION: pleural effusion, hypoxia worsening, J96.21 Acute and  chronic respiratory failure with hypoxia J90 Pleural effusion,  not elsewhere classified N19 Unspecified kidney failure I50.9  Heart failure, unspecified Z74.09 Other reduced mobility Z74.09  Other reduced mobility Z78.9 Other specified health status    COMPARISON: None    TECHNIQUE: Spiral CT images were obtained of the thorax with  multiplanar reconstructions.    FINDINGS:  The study is limited by motion artifact.  AIRWAYS: Patent  MEDIASTINUM: Severe vascular calcifications. Severe calcific  coronary atherosclerosis. Cardiomegaly.  LUNGS: There is a 2.6 cm band like opacity extending to the  pleura at the right pulmonary apex which could represent scarring  in this patient with centrilobular emphysema. There are scattered  groundglass and mosaic opacities throughout the aerated portions  of the right lung which can be seen in small airway infection and  inflammation or edema. There is mild peribronchial cuffing at the  right lung apex. Within the aerated portions of the left lung,  there is mosaic attenuation and groundglass opacity that is  patchy suggestive of interstitial process such as edema. There is  a large left and moderate right pleural effusion.  UPPER ABDOMEN: Trace perihepatic ascites. Suprarenal abdominal  aortic aneurysm measuring 3.2 cm. Left kidney atretic. Left  adrenal adenoma measuring 1.7 cm. Inferior vena cava is enlarged  suggesting elevated right heart pressures.  SOFT TISSUES: Unremarkable  OSSEOUS STRUCTURES: Sternotomy.  L2 compression fracture  There is severe kyphotic deformity. Left carotid stent. Right  carotid stent.      Impression:      1. Right apical bandlike opacity measuring approximately 2.6 cm  which could represent scarring in this patient with centrilobular  emphysema.  2. Scattered groundglass and mosaic opacities throughout aerated  portions of both lungs which can be seen in small airway  infection/inflammation or edema.  3. Mild peribronchial cuffing at right lung apex which can be  seen in airway infection or inflammation.  4. Large left pleural effusion.  5. Moderate right pleural effusion.  6. Severe vascular calcifications including severe calcific  coronary atherosclerosis.  7. Suprarenal abdominal aortic aneurysm measuring 3.2 cm.  8. Cardiomegaly.  9. Enlarged inferior vena cava suggesting elevated right heart  pressures.  10. Trace perihepatic ascites.  11. Left adrenal adenoma measuring 1.7 cm.  12. L2 compression fracture.  13. Severe kyphotic deformity.  14. Bilateral carotid artery stents.    Electronically signed by:  Marina Rios MD  3/17/2022 4:30 PM CDT  Workstation: [id number]    US Thoracentesis [[id number]] Collected: 03/17/22 1034    Specimen: Body Fluid Updated: 03/17/22 1322    Narrative:      DIAGNOSTIC AND THERAPEUTIC THORACENTESIS    STAFF: Marina Rios M.D.    INDICATION: Symptomatic left pleural effusion.    MEDICATIONS: Local anesthetic.    PROCEDURE: Using ultrasound guidance, the left pleural cavity was  punctured and a catheter placed. A total of 1.5 liters of josefina  fluid was removed. The catheter was then removed. A post  procedure chest radiograph was obtained.    FINDINGS: Large left pleural effusion, incompletely drained.    EBL: None    COMPLICATIONS: None      Impression:      Successful diagnostic and therapeutic left  thoracentesis.    Electronically signed by:  Marina Rios MD  3/17/2022 1:20 PM CDT  Workstation: [id number]           MEDICATIONS    apixaban, 2.5 mg, Oral,  Q12H  aspirin, 81 mg, Oral, Daily  cefepime, 2 g, Intravenous, Q24H  ipratropium-albuterol, 3 mL, Nebulization, 4x Daily - RT  midodrine, 5 mg, Oral, BID AC  polyethylene glycol, 17 g, Oral, Daily  sodium chloride, 10 mL, Intravenous, Q12H  sodium zirconium cyclosilicate, 10 g, Oral, Once      hold, 1 each  hold, 1 each  hold, 1 each  Pharmacy to Dose Cefepime,         Assessment/Plan   ASSESSMENT / PLAN      Acute on chronic respiratory failure with hypoxia (HCC)    Acute on chronic systolic CHF (congestive heart failure) (HCC)    Bilateral pleural effusion    CKD (chronic kidney disease) stage 4, GFR 15-29 ml/min (HCC)    Atrial fibrillation (HCC)    Chronic anemia    HCAP (healthcare-associated pneumonia)       1.  CKD 4- Baseline creatinine is around 1.7-1.8, creatinine peak at 2.07.     Now worsening effusion bilateral and was on iv lasix 40mg bid. Cr then worsening and now on hold. Agree with it. Add albumin. dw family about gfr close to 20. Get u/s renal as well .     2.  Acute on chronic CHF / Bilateral pleural effusions- s/p thoracentesis bilateral.      3.  Acute on chronic respiratory failure- secondary to underlying CHF as well as new onset atrial fibrillation and chronic pleural effusions.  Managed per primary team.     4.  Atrial fibrillation- managed per cardiology and primary team     6.  Anemia- worsening, fe low and on iv fe. hgb better     7.  History of secondary hyperparathyroidism     8.  History of CAD    9. Hyperkalemia better after lokelma. Will add again today     Plan for [place] transfer once stable              This document has been electronically signed by Stevenson Robertson MD on March 18, 2022 12:33 CDT

## 2022-03-18 NOTE — PROGRESS NOTES
"Nutrition Services    Patient Name:  Naomi Duong Son  YOB: 1935  MRN: [id number]  Admit Date:  3/7/2022  Pt is currently having another Thoracentesis.  She has bilateral Thoracentesis and on admit she had a L thoracentesis with 1.5L removed.  Yesterday she had another L Thoracentesis with 1.2L removed. Today she having a Right Thoracentesis.  She continues to be managed for A/C Resp failure related to CHF and bilateral pleural effusion; CKD; AFib; and Hyerkalemia.  Lasix has been stopped due to worsening renal fxn.  Receiving Lokelma for hyperkalemia.    Her daughter reports that she is eating well for her.  They have been assisting her in ordering foods that she likes.  She is eating the ice cream.  Likes fruit and doing well with fruits.  \"For her she is doing well.\"  Rd offered menu suggestions and alternatives.  We can provide other foods if she gets something she does not like.   Reminded her that snacks are available in the pantry prn.  Family voiced understanding.   Will continue to monitor POC.      Electronically signed by:  Ruby Soliman RD  03/18/22 11:48 CDT   "

## 2022-03-18 NOTE — SIGNIFICANT NOTE
"   03/18/22 1455   OTHER   Discipline physical therapy assistant   Rehab Time/Intention   Session Not Performed patient/family declined treatment  (pt sleeping heavily after porcedure today. when asked about PT, pt shook head, \"no.\")     "

## 2022-03-18 NOTE — PROGRESS NOTES
INTERVENTIONAL RADIOLOGY    POD1 s/p left diagnostic and therapeutic thoracentesis. Weaned from 12L HFNC to 10L HFNC. Left pleural effusion improved though moderate left pleural effusion remains. CXR this AM demonstrates enlarged R pleural effusion.    On exam, severely debilitated, cachectic, more alert and interactive today, 10L HFNC.  Sonographic exam of right thorax demonstrates moderate pleural effusion.    Plan right dx/tx thoracentesis today.    The patient's history and physical exam were reviewed, and no changes were noted. The risks, benefits, alternatives, and indications of the procedure were discussed with the patient, and all questions were answered. Informed consent was obtained.    Marina Rios M.D.  Vascular, Interventional and Wound Physician  IR Chief, [facility]  Cell: [phone] / Office: [phone]

## 2022-03-19 NOTE — PLAN OF CARE
Goal Outcome Evaluation:  Plan of Care Reviewed With: patient           Outcome Evaluation: OT recert complete. pt agreeable to in bed assessment only secondary to pain and getting up with PT this morning. requires mod A to reposition in bed. BUE ROM WFL grossly. BUE strength and bilateral  strength are grossly 3+ to 4-/5. pt has met no goals at this time. all goals revised. pt will likely need SNF placement at discharge. continue per OT POC.

## 2022-03-19 NOTE — THERAPY PROGRESS REPORT/RE-CERT
Patient Name: Naomi Duong Son  : 1935    MRN: [id number]                              Today's Date: 3/19/2022       Admit Date: 3/7/2022    Visit Dx:     ICD-10-CM ICD-9-CM   1. Acute on chronic respiratory failure with hypoxia (HCC)  J96.21 518.84     799.02   2. Pleural effusion  J90 511.9   3. Renal failure, unspecified chronicity  N19 586   4. Acute on chronic congestive heart failure, unspecified heart failure type (HCC)  I50.9 428.0   5. Impaired functional mobility, balance, gait, and endurance  Z74.09 V49.89   6. Impaired mobility and ADLs  Z74.09 V49.89    Z78.9      Patient Active Problem List   Diagnosis   • Ischemic cardiomyopathy   • S/P CABG (coronary artery bypass graft)   • Essential hypertension   • Mixed hyperlipidemia   • H/O CHF   • Carotid artery stenosis   • CKD (chronic kidney disease) stage 3, GFR 30-59 ml/min ([facility])   • Bilateral carotid artery disease (HCC)   • Carotid stenosis, asymptomatic, bilateral   • Surgical aftercare, circulatory system   • Carotid stenosis, asymptomatic, left   • Acute on chronic systolic CHF (congestive heart failure) ([facility])   • CKD (chronic kidney disease) stage 3, GFR 30-59 ml/min (HCC)   • Severe malnutrition (HCC)   • Acute respiratory failure with hypoxia (HCC)   • Acute on chronic respiratory failure with hypoxia ([facility])   • Bilateral pleural effusion   • CKD (chronic kidney disease) stage 4, GFR 15-29 ml/min (HCC)   • Atrial fibrillation (HCC)   • Chronic anemia   • HCAP (healthcare-associated pneumonia)     Past Medical History:   Diagnosis Date   • CAD (coronary artery disease)    • Carotid artery stenosis    • Chronic systolic heart failure (HCC)    • CKD (chronic kidney disease) stage 3, GFR 30-59 ml/min (HCC)    • GERD (gastroesophageal reflux disease)    • Hypercholesterolemia    • Hyperlipidemia    • Hypertension    • Iron deficiency anemia    • Ischemic cardiomyopathy    • MI (myocardial infarction) ([facility])    • Mitral valve disease    • Osteoarthritis     • UTI (urinary tract infection)      Past Surgical History:   Procedure Laterality Date   • CARDIAC CATHETERIZATION     • CAROTID ENDARTERECTOMY     • CORONARY ARTERY BYPASS GRAFT     • TRANSESOPHAGEAL ECHOCARDIOGRAM ([facility])        General Information     Row Name 03/19/22 1120          OT Time and Intention    Document Type progress note/recertification  -ME     Mode of Treatment individual therapy;occupational therapy  -ME     Row Name 03/19/22 1120          General Information    Patient Profile Reviewed yes  -ME     Existing Precautions/Restrictions oxygen therapy device and L/min;fall  -ME     Row Name 03/19/22 1120          Cognition    Orientation Status (Cognition) oriented x 4  -ME     Row Name 03/19/22 1120          Safety Issues, Functional Mobility    Safety Issues Affecting Function (Mobility) safety precaution awareness;safety precautions follow-through/compliance  -ME     Impairments Affecting Function (Mobility) balance;endurance/activity tolerance;strength;shortness of breath;pain  -ME           User Key  (r) = Recorded By, (t) = Taken By, (c) = Cosigned By    Initials Name Provider Type    ME Johnny Rodriguez, OTR/L Occupational Therapist                 Mobility/ADL's     Row Name 03/19/22 1120          Bed Mobility    Comment, (Bed Mobility) pt required mod-max A to reposition in bed; declined EOB/OOB secondary to already getting up with PT today and being in large amounts of pain  -ME     Row Name 03/19/22 1120          Transfers    Comment, (Transfers) see bed mobility comment  -ME           User Key  (r) = Recorded By, (t) = Taken By, (c) = Cosigned By    Initials Name Provider Type    ME Johnny oRdriguez OTR/L Occupational Therapist               Obj/Interventions     Row Name 03/19/22 1120          Sensory Assessment (Somatosensory)    Sensory Assessment (Somatosensory) UE sensation intact  -ME     Row Name 03/19/22 1120          Range of Motion Comprehensive    General Range of Motion no  range of motion deficits identified;bilateral upper extremity ROM WFL  -ME     Row Name 03/19/22 1120          Strength Comprehensive (MMT)    General Manual Muscle Testing (MMT) Assessment other (see comments)  -ME     Comment, General Manual Muscle Testing (MMT) Assessment BUE strength and bilateral  strength are grossly 3+ to 4-/5; pt is right handed  -ME           User Key  (r) = Recorded By, (t) = Taken By, (c) = Cosigned By    Initials Name Provider Type    Johnny Tierney, OTR/L Occupational Therapist               Goals/Plan     Row Name 03/19/22 1120          Transfer Goal 1 (OT)    Activity/Assistive Device (Transfer Goal 1, OT) toilet  -ME     [place] Level/Cues Needed (Transfer Goal 1, OT) minimum assist (75% or more patient effort)  -ME     Time Frame (Transfer Goal 1, OT) long term goal (LTG);by discharge  -ME     Progress/Outcome (Transfer Goal 1, OT) goal not met;goal revised this date  -ME     Row Name 03/19/22 1120          Bathing Goal 1 (OT)    Activity/Device (Bathing Goal 1, OT) upper body bathing  -ME     [place] Level/Cues Needed (Bathing Goal 1, OT) supervision required;set-up required  -ME     Time Frame (Bathing Goal 1, OT) long term goal (LTG);by discharge  -ME     Progress/Outcomes (Bathing Goal 1, OT) goal not met;goal revised this date  -ME     Row Name 03/19/22 1120          Dressing Goal 1 (OT)    Activity/Device (Dressing Goal 1, OT) upper body dressing  AD as needed  -ME     [place]/Cues Needed (Dressing Goal 1, OT) supervision required;set-up required  -ME     Time Frame (Dressing Goal 1, OT) long term goal (LTG);by discharge  -ME     Progress/Outcome (Dressing Goal 1, OT) goal not met;goal revised this date  -ME           User Key  (r) = Recorded By, (t) = Taken By, (c) = Cosigned By    Initials Name Provider Type    Johnny Tierney, OTR/L Occupational Therapist               Clinical Impression     Row Name 03/19/22 1120          Pain Assessment     "Pretreatment Pain Rating 10/10  -ME     Posttreatment Pain Rating 10/10  -ME     Pre/Posttreatment Pain Comment \"all over\"; RN in at end of session to give pain medication  -ME     Pain Intervention(s) Repositioned;Ambulation/increased activity;Distraction  -ME     Row Name 03/19/22 1120          Plan of Care Review    Plan of Care Reviewed With patient  -ME     Row Name 03/19/22 1120          Therapy Assessment/Plan (OT)    Therapy Frequency (OT) other (see comments)  3-7 days per week  -ME     Row Name 03/19/22 1120          Therapy Plan Review/Discharge Plan (OT)    Anticipated Discharge Disposition (OT) skilled nursing facility  -ME     Row Name 03/19/22 1120          Vital Signs    Pre Systolic BP Rehab 106  -ME     Pre Treatment Diastolic BP 55  -ME     Post Systolic BP Rehab 94  -ME     Pre SpO2 (%) 95  -ME     O2 Delivery Pre Treatment hi-flow  -ME     Pre Patient Position Supine  -ME     Row Name 03/19/22 1120          Positioning and Restraints    Pre-Treatment Position in bed  -ME     Post Treatment Position bed  -ME     In Bed notified nsg;side lying right;call light within reach;encouraged to call for assist;exit alarm on  -ME           User Key  (r) = Recorded By, (t) = Taken By, (c) = Cosigned By    Initials Name Provider Type    ME Johnny Rodriguez OTR/L Occupational Therapist               Outcome Measures     Row Name 03/19/22 1120          How much help from another is currently needed...    Putting on and taking off regular lower body clothing? 2  -ME     Bathing (including washing, rinsing, and drying) 2  -ME     Toileting (which includes using toilet bed pan or urinal) 2  -ME     Putting on and taking off regular upper body clothing 3  -ME     Taking care of personal grooming (such as brushing teeth) 3  -ME     Eating meals 4  -ME     AM-PAC 6 Clicks Score (OT) 16  -ME     Row Name 03/19/22 1120          Functional Assessment    Outcome Measure Options AM-PAC 6 Clicks Daily Activity (OT)  -ME  "          User Key  (r) = Recorded By, (t) = Taken By, (c) = Cosigned By    Initials Name Provider Type    Johnny Tierney OTR/L Occupational Therapist                Occupational Therapy Education                 Title: PT OT SLP Therapies (In Progress)     Topic: Occupational Therapy (In Progress)     Point: ADL training (Not Started)     Description:   Instruct learner(s) on proper safety adaptation and remediation techniques during self care or transfers.   Instruct in proper use of assistive devices.              Learner Progress:  Not documented in this visit.          Point: Home exercise program (Not Started)     Description:   Instruct learner(s) on appropriate technique for monitoring, assisting and/or progressing therapeutic exercises/activities.              Learner Progress:  Not documented in this visit.          Point: Precautions (Done)     Description:   Instruct learner(s) on prescribed precautions during self-care and functional transfers.              Learning Progress Summary           Patient Acceptance, E, VU,NR by ME at 3/19/2022 1143    Comment: Educated on OT and POC. Educated to call for assistance. Educated on safety precautions.    Acceptance, E, VU,NR by ME at 3/8/2022 1004    Comment: Educated on OT and POC. Educated to call for assistance. Educated on safety precautions. Educated on PLB technique.                   Point: Body mechanics (Done)     Description:   Instruct learner(s) on proper positioning and spine alignment during self-care, functional mobility activities and/or exercises.              Learning Progress Summary           Patient Acceptance, E, VU,NR by ME at 3/19/2022 1143    Comment: Educated on OT and POC. Educated to call for assistance. Educated on safety precautions.    Acceptance, E, VU,NR by ME at 3/8/2022 1004    Comment: Educated on OT and POC. Educated to call for assistance. Educated on safety precautions. Educated on PLB technique.                                User Key     Initials Effective Dates Name Provider Type Discipline    ME 06/16/21 -  Johnny Rodriguez OTR/L Occupational Therapist OT              OT Recommendation and Plan  Planned Therapy Interventions (OT): activity tolerance training, adaptive equipment training, BADL retraining, functional balance retraining, IADL retraining, occupation/activity based interventions, patient/caregiver education/training, strengthening exercise, ROM/therapeutic exercise, transfer/mobility retraining  Therapy Frequency (OT): other (see comments) (3-7 days per week)  Plan of Care Review  Plan of Care Reviewed With: patient  Outcome Evaluation: OT recert complete. pt agreeable to in bed assessment only secondary to pain and getting up with PT this morning. requires mod A to reposition in bed. BUE ROM WFL grossly. BUE strength and bilateral  strength are grossly 3+ to 4-/5. pt has met no goals at this time. all goals revised. pt will likely need SNF placement at discharge. continue per OT POC.     Time Calculation:    Time Calculation- OT     Row Name 03/19/22 1147             Time Calculation- OT    OT Start Time 1120  -ME      OT Stop Time 1132  -ME      OT Time Calculation (min) 12 min  -ME      OT Received On 03/19/22  -ME      OT Goal Re-Cert Due Date 04/01/22  -ME              Timed Charges    48343 - OT Therapeutic Exercise Minutes 12  -ME              Total Minutes    Timed Charges Total Minutes 12  -ME       Total Minutes 12  -ME            User Key  (r) = Recorded By, (t) = Taken By, (c) = Cosigned By    Initials Name Provider Type    ME Johnny Rodriguez, OTR/L Occupational Therapist              Therapy Charges for Today     Code Description Service Date Service Provider Modifiers Qty    82376054716  OT THER PROC EA 15 MIN 3/19/2022 Johnny Rodriguez OTR/L GO 1               Johnny Rodriguez OTR/MARIA FERNANDA  3/19/2022

## 2022-03-19 NOTE — PLAN OF CARE
"  Problem: Adult Inpatient Plan of Care  Goal: Plan of Care Review  Outcome: Ongoing, Progressing  Flowsheets  Taken 3/19/2022 1630 by Phyllis Tate RN  Progress: declining  Outcome Evaluation: No output this shift. Bladder scanner shows less than 100mL when scanned.  Pt aware of possible need for dialysis in the near future if that is what is decided by helf and family.  Pt did make statement this shift, \"I am ready to die.\"  Family at bedside throughout shift.  All questions answered.  Continuing to monitor closely.  Taken 3/19/2022 1143 by Johnny Rodriguez, STEPHANIER/L  Plan of Care Reviewed With: patient   Goal Outcome Evaluation:           Progress: declining  Outcome Evaluation: No output this shift. Bladder scanner shows less than 100mL when scanned.  Pt aware of possible need for dialysis in the near future if that is what is decided by helf and family.  Pt did make statement this shift, \"I am ready to die.\"  Family at bedside throughout shift.  All questions answered.  Continuing to monitor closely.  "

## 2022-03-19 NOTE — PROGRESS NOTES
"[facility]  [address] [place] [address]. [id number]  T - [phone]  F - [phone]     Progress Note          PATIENT  DEMOGRAPHICS   PATIENT NAME: Naomi Duong Son                      PHYSICIAN: Rhoda Celaya CHANA Santiago  : 1935  MRN: [id number]   LOS: 11 days    Patient Care Team:  Sabino Mobley MD as PCP - General (Family Medicine)  Subjective   SUBJECTIVE   No acute events overnight. She reports she is hurting all over today. Denies N&V. She does report chest tenderness and she reports this is related to previous thoracentesis she believes. SOB is present- continues on oxygen.          Objective   OBJECTIVE   Vital Signs  Temp:  [96.1 °F (35.6 °C)-97.9 °F (36.6 °C)] 96.1 °F (35.6 °C)  Heart Rate:  [] 104  Resp:  [20-22] 20  BP: ()/(50-61) 98/50    Flowsheet Rows    Flowsheet Row First Filed Value   Admission Height 157.5 cm (62\") Documented at 2022 0504   Admission Weight 46.7 kg (103 lb) Documented at 2022 0504           I/O last 3 completed shifts:  In: 780 [P.O.:480; IV Piggyback:300]  Out: 1175 [Urine:275; Chest Tube:900]    PHYSICAL EXAM    Physical Exam  Vitals and nursing note reviewed.   Constitutional:       General: She is not in acute distress.     Appearance: She is ill-appearing.   HENT:      Head: Normocephalic and atraumatic.   Cardiovascular:      Rate and Rhythm: Normal rate and regular rhythm.   Pulmonary:      Effort: Pulmonary effort is normal.   Abdominal:      General: Bowel sounds are normal. There is distension.   Musculoskeletal:      Right lower leg: No edema.      Left lower leg: Edema present.   Skin:     General: Skin is warm and dry.   Neurological:      Mental Status: She is alert and oriented to person, place, and time.         RESULTS   Results Review:    Results from last 7 days   Lab Units 22  0645 22  2101 22  0555 22  0654   SODIUM mmol/L 137  --  137 137   POTASSIUM mmol/L 5.3* 4.9 5.5* 5.4* "   CHLORIDE mmol/L 92*  --  92* 92*   CO2 mmol/L 37.0*  --  35.0* 36.0*   BUN mg/dL 76*  --  67* 61*   CREATININE mg/dL 3.15*  --  2.53* 2.15*   CALCIUM mg/dL 9.1  --  8.8 9.5   BILIRUBIN mg/dL 0.5  --  0.5 0.5   ALK PHOS U/L 69  --  80 90   ALT (SGPT) U/L 14  --  14 18   AST (SGOT) U/L 19  --  22 25   GLUCOSE mg/dL 92  --  88 92       Estimated Creatinine Clearance: 9.6 mL/min (A) (by C-G formula based on SCr of 3.15 mg/dL (H)).                Results from last 7 days   Lab Units 03/19/22  0645 03/18/22  0555 03/17/22  0654 03/16/22  0609 03/15/22  0530   WBC 10*3/mm3 6.28 5.52 5.43 6.85 6.14   HEMOGLOBIN g/dL 8.8* 9.4* 10.6* 10.3* 9.8*   PLATELETS 10*3/mm3 220 228 229 250 217               Imaging Results (Last 24 Hours)     Procedure Component Value Units Date/Time    US Renal Bilateral [[id number]] Collected: 03/18/22 1606     Updated: 03/18/22 1704    Narrative:      Ultrasound renal complete    HISTORY:  Right kidney disease stage III B. Acute renal failure.    Ultrasound examination of the kidneys and urinary bladder was  performed.    COMPARISON: November 17, 2013    FINDINGS:  The right kidney measures 8.05 cm in length by 4.47 cm x 3.94 cm  transverse.  Right renal volume 74.26 mL.  Atrophic left kidney demonstrated on CT chest March 17, 2022 is  not visualized on this ultrasound.  Increased cortical echogenicity right kidney, compatible with  medical renal disease.  No hydronephrosis.  No solid or cystic renal mass.    Images of the urinary bladder are unremarkable.      Impression:      CONCLUSION:  Right renal volume 74.26 mL.  Atrophic left kidney demonstrated on CT chest March 17, 2022 is  not visualized on this ultrasound.  Increased cortical echogenicity right kidney, compatible with  medical renal disease.  No hydronephrosis.    [address]    Electronically signed by:  Maco Kim MD  3/18/2022 5:02 PM CDT  Workstation: [id number]    US Thoracentesis [[id number]] Collected: 03/18/22 1135    Specimen: Body  Fluid Updated: 03/18/22 1407    Narrative:      DIAGNOSTIC AND THERAPEUTIC THORACENTESIS    STAFF: Marina Rios M.D.    INDICATION: Symptomatic right pleural effusion of unclear  etiology.    MEDICATIONS: Local anesthetic.    PROCEDURE: Using ultrasound guidance, the right pleural cavity  was punctured and a catheter placed. A total of 900mL of josefina  fluid was removed. No further fluid could be aspirated. The  catheter was then removed. A post procedure chest radiograph was  obtained.    FINDINGS: Moderate right pleural effusion.    EBL: None    COMPLICATIONS: None      Impression:      Successful diagnostic and therapeutic right  thoracentesis.    Electronically signed by:  Marina Rios MD  3/18/2022 2:05 PM CDT  Workstation: [id number]    XR Chest 1 View [[id number]] Collected: 03/18/22 1154     Updated: 03/18/22 1249    Narrative:      PORTABLE CHEST RADIOGRAPH    HISTORY: Post Thoracentesis, J96.21 Acute and chronic respiratory  failure with hypoxia J90 Pleural effusion, not elsewhere  classified N19 Unspecified kidney failure I50.9 Heart failure,  unspecified Z74.09 Other reduced mobility Z74.09 Other reduced  mobility Z78.9 Other specified health status    TECHNIQUE: Single AP view of the chest.    COMPARISON: Chest radiograph 5:18 AM.    FINDINGS:  Upper abdomen is difficult to visualize due to the patient's  severe kyphotic deformity and positioning. Visualized soft  tissues appear unremarkable. Visualized osseous structures  demonstrates severe degenerative change. There is a left carotid  artery stent. Clips in the left upper abdomen. Severe aortic  atherosclerosis. The right pleural effusion has decreased status  post thoracentesis with trace right residual pleural fluid or  pleural thickening. There is no pneumothorax. There is a  persistent left moderate pleural effusion. The aerated lungs  demonstrate mildly increased interstitial markings compatible  with edema. 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       Impression:      1. No right pneumothorax status post right thoracentesis.  2. Significantly improved right pleural effusion with trace  residual right pleural fluid or pleural thickening.  3. Moderate left pleural effusion.  4. Increased interstitial markings which can be seen in pulmonary  edema.    Electronically signed by:  Marina Rios MD  3/18/2022 12:46 PM CDT  Workstation: [id number]           MEDICATIONS    albumin human, 25 g, Intravenous, BID  apixaban, 2.5 mg, Oral, Q12H  aspirin, 81 mg, Oral, Daily  cefepime, 2 g, Intravenous, Q24H  ipratropium-albuterol, 3 mL, Nebulization, 4x Daily - RT  midodrine, 5 mg, Oral, BID AC  polyethylene glycol, 17 g, Oral, Daily  sodium chloride, 10 mL, Intravenous, Q12H  sodium zirconium cyclosilicate, 5 g, Oral, Daily      hold, 1 each  hold, 1 each  hold, 1 each  Pharmacy to Dose Cefepime,     Ultrasound renal complete     HISTORY:  Right kidney disease stage III B. Acute renal failure.     Ultrasound examination of the kidneys and urinary bladder was  performed.     COMPARISON: November 17, 2013     FINDINGS:  The right kidney measures 8.05 cm in length by 4.47 cm x 3.94 cm  transverse.  Right renal volume 74.26 mL.  Atrophic left kidney demonstrated on CT chest March 17, 2022 is  not visualized on this ultrasound.  Increased cortical echogenicity right kidney, compatible with  medical renal disease.  No hydronephrosis.  No solid or cystic renal mass.     Images of the urinary bladder are unremarkable.     IMPRESSION:  CONCLUSION:  Right renal volume 74.26 mL.  Atrophic left kidney demonstrated on CT chest March 17, 2022 is  not visualized on this ultrasound.  Increased cortical echogenicity right kidney, compatible with  medical renal disease.  No hydronephrosis.    Assessment/Plan   ASSESSMENT / PLAN      Acute on chronic respiratory failure with hypoxia (HCC)    Acute on chronic systolic CHF (congestive heart failure) (HCC)    Bilateral pleural effusion    CKD  (chronic kidney disease) stage 4, GFR 15-29 ml/min (HCC)    Atrial fibrillation (HCC)    Chronic anemia    HCAP (healthcare-associated pneumonia)    1.  CKD 4- Baseline creatinine is around 1.7-1.8, creatinine peak at 2.07.      Now worsening effusion bilateral and was on iv lasix 40mg bid. Cr then worsening and now on hold. Agree with it. Add albumin. dw family about gfr close to 20. Urine sodium 20.Requesting UPCR and UA. Bladder palpable on exam- has [place] with very little drainage. Bladder scan requested,   DOMINIC- shows atrophic left kidney, right kidney no hydronephrosis, mass or stone. Medical renal disease.      2.  Acute on chronic CHF / Bilateral pleural effusions- s/p thoracentesis bilateral. EF 35%. On albumin 25% 25 g IV bid. On cefepime- WBC 6.28. Pleural fluid culture is negative at 24 hours. Bps are borderline low- on midorine 5 mg bid     3.  Acute on chronic respiratory failure- secondary to underlying CHF as well as new onset atrial fibrillation and chronic pleural effusions.  O2 7 liters flow. Managed per primary team.     4.  Atrial fibrillation- managed per cardiology and primary team     6.  Anemia-  fe low and on iv fe. hgb better 8.8     7.  History of secondary hyperparathyroidism     8.  History of CAD     9. Hyperkalemia better after lokelma. Potassium 5.3- on lokelma 5 g daily     Plan for [place] transfer once stable         This document has been electronically signed by CHANA Hope on March 19, 2022 09:43 CDT

## 2022-03-19 NOTE — PLAN OF CARE
Goal Outcome Evaluation:           Progress: declining  Outcome Evaluation: Noted decrease in output. Only 50 ml yellow urine out this shift. Pt received albumin this shift, tolerated well. Continuing Q2H repositioning.

## 2022-03-19 NOTE — PLAN OF CARE
Goal Outcome Evaluation:  Plan of Care Reviewed With: patient           Outcome Evaluation: PT recert completed. BP low at 82/51 with sitting and patient only mildly dizzy and agreeable to sitting up and standing. CGA for sit<>stand 2x3 with CGA. Patient with good hip extension and full erect posture. SPO2% steady but did droped to 87% after first sit<>Stand trial and then maintain at >94% throughout session. No goals met at this time. Will continue to work towards improving independence with sit<>stand and ambulation. Continue skilled PT.

## 2022-03-19 NOTE — PROGRESS NOTES
[facility] Medicine Services  INPATIENT PROGRESS NOTE    Length of Stay: 11  Date of Admission: 3/7/2022  Primary Care Physician: Sabino Mobley MD    Subjective   Chief Complaint: Shortness of breath  HPI:  87 year old female with a history of CAD, HFrEF, CKD3, HTN, HLD, GERD, and OA who presented with worsening shortness of breath and increased need for home O2.  She was noted to have bilateral pleural effusions. She was admitted on supplemental oxygen, IV lasix, and fluid restrictions.  She developed atrial fibrillation and was initiated on Eliquis.  Cardiology consulted and she was recommended thoracentesis. She underwent thoracentesis with 1.2 L of pleural fluid removed from the left. Nephrology was consulted due to worsening of creatinine from baseline. She has undergone thoracentesis of the left and right pleural spaces. Oxygen requirements have improved.  Renal function has continued to worsen.     Review of Systems   Constitutional: Positive for fatigue. Negative for chills and fever.   Respiratory: Positive for shortness of breath.    Cardiovascular: Negative for chest pain.   Gastrointestinal: Negative for abdominal pain and nausea.        All pertinent negatives and positives are as above. All other systems have been reviewed and are negative unless otherwise stated.     Objective    Temp:  [96.1 °F (35.6 °C)-97.9 °F (36.6 °C)] 96.1 °F (35.6 °C)  Heart Rate:  [] 104  Resp:  [20-22] 20  BP: ()/(50-61) 98/50    Physical Exam  Vitals reviewed.   Constitutional:       Appearance: She is ill-appearing.   HENT:      Head: Normocephalic and atraumatic.   Cardiovascular:      Rate and Rhythm: Normal rate.   Pulmonary:      Effort: Pulmonary effort is normal. No respiratory distress.      Breath sounds: Examination of the right-middle field reveals decreased breath sounds. Examination of the left-middle field reveals decreased breath sounds.  Examination of the right-lower field reveals decreased breath sounds. Examination of the left-lower field reveals decreased breath sounds. Decreased breath sounds present.   Abdominal:      General: There is no distension.      Palpations: Abdomen is soft.   Musculoskeletal:         General: No deformity.      Right lower leg: No edema.      Left lower leg: No edema.   Skin:     General: Skin is warm and dry.   Neurological:      General: No focal deficit present.      Mental Status: She is alert.         Results Review:  I have reviewed the labs, radiology results, and diagnostic studies.    Laboratory Data:   Results from last 7 days   Lab Units 03/19/22  0645 03/18/22  2101 03/18/22  0555 03/17/22  0654   SODIUM mmol/L 137  --  137 137   POTASSIUM mmol/L 5.3* 4.9 5.5* 5.4*   CHLORIDE mmol/L 92*  --  92* 92*   CO2 mmol/L 37.0*  --  35.0* 36.0*   BUN mg/dL 76*  --  67* 61*   CREATININE mg/dL 3.15*  --  2.53* 2.15*   GLUCOSE mg/dL 92  --  88 92   CALCIUM mg/dL 9.1  --  8.8 9.5   BILIRUBIN mg/dL 0.5  --  0.5 0.5   ALK PHOS U/L 69  --  80 90   ALT (SGPT) U/L 14  --  14 18   AST (SGOT) U/L 19  --  22 25   ANION GAP mmol/L 8.0  --  10.0 9.0     Estimated Creatinine Clearance: 9.6 mL/min (A) (by C-G formula based on SCr of 3.15 mg/dL (H)).          Results from last 7 days   Lab Units 03/19/22  0645 03/18/22  0555 03/17/22  0654 03/16/22  0609 03/15/22  0530   WBC 10*3/mm3 6.28 5.52 5.43 6.85 6.14   HEMOGLOBIN g/dL 8.8* 9.4* 10.6* 10.3* 9.8*   HEMATOCRIT % 29.4* 32.2* 36.9 33.7* 31.7*   PLATELETS 10*3/mm3 220 228 229 250 217           Culture Data:   No results found for: BLOODCX  No results found for: URINECX  No results found for: RESPCX  No results found for: WOUNDCX  No results found for: STOOLCX  No components found for: BODYFLD    Radiology Data:   Imaging Results (Last 24 Hours)     Procedure Component Value Units Date/Time    US Renal Bilateral [[id number]] Collected: 03/18/22 1606     Updated: 03/18/22 1704     Narrative:      Ultrasound renal complete    HISTORY:  Right kidney disease stage III B. Acute renal failure.    Ultrasound examination of the kidneys and urinary bladder was  performed.    COMPARISON: November 17, 2013    FINDINGS:  The right kidney measures 8.05 cm in length by 4.47 cm x 3.94 cm  transverse.  Right renal volume 74.26 mL.  Atrophic left kidney demonstrated on CT chest March 17, 2022 is  not visualized on this ultrasound.  Increased cortical echogenicity right kidney, compatible with  medical renal disease.  No hydronephrosis.  No solid or cystic renal mass.    Images of the urinary bladder are unremarkable.      Impression:      CONCLUSION:  Right renal volume 74.26 mL.  Atrophic left kidney demonstrated on CT chest March 17, 2022 is  not visualized on this ultrasound.  Increased cortical echogenicity right kidney, compatible with  medical renal disease.  No hydronephrosis.    [id number]    Electronically signed by:  Maco Kim MD  3/18/2022 5:02 PM CDT  Workstation: [id number]    US Thoracentesis [[id number]] Collected: 03/18/22 1135    Specimen: Body Fluid Updated: 03/18/22 1407    Narrative:      DIAGNOSTIC AND THERAPEUTIC THORACENTESIS    STAFF: Marina Rios M.D.    INDICATION: Symptomatic right pleural effusion of unclear  etiology.    MEDICATIONS: Local anesthetic.    PROCEDURE: Using ultrasound guidance, the right pleural cavity  was punctured and a catheter placed. A total of 900mL of josefina  fluid was removed. No further fluid could be aspirated. The  catheter was then removed. A post procedure chest radiograph was  obtained.    FINDINGS: Moderate right pleural effusion.    EBL: None    COMPLICATIONS: None      Impression:      Successful diagnostic and therapeutic right  thoracentesis.    Electronically signed by:  Marina Rios MD  3/18/2022 2:05 PM CDT  Workstation: [id number]    XR Chest 1 View [[id number]] Collected: 03/18/22 1154     Updated: 03/18/22 1249    Narrative:      PORTABLE CHEST  RADIOGRAPH    HISTORY: Post Thoracentesis, J96.21 Acute and chronic respiratory  failure with hypoxia J90 Pleural effusion, not elsewhere  classified N19 Unspecified kidney failure I50.9 Heart failure,  unspecified Z74.09 Other reduced mobility Z74.09 Other reduced  mobility Z78.9 Other specified health status    TECHNIQUE: Single AP view of the chest.    COMPARISON: Chest radiograph 5:18 AM.    FINDINGS:  Upper abdomen is difficult to visualize due to the patient's  severe kyphotic deformity and positioning. Visualized soft  tissues appear unremarkable. Visualized osseous structures  demonstrates severe degenerative change. There is a left carotid  artery stent. Clips in the left upper abdomen. Severe aortic  atherosclerosis. The right pleural effusion has decreased status  post thoracentesis with trace right residual pleural fluid or  pleural thickening. There is no pneumothorax. There is a  persistent left moderate pleural effusion. The aerated lungs  demonstrate mildly increased interstitial markings compatible  with edema. 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      Impression:      1. No right pneumothorax status post right thoracentesis.  2. Significantly improved right pleural effusion with trace  residual right pleural fluid or pleural thickening.  3. Moderate left pleural effusion.  4. Increased interstitial markings which can be seen in pulmonary  edema.    Electronically signed by:  Marina Rios MD  3/18/2022 12:46 PM CDT  Workstation: [id number]          I have reviewed the patient's current medications.     Assessment/Plan     Active Hospital Problems    Diagnosis    • **Acute on chronic respiratory failure with hypoxia (HCC)    • Acute on chronic systolic CHF (congestive heart failure) ([facility])    • HCAP (healthcare-associated pneumonia)    • Chronic anemia    • Bilateral pleural effusion    • CKD (chronic kidney disease) stage 4, GFR 15-29 ml/min ([facility])    • Atrial fibrillation ([facility])     Hyperkalemia    Plan:    S/P thoracentesis on 3/11, 3/17, and 3/18  Plan for probable repeat left sided thoracentesis on Monday  Supplemental oxygen, wean as tolerated, weaned to 7 LPM  Off diuretics with worsening renal function  Albumin BID  Strict I&O, fluid restriction, daily weights  Cardiology consultation appreciated  Nephrology consultation appreciated, await further recommendations  Lokelma 5 g daily for hyperkalemia  PT/OT  VTE PPx: Eliquis  DNR/DNI      I confirmed that the patient's Advance Care Plan is present, code status is documented, or surrogate decision maker is listed in the patient's medical record.     The patient was evaluated during the global COVID-19 pandemic, and the diagnosis was suspected/considered upon their initial presentation.  Evaluation, treatment, and testing were consistent with current guidelines for patients who present with complaints or symptoms that may be related to COVID-19.          This document has been electronically signed by CHANA Johnson on March 19, 2022 10:04 CDT

## 2022-03-19 NOTE — THERAPY PROGRESS REPORT/RE-CERT
Patient Name: Naomi Duong Son  : 1935    MRN: [id number]                              Today's Date: 3/19/2022       Admit Date: 3/7/2022    Visit Dx:     ICD-10-CM ICD-9-CM   1. Acute on chronic respiratory failure with hypoxia (HCC)  J96.21 518.84     799.02   2. Pleural effusion  J90 511.9   3. Renal failure, unspecified chronicity  N19 586   4. Acute on chronic congestive heart failure, unspecified heart failure type (HCC)  I50.9 428.0   5. Impaired functional mobility, balance, gait, and endurance  Z74.09 V49.89   6. Impaired mobility and ADLs  Z74.09 V49.89    Z78.9      Patient Active Problem List   Diagnosis   • Ischemic cardiomyopathy   • S/P CABG (coronary artery bypass graft)   • Essential hypertension   • Mixed hyperlipidemia   • H/O CHF   • Carotid artery stenosis   • CKD (chronic kidney disease) stage 3, GFR 30-59 ml/min ([facility])   • Bilateral carotid artery disease (HCC)   • Carotid stenosis, asymptomatic, bilateral   • Surgical aftercare, circulatory system   • Carotid stenosis, asymptomatic, left   • Acute on chronic systolic CHF (congestive heart failure) ([facility])   • CKD (chronic kidney disease) stage 3, GFR 30-59 ml/min (HCC)   • Severe malnutrition (HCC)   • Acute respiratory failure with hypoxia (HCC)   • Acute on chronic respiratory failure with hypoxia ([facility])   • Bilateral pleural effusion   • CKD (chronic kidney disease) stage 4, GFR 15-29 ml/min (HCC)   • Atrial fibrillation (HCC)   • Chronic anemia   • HCAP (healthcare-associated pneumonia)     Past Medical History:   Diagnosis Date   • CAD (coronary artery disease)    • Carotid artery stenosis    • Chronic systolic heart failure (HCC)    • CKD (chronic kidney disease) stage 3, GFR 30-59 ml/min (HCC)    • GERD (gastroesophageal reflux disease)    • Hypercholesterolemia    • Hyperlipidemia    • Hypertension    • Iron deficiency anemia    • Ischemic cardiomyopathy    • MI (myocardial infarction) ([facility])    • Mitral valve disease    • Osteoarthritis     • UTI (urinary tract infection)      Past Surgical History:   Procedure Laterality Date   • CARDIAC CATHETERIZATION     • CAROTID ENDARTERECTOMY     • CORONARY ARTERY BYPASS GRAFT     • TRANSESOPHAGEAL ECHOCARDIOGRAM ([facility])        General Information     Row Name 03/19/22 0922          Physical Therapy Time and Intention    Document Type progress note/recertification  -LR     Mode of Treatment individual therapy;physical therapy  -LR     Row Name 03/19/22 0922          General Information    Patient Profile Reviewed yes  -LR     Existing Precautions/Restrictions oxygen therapy device and L/min;fall  -LR     Row Name 03/19/22 0922          Cognition    Orientation Status (Cognition) oriented x 4  -LR     Row Name 03/19/22 0922          Safety Issues, Functional Mobility    Safety Issues Affecting Function (Mobility) safety precautions follow-through/compliance;safety precaution awareness  -LR     Impairments Affecting Function (Mobility) strength;endurance/activity tolerance;shortness of breath;balance;pain  -LR           User Key  (r) = Recorded By, (t) = Taken By, (c) = Cosigned By    Initials Name Provider Type    LR Betito George Physical Therapist               Mobility     Row Name 03/19/22 0910          Bed Mobility    Supine-Sit [place] (Bed Mobility) moderate assist (50% patient effort)  -LR     Sit-Supine [place] (Bed Mobility) moderate assist (50% patient effort)  -LR     Assistive Device (Bed Mobility) head of bed elevated;bed rails  -LR     Row Name 03/19/22 0910          Sit-Stand Transfer    Sit-Stand [place] (Transfers) contact guard  -LR     Assistive Device (Sit-Stand Transfers) walker, front-wheeled  -LR           User Key  (r) = Recorded By, (t) = Taken By, (c) = Cosigned By    Initials Name Provider Type    LR Betito George Physical Therapist               Obj/Interventions     Row Name 03/19/22 0910          Range of Motion Comprehensive    General Range of Motion no range  of motion deficits identified  -LR     Comment, General Range of Motion BLE grossly WFL  -LR     Row Name 03/19/22 0910          Strength Comprehensive (MMT)    Comment, General Manual Muscle Testing (MMT) Assessment BLE grossly 4/5  -LR     Row Name 03/19/22 0910          Sensory Assessment (Somatosensory)    Sensory Assessment (Somatosensory) sensation intact;LE sensation intact  -LR           User Key  (r) = Recorded By, (t) = Taken By, (c) = Cosigned By    Initials Name Provider Type    LR Betito George Physical Therapist               Goals/Plan     Row Name 03/19/22 0910          Bed Mobility Goal 1 (PT)    Activity/Assistive Device (Bed Mobility Goal 1, PT) sit to supine/supine to sit  -LR     [place] Level/Cues Needed (Bed Mobility Goal 1, PT) modified independence  -LR     Time Frame (Bed Mobility Goal 1, PT) by discharge  -LR     Strategies/Barriers (Bed Mobility Goal 1, PT) Maintain SpO2 >90%.  -LR     Row Name 03/19/22 0910          Transfer Goal 1 (PT)    Activity/Assistive Device (Transfer Goal 1, PT) sit-to-stand/stand-to-sit;bed-to-chair/chair-to-bed;walker, rolling  -LR     [place] Level/Cues Needed (Transfer Goal 1, PT) modified independence  -LR     Time Frame (Transfer Goal 1, PT) by discharge  -LR     Strategies/Barriers (Transfers Goal 1, PT) Maintain SpO2 >90%.  -LR     Progress/Outcome (Transfer Goal 1, PT) goal not met  -LR     Row Name 03/19/22 0910          Gait Training Goal 1 (PT)    Activity/Assistive Device (Gait Training Goal 1, PT) walker, rolling  -LR     [place] Level (Gait Training Goal 1, PT) modified independence  -LR     Distance (Gait Training Goal 1, PT) 75'x2.  -LR     Time Frame (Gait Training Goal 1, PT) by discharge  -LR     Strategies/Barriers (Gait Training Goal 1, PT) Maintain SpO2 >90%.  -LR     Progress/Outcome (Gait Training Goal 1, PT) goal not met  -LR           User Key  (r) = Recorded By, (t) = Taken By, (c) = Cosigned By    Initials Name  Provider Type    LR Betito George Physical Therapist               Clinical Impression     Row Name 03/19/22 0910          Pain    Pretreatment Pain Rating 0/10 - no pain  -LR     Posttreatment Pain Rating 0/10 - no pain  -LR     Row Name 03/19/22 0910          Plan of Care Review    Plan of Care Reviewed With patient  -LR     Outcome Evaluation PT recert completed. BP low at 82/51 with sitting and patient only mildly dizzy and agreeable to sitting up and standing. CGA for sit<>stand 2x3 with CGA. Patient with good hip extension and full erect posture. SPO2% steady but did droped to 87% after first sit<>Stand trial and then maintain at >94% throughout session. No goals met at this time. Will continue to work towards improving independence with sit<>stand and ambulation. Continue skilled PT.  -LR     Row Name 03/19/22 0910          Therapy Assessment/Plan (PT)    Patient/Family Therapy Goals Statement (PT) Patient wants to rerturn home  -LR     Rehab Potential (PT) good, to achieve stated therapy goals  -LR     Criteria for Skilled Interventions Met (PT) yes;meets criteria;skilled treatment is necessary  -LR     Predicted Duration of Therapy Intervention (PT) Until d/c or until all goals met  -LR     Row Name 03/19/22 0910          Vital Signs    Intra Systolic BP Rehab 82  -LR     Intra Treatment Diastolic BP 51  -LR     Post Systolic BP Rehab 88  -LR     Post Treatment Diastolic BP 60  -LR     Pretreatment Heart Rate (beats/min) 95  -LR     Intratreatment Heart Rate (beats/min) 102  -LR     Pre SpO2 (%) 91  -LR     O2 Delivery Pre Treatment supplemental O2  -LR     Intra SpO2 (%) 87  -LR     O2 Delivery Intra Treatment supplemental O2  -LR     Post SpO2 (%) 95  -LR     O2 Delivery Post Treatment supplemental O2  -LR     Pre Patient Position Supine  -LR     Intra Patient Position Sitting  -LR     Post Patient Position Supine  -LR     Row Name 03/19/22 0910          Positioning and Restraints    Pre-Treatment  Position in bed  -LR     Post Treatment Position bed  -LR           User Key  (r) = Recorded By, (t) = Taken By, (c) = Cosigned By    Initials Name Provider Type    LR Betito George Physical Therapist               Outcome Measures     Row Name 03/19/22 0910          How much help from another person do you currently need...    Turning from your back to your side while in flat bed without using bedrails? 2  -LR     Moving from lying on back to sitting on the side of a flat bed without bedrails? 2  -LR     Moving to and from a bed to a chair (including a wheelchair)? 3  -LR     Standing up from a chair using your arms (e.g., wheelchair, bedside chair)? 3  -LR     Climbing 3-5 steps with a railing? 2  -LR     To walk in hospital room? 3  -LR     AM-PAC 6 Clicks Score (PT) 15  -LR     Row Name 03/19/22 1120          Functional Assessment    Outcome Measure Options AM-PAC 6 Clicks Daily Activity (OT)  -ME           User Key  (r) = Recorded By, (t) = Taken By, (c) = Cosigned By    Initials Name Provider Type    Betito Todd Physical Therapist    ME Johnny Rodriugez OTR/L Occupational Therapist                             Physical Therapy Education                 Title: PT OT SLP Therapies (In Progress)     Topic: Physical Therapy (Done)     Point: Mobility training (Done)     Learning Progress Summary           Patient Acceptance, E,TB, VU by  at 3/19/2022 1326    Comment: Educated on progress towards goals and purpose of daily mobility.    Acceptance, E, NR by DANIEL at 3/17/2022 1452    Acceptance, E, NR by DANIEL at 3/17/2022 1406    Acceptance, E, NR by DANIEL at 3/15/2022 1259    Acceptance, E, NR by DANEIL at 3/14/2022 1351    Acceptance, E, NR by DANIEL at 3/11/2022 1438    Acceptance, E, NR by DANIEL at 3/10/2022 1318    Acceptance, E, NR by DANIEL at 3/9/2022 1614    Acceptance, E, VU,NR by  at 3/8/2022 0937    Comment: PT POC, breathing technique, transfer technique.                   Point: Home exercise program (Done)      Learning Progress Summary           Patient Acceptance, E,TB, VU by LR at 3/19/2022 1326    Comment: Educated on progress towards goals and purpose of daily mobility.                   Point: Body mechanics (Done)     Learning Progress Summary           Patient Acceptance, E,TB, VU by LR at 3/19/2022 1326    Comment: Educated on progress towards goals and purpose of daily mobility.                   Point: Precautions (Done)     Learning Progress Summary           Patient Acceptance, E,TB, VU by LR at 3/19/2022 1326    Comment: Educated on progress towards goals and purpose of daily mobility.                               User Key     Initials Effective Dates Name Provider Type Discipline    DANIEL 06/16/21 -  Dc Thakur, PTA Physical Therapy Assistant PT    CZ 06/16/21 -  Martin Alvarez, PT Physical Therapist PT    LR 06/16/21 -  Betito George Physical Therapist PT              PT Recommendation and Plan     Plan of Care Reviewed With: patient  Outcome Evaluation: PT recert completed. BP low at 82/51 with sitting and patient only mildly dizzy and agreeable to sitting up and standing. CGA for sit<>stand 2x3 with CGA. Patient with good hip extension and full erect posture. SPO2% steady but did droped to 87% after first sit<>Stand trial and then maintain at >94% throughout session. No goals met at this time. Will continue to work towards improving independence with sit<>stand and ambulation. Continue skilled PT.     Time Calculation:    PT Charges     Row Name 03/19/22 1327             Time Calculation    Start Time 0910  -LR      Stop Time 0935  -LR      Time Calculation (min) 25 min  -LR      PT Received On 03/19/22  -LR      PT Goal Re-Cert Due Date 04/01/22  -LR              Time Calculation- PT    Total Timed Code Minutes- PT 25 minute(s)  -LR              Timed Charges    69965 - PT Therapeutic Activity Minutes 25  -LR              Total Minutes    Timed Charges Total Minutes 25  -LR       Total Minutes 25   -LR            User Key  (r) = Recorded By, (t) = Taken By, (c) = Cosigned By    Initials Name Provider Type    LR Betito George Physical Therapist              Therapy Charges for Today     Code Description Service Date Service Provider Modifiers Qty    [id number] HC PT THERAPEUTIC ACT EA 15 MIN 3/19/2022 Betito George GP 2          PT G-Codes  Outcome Measure Options: AM-PAC 6 Clicks Daily Activity (OT)  AM-PAC 6 Clicks Score (PT): 15  AM-PAC 6 Clicks Score (OT): 16    Betito George  3/19/2022

## 2022-03-20 NOTE — PLAN OF CARE
Problem: Adult Inpatient Plan of Care  Goal: Plan of Care Review  Outcome: Ongoing, Progressing  Flowsheets  Taken 3/20/2022 1748 by Phyllis Tate RN  Progress: no change  Outcome Evaluation: Pt continues to have very little output. Family to talk to Dr. Robertson tomorrow about dialysis and other options.  No new concerns this shift.  Blood pressure improving.  continuing to monitor.  Taken 3/19/2022 1143 by Johnny Rodriguez OTR/L  Plan of Care Reviewed With: patient   Goal Outcome Evaluation:           Progress: no change  Outcome Evaluation: Pt continues to have very little output. Family to talk to Dr. Robertson tomorrow about dialysis and other options.  No new concerns this shift.  Blood pressure improving.  continuing to monitor.

## 2022-03-20 NOTE — PLAN OF CARE
Goal Outcome Evaluation:              Outcome Evaluation: No output at this time from patient, it was very hard for pt to get comfortable, pt took 2 tylenol

## 2022-03-20 NOTE — PROGRESS NOTES
[facility] Medicine Services  INPATIENT PROGRESS NOTE    Length of Stay: 12  Date of Admission: 3/7/2022  Primary Care Physician: Sabino Mobley MD    Subjective   Chief Complaint: Shortness of breath  HPI:  87 year old female with a history of CAD, HFrEF, CKD3, HTN, HLD, GERD, and OA who presented with worsening shortness of breath and increased need for home O2.  She was noted to have bilateral pleural effusions. She was admitted on supplemental oxygen, IV lasix, and fluid restrictions.  She developed atrial fibrillation and was initiated on Eliquis.  Cardiology consulted and she was recommended thoracentesis. She underwent thoracentesis with 1.2 L of pleural fluid removed from the left. Nephrology was consulted due to worsening of creatinine from baseline. She has undergone thoracentesis of the left and right pleural spaces. Oxygen requirements have improved.  Renal function has continued to worsen.     Review of Systems   Constitutional: Positive for fatigue. Negative for chills and fever.   Respiratory: Positive for shortness of breath.    Cardiovascular: Negative for chest pain.   Gastrointestinal: Negative for abdominal pain and nausea.        All pertinent negatives and positives are as above. All other systems have been reviewed and are negative unless otherwise stated.     Objective    Temp:  [96.4 °F (35.8 °C)-98.8 °F (37.1 °C)] 96.4 °F (35.8 °C)  Heart Rate:  [] 103  Resp:  [16-20] 20  BP: ()/(48-60) 113/60    Physical Exam  Vitals reviewed.   Constitutional:       Appearance: She is ill-appearing.   HENT:      Head: Normocephalic and atraumatic.   Cardiovascular:      Rate and Rhythm: Normal rate.   Pulmonary:      Effort: Pulmonary effort is normal. No respiratory distress.      Breath sounds: Examination of the right-middle field reveals decreased breath sounds. Examination of the left-middle field reveals decreased breath sounds.  Examination of the right-lower field reveals decreased breath sounds. Examination of the left-lower field reveals decreased breath sounds. Decreased breath sounds present.   Abdominal:      General: There is no distension.      Palpations: Abdomen is soft.   Musculoskeletal:         General: No deformity.      Right lower leg: No edema.      Left lower leg: No edema.   Skin:     General: Skin is warm and dry.   Neurological:      General: No focal deficit present.      Mental Status: She is alert.         Results Review:  I have reviewed the labs, radiology results, and diagnostic studies.    Laboratory Data:   Results from last 7 days   Lab Units 03/20/22  0601 03/19/22  0645 03/18/22  2101 03/18/22  0555   SODIUM mmol/L 131* 137  --  137   POTASSIUM mmol/L 4.9 5.3* 4.9 5.5*   CHLORIDE mmol/L 88* 92*  --  92*   CO2 mmol/L 32.0* 37.0*  --  35.0*   BUN mg/dL 76* 76*  --  67*   CREATININE mg/dL 3.30* 3.15*  --  2.53*   GLUCOSE mg/dL 86 92  --  88   CALCIUM mg/dL 8.8 9.1  --  8.8   BILIRUBIN mg/dL 0.7 0.5  --  0.5   ALK PHOS U/L 59 69  --  80   ALT (SGPT) U/L 11 14  --  14   AST (SGOT) U/L 15 19  --  22   ANION GAP mmol/L 11.0 8.0  --  10.0     Estimated Creatinine Clearance: 9.2 mL/min (A) (by C-G formula based on SCr of 3.3 mg/dL (H)).          Results from last 7 days   Lab Units 03/20/22  0601 03/19/22  0645 03/18/22  0555 03/17/22  0654 03/16/22  0609   WBC 10*3/mm3 5.69 6.28 5.52 5.43 6.85   HEMOGLOBIN g/dL 8.3* 8.8* 9.4* 10.6* 10.3*   HEMATOCRIT % 26.8* 29.4* 32.2* 36.9 33.7*   PLATELETS 10*3/mm3 199 220 228 229 250           Culture Data:   No results found for: BLOODCX  No results found for: URINECX  No results found for: RESPCX  No results found for: WOUNDCX  No results found for: STOOLCX  No components found for: BODYFLD    Radiology Data:   Imaging Results (Last 24 Hours)     ** No results found for the last 24 hours. **          I have reviewed the patient's current medications.     Assessment/Plan      Active Hospital Problems    Diagnosis    • **Acute on chronic respiratory failure with hypoxia (HCC)    • Acute on chronic systolic CHF (congestive heart failure) (HCC)    • HCAP (healthcare-associated pneumonia)    • Chronic anemia    • Bilateral pleural effusion    • CKD (chronic kidney disease) stage 4, GFR 15-29 ml/min (HCC)    • Atrial fibrillation (HCC)    Hyperkalemia    Plan:    S/P thoracentesis on 3/11, 3/17, and 3/18  Plan for probable repeat left sided thoracentesis on Monday  Repeat CXR Monday  Supplemental oxygen, wean as tolerated, weaned to 5 LPM  Off diuretics with worsening renal function  Albumin TID  Renal function continues to worsen, may require dialysis   Strict I&O, fluid restriction, daily weights  Cardiology consultation appreciated  Nephrology consultation appreciated  Lokelma 10 g daily for hyperkalemia  PT/OT  VTE PPx: Eliquis  DNR/DNI    Discussed the patient's worsening renal function with the patient and family at the bedside.  Options including dialysis and hospice services were discussed.  The family wishes to discuss further and to also speak to Dr. Robertson before making any decision.     I confirmed that the patient's Advance Care Plan is present, code status is documented, or surrogate decision maker is listed in the patient's medical record.     The patient was evaluated during the global COVID-19 pandemic, and the diagnosis was suspected/considered upon their initial presentation.  Evaluation, treatment, and testing were consistent with current guidelines for patients who present with complaints or symptoms that may be related to COVID-19.          This document has been electronically signed by CHANA Johnson on March 20, 2022 09:47 CDT

## 2022-03-20 NOTE — PROGRESS NOTES
"[facility]  [address] [place] [address]. [id number]  T - [phone]  F - [phone]     Progress Note          PATIENT  DEMOGRAPHICS   PATIENT NAME: Naomi Duong Son                      PHYSICIAN: Rhoda Celaya CHANA Santiago  : 1935  MRN: [id number]   LOS: 12 days    Patient Care Team:  Sabino Mobley MD as PCP - General (Family Medicine)  Subjective   SUBJECTIVE   Sitting up in bed. Desat earlier to 89% continues on 5 liters. SOB with any exertion. Denies CP. No N&V. Spoke to daughter this morning and she was informed creatinine is slowly increasing.         Objective   OBJECTIVE   Vital Signs  Temp:  [96.4 °F (35.8 °C)-98.8 °F (37.1 °C)] 96.4 °F (35.8 °C)  Heart Rate:  [] 103  Resp:  [16-20] 20  BP: ()/(48-60) 113/60    Flowsheet Rows    Flowsheet Row First Filed Value   Admission Height 157.5 cm (62\") Documented at 2022 0504   Admission Weight 46.7 kg (103 lb) Documented at 2022 0504           I/O last 3 completed shifts:  In: 390 [P.O.:390]  Out: 50 [Urine:50]    PHYSICAL EXAM    Physical Exam  Vitals and nursing note reviewed.   Constitutional:       General: She is not in acute distress.     Appearance: She is ill-appearing.   Cardiovascular:      Rate and Rhythm: Normal rate and regular rhythm.      Heart sounds: Murmur heard.   Pulmonary:      Breath sounds: Normal breath sounds.   Abdominal:      General: Bowel sounds are normal.      Palpations: Abdomen is soft. There is mass.   Musculoskeletal:      Right lower leg: No edema.      Left lower leg: No edema.   Skin:     General: Skin is warm and dry.      Findings: Bruising present.   Neurological:      Mental Status: She is oriented to person, place, and time. Mental status is at baseline.         RESULTS   Results Review:    Results from last 7 days   Lab Units 22  0601 22  0645 22  2101 22  0555   SODIUM mmol/L 131* 137  --  137   POTASSIUM mmol/L 4.9 5.3* 4.9 5.5*   CHLORIDE " mmol/L 88* 92*  --  92*   CO2 mmol/L 32.0* 37.0*  --  35.0*   BUN mg/dL 76* 76*  --  67*   CREATININE mg/dL 3.30* 3.15*  --  2.53*   CALCIUM mg/dL 8.8 9.1  --  8.8   BILIRUBIN mg/dL 0.7 0.5  --  0.5   ALK PHOS U/L 59 69  --  80   ALT (SGPT) U/L 11 14  --  14   AST (SGOT) U/L 15 19  --  22   GLUCOSE mg/dL 86 92  --  88       Estimated Creatinine Clearance: 9.2 mL/min (A) (by C-G formula based on SCr of 3.3 mg/dL (H)).                Results from last 7 days   Lab Units 03/20/22  0601 03/19/22  0645 03/18/22  0555 03/17/22  0654 03/16/22  0609   WBC 10*3/mm3 5.69 6.28 5.52 5.43 6.85   HEMOGLOBIN g/dL 8.3* 8.8* 9.4* 10.6* 10.3*   PLATELETS 10*3/mm3 199 220 228 229 250               Imaging Results (Last 24 Hours)     ** No results found for the last 24 hours. **           MEDICATIONS    albumin human, 25 g, Intravenous, TID  apixaban, 2.5 mg, Oral, Q12H  aspirin, 81 mg, Oral, Daily  cefepime, 2 g, Intravenous, Q24H  ipratropium-albuterol, 3 mL, Nebulization, 4x Daily - RT  midodrine, 5 mg, Oral, BID AC  polyethylene glycol, 17 g, Oral, Daily  sodium chloride, 10 mL, Intravenous, Q12H  sodium zirconium cyclosilicate, 10 g, Oral, Daily      hold, 1 each  hold, 1 each  hold, 1 each  Pharmacy to Dose Cefepime,         Assessment/Plan   ASSESSMENT / PLAN      Acute on chronic respiratory failure with hypoxia (HCC)    Acute on chronic systolic CHF (congestive heart failure) (HCC)    Bilateral pleural effusion    CKD (chronic kidney disease) stage 4, GFR 15-29 ml/min (HCC)    Atrial fibrillation (HCC)    Chronic anemia    HCAP (healthcare-associated pneumonia)    1.  CKD 4- Baseline creatinine is around 1.7-1.8, creatinine peak at 2.07.      Now worsening effusion bilateral and was on iv lasix 40mg bid. Cr then worsening and now on hold. Agree with it. Add albumin- now on 25% 25 g tid. dw family about gfr close to 20. Urine sodium 20. Terrence is negative. Requesting UPCR and UA. Has [place] with UOP documented as 250.  Bladder scan reported 50cc urine only.  DOMINIC- shows atrophic left kidney, right kidney no hydronephrosis, mass or stone. Medical renal disease.      2.  Acute on chronic CHF / Bilateral pleural effusions- s/p thoracentesis bilateral. EF 35%. On albumin 25% 25 g IV on tid. On cefepime- WBC 6.28. Pleural fluid culture is negative at 48 hours. Bps are borderline low- on midorine 5 mg bid     3.  Acute on chronic respiratory failure- secondary to underlying CHF as well as new onset atrial fibrillation and chronic pleural effusions.  Desat last night- currently on 5liters flow. Managed per primary team.     4.  Atrial fibrillation- managed per cardiology and primary team     6.  Anemia-  fe low and on iv fe. hgb 8.3     7.  History of secondary hyperparathyroidism     8.  History of CAD     9. Hyperkalemia better after lokelma. Potassium 4.9 on lokelma  Now 10g daily           This document has been electronically signed by CHANA Hope on March 20, 2022 10:25 CDT

## 2022-03-21 NOTE — NURSING NOTE
Pt will be going to the [facility].  APRN notified for need for discharge orders, also orders for pain medication.  EMS  To be set up for transport.    Report called to  [facility].  All questions answered at this time.

## 2022-03-21 NOTE — DISCHARGE PLACEMENT REQUEST
"Duong Abdalla (87 y.o. Female)     [facility] case management   Phone [phone]  Fax [phone]            Date of Birth   1935    Social Security Number       Address   [address] [id number]    Home Phone   [phone]    MRN   [id number]       Gnosticist   Synagogue    Marital Status                               Admission Date   3/7/22    Admission Type   Emergency    Admitting Provider   Roberto Jaramillo MD    Attending Provider   Roberto Jaramillo MD    Department, Room/Bed   [facility], 382/1       Discharge Date       Discharge Disposition       Discharge Destination                               Attending Provider: Roberto Jaramillo MD    Allergies: No Known Allergies    Isolation: None   Infection: None   Code Status: No CPR   Advance Care Planning Activity    Ht: 157.5 cm (62.01\")   Wt: 46.7 kg (103 lb)    Admission Cmt: None   Principal Problem: Acute on chronic respiratory failure with hypoxia (HCC) [J96.21]                 Active Insurance as of 3/7/2022     Primary Coverage     Payor Plan Insurance Group Employer/Plan Group    MEDICARE MEDICARE A & B      Payor Plan Address Payor Plan Phone Number Payor Plan Fax Number Effective Dates    [address] [phone]  1/1/2002 - None Entered    [facility] [id number]       Subscriber Name Subscriber Birth Date Member ID       DUONG ABDALLA 1935 [id number]           Secondary Coverage     Payor Plan Insurance Group Employer/Plan Group     FOR LIFE  FOR LIFE  SUP  [id number]     Payor Plan Address Payor Plan Phone Number Payor Plan Fax Number Effective Dates    [address] [phone]  7/17/2016 - None Entered    [facility] [id number]       Subscriber Name Subscriber Birth Date Member ID       DUONG ABDALLA 1935 [id number]                 Emergency Contacts      (Rel.) Home Phone Work Phone Mobile Phone    Alden Abdalla (Rm) [phone] -- [phone]    " "           History & Physical      Randall Cabrera MD at 03/07/22 0830     Summary:History and physical                   [facility]  HISTORY AND PHYSICAL      Date of Admission: 3/7/2022  Primary Care Physician: Sabino Mobley MD    Subjective     Chief Complaint: Shortness of breath and generalized weakness    History of Present Illness  Patient presents to emergency room extremely dyspneic, and deconditioned.  Patient states she normally wears oxygen as needed at home, but over past 30 days has worn oxygen 24/7 every day.  Patient states shortness of breath gradually worsening with time.  Denies chest pain, but admits to occasional chest discomfort.  Admits to increased weakness, and easy fatigue with minimal exertion.  Admits to increased chest pressure over past several days, and states she feels like \"rock on my chest.\"  Believes shortness of breath has worsened after recent carotid endarterectomy procedure.  She is D-dimer elevated in emergency room, however unable to perform CTA chest due to renal dysfunction.  Lateral pleural effusions right greater than left noted on ER imaging.  Patient also admits to previous lower extremity swelling, orthopnea, and PND worsening over the past month.  Denies fevers, chills, sick contacts, cough, or recent travel.      Review of Systems   Constitutional: Positive for activity change, appetite change and fatigue. Negative for chills and fever.   HENT: Negative for congestion, ear discharge, sinus pressure and sneezing.    Eyes: Negative for discharge and visual disturbance.   Respiratory: Positive for shortness of breath and wheezing. Negative for chest tightness.    Cardiovascular: Negative for chest pain and palpitations.   Gastrointestinal: Negative for abdominal distention, constipation, diarrhea, nausea and vomiting.   Endocrine: Negative for polyphagia and polyuria.   Genitourinary: Positive for difficulty " urinating. Negative for dysuria.   Musculoskeletal: Positive for gait problem and myalgias.   Skin: Negative for color change and wound.   Allergic/Immunologic: Negative for immunocompromised state.   Neurological: Positive for weakness. Negative for dizziness and syncope.   Hematological: Negative for adenopathy.   Psychiatric/Behavioral: Negative for agitation, confusion, hallucinations and suicidal ideas.          Otherwise complete ROS reviewed and negative except as mentioned in the HPI.    Past Medical History:   Past Medical History:   Diagnosis Date   • CAD (coronary artery disease)    • Carotid artery stenosis    • Chronic systolic heart failure (HCC)    • CKD (chronic kidney disease) stage 3, GFR 30-59 ml/min ([facility])    • GERD (gastroesophageal reflux disease)    • Hypercholesterolemia    • Hyperlipidemia    • Hypertension    • Iron deficiency anemia    • Ischemic cardiomyopathy    • MI (myocardial infarction) ([facility])    • Mitral valve disease    • Osteoarthritis    • UTI (urinary tract infection)      Past Surgical History:  Past Surgical History:   Procedure Laterality Date   • CARDIAC CATHETERIZATION     • CAROTID ENDARTERECTOMY     • CORONARY ARTERY BYPASS GRAFT     • TRANSESOPHAGEAL ECHOCARDIOGRAM (MISSY)       Social History:  reports that she quit smoking about 22 years ago. She has never used smokeless tobacco. She reports that she does not drink alcohol and does not use drugs.    Family History: family history includes Hypertension in her father.       Allergies:  No Known Allergies    Medications:  Prior to Admission medications    Medication Sig Start Date End Date Taking? Authorizing Provider   acetaminophen (TYLENOL) 325 MG tablet Take 2 tablets by mouth Every 4 (Four) Hours As Needed for Mild Pain . 11/18/20   Ryan Dubois APRN   albuterol sulfate  (90 Base) MCG/ACT inhaler Inhale 2 puffs Every 4 (Four) Hours As Needed for Wheezing or Shortness of Air. 7/3/21   Roberto Jaramillo MD  "  aspirin 81 MG chewable tablet Chew 81 mg Daily.    Provider, MD [place]   atorvastatin (LIPITOR) 20 MG tablet Take 1 tablet by mouth Every Night. 12/30/21   Ryan Dubois APRN   carvedilol (Coreg) 12.5 MG tablet Take 1 tablet by mouth 2 (Two) Times a Day With Meals. 8/9/21   Kalee Cisneros MD   clopidogrel (PLAVIX) 75 MG tablet Take 1 tablet by mouth Daily. 8/9/21   Kalee Cisneros MD   furosemide (LASIX) 40 MG tablet 40 mg 2 (Two) Times a Week. 8/29/21   ProviderChula MD   irbesartan (AVAPRO) 75 MG tablet Take 1 tablet by mouth Daily. 8/9/21   Kalee Cisneros MD   sennosides-docusate (senna-docusate sodium) 8.6-50 MG per tablet Take 2 tablets by mouth 2 (Two) Times a Day As Needed for Constipation. 11/18/20   Ryan Dubois APRN     I have utilized all available immediate resources to obtain, update, and review the patient's current medications.    Objective     Vital Signs: /75   Pulse 90   Temp 97.4 °F (36.3 °C) (Oral)   Resp 24   Ht 157.5 cm (62\")   Wt 46.7 kg (103 lb)   SpO2 99%   BMI 18.84 kg/m²   Physical Exam  Constitutional:       Appearance: Normal appearance. She is normal weight.   HENT:      Head: Normocephalic.      Nose: Nose normal.      Mouth/Throat:      Mouth: Mucous membranes are moist.      Pharynx: Oropharynx is clear.   Eyes:      Extraocular Movements: Extraocular movements intact.      Conjunctiva/sclera: Conjunctivae normal.      Pupils: Pupils are equal, round, and reactive to light.   Cardiovascular:      Rate and Rhythm: Normal rate and regular rhythm.      Pulses: Normal pulses.      Heart sounds: Normal heart sounds.   Pulmonary:      Effort: Pulmonary effort is normal.      Breath sounds: Normal breath sounds.   Abdominal:      General: Abdomen is flat. Bowel sounds are normal.      Palpations: Abdomen is soft.   Musculoskeletal:         General: Swelling present.      Cervical back: Normal range of motion and neck " supple.      Right lower leg: Edema present.      Left lower leg: Edema present.      Comments: +2 bilateral lower extremity edema appreciated pitting in nature   Skin:     General: Skin is warm and dry.      Capillary Refill: Capillary refill takes less than 2 seconds.   Neurological:      General: No focal deficit present.      Mental Status: She is alert and oriented to person, place, and time. Mental status is at baseline.      Sensory: No sensory deficit.      Motor: Weakness present.      Gait: Gait abnormal.   Psychiatric:         Mood and Affect: Mood normal.         Behavior: Behavior normal.         Thought Content: Thought content normal.         Judgment: Judgment normal.              Results Reviewed:  Lab Results (last 24 hours)     Procedure Component Value Units Date/Time    Protime-INR [[id number]]  (Normal) Collected: 03/07/22 0521    Specimen: Blood Updated: 03/07/22 0711     Protime 12.8 Seconds      INR 0.97    Narrative:      Therapeutic range for most indications is 2.0-3.0 INR,  or 2.5-3.5 for mechanical heart valves.    aPTT [[id number]]  (Normal) Collected: 03/07/22 0521    Specimen: Blood Updated: 03/07/22 0711     PTT 30.0 seconds     Narrative:      The recommended Heparin therapeutic range is 68-97 seconds.    Procalcitonin [[id number]]  (Normal) Collected: 03/07/22 0521    Specimen: Blood Updated: 03/07/22 0635     Procalcitonin 0.17 ng/mL     Narrative:      As a Marker for Sepsis (Non-Neonates):    1. <0.5 ng/mL represents a low risk of severe sepsis and/or septic shock.  2. >2 ng/mL represents a high risk of severe sepsis and/or septic shock.    As a Marker for Lower Respiratory Tract Infections that require antibiotic therapy:    PCT on Admission    Antibiotic Therapy       6-12 Hrs later    >0.5                Strongly Recommended  >0.25 - <0.5        Recommended   0.1 - 0.25          Discouraged              Remeasure/reassess PCT  <0.1                Strongly Discouraged      "Remeasure/reassess PCT    As 28 day mortality risk marker: \"Change in Procalcitonin Result\" (>80% or <=80%) if Day 0 (or Day 1) and Day 4 values are available. Refer to http://www.[facility]-pct-calculator.com    Change in PCT <=80%  A decrease of PCT levels below or equal to 80% defines a positive change in PCT test result representing a higher risk for 28-day all-cause mortality of patients diagnosed with severe sepsis for septic shock.    Change in PCT >80%  A decrease of PCT levels of more than 80% defines a negative change in PCT result representing a lower risk for 28-day all-cause mortality of patients diagnosed with severe sepsis or septic shock.       [place] Draw [[id number]] Collected: 03/07/22 0521    Specimen: Blood Updated: 03/07/22 0633    Narrative:      The following orders were created for panel order [place] Draw.  Procedure                               Abnormality         Status                     ---------                               -----------         ------                     Green Top (Gel)[[id number]]                                  Final result               Lavender Top[[id number]]                                     Final result               Gold Top - SST[[id number]]                                   Final result               Light Blue Top[[id number]]                                   Final result                 Please view results for these tests on the individual orders.    Green Top (Gel) [[id number]] Collected: 03/07/22 0521    Specimen: Blood Updated: 03/07/22 0633     Extra Tube Hold for add-ons.     Comment: Auto resulted.       Lavender Top [[id number]] Collected: 03/07/22 0521    Specimen: Blood Updated: 03/07/22 0633     Extra Tube hold for add-on     Comment: Auto resulted       Gold Top - SST [[id number]] Collected: 03/07/22 0521    Specimen: Blood Updated: 03/07/22 0633     Extra Tube Hold for add-ons.     Comment: Auto resulted.       Light Blue Top [[id number]] Collected: " 03/07/22 0521    Specimen: Blood Updated: 03/07/22 0633     Extra Tube hold for add-on     Comment: Auto resulted       D-dimer, Quantitative [[id number]]  (Abnormal) Collected: 03/07/22 0521    Specimen: Blood Updated: 03/07/22 0624     D-Dimer, Quantitative 3,153 ng/mL (FEU)     Narrative:      Dimer values <500 ng/ml FEU are FDA approved as aid in diagnosis of deep venous thrombosis and pulmonary embolism.  This test should not be used in an exclusion strategy with pretest probability alone.    A recent guideline regarding diagnosis for pulmonary thromboembolism recommends an adjusted exclusion criterion of age x 10 ng/ml FEU for patients >50 years of age ([employer] Intern Med 2015; 163: 701-711).      CK [[id number]]  (Normal) Collected: 03/07/22 0521    Specimen: Blood Updated: 03/07/22 0624     Creatine Kinase 36 U/L     Magnesium [[id number]]  (Abnormal) Collected: 03/07/22 0521    Specimen: Blood Updated: 03/07/22 0624     Magnesium 2.5 mg/dL     COVID-19 and FLU A/B PCR - Swab, Nasopharynx [[id number]]  (Normal) Collected: 03/07/22 0545    Specimen: Swab from Nasopharynx Updated: 03/07/22 0611     COVID19 Not Detected     Influenza A PCR Not Detected     Influenza B PCR Not Detected    Narrative:      Fact sheet for providers: https://www.fda.gov/media/132273/download    Fact sheet for patients: https://www.fda.gov/media/501216/download    Test performed by PCR.    Comprehensive Metabolic Panel [[id number]]  (Abnormal) Collected: 03/07/22 0521    Specimen: Blood Updated: 03/07/22 0609     Glucose 93 mg/dL      BUN 32 mg/dL      Creatinine 1.77 mg/dL      Sodium 140 mmol/L      Potassium 5.4 mmol/L      Chloride 98 mmol/L      CO2 35.0 mmol/L      Calcium 9.2 mg/dL      Total Protein 7.2 g/dL      Albumin 3.90 g/dL      ALT (SGPT) 11 U/L      AST (SGOT) 17 U/L      Alkaline Phosphatase 84 U/L      Total Bilirubin 0.4 mg/dL      Globulin 3.3 gm/dL      A/G Ratio 1.2 g/dL      BUN/Creatinine Ratio 18.1     Anion Gap 7.0  mmol/L      eGFR 27.9 mL/min/1.73      Comment: National Kidney Foundation and American Society of Nephrology (ASN) Task Force recommended calculation based on the Chronic Kidney Disease Epidemiology Collaboration (CKD-EPI) equation refit without adjustment for race.       Narrative:      GFR Normal >60  Chronic Kidney Disease <60  Kidney Failure <15      Troponin [[id number]]  (Normal) Collected: 03/07/22 0521    Specimen: Blood Updated: 03/07/22 0602     Troponin T <0.010 ng/mL     Narrative:      Troponin T Reference Range:  <= 0.03 ng/mL-   Negative for AMI  >0.03 ng/mL-     Abnormal for myocardial necrosis.  Clinicians would have to utilize clinical acumen, EKG, Troponin and serial changes to determine if it is an Acute Myocardial Infarction or myocardial injury due to an underlying chronic condition.       Results may be falsely decreased if patient taking Biotin.      BNP [[id number]]  (Abnormal) Collected: 03/07/22 0521    Specimen: Blood Updated: 03/07/22 0601     proBNP 16,282.0 pg/mL     Narrative:      Among patients with dyspnea, NT-proBNP is highly sensitive for the detection of acute congestive heart failure. In addition NT-proBNP of <300 pg/ml effectively rules out acute congestive heart failure with 99% negative predictive value.    Results may be falsely decreased if patient taking Biotin.      CBC & Differential [[id number]]  (Abnormal) Collected: 03/07/22 0521    Specimen: Blood Updated: 03/07/22 0528    Narrative:      The following orders were created for panel order CBC & Differential.  Procedure                               Abnormality         Status                     ---------                               -----------         ------                     CBC Auto Differential[[id number]]        Abnormal            Final result                 Please view results for these tests on the individual orders.    CBC Auto Differential [[id number]]  (Abnormal) Collected: 03/07/22 0521    Specimen: Blood  Updated: 03/07/22 0528     WBC 4.31 10*3/mm3      RBC 3.80 10*6/mm3      Hemoglobin 10.4 g/dL      Hematocrit 34.7 %      MCV 91.3 fL      MCH 27.4 pg      MCHC 30.0 g/dL      RDW 14.6 %      RDW-SD 48.4 fl      MPV 10.7 fL      Platelets 197 10*3/mm3      Neutrophil % 71.3 %      Lymphocyte % 15.5 %      Monocyte % 11.8 %      Eosinophil % 0.0 %      Basophil % 0.9 %      Immature Grans % 0.5 %      Neutrophils, Absolute 3.07 10*3/mm3      Lymphocytes, Absolute 0.67 10*3/mm3      Monocytes, Absolute 0.51 10*3/mm3      Eosinophils, Absolute 0.00 10*3/mm3      Basophils, Absolute 0.04 10*3/mm3      Immature Grans, Absolute 0.02 10*3/mm3      nRBC 0.0 /100 WBC         Imaging Results (Last 24 Hours)     Procedure Component Value Units Date/Time    XR Chest 1 View [[id number]] Collected: 03/07/22 0512     Updated: 03/07/22 0615    Narrative:      EXAM:    XR Chest, 1 View    CLINICAL HISTORY:    The patient is 87 years old and is Female; SOA Triage Protocol    TECHNIQUE:    Frontal view of the chest.    COMPARISON:    XR Chest dated July 1 2021    FINDINGS:    LUNGS:  Coarsened interstitial markings are present throughout  the lungs with associated bilateral lower lobe infiltrates.    PLEURAL SPACE:  Moderate to large left pleural effusion and  small right pleural effusion are present.  No pneumothorax.    HEART:  The cardiac silhouette is obscured.    MEDIASTINUM:  Unremarkable.    BONES/JOINTS:  There are degenerative changes of the bones.  Median sternotomy is present.    VASCULATURE:  Atherosclerosis of the aorta is noted.    UPPER ABDOMEN:  Unremarkable as visualized.      Impression:        Bilateral pleural effusions, left greater right with likely  associated atelectasis.    Electronically signed by:  Tish Avalos MD  3/7/2022 6:12 AM CST  Workstation: [id number]        I have personally reviewed and interpreted the radiology studies and ECG obtained at time of admission.     Assessment / Plan     Assessment:    Active Hospital Problems    Diagnosis    • Acute on chronic respiratory failure with hypoxia (HCC)      Symptomatic hypoxia most likely secondary to CHF exacerbation however need to rule out pulmonary embolus.    Plan:     Hypoxia:  -VQ scan in a.m.  Heparin gtt. until VQ scan results available.  IV Lasix, strict I's and O's, fluid restriction 1200 mL/day, daily weights, echocardiogram for empirical CHF exacerbation care.    Deconditioning: PT/OT evaluation ordered at time of admission.  Patient may require short-term rehab at time of hospital discharge.      FEN cardiac with 1200 mL/day fluid restriction, electrolyte replacement protocol  PPX Heparin gtt.      Code Status/Advanced Care Plan: Full    The patient's surrogate decision maker is daughter/son.     I discussed my findings and recommendations with the patient.    Estimated length of stay is 1 to 3 days.     The patient was seen and examined by me on 3/7/2022 at 7:30 AM.    Electronically signed by Randall Cabrera MD, 03/07/22, 08:30 CST.              Electronically signed by Randall Cabrera MD at 03/07/22 0841          Physician Progress Notes (last 24 hours)      Marina Rios MD at 03/21/22 1056        INTERVENTIONAL RADIOLOGY    88yo severely debilitated female PMH CAD s/p stent, severe CHF (EF 35%), CKD3, HLD, HTN, ischemic cardiomyopathy, emphysema ex-smoker admitted 3/7/22 with dyspnea and increased O2 demands (normally only wears 2L O2 qhs). She was found to have large left and moderate right pleural effusions and underwent L thora 1.2L on 3/11/22, L thora 1.5L 3/17/22, R thora 900mL 3/18/22. Since then her pleural fluid has reaccumulated somewhat bilaterally, with moderate left and small right pleural effusions on CXR this AM. She is currently developing anuric renal failure and discussions are ongoing regarding HD vs comfort care. IR consulted for left thora for comfort today.    On exam, weak, debilitated, speaks in short sentences.  Sonographic  exam demonstrates moderate left pleural effusion.    Plan therapeutic left thoracentesis.    The patient's history and physical exam were reviewed, and no changes were noted. The risks, benefits, alternatives, and indications of the procedure were discussed with the patient, and all questions were answered. Informed consent was obtained.    Marina Rios M.D.  Vascular, Interventional and Wound Physician  IR Chief, [facility]  Cell: [phone] / Office: [phone]      Electronically signed by Marina Rios MD at 03/21/22 1123     Sabino Carrera APRN at 03/21/22 1006                [facility] Medicine Services  INPATIENT PROGRESS NOTE    Length of Stay: 13  Date of Admission: 3/7/2022  Primary Care Physician: Sabino Mobley MD    Subjective   Chief Complaint: Shortness of breath  HPI:  87 year old female with a history of CAD, HFrEF, CKD3, HTN, HLD, GERD, and OA who presented with worsening shortness of breath and increased need for home O2.  She was noted to have bilateral pleural effusions. She was admitted on supplemental oxygen, IV lasix, and fluid restrictions.  She developed atrial fibrillation and was initiated on Eliquis.  Cardiology consulted and she was recommended thoracentesis. She underwent thoracentesis with 1.2 L of pleural fluid removed from the left. Nephrology was consulted due to worsening of creatinine from baseline. She has undergone thoracentesis of the left and right pleural spaces. Oxygen requirements have improved.  Renal function has continued to worsen. Today (3/21/22), she notes her shortness of breath is worse and she has increasing malaise.      Review of Systems   Constitutional: Positive for fatigue. Negative for chills and fever.   Respiratory: Positive for shortness of breath.    Cardiovascular: Negative for chest pain.   Gastrointestinal: Negative for abdominal pain and nausea.        All  pertinent negatives and positives are as above. All other systems have been reviewed and are negative unless otherwise stated.     Objective    Temp:  [96.3 °F (35.7 °C)-98 °F (36.7 °C)] 98 °F (36.7 °C)  Heart Rate:  [] 102  Resp:  [16-24] 24  BP: (113-129)/(55-70) 129/65    Physical Exam  Vitals reviewed.   Constitutional:       Appearance: She is ill-appearing.   HENT:      Head: Normocephalic and atraumatic.   Cardiovascular:      Rate and Rhythm: Normal rate.   Pulmonary:      Effort: Pulmonary effort is normal. No respiratory distress.      Breath sounds: Examination of the right-middle field reveals decreased breath sounds. Examination of the right-lower field reveals decreased breath sounds. Examination of the left-lower field reveals decreased breath sounds. Decreased breath sounds present.   Abdominal:      General: There is no distension.      Palpations: Abdomen is soft.   Musculoskeletal:         General: No deformity.      Right lower leg: No edema.      Left lower leg: No edema.   Skin:     General: Skin is warm and dry.   Neurological:      General: No focal deficit present.      Mental Status: She is alert.         Results Review:  I have reviewed the labs, radiology results, and diagnostic studies.    Laboratory Data:   Results from last 7 days   Lab Units 03/21/22  0526 03/20/22  0601 03/19/22  0645   SODIUM mmol/L 134* 131* 137   POTASSIUM mmol/L 5.1 4.9 5.3*   CHLORIDE mmol/L 90* 88* 92*   CO2 mmol/L 30.0* 32.0* 37.0*   BUN mg/dL 81* 76* 76*   CREATININE mg/dL 3.40* 3.30* 3.15*   GLUCOSE mg/dL 88 86 92   CALCIUM mg/dL 8.9 8.8 9.1   BILIRUBIN mg/dL 0.7 0.7 0.5   ALK PHOS U/L 54 59 69   ALT (SGPT) U/L 9 11 14   AST (SGOT) U/L 15 15 19   ANION GAP mmol/L 14.0 11.0 8.0     Estimated Creatinine Clearance: 8.9 mL/min (A) (by C-G formula based on SCr of 3.4 mg/dL (H)).          Results from last 7 days   Lab Units 03/21/22  0526 03/20/22  0601 03/19/22  0645 03/18/22  0555 03/17/22  0654   WBC  10*3/mm3 6.40 5.69 6.28 5.52 5.43   HEMOGLOBIN g/dL 8.9* 8.3* 8.8* 9.4* 10.6*   HEMATOCRIT % 28.7* 26.8* 29.4* 32.2* 36.9   PLATELETS 10*3/mm3 199 199 220 228 229           Culture Data:   No results found for: BLOODCX  No results found for: URINECX  No results found for: RESPCX  No results found for: WOUNDCX  No results found for: STOOLCX  No components found for: BODYFLD    Radiology Data:   Imaging Results (Last 24 Hours)     Procedure Component Value Units Date/Time    XR Chest 1 View [[id number]] Collected: 03/21/22 0448     Updated: 03/21/22 0729    Narrative:        PROCEDURE: Single chest view portable    REASON FOR EXAM:effusion follow-up, hypoxia, J96.21 Acute and  chronic respiratory failure with hypoxia J90 Pleural effusion,  not elsewhere classified N19 Unspecified kidney failure I50.9  Heart failure, unspecified Z74.09 Other reduced mobility Z74.09  Other reduced mobility Z78.9 Other specified health status    FINDINGS: Comparison exam dated March 18, 2022. Stable  postsurgical changes with median sternotomy. Cardiac size is  upper limits of normal. Bibasilar infrahilar small patchy  opacities. Lungs are otherwise clear. Small right pleural  effusion. Small to moderate left pleural effusion. No acute  osseous abnormality.      Impression:      1.  Bibasilar infrahilar small patchy opacities. This may  represent mild pulmonary edema versus pneumonia versus  subsegmental atelectasis. Recommend clinical correlation.  2.  Small right pleural effusion. Smaller to moderate left  pleural effusion.    Electronically signed by:  Moisés Frederick MD  3/21/2022 7:27 AM CDT  Workstation: [id number]          I have reviewed the patient's current medications.     Assessment/Plan     Active Hospital Problems    Diagnosis    • **Acute on chronic respiratory failure with hypoxia (HCC)    • Acute on chronic systolic CHF (congestive heart failure) (HCC)    • HCAP (healthcare-associated pneumonia)    • Chronic anemia    •  Bilateral pleural effusion    • CKD (chronic kidney disease) stage 4, GFR 15-29 ml/min (HCC)    • Atrial fibrillation (HCC)    Hyperkalemia    Plan:    S/P thoracentesis on 3/11, 3/17, and 3/18  Left sided thoracentesis planned for today, discussed with IR  Supplemental oxygen, wean as tolerated, weaned to 6 LPM  Off diuretics with worsening renal function  Albumin TID  Renal function continues to worsen, may require dialysis   Discussed with nephrology, Nephrology to discuss options further with family today  Strict I&O, fluid restriction, daily weights  Cardiology consultation appreciated  Nephrology consultation appreciated  Lokelma 10 g daily for hyperkalemia  PT/OT  VTE PPx: Eliquis  DNR/DNI    I confirmed that the patient's Advance Care Plan is present, code status is documented, or surrogate decision maker is listed in the patient's medical record.     The patient was evaluated during the global COVID-19 pandemic, and the diagnosis was suspected/considered upon their initial presentation.  Evaluation, treatment, and testing were consistent with current guidelines for patients who present with complaints or symptoms that may be related to COVID-19.          This document has been electronically signed by CHANA Johnson on March 21, 2022 10:06 CDT        Electronically signed by Sabino Carrera APRN at 03/21/22 1015

## 2022-03-21 NOTE — PROGRESS NOTES
"[facility]  [facility] [place]. [address]  T - [phone]  F - [phone]     Progress Note          PATIENT  DEMOGRAPHICS   PATIENT NAME: Naomi Duong Son                      PHYSICIAN: Stevenson Robertson MD  : 1935  MRN: [id number]   LOS: 13 days    Patient Care Team:  Sabino Mobley MD as PCP - General (Family Medicine)  Subjective   SUBJECTIVE   Just came from thoracentesis. Met family earlier          Objective   OBJECTIVE   Vital Signs  Temp:  [97.6 °F (36.4 °C)-98 °F (36.7 °C)] 98 °F (36.7 °C)  Heart Rate:  [] 102  Resp:  [20-24] 20  BP: (113-133)/(62-70) 128/62    Flowsheet Rows    Flowsheet Row First Filed Value   Admission Height 157.5 cm (62\") Documented at 2022 0504   Admission Weight 46.7 kg (103 lb) Documented at 2022 0504           I/O last 3 completed shifts:  In: -   Out: 100 [Urine:100]    PHYSICAL EXAM    Physical Exam  Vitals and nursing note reviewed.   Constitutional:       General: She is not in acute distress.     Appearance: She is ill-appearing.   Cardiovascular:      Rate and Rhythm: Normal rate and regular rhythm.      Heart sounds: Murmur heard.   Pulmonary:      Breath sounds: Normal breath sounds.   Abdominal:      General: Bowel sounds are normal.      Palpations: Abdomen is soft. There is mass.   Musculoskeletal:      Right lower leg: No edema.      Left lower leg: No edema.   Skin:     General: Skin is warm and dry.      Findings: Bruising present.   Neurological:      Mental Status: She is oriented to person, place, and time. Mental status is at baseline.         RESULTS   Results Review:    Results from last 7 days   Lab Units 22  0526 22  0601 22  0645   SODIUM mmol/L 134* 131* 137   POTASSIUM mmol/L 5.1 4.9 5.3*   CHLORIDE mmol/L 90* 88* 92*   CO2 mmol/L 30.0* 32.0* 37.0*   BUN mg/dL 81* 76* 76*   CREATININE mg/dL 3.40* 3.30* 3.15*   CALCIUM mg/dL 8.9 8.8 9.1   BILIRUBIN mg/dL 0.7 0.7 0.5   ALK PHOS " U/L 54 59 69   ALT (SGPT) U/L 9 11 14   AST (SGOT) U/L 15 15 19   GLUCOSE mg/dL 88 86 92       Estimated Creatinine Clearance: 8.9 mL/min (A) (by C-G formula based on SCr of 3.4 mg/dL (H)).                Results from last 7 days   Lab Units 03/21/22  0526 03/20/22  0601 03/19/22  0645 03/18/22  0555 03/17/22  0654   WBC 10*3/mm3 6.40 5.69 6.28 5.52 5.43   HEMOGLOBIN g/dL 8.9* 8.3* 8.8* 9.4* 10.6*   PLATELETS 10*3/mm3 199 199 220 228 229               Imaging Results (Last 24 Hours)     Procedure Component Value Units Date/Time    US Thoracentesis [[id number]] Collected: 03/21/22 1049    Specimen: Body Fluid Updated: 03/21/22 1418    Narrative:      THERAPEUTIC THORACENTESIS    STAFF: Marina Rios M.D.    INDICATION: Symptomatic left pleural effusion.    MEDICATIONS: Local anesthetic.    PROCEDURE: Using ultrasound guidance, the left pleural cavity was  punctured and a catheter placed. A total of 1.3 liters of josefina  fluid was removed. No further fluid could be removed. The  catheter was then removed. A post procedure chest radiograph was  obtained.    FINDINGS: Moderate left pleural effusion.    EBL: Less than 1 mL    COMPLICATIONS: None      Impression:      Successful therapeutic left thoracentesis.    Electronically signed by:  Marina Rios MD  3/21/2022 11:53 AM CDT  Workstation: [id number]    XR Chest 1 View [[id number]] Collected: 03/21/22 1120     Updated: 03/21/22 1138    Narrative:      PORTABLE CHEST RADIOGRAPH    HISTORY: post left thoracentesis, J96.21 Acute and chronic  respiratory failure with hypoxia J90 Pleural effusion, not  elsewhere classified N19 Unspecified kidney failure I50.9 Heart  failure, unspecified Z74.09 Other reduced mobility Z74.09 Other  reduced mobility Z78.9 Other specified health status    TECHNIQUE: Single AP view of the chest.    COMPARISON: Chest radiograph from 5:00 AM on the same day.    FINDINGS:  Upper abdomen is difficult to visualize due to the patient's  severe kyphotic  deformity and positioning. Visualized soft  tissues appear unremarkable. Visualized osseous structures  demonstrates severe degenerative change. There are bilateral  carotid artery stents. Severe aortic atherosclerosis. There is no  pneumothorax. Left pleural effusion is completely resolved status  post left thoracentesis. There is a persistent moderate right  pleural effusion. The aerated lungs demonstrate mildly increased  interstitial markings compatible with edema. There are  postoperative changes from CABG.      Impression:      1. No pneumothorax status post left thoracentesis.  2. Complete resolution of left pleural effusion.  3. Moderate right pleural effusion.  4. Mildly increased pulmonary interstitial markings consistent  with edema.    Electronically signed by:  Marina Rios MD  3/21/2022 11:36 AM CDT  Workstation: [id number]    XR Chest 1 View [[id number]] Collected: 03/21/22 0448     Updated: 03/21/22 0729    Narrative:        PROCEDURE: Single chest view portable    REASON FOR EXAM:effusion follow-up, hypoxia, J96.21 Acute and  chronic respiratory failure with hypoxia J90 Pleural effusion,  not elsewhere classified N19 Unspecified kidney failure I50.9  Heart failure, unspecified Z74.09 Other reduced mobility Z74.09  Other reduced mobility Z78.9 Other specified health status    FINDINGS: Comparison exam dated March 18, 2022. Stable  postsurgical changes with median sternotomy. Cardiac size is  upper limits of normal. Bibasilar infrahilar small patchy  opacities. Lungs are otherwise clear. Small right pleural  effusion. Small to moderate left pleural effusion. No acute  osseous abnormality.      Impression:      1.  Bibasilar infrahilar small patchy opacities. This may  represent mild pulmonary edema versus pneumonia versus  subsegmental atelectasis. Recommend clinical correlation.  2.  Small right pleural effusion. Smaller to moderate left  pleural effusion.    Electronically signed by:  Moisés Frederick MD   3/21/2022 7:27 AM CDT  Workstation: [id number]           MEDICATIONS    apixaban, 2.5 mg, Oral, Q12H  aspirin, 81 mg, Oral, Daily  cefepime, 2 g, Intravenous, Q24H  ipratropium-albuterol, 3 mL, Nebulization, TID - RT  midodrine, 5 mg, Oral, BID AC  polyethylene glycol, 17 g, Oral, Daily  sodium chloride, 10 mL, Intravenous, Q12H  sodium zirconium cyclosilicate, 10 g, Oral, Daily      hold, 1 each  hold, 1 each  hold, 1 each  hold, 1 each  Pharmacy to Dose Cefepime,         Assessment/Plan   ASSESSMENT / PLAN      Acute on chronic respiratory failure with hypoxia (HCC)    Acute on chronic systolic CHF (congestive heart failure) (HCC)    Bilateral pleural effusion    CKD (chronic kidney disease) stage 4, GFR 15-29 ml/min (HCC)    Atrial fibrillation (HCC)    Chronic anemia    HCAP (healthcare-associated pneumonia)    1.  CKD 4- Baseline creatinine is around 1.7-1.8, creatinine peak at 2.07.      Now worsening effusion bilateral and was on iv lasix 40mg bid. Cr then worsening and now lasix on hold. Urine sodium 20. Terrence is negative. Bladder scan reported 50cc urine only.  DOMINIC- shows atrophic left kidney, right kidney no hydronephrosis, mass or stone. Medical renal disease    Discuss with patient about advance nature of ckd and need of dialysis in those situation - she doesn't want to go through dialysis and likes to go hospice at nursing home. She is tired and aware of poor prognosis. Discuss with 3 kids as well and all are on same page. Primary team is informed about hospice referral.      2.  Acute on chronic CHF / Bilateral pleural effusions- s/p thoracentesis bilateral. EF 35%. Thoracentesis again today     3.  Acute on chronic respiratory failure- secondary to underlying CHF as well as new onset atrial fibrillation and chronic pleural effusions.      4.  Atrial fibrillation- managed per cardiology and primary team     6.  Anemia-  fe low and on iv fe. hgb 8.3     7.  History of secondary  hyperparathyroidism     8.  History of CAD     9. Hyperkalemia better after lokelma. on lokelma 10g daily           This document has been electronically signed by Stevenson Robertson MD on March 21, 2022 16:42 CDT

## 2022-03-21 NOTE — PROGRESS NOTES
INTERVENTIONAL RADIOLOGY    88yo severely debilitated female PMH CAD s/p stent, severe CHF (EF 35%), CKD3, HLD, HTN, ischemic cardiomyopathy, emphysema ex-smoker admitted 3/7/22 with dyspnea and increased O2 demands (normally only wears 2L O2 qhs). She was found to have large left and moderate right pleural effusions and underwent L thora 1.2L on 3/11/22, L thora 1.5L 3/17/22, R thora 900mL 3/18/22. Since then her pleural fluid has reaccumulated somewhat bilaterally, with moderate left and small right pleural effusions on CXR this AM. She is currently developing anuric renal failure and discussions are ongoing regarding HD vs comfort care. IR consulted for left thora for comfort today.    On exam, weak, debilitated, speaks in short sentences.  Sonographic exam demonstrates moderate left pleural effusion.    Plan therapeutic left thoracentesis.    The patient's history and physical exam were reviewed, and no changes were noted. The risks, benefits, alternatives, and indications of the procedure were discussed with the patient, and all questions were answered. Informed consent was obtained.    Marina Rios M.D.  Vascular, Interventional and Wound Physician  IR Chief, [facility]  Cell: [phone] / Office: [phone]

## 2022-03-21 NOTE — BRIEF OP NOTE
INTERVENTIONAL RADIOLOGY: BRIEF OP NOTE    Pre-Procedure Diagnosis: symptomatic left pleural effusion  Post-Procedure Diagnosis: same  Procedure: Therapeutic Left Thoracentesis  Anesthesia: local  Staff: Marina Rios M.D.  EBL: <1mL  Specimens: none  Drains: none  Findings: Moderate left pleural effusion. Kelli fluid. 1.3L removed (no further fluid could be aspirated).  Complications: none    Marina Rios M.D.  Vascular, Interventional and Wound Physician  IR Chief, [facility]  Cell: [phone] / Office: [phone]

## 2022-03-21 NOTE — PLAN OF CARE
Problem: Adult Inpatient Plan of Care  Goal: Plan of Care Review  Outcome: Ongoing, Progressing  Flowsheets (Taken 3/21/2022 7310)  Progress: declining  Plan of Care Reviewed With: patient  Outcome Evaluation: Thoracentesis again today taking 1.3 liters off.  Spoke with Dr Dillon, decided that Hospice care was the best plan for patient.  Hospice currently at facility speaking to patient and family.  Awaiting final decision by hospice facility if pt is appropriate.  Continuing to monitor closely.   Goal Outcome Evaluation:  Plan of Care Reviewed With: patient        Progress: declining  Outcome Evaluation: Thoracentesis again today taking 1.3 liters off.  Spoke with Dr Dillon, decided that Hospice care was the best plan for patient.  Hospice currently at facility speaking to patient and family.  Awaiting final decision by hospice facility if pt is appropriate.  Continuing to monitor closely.

## 2022-03-21 NOTE — SIGNIFICANT NOTE
03/21/22 1440   OTHER   Discipline physical therapy assistant   Rehab Time/Intention   Session Not Performed patient/family declined, not feeling well  (Nsg. agreeable to attempt treatment as pt. agreeable, pt. defers PT treatment this p.m.)

## 2022-03-21 NOTE — PROGRESS NOTES
[facility]  INPATIENT PROGRESS NOTE    Length of Stay: 13  Date of Admission: 3/7/2022  Primary Care Physician: Sabino Mobley MD    Subjective   Chief Complaint: Shortness of breath  HPI:  87 year old female with a history of CAD, HFrEF, CKD3, HTN, HLD, GERD, and OA who presented with worsening shortness of breath and increased need for home O2.  She was noted to have bilateral pleural effusions. She was admitted on supplemental oxygen, IV lasix, and fluid restrictions.  She developed atrial fibrillation and was initiated on Eliquis.  Cardiology consulted and she was recommended thoracentesis. She underwent thoracentesis with 1.2 L of pleural fluid removed from the left. Nephrology was consulted due to worsening of creatinine from baseline. She has undergone thoracentesis of the left and right pleural spaces. Oxygen requirements have improved.  Renal function has continued to worsen. Today (3/21/22), she notes her shortness of breath is worse and she has increasing malaise.      Review of Systems   Constitutional: Positive for fatigue. Negative for chills and fever.   Respiratory: Positive for shortness of breath.    Cardiovascular: Negative for chest pain.   Gastrointestinal: Negative for abdominal pain and nausea.        All pertinent negatives and positives are as above. All other systems have been reviewed and are negative unless otherwise stated.     Objective    Temp:  [96.3 °F (35.7 °C)-98 °F (36.7 °C)] 98 °F (36.7 °C)  Heart Rate:  [] 102  Resp:  [16-24] 24  BP: (113-129)/(55-70) 129/65    Physical Exam  Vitals reviewed.   Constitutional:       Appearance: She is ill-appearing.   HENT:      Head: Normocephalic and atraumatic.   Cardiovascular:      Rate and Rhythm: Normal rate.   Pulmonary:      Effort: Pulmonary effort is normal. No respiratory distress.      Breath sounds: Examination of the right-middle field reveals  decreased breath sounds. Examination of the right-lower field reveals decreased breath sounds. Examination of the left-lower field reveals decreased breath sounds. Decreased breath sounds present.   Abdominal:      General: There is no distension.      Palpations: Abdomen is soft.   Musculoskeletal:         General: No deformity.      Right lower leg: No edema.      Left lower leg: No edema.   Skin:     General: Skin is warm and dry.   Neurological:      General: No focal deficit present.      Mental Status: She is alert.         Results Review:  I have reviewed the labs, radiology results, and diagnostic studies.    Laboratory Data:   Results from last 7 days   Lab Units 03/21/22  0526 03/20/22  0601 03/19/22  0645   SODIUM mmol/L 134* 131* 137   POTASSIUM mmol/L 5.1 4.9 5.3*   CHLORIDE mmol/L 90* 88* 92*   CO2 mmol/L 30.0* 32.0* 37.0*   BUN mg/dL 81* 76* 76*   CREATININE mg/dL 3.40* 3.30* 3.15*   GLUCOSE mg/dL 88 86 92   CALCIUM mg/dL 8.9 8.8 9.1   BILIRUBIN mg/dL 0.7 0.7 0.5   ALK PHOS U/L 54 59 69   ALT (SGPT) U/L 9 11 14   AST (SGOT) U/L 15 15 19   ANION GAP mmol/L 14.0 11.0 8.0     Estimated Creatinine Clearance: 8.9 mL/min (A) (by C-G formula based on SCr of 3.4 mg/dL (H)).          Results from last 7 days   Lab Units 03/21/22  0526 03/20/22  0601 03/19/22  0645 03/18/22  0555 03/17/22  0654   WBC 10*3/mm3 6.40 5.69 6.28 5.52 5.43   HEMOGLOBIN g/dL 8.9* 8.3* 8.8* 9.4* 10.6*   HEMATOCRIT % 28.7* 26.8* 29.4* 32.2* 36.9   PLATELETS 10*3/mm3 199 199 220 228 229           Culture Data:   No results found for: BLOODCX  No results found for: URINECX  No results found for: RESPCX  No results found for: WOUNDCX  No results found for: STOOLCX  No components found for: BODYFLD    Radiology Data:   Imaging Results (Last 24 Hours)     Procedure Component Value Units Date/Time    XR Chest 1 View [[id number]] Collected: 03/21/22 0448     Updated: 03/21/22 0729    Narrative:        PROCEDURE: Single chest view  portable    REASON FOR EXAM:effusion follow-up, hypoxia, J96.21 Acute and  chronic respiratory failure with hypoxia J90 Pleural effusion,  not elsewhere classified N19 Unspecified kidney failure I50.9  Heart failure, unspecified Z74.09 Other reduced mobility Z74.09  Other reduced mobility Z78.9 Other specified health status    FINDINGS: Comparison exam dated March 18, 2022. Stable  postsurgical changes with median sternotomy. Cardiac size is  upper limits of normal. Bibasilar infrahilar small patchy  opacities. Lungs are otherwise clear. Small right pleural  effusion. Small to moderate left pleural effusion. No acute  osseous abnormality.      Impression:      1.  Bibasilar infrahilar small patchy opacities. This may  represent mild pulmonary edema versus pneumonia versus  subsegmental atelectasis. Recommend clinical correlation.  2.  Small right pleural effusion. Smaller to moderate left  pleural effusion.    Electronically signed by:  Moisés Frederick MD  3/21/2022 7:27 AM CDT  Workstation: [id number]          I have reviewed the patient's current medications.     Assessment/Plan     Active Hospital Problems    Diagnosis    • **Acute on chronic respiratory failure with hypoxia (HCC)    • Acute on chronic systolic CHF (congestive heart failure) (HCC)    • HCAP (healthcare-associated pneumonia)    • Chronic anemia    • Bilateral pleural effusion    • CKD (chronic kidney disease) stage 4, GFR 15-29 ml/min (HCC)    • Atrial fibrillation (HCC)    Hyperkalemia    Plan:    S/P thoracentesis on 3/11, 3/17, and 3/18  Left sided thoracentesis planned for today, discussed with IR  Supplemental oxygen, wean as tolerated, weaned to 6 LPM  Off diuretics with worsening renal function  Albumin TID  Renal function continues to worsen, may require dialysis   Discussed with nephrology, Nephrology to discuss options further with family today  Strict I&O, fluid restriction, daily weights  Cardiology consultation appreciated  Nephrology  consultation appreciated  Lokelma 10 g daily for hyperkalemia  PT/OT  VTE PPx: Eliquis  DNR/DNI    I confirmed that the patient's Advance Care Plan is present, code status is documented, or surrogate decision maker is listed in the patient's medical record.     The patient was evaluated during the global COVID-19 pandemic, and the diagnosis was suspected/considered upon their initial presentation.  Evaluation, treatment, and testing were consistent with current guidelines for patients who present with complaints or symptoms that may be related to COVID-19.          This document has been electronically signed by CHANA Johnson on March 21, 2022 10:06 CDT

## 2022-03-22 PROBLEM — N17.9 ACUTE RENAL FAILURE (ARF) (HCC): Status: ACTIVE | Noted: 2022-01-01

## 2022-03-22 NOTE — DISCHARGE SUMMARY
[facility] Medicine Services  DISCHARGE SUMMARY       Date of Admission: 3/7/2022  Date of Discharge:  3/21/2022  Primary Care Physician: Sabino Mobley MD    Presenting Problem/History of Present Illness:  Pleural effusion [J90]  Acute on chronic respiratory failure with hypoxia (HCC) [J96.21]  Renal failure, unspecified chronicity [N19]  Acute on chronic congestive heart failure, unspecified heart failure type (HCC) [I50.9]       Final Discharge Diagnoses:    Acute on chronic respiratory failure with hypoxia (HCC)    Acute on chronic systolic CHF (congestive heart failure) (HCC)    Acute renal failure (ARF) (HCC)    Bilateral pleural effusion    CKD (chronic kidney disease) stage 4, GFR 15-29 ml/min (HCC)    Atrial fibrillation (HCC)    Chronic anemia    HCAP (healthcare-associated pneumonia)      Consults:   Consults     Date and Time Order Name Status Description    3/9/2022  2:54 PM Inpatient Nephrology Consult Completed     3/9/2022 10:02 AM Inpatient Cardiology Consult Completed           Pertinent Test Results:   Adult Transthoracic Echo Complete w/ Color, Spectral and Contrast if necessary per protocol    Result Date: 3/7/2022  · Estimated left ventricular EF = 35% Left ventricular ejection fraction appears to be 31 - 35%. Left ventricular systolic function is moderately decreased. The left ventricular cavity is borderline dilated. Left ventricular wall thickness is consistent with mild concentric hypertrophy. There is left ventricular global hypokinesis noted. Left ventricular diastolic function is consistent with (grade II w/high LAP) pseudonormalization. · The left atrial cavity is moderately dilated. · Moderate mitral valve regurgitation is present with an eccentric jet noted. · Moderate tricuspid valve regurgitation is present. · Estimated right ventricular systolic pressure from tricuspid regurgitation is moderately elevated (45-55 mmHg). ·  Borderline dilation of the aortic root is present (3.8 cms)      CT Chest Without Contrast Diagnostic    Result Date: 3/17/2022  CT CHEST WITHOUT CONTRAST INDICATION: pleural effusion, hypoxia worsening, J96.21 Acute and chronic respiratory failure with hypoxia J90 Pleural effusion, not elsewhere classified N19 Unspecified kidney failure I50.9 Heart failure, unspecified Z74.09 Other reduced mobility Z74.09 Other reduced mobility Z78.9 Other specified health status COMPARISON: None TECHNIQUE: Spiral CT images were obtained of the thorax with multiplanar reconstructions. FINDINGS: The study is limited by motion artifact. AIRWAYS: Patent MEDIASTINUM: Severe vascular calcifications. Severe calcific coronary atherosclerosis. Cardiomegaly. LUNGS: There is a 2.6 cm band like opacity extending to the pleura at the right pulmonary apex which could represent scarring in this patient with centrilobular emphysema. There are scattered groundglass and mosaic opacities throughout the aerated portions of the right lung which can be seen in small airway infection and inflammation or edema. There is mild peribronchial cuffing at the right lung apex. Within the aerated portions of the left lung, there is mosaic attenuation and groundglass opacity that is patchy suggestive of interstitial process such as edema. There is a large left and moderate right pleural effusion. UPPER ABDOMEN: Trace perihepatic ascites. Suprarenal abdominal aortic aneurysm measuring 3.2 cm. Left kidney atretic. Left adrenal adenoma measuring 1.7 cm. Inferior vena cava is enlarged suggesting elevated right heart pressures. SOFT TISSUES: Unremarkable OSSEOUS STRUCTURES: Sternotomy. L2 compression fracture There is severe kyphotic deformity. Left carotid stent. Right carotid stent.     1. Right apical bandlike opacity measuring approximately 2.6 cm which could represent scarring in this patient with centrilobular emphysema. 2. Scattered groundglass and mosaic  opacities throughout aerated portions of both lungs which can be seen in small airway infection/inflammation or edema. 3. Mild peribronchial cuffing at right lung apex which can be seen in airway infection or inflammation. 4. Large left pleural effusion. 5. Moderate right pleural effusion. 6. Severe vascular calcifications including severe calcific coronary atherosclerosis. 7. Suprarenal abdominal aortic aneurysm measuring 3.2 cm. 8. Cardiomegaly. 9. Enlarged inferior vena cava suggesting elevated right heart pressures. 10. Trace perihepatic ascites. 11. Left adrenal adenoma measuring 1.7 cm. 12. L2 compression fracture. 13. Severe kyphotic deformity. 14. Bilateral carotid artery stents. Electronically signed by:  Marina Rios MD  3/17/2022 4:30 PM CDT Workstation: [id number]    XR Chest 1 View    Result Date: 3/21/2022  PORTABLE CHEST RADIOGRAPH HISTORY: post left thoracentesis, J96.21 Acute and chronic respiratory failure with hypoxia J90 Pleural effusion, not elsewhere classified N19 Unspecified kidney failure I50.9 Heart failure, unspecified Z74.09 Other reduced mobility Z74.09 Other reduced mobility Z78.9 Other specified health status TECHNIQUE: Single AP view of the chest. COMPARISON: Chest radiograph from 5:00 AM on the same day. FINDINGS: Upper abdomen is difficult to visualize due to the patient's severe kyphotic deformity and positioning. Visualized soft tissues appear unremarkable. Visualized osseous structures demonstrates severe degenerative change. There are bilateral carotid artery stents. Severe aortic atherosclerosis. There is no pneumothorax. Left pleural effusion is completely resolved status post left thoracentesis. There is a persistent moderate right pleural effusion. The aerated lungs demonstrate mildly increased interstitial markings compatible with edema. 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     1. No pneumothorax status post left thoracentesis. 2. Complete resolution of left pleural  effusion. 3. Moderate right pleural effusion. 4. Mildly increased pulmonary interstitial markings consistent with edema. Electronically signed by:  Marina Rios MD  3/21/2022 11:36 AM CDT Workstation: [id number]    XR Chest 1 View    Result Date: 3/21/2022  PROCEDURE: Single chest view portable REASON FOR EXAM:effusion follow-up, hypoxia, J96.21 Acute and chronic respiratory failure with hypoxia J90 Pleural effusion, not elsewhere classified N19 Unspecified kidney failure I50.9 Heart failure, unspecified Z74.09 Other reduced mobility Z74.09 Other reduced mobility Z78.9 Other specified health status FINDINGS: Comparison exam dated March 18, 2022. Stable postsurgical changes with median sternotomy. Cardiac size is upper limits of normal. Bibasilar infrahilar small patchy opacities. Lungs are otherwise clear. Small right pleural effusion. Small to moderate left pleural effusion. No acute osseous abnormality.     1.  Bibasilar infrahilar small patchy opacities. This may represent mild pulmonary edema versus pneumonia versus subsegmental atelectasis. Recommend clinical correlation. 2.  Small right pleural effusion. Smaller to moderate left pleural effusion. Electronically signed by:  Moisés Frederick MD  3/21/2022 7:27 AM CDT Workstation: [id number]    XR Chest 1 View    Result Date: 3/18/2022  PORTABLE CHEST RADIOGRAPH HISTORY: Post Thoracentesis, J96.21 Acute and chronic respiratory failure with hypoxia J90 Pleural effusion, not elsewhere classified N19 Unspecified kidney failure I50.9 Heart failure, unspecified Z74.09 Other reduced mobility Z74.09 Other reduced mobility Z78.9 Other specified health status TECHNIQUE: Single AP view of the chest. COMPARISON: Chest radiograph 5:18 AM. FINDINGS: Upper abdomen is difficult to visualize due to the patient's severe kyphotic deformity and positioning. Visualized soft tissues appear unremarkable. Visualized osseous structures demonstrates severe degenerative change. There is a  left carotid artery stent. Clips in the left upper abdomen. Severe aortic atherosclerosis. The right pleural effusion has decreased status post thoracentesis with trace right residual pleural fluid or pleural thickening. There is no pneumothorax. There is a persistent left moderate pleural effusion. The aerated lungs demonstrate mildly increased interstitial markings compatible with edema. 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     1. No right pneumothorax status post right thoracentesis. 2. Significantly improved right pleural effusion with trace residual right pleural fluid or pleural thickening. 3. Moderate left pleural effusion. 4. Increased interstitial markings which can be seen in pulmonary edema. Electronically signed by:  Marina Rios MD  3/18/2022 12:46 PM CDT Workstation: [id number]    XR Chest 1 View    Result Date: 3/18/2022  PROCEDURE: Portable chest x-ray TECHNIQUE: Single frontal view of the chest COMPARISON: 3/17/2022 HISTORY: effusion follow-up s/p thoracentesis, J96.21 Acute and chronic respiratory failure with hypoxia J90 Pleural effusion, not elsewhere classified N19 Unspecified kidney failure I50.9 Heart failure, unspecified Z74.09 Other reduced mobility Z74.09 Other reduced mobility Z78.9 Other specified health status FINDINGS: Moderate size bilateral pleural effusions. The effusion on the right shows a slight increase in size compared with yesterday's exam. Right lung base airspace opacification suspicious for atelectasis or pneumonia. Pulmonary vascular prominence noted in the upper portions of both lungs. No pneumothorax visualized. Heart, hilar, and mediastinal structures appear stable accounting for differences in projection and technique.     Moderate size bilateral pleural effusions. The effusion on the right shows a slight increase in size compared to yesterday's exam. Right lung base airspace opacification suspicious for atelectasis or pneumonia. Pulmonary vascular prominence noted  in the upper portions of both lungs. No pneumothorax visualized. Electronically signed by:  Surendra Carter MD  3/18/2022 7:39 AM CDT Workstation: [id number]    XR Chest 1 View    Result Date: 3/17/2022  PORTABLE CHEST RADIOGRAPH HISTORY: post thora, J96.21 Acute and chronic respiratory failure with hypoxia J90 Pleural effusion, not elsewhere classified N19 Unspecified kidney failure I50.9 Heart failure, unspecified Z74.09 Other reduced mobility Z74.09 Other reduced mobility Z78.9 Other specified health status TECHNIQUE: Single AP view of the chest. COMPARISON: Chest radiograph March 16, 2022 FINDINGS: Upper abdomen is difficult to visualize due to the patient's severe kyphotic deformity and positioning. Visualized soft tissues appear unremarkable. Visualized osseous structures demonstrates severe degenerative change. There is a left carotid artery stent. Clips in the left upper abdomen. Severe aortic atherosclerosis. There are moderate bilateral pleural effusions. The left pleural effusion has decreased status post left thoracentesis. There is no pneumothorax. The aerated lungs demonstrate mildly increased interstitial markings compatible with edema however this is improved since the prior study. 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     1. No left pneumothorax status post left thoracentesis. 2. Decreased left pleural effusion, now moderate. 3. Moderate right pleural effusion. 4. Mild improvement in pulmonary interstitial markings consistent with edema. 5. Left carotid artery stent. 6. CABG. 7. Severe aortic atherosclerosis. Electronically signed by:  Marina Rios MD  3/17/2022 10:54 AM CDT Workstation: [id number]    XR Chest 1 View    Result Date: 3/16/2022  EXAM: XR CHEST 1 VIEW COMPARISONS: Radiograph 3/14/2022 INDICATION: assess for thoracentesis need, J96.21 Acute and chronic respiratory failure with hypoxia J90 Pleural effusion, not elsewhere classified N19 Unspecified kidney failure I50.9 Heart failure,  unspecified Z74.09 Other reduced mobility Z74.09 Other reduced mobility Z78.9 Other specified health status FINDINGS: Frontal view of the chest. No change in dense opacification of the bilateral mid to lower lung, left greater than right. No visible pneumothorax. Bilateral interstitial thickening. Atherosclerotic and tortuous aorta. No acute osseous abnormality. Sternotomy wires are again noted.     No significant change in dense opacification of the bilateral mid to lower lungs, left greater than right. This would be consistent with large bilateral effusions and atelectasis. Concurrent airspace disease/pneumonia not excluded. Electronically signed by:  Shyam Worrell MD  3/16/2022 9:43 AM CDT Workstation: [id number]    XR Chest 1 View    Result Date: 3/11/2022  PROCEDURE: Portable chest x-ray TECHNIQUE: Single frontal view of the chest COMPARISON: Chest x-ray 3/8/2022. HISTORY: post thora, J96.21 Acute and chronic respiratory failure with hypoxia J90 Pleural effusion, not elsewhere classified N19 Unspecified kidney failure I50.9 Heart failure, unspecified Z74.09 Other reduced mobility Z74.09 Other reduced mobility Z78.9 Other specified health status FINDINGS: Interval removal of 1.2 L of pleural fluid from the left chest. No pneumothorax. There is a small residual left pleural effusion. There is a small to moderate right pleural effusion which appears unchanged. There is mild pulmonary vascular congestion and coarse interstitial markings similar to the previous exam. Heart, hilar, and mediastinal structures appear stable. Suture wires appear stable.     Interval left thoracentesis. Small residual left pleural effusion. Persistent moderate right pleural effusion without significant change. No pneumothorax. Electronically signed by:  Surendra Carter MD  3/11/2022 10:46 AM CST Workstation: [id number]    XR Chest 1 View    Result Date: 3/8/2022  Upright portable chest March 8, 2022 INDICATION: Increased oxygen  requirement FINDINGS: Large left pleural effusion with small to moderate right effusion. Significant basilar volume loss/consolidation resulting left greater than right. Improved fluid status compared with yesterday. Some persistent pulmonary vascular prominence present. No pneumothorax.     Some improvement and fluid status compared with yesterday. Significant residual pleural effusions and basilar volume loss/consolidation left greater than right. No new abnormality. Electronically signed by:  Gael Caballero MD  3/8/2022 7:59 AM CST Workstation: [id number]    XR Chest 1 View    Result Date: 3/7/2022  EXAM:   XR Chest, 1 View CLINICAL HISTORY:   The patient is 87 years old and is Female; SOA Triage Protocol TECHNIQUE:   Frontal view of the chest. COMPARISON:   XR Chest dated July 1 2021 FINDINGS:   LUNGS:  Coarsened interstitial markings are present throughout the lungs with associated bilateral lower lobe infiltrates.   PLEURAL SPACE:  Moderate to large left pleural effusion and small right pleural effusion are present.  No pneumothorax.   HEART:  The cardiac silhouette is obscured.   MEDIASTINUM:  Unremarkable.   BONES/JOINTS:  There are degenerative changes of the bones. Median sternotomy is present.   VASCULATURE:  Atherosclerosis of the aorta is noted.   UPPER ABDOMEN:  Unremarkable as visualized.       Bilateral pleural effusions, left greater right with likely associated atelectasis. Electronically signed by:  Tish Avalos MD  3/7/2022 6:12 AM CST Workstation: [id number]    US Renal Bilateral    Result Date: 3/18/2022  Ultrasound renal complete HISTORY:  Right kidney disease stage III B. Acute renal failure. Ultrasound examination of the kidneys and urinary bladder was performed. COMPARISON: November 17, 2013 FINDINGS: The right kidney measures 8.05 cm in length by 4.47 cm x 3.94 cm transverse. Right renal volume 74.26 mL. Atrophic left kidney demonstrated on CT chest March 17, 2022 is not visualized on  this ultrasound. Increased cortical echogenicity right kidney, compatible with medical renal disease. No hydronephrosis. No solid or cystic renal mass. Images of the urinary bladder are unremarkable.     CONCLUSION: Right renal volume 74.26 mL. Atrophic left kidney demonstrated on CT chest March 17, 2022 is not visualized on this ultrasound. Increased cortical echogenicity right kidney, compatible with medical renal disease. No hydronephrosis. [id number] Electronically signed by:  Maco Kim MD  3/18/2022 5:02 PM CDT Workstation: [id number]    US Thoracentesis    Result Date: 3/21/2022  THERAPEUTIC THORACENTESIS STAFF: Marina Rios M.D. INDICATION: Symptomatic left pleural effusion. MEDICATIONS: Local anesthetic. PROCEDURE: Using ultrasound guidance, the left pleural cavity was punctured and a catheter placed. A total of 1.3 liters of josefina fluid was removed. No further fluid could be removed. The catheter was then removed. A post procedure chest radiograph was obtained. FINDINGS: Moderate left pleural effusion. EBL: Less than 1 mL COMPLICATIONS: None     Successful therapeutic left thoracentesis. Electronically signed by:  Marina Rios MD  3/21/2022 11:53 AM CDT Workstation: [id number]    US Thoracentesis    Result Date: 3/18/2022  DIAGNOSTIC AND THERAPEUTIC THORACENTESIS STAFF: Marina Rios M.D. INDICATION: Symptomatic right pleural effusion of unclear etiology. MEDICATIONS: Local anesthetic. PROCEDURE: Using ultrasound guidance, the right pleural cavity was punctured and a catheter placed. A total of 900mL of josefina fluid was removed. No further fluid could be aspirated. The catheter was then removed. A post procedure chest radiograph was obtained. FINDINGS: Moderate right pleural effusion. EBL: None COMPLICATIONS: None     Successful diagnostic and therapeutic right thoracentesis. Electronically signed by:  Marina Rios MD  3/18/2022 2:05 PM CDT Workstation: [id number]    US Thoracentesis    Result Date: 3/17/2022  DIAGNOSTIC  AND THERAPEUTIC THORACENTESIS STAFF: Marina Rios M.D. INDICATION: Symptomatic left pleural effusion. MEDICATIONS: Local anesthetic. PROCEDURE: Using ultrasound guidance, the left pleural cavity was punctured and a catheter placed. A total of 1.5 liters of josefina fluid was removed. The catheter was then removed. A post procedure chest radiograph was obtained. FINDINGS: Large left pleural effusion, incompletely drained. EBL: None COMPLICATIONS: None     Successful diagnostic and therapeutic left thoracentesis. Electronically signed by:  Marina Rios MD  3/17/2022 1:20 PM CDT Workstation: [id number]    US Thoracentesis    Result Date: 3/11/2022  PROCEDURE: Ultrasound guided thoracentesis HISTORY:  Left pleural effusion COMPARISON:  Chest x-ray dated 3/8/2022. TECHNIQUE:  Informed consent was obtained following a discussion of the procedure with the patient, with benefits, alternatives, and with risks explained as possible bleeding, infection, damage to adjacent organs and pneumothorax which would require a chest tube. An appropriate site was marked in the posterolateral left hemithorax. Following initial imaging for localization of an optimal site of intervention, the skin was prepped and draped in the usual sterile fashion.  10 mL of lidocaine was used for local anesthesia. A 19-gauge Yueh needle and catheter was inserted into the pleural space under continuous ultrasound guidance. The needle was removed and fluid was collected into a vacuum bottle. FINDINGS: Approximately 1200 mL of serous fluid was collected and submitted for laboratory evaluation, as requested. Blood loss: None. The patient tolerated the procedure well and left the department in stable condition. No immediate post procedure complications.     CONCLUSION: Successful ultrasound-guided thoracentesis with removal of 1200 mL of serous fluid, as described above. Electronically signed by:  Surendra Carter MD  3/11/2022 10:44 AM CST Workstation:  [id number]    XR Chest PA & Lateral    Result Date: 3/14/2022  Chest x-ray PA and lateral CLINICAL INDICATION: Shortness of breath. Follow-up COMPARISON: Chest March 11, 2022. FINDINGS: Cardiac silhouette is enlarged in size. Pulmonary vascularity is unremarkable. Midline sternal sutures from prior cardiac surgery. Bilateral pleural effusions and basilar infiltrative changes. Stable on the right. Increasing on the left.     Cardiomegaly. Midline sternal sutures from prior cardiac surgery. Bilateral pleural effusions and underlying basilar infiltrative changes, increasing on the left since prior exam. Electronically signed by:  Andrae Garza MD  3/14/2022 9:47 AM CDT Workstation: [id number]      HPI/Hospital Course:  The patient is a 87 y.o. female with a history of CAD, HFrEF, CKD3, HTN, HLD, GERD, and OA who presented to [facility] with  worsening shortness of breath and increased need for home O2.  She was noted to have bilateral pleural effusions. She was admitted on supplemental oxygen, IV lasix, and fluid restrictions.  She developed atrial fibrillation and was initiated on Eliquis.  Cardiology consulted and she was recommended thoracentesis.  She underwent multiple thoracentesis with improvement in oxygen requirement.  She developed worsening creatinine and nephrology was consulted.  She would require dialysis, but after discussion with patient and family, she elected for hospice.  Hospice consutled and she was accepted to [facility].  She was discharged in stable condition for transport.     Condition on Discharge:  Stable for transport.  Hospice.     Physical Exam on Discharge:  /55 (BP Location: Right arm, Patient Position: Lying)   Pulse 103   Temp 97.7 °F (36.5 °C) (Axillary)   Resp 18   Ht 157.5 cm (62.01\")   Wt 46.7 kg (103 lb)   SpO2 92%   BMI 18.83 kg/m²   Physical Exam  Vitals reviewed.   Constitutional:       Appearance: She is ill-appearing.   HENT:      Head: " Normocephalic and atraumatic.   Cardiovascular:      Rate and Rhythm: Normal rate.   Pulmonary:      Effort: Pulmonary effort is normal. No respiratory distress.      Breath sounds: Examination of the right-middle field reveals decreased breath sounds. Examination of the right-lower field reveals decreased breath sounds. Examination of the left-lower field reveals decreased breath sounds. Decreased breath sounds present.   Abdominal:      General: There is no distension.      Palpations: Abdomen is soft.   Musculoskeletal:         General: No deformity.      Right lower leg: No edema.      Left lower leg: No edema.   Skin:     General: Skin is warm and dry.   Neurological:      General: No focal deficit present.      Mental Status: She is alert.     Discharge Disposition:  Hospice/Medical Facility (DC - External)    Discharge Medications:     Discharge Medications      Stop These Medications    acetaminophen 325 MG tablet  Commonly known as: TYLENOL     albuterol sulfate  (90 Base) MCG/ACT inhaler  Commonly known as: PROVENTIL HFA;VENTOLIN HFA;PROAIR HFA     aspirin 81 MG chewable tablet     atorvastatin 20 MG tablet  Commonly known as: LIPITOR     carvedilol 12.5 MG tablet  Commonly known as: Coreg     clopidogrel 75 MG tablet  Commonly known as: PLAVIX     furosemide 40 MG tablet  Commonly known as: LASIX     irbesartan 75 MG tablet  Commonly known as: AVAPRO     sennosides-docusate 8.6-50 MG per tablet  Commonly known as: PERICOLACE            Discharge Diet:   Diet Instructions     Diet: Regular      Discharge Diet: Regular          Activity at Discharge:   Activity Instructions     Activity as Tolerated            Discharge Care Plan/Instructions: Transfer to Hospice at [facility]    Follow-up Appointments:   Future Appointments   Date Time Provider Department Center   4/25/2022 11:15 AM Kalee Cisneros MD [facility] CD [facility] None   7/6/2022  1:30 PM [facility] ROOM [facility]   7/6/2022   2:00 PM Ryan Dubois, APRN [facility] None       Test Results Pending at Discharge:   Pending Labs     Order Current Status    Non-gynecologic Cytology In process    Body Fluid Culture - Body Fluid, Pleural Cavity Preliminary result          CHANA Johnson  03/22/22  11:45 CDT    Time: Discharge planning and teaching took greater than 30 minutes.

## (undated) DEVICE — SOL IRR NACL 0.9PCT BT 1000ML

## (undated) DEVICE — GOWN,PREVENTION PLUS,XLARGE,STERILE: Brand: MEDLINE

## (undated) DEVICE — SYR SLP TP 10ML DISP

## (undated) DEVICE — PRESTO™ INFLATION DEVICE: Brand: PRESTO

## (undated) DEVICE — PK VASC LF 60

## (undated) DEVICE — STERILE POLYISOPRENE POWDER-FREE SURGICAL GLOVES: Brand: PROTEXIS

## (undated) DEVICE — GLV SURG SENSICARE PI LF PF 7.5 GRN STRL

## (undated) DEVICE — GAUZE,SPONGE,4"X4",16PLY,XRAY,STRL,LF: Brand: MEDLINE

## (undated) DEVICE — SUT PROLENE CARDIO C1D 6/0 24IN 8726H

## (undated) DEVICE — SUT SILK 2/0 FS BLK 18IN 685G

## (undated) DEVICE — STERILE POLYISOPRENE POWDER-FREE SURGICAL GLOVES WITH EMOLLIENT COATING: Brand: PROTEXIS

## (undated) DEVICE — BNDG COBAN S/ADHR WRP LF 1IN 5YD TN PK/5

## (undated) DEVICE — ANGLED-TIP ARTERIAL SHEATH CONFIGURATION: Brand: ENROUTE TRANSCAROTID NEUROPROTECTION SYSTEM

## (undated) DEVICE — PACK,LAPAROTOMY,NO GOWNS: Brand: MEDLINE

## (undated) DEVICE — GLV RAD REDUC ESP SZ8

## (undated) DEVICE — PTA BALLOON DILATATION CATHETER: Brand: STERLING™

## (undated) DEVICE — GLV SURG SENSICARE PI PF LF 7 GRN STRL

## (undated) DEVICE — TOTAL TRAY, 16FR 10ML SIL FOLEY, URN: Brand: MEDLINE

## (undated) DEVICE — SPNG LAP 18X18IN LF STRL PK/5

## (undated) DEVICE — KT INTRO MINISTICK MAX W/GW PALLADIUM ECHO 4F 21G 7CM

## (undated) DEVICE — TOWEL,OR,DSP,ST,BLUE,DLX,4/PK,20PK/CS: Brand: MEDLINE

## (undated) DEVICE — SYS SKIN CLS DERMABOND PRINEO W/22CM MESH TP

## (undated) DEVICE — DRSNG SURESITE WNDW 4X4.5

## (undated) DEVICE — PACK,UNIVERSAL,NO GOWNS: Brand: MEDLINE

## (undated) DEVICE — 3M™ IOBAN™ 2 ANTIMICROBIAL INCISE DRAPE 6651EZ: Brand: IOBAN™ 2

## (undated) DEVICE — RADIFOCUS GLIDEWIRE: Brand: GLIDEWIRE

## (undated) DEVICE — DRSNG TELFA PAD NONADH STR 1S 3X8IN

## (undated) DEVICE — GLV SURG SENSICARE PI ORTHO PF SZ7 LF STRL

## (undated) DEVICE — SYR LUERLOK 50ML

## (undated) DEVICE — ST CVR PROB PULLUP ULTRASND 5X48IN

## (undated) DEVICE — GOWN,SIRUS,NON REINFRCD XL XLONG: Brand: MEDLINE

## (undated) DEVICE — DECANT BG O JET

## (undated) DEVICE — SUT VICRYL 3-0 SH-1 PO 18IN J772D

## (undated) DEVICE — KT INTRO MIC TROC 4F 21GA 7CM

## (undated) DEVICE — INTRO SHEATH ULTIMUM ACT 5F

## (undated) DEVICE — SUT MONOCRYL 4/0 PS2 27IN Y426H ETY426H

## (undated) DEVICE — GW ENROUTE HC .014IN 95CM

## (undated) DEVICE — SYR LUERLOK 20CC BX/50

## (undated) DEVICE — SYR LL TP 10ML STRL